# Patient Record
Sex: MALE | Race: WHITE | NOT HISPANIC OR LATINO | Employment: FULL TIME | URBAN - METROPOLITAN AREA
[De-identification: names, ages, dates, MRNs, and addresses within clinical notes are randomized per-mention and may not be internally consistent; named-entity substitution may affect disease eponyms.]

---

## 2017-09-01 ENCOUNTER — TELEPHONE (OUTPATIENT)
Dept: ONCOLOGY | Facility: CLINIC | Age: 37
End: 2017-09-01

## 2017-09-01 NOTE — TELEPHONE ENCOUNTER
Spoke with David Pollack, patients father.  Per Dr. Henderson, patient needs to be seen by Dr. Henderson at least once per year in order for Testosterone to be refilled.  Dr. Henderson authed 1 month refill and requested patient to make appt.  David Donovanann marie stated he understood and would relay message to his son.    [facility]  [address] [place] [id number]  Phone# [phone]  Fax# [phone]    1 month supply:  CMPD Testosterone 10% 100mg/MI  Apply 1 milliliter (4 clicks) as directed each day.    This is a Schedule CIII-V Drug.  Must be verbally called or signed order faxed.

## 2017-10-04 ENCOUNTER — TELEPHONE (OUTPATIENT)
Dept: ONCOLOGY | Facility: CLINIC | Age: 37
End: 2017-10-04

## 2017-10-04 NOTE — TELEPHONE ENCOUNTER
----- Message from Natalia Watson sent at 10/4/2017  9:21 AM EDT -----  Regarding: [facility] - RX REFILL   Contact: [phone]  PATIENT NEEDS REFILL ON TESTOSTERONE.      PHARMACY: [facility] IN [place]

## 2017-10-04 NOTE — TELEPHONE ENCOUNTER
Called, no answer- left VM. Patient was supposed to have called and set up an apt to see Dr. Henderson. No one has been in touch since 9-1-17. I asked that they call and schedule an apt. Until then no additional refills will be authorized.

## 2017-10-05 ENCOUNTER — OFFICE VISIT (OUTPATIENT)
Dept: ONCOLOGY | Facility: CLINIC | Age: 37
End: 2017-10-05

## 2017-10-05 VITALS
SYSTOLIC BLOOD PRESSURE: 127 MMHG | WEIGHT: 202 LBS | HEART RATE: 90 BPM | TEMPERATURE: 97.2 F | BODY MASS INDEX: 28.28 KG/M2 | RESPIRATION RATE: 14 BRPM | HEIGHT: 71 IN | DIASTOLIC BLOOD PRESSURE: 80 MMHG

## 2017-10-05 DIAGNOSIS — F43.10 PTSD (POST-TRAUMATIC STRESS DISORDER): ICD-10-CM

## 2017-10-05 DIAGNOSIS — E29.1 HYPOGONADISM, MALE: Primary | ICD-10-CM

## 2017-10-05 PROBLEM — C85.90 LYMPHOMA: Status: ACTIVE | Noted: 2017-10-05

## 2017-10-05 PROBLEM — H92.01 RIGHT EAR PAIN: Status: ACTIVE | Noted: 2017-10-05

## 2017-10-05 PROBLEM — Z72.0 TOBACCO USE: Status: ACTIVE | Noted: 2017-10-05

## 2017-10-05 PROBLEM — K21.9 GERD (GASTROESOPHAGEAL REFLUX DISEASE): Status: ACTIVE | Noted: 2017-10-05

## 2017-10-05 PROBLEM — T78.40XA ALLERGY: Status: ACTIVE | Noted: 2017-10-05

## 2017-10-05 PROCEDURE — 99213 OFFICE O/P EST LOW 20 MIN: CPT | Performed by: INTERNAL MEDICINE

## 2017-10-05 RX ORDER — TESTOSTERONE 10 MG/.5G
100 GEL, METERED TOPICAL DAILY
Qty: 60 G | Refills: 5 | OUTPATIENT
Start: 2017-10-05 | End: 2018-06-21 | Stop reason: SDUPTHER

## 2017-10-05 NOTE — PROGRESS NOTES
Chief Complaint   Follow-up post chemotherapy related hypogonadism.  History of Hodgkin's lymphoma diagnosed in 1999, alternately treated with high-dose therapy with auto transplant at that time.  PTSD  PROBLEM LIST   Patient Active Problem List   Diagnosis   • Hypogonadism, male   • Lymphoma   • Tobacco use   • GERD (gastroesophageal reflux disease)   • Right ear pain   • Allergy   • PTSD (post-traumatic stress disorder)       HISTORY OF PRESENT ILLNESS:   First time in that I've seen the patient in about a year and a half.  I diagnosed as hypogonadism and started him on topical testosterone at that time which has been effective.  His PTSD has been under good control.  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    Jamar is living with his girlfriend in [place].  He reports that his health has been good although, he's had some bowel issues recently which led to a colonoscopy which was apparently relatively benign last week.    Past Medical History, Past Surgical History, Social History, Family History have been reviewed and are without significant changes except as mentioned.      Medications:      Current Outpatient Prescriptions:   •  Testosterone 10 MG/ACT (2%) gel, Apply 100 mL topically Daily. Apply 1 ml 4 clicksas directed each day, Disp: 60 g, Rfl: 5    ALLERGIES:    Allergies   Allergen Reactions   • Bactrim [Sulfamethoxazole-Trimethoprim]      Skin rash   • Biaxin [Clarithromycin]        ROS:  Review of Systems   Constitutional: Negative for activity change and appetite change.        Energy level has been good   HENT: Negative for mouth sores, sinus pressure and voice change.    Eyes: Negative for visual disturbance.   Respiratory: Negative for shortness of breath.    Cardiovascular: Negative for chest pain.   Gastrointestinal: Negative for abdominal pain and vomiting.   Genitourinary: Negative for dysuria.   Musculoskeletal: Negative for  "arthralgias and myalgias.   Skin: Negative for color change.   Neurological: Negative for dizziness, syncope and headaches.   Hematological: Negative for adenopathy.   Psychiatric/Behavioral: Negative for confusion, sleep disturbance and suicidal ideas. The patient is not nervous/anxious.         Anxiety remains an issue but it's been relatively quiet for 4 months or so           Objective:    /80  Pulse 90  Temp 97.2 °F (36.2 °C)  Resp 14  Ht 71\" (180.3 cm)  Wt 202 lb (91.6 kg)  BMI 28.17 kg/m2    Physical Exam   Constitutional: He is oriented to person, place, and time. He appears well-developed and well-nourished. No distress.   Alert healthy plenty of male pattern body hair as well as a full beard   HENT:   Head: Normocephalic.   Mouth/Throat: Oropharynx is clear and moist.   Eyes: Conjunctivae and EOM are normal. No scleral icterus.   Neck: Normal range of motion. Neck supple. No thyromegaly present.   Cardiovascular: Normal rate, regular rhythm and normal heart sounds.    No murmur heard.  Pulmonary/Chest: Effort normal and breath sounds normal. He has no wheezes. He has no rales.   Abdominal: Soft. Bowel sounds are normal. He exhibits no distension and no mass. There is no tenderness.   Genitourinary:   Genitourinary Comments: Testis are a bit small-unchanged   Musculoskeletal: He exhibits no edema or tenderness.   Lymphadenopathy:     He has no cervical adenopathy.   Neurological: He is alert and oriented to person, place, and time. He displays normal reflexes. No cranial nerve deficit.   Skin: Skin is warm and dry. No rash noted.   Psychiatric: He has a normal mood and affect. Judgment normal.   Vitals reviewed.             RECENT LABS:  Hematology No results found for: WBC, HGB, HCT, MCV, RDW, MPV, PLT, AUTOIGPER, NEUTROABS, LYMPHSABS, MONOSABS, EOSABS, BASOSABS, AUTOIGNUM, NRBC    Albumin   Date Value Ref Range Status   12/30/2014 4.6 3.2 - 5.6 g/dL Final     A/G Ratio   Date Value Ref Range " Status   12/30/2014 1.7 0.7 - 2.0 Final          Perf Status: 0    Assessment/Plan    Diagnoses and all orders for this visit:    Hypogonadism, male    PTSD (post-traumatic stress disorder)      Overall, the patient is doing well.  We will renew his topical testosterone.  I asked him to make an appointment to return in about a year.      Alec Henderson MD , 10/5/2017    CC:

## 2018-03-02 ENCOUNTER — APPOINTMENT (OUTPATIENT)
Dept: CT IMAGING | Facility: HOSPITAL | Age: 38
End: 2018-03-02

## 2018-03-02 ENCOUNTER — HOSPITAL ENCOUNTER (EMERGENCY)
Facility: HOSPITAL | Age: 38
Discharge: HOME | End: 2018-03-02
Attending: EMERGENCY MEDICINE | Admitting: EMERGENCY MEDICINE

## 2018-03-02 VITALS
SYSTOLIC BLOOD PRESSURE: 128 MMHG | HEART RATE: 86 BPM | WEIGHT: 200 LBS | OXYGEN SATURATION: 96 % | TEMPERATURE: 98.6 F | HEIGHT: 71 IN | DIASTOLIC BLOOD PRESSURE: 80 MMHG | RESPIRATION RATE: 16 BRPM | BODY MASS INDEX: 28 KG/M2

## 2018-03-02 DIAGNOSIS — K57.32 DIVERTICULITIS OF LARGE INTESTINE WITHOUT PERFORATION OR ABSCESS WITHOUT BLEEDING: Primary | ICD-10-CM

## 2018-03-02 LAB
ALBUMIN SERPL-MCNC: 4.8 G/DL (ref 3.2–4.8)
ALBUMIN/GLOB SERPL: 1.8 G/DL (ref 1.5–2.5)
ALP SERPL-CCNC: 81 U/L (ref 25–100)
ALT SERPL W P-5'-P-CCNC: 59 U/L (ref 7–40)
ANION GAP SERPL CALCULATED.3IONS-SCNC: 7 MMOL/L (ref 3–11)
AST SERPL-CCNC: 41 U/L (ref 0–33)
BASOPHILS # BLD AUTO: 0.03 10*3/MM3 (ref 0–0.2)
BASOPHILS NFR BLD AUTO: 0.4 % (ref 0–1)
BILIRUB SERPL-MCNC: 0.3 MG/DL (ref 0.3–1.2)
BILIRUB UR QL STRIP: NEGATIVE
BUN BLD-MCNC: 13 MG/DL (ref 9–23)
BUN/CREAT SERPL: 11.8 (ref 7–25)
CALCIUM SPEC-SCNC: 9.3 MG/DL (ref 8.7–10.4)
CHLORIDE SERPL-SCNC: 105 MMOL/L (ref 99–109)
CLARITY UR: CLEAR
CO2 SERPL-SCNC: 26 MMOL/L (ref 20–31)
COLOR UR: YELLOW
CREAT BLD-MCNC: 1.1 MG/DL (ref 0.6–1.3)
D-LACTATE SERPL-SCNC: 0.9 MMOL/L (ref 0.5–2)
DEPRECATED RDW RBC AUTO: 48.5 FL (ref 37–54)
EOSINOPHIL # BLD AUTO: 0.07 10*3/MM3 (ref 0–0.3)
EOSINOPHIL NFR BLD AUTO: 1 % (ref 0–3)
ERYTHROCYTE [DISTWIDTH] IN BLOOD BY AUTOMATED COUNT: 14.6 % (ref 11.3–14.5)
GFR SERPL CREATININE-BSD FRML MDRD: 75 ML/MIN/1.73
GLOBULIN UR ELPH-MCNC: 2.6 GM/DL
GLUCOSE BLD-MCNC: 95 MG/DL (ref 70–100)
GLUCOSE UR STRIP-MCNC: NEGATIVE MG/DL
HCT VFR BLD AUTO: 44.9 % (ref 38.9–50.9)
HGB BLD-MCNC: 15 G/DL (ref 13.1–17.5)
HGB UR QL STRIP.AUTO: NEGATIVE
HOLD SPECIMEN: NORMAL
HOLD SPECIMEN: NORMAL
IMM GRANULOCYTES # BLD: 0.02 10*3/MM3 (ref 0–0.03)
IMM GRANULOCYTES NFR BLD: 0.3 % (ref 0–0.6)
KETONES UR QL STRIP: NEGATIVE
LEUKOCYTE ESTERASE UR QL STRIP.AUTO: NEGATIVE
LIPASE SERPL-CCNC: 48 U/L (ref 6–51)
LYMPHOCYTES # BLD AUTO: 1.85 10*3/MM3 (ref 0.6–4.8)
LYMPHOCYTES NFR BLD AUTO: 25.5 % (ref 24–44)
MCH RBC QN AUTO: 30.5 PG (ref 27–31)
MCHC RBC AUTO-ENTMCNC: 33.4 G/DL (ref 32–36)
MCV RBC AUTO: 91.4 FL (ref 80–99)
MONOCYTES # BLD AUTO: 0.62 10*3/MM3 (ref 0–1)
MONOCYTES NFR BLD AUTO: 8.5 % (ref 0–12)
NEUTROPHILS # BLD AUTO: 4.67 10*3/MM3 (ref 1.5–8.3)
NEUTROPHILS NFR BLD AUTO: 64.3 % (ref 41–71)
NITRITE UR QL STRIP: NEGATIVE
PH UR STRIP.AUTO: <=5 [PH] (ref 5–8)
PLATELET # BLD AUTO: 162 10*3/MM3 (ref 150–450)
PMV BLD AUTO: 9.5 FL (ref 6–12)
POTASSIUM BLD-SCNC: 3.4 MMOL/L (ref 3.5–5.5)
PROT SERPL-MCNC: 7.4 G/DL (ref 5.7–8.2)
PROT UR QL STRIP: NEGATIVE
RBC # BLD AUTO: 4.91 10*6/MM3 (ref 4.2–5.76)
SODIUM BLD-SCNC: 138 MMOL/L (ref 132–146)
SP GR UR STRIP: 1.01 (ref 1–1.03)
UROBILINOGEN UR QL STRIP: NORMAL
WBC NRBC COR # BLD: 7.26 10*3/MM3 (ref 3.5–10.8)
WHOLE BLOOD HOLD SPECIMEN: NORMAL
WHOLE BLOOD HOLD SPECIMEN: NORMAL

## 2018-03-02 PROCEDURE — 25010000002 ONDANSETRON PER 1 MG: Performed by: EMERGENCY MEDICINE

## 2018-03-02 PROCEDURE — 0 IOPAMIDOL 61 % SOLUTION: Performed by: EMERGENCY MEDICINE

## 2018-03-02 PROCEDURE — 83690 ASSAY OF LIPASE: CPT | Performed by: EMERGENCY MEDICINE

## 2018-03-02 PROCEDURE — 80053 COMPREHEN METABOLIC PANEL: CPT | Performed by: EMERGENCY MEDICINE

## 2018-03-02 PROCEDURE — 96375 TX/PRO/DX INJ NEW DRUG ADDON: CPT

## 2018-03-02 PROCEDURE — 96374 THER/PROPH/DIAG INJ IV PUSH: CPT

## 2018-03-02 PROCEDURE — 25010000002 HYDROMORPHONE PER 4 MG: Performed by: EMERGENCY MEDICINE

## 2018-03-02 PROCEDURE — 83605 ASSAY OF LACTIC ACID: CPT | Performed by: EMERGENCY MEDICINE

## 2018-03-02 PROCEDURE — 81003 URINALYSIS AUTO W/O SCOPE: CPT | Performed by: EMERGENCY MEDICINE

## 2018-03-02 PROCEDURE — 99283 EMERGENCY DEPT VISIT LOW MDM: CPT

## 2018-03-02 PROCEDURE — 85025 COMPLETE CBC W/AUTO DIFF WBC: CPT | Performed by: EMERGENCY MEDICINE

## 2018-03-02 PROCEDURE — 74177 CT ABD & PELVIS W/CONTRAST: CPT

## 2018-03-02 RX ORDER — METRONIDAZOLE 500 MG/1
500 TABLET ORAL 3 TIMES DAILY
Qty: 30 TABLET | Refills: 0 | Status: SHIPPED | OUTPATIENT
Start: 2018-03-02 | End: 2018-06-21

## 2018-03-02 RX ORDER — SODIUM CHLORIDE 0.9 % (FLUSH) 0.9 %
10 SYRINGE (ML) INJECTION AS NEEDED
Status: DISCONTINUED | OUTPATIENT
Start: 2018-03-02 | End: 2018-03-03 | Stop reason: HOSPADM

## 2018-03-02 RX ORDER — CIPROFLOXACIN 500 MG/1
500 TABLET, FILM COATED ORAL 2 TIMES DAILY
Qty: 20 TABLET | Refills: 0 | Status: SHIPPED | OUTPATIENT
Start: 2018-03-02 | End: 2018-06-21

## 2018-03-02 RX ORDER — HYDROCODONE BITARTRATE AND ACETAMINOPHEN 5; 325 MG/1; MG/1
1 TABLET ORAL EVERY 6 HOURS PRN
Qty: 12 TABLET | Refills: 0 | Status: SHIPPED | OUTPATIENT
Start: 2018-03-02 | End: 2018-06-21

## 2018-03-02 RX ORDER — ONDANSETRON 2 MG/ML
4 INJECTION INTRAMUSCULAR; INTRAVENOUS ONCE
Status: COMPLETED | OUTPATIENT
Start: 2018-03-02 | End: 2018-03-02

## 2018-03-02 RX ADMIN — IOPAMIDOL 95 ML: 612 INJECTION, SOLUTION INTRAVENOUS at 22:42

## 2018-03-02 RX ADMIN — SODIUM CHLORIDE 1000 ML: 9 INJECTION, SOLUTION INTRAVENOUS at 22:23

## 2018-03-02 RX ADMIN — ONDANSETRON 4 MG: 2 INJECTION INTRAMUSCULAR; INTRAVENOUS at 22:25

## 2018-03-02 RX ADMIN — HYDROMORPHONE HYDROCHLORIDE 1 MG: 10 INJECTION INTRAMUSCULAR; INTRAVENOUS; SUBCUTANEOUS at 22:26

## 2018-03-03 NOTE — DISCHARGE INSTRUCTIONS
Follow up with your primary care physician in one week.     Immediately return to the emergency department for any new or worsening symptoms.

## 2018-03-03 NOTE — ED PROVIDER NOTES
"Subjective   HPI Comments: 37-year-old male presents complaining of acute on chronic left-sided abdominal pain.  He states that he has a history of both kidney stones and diverticulitis as well as low back pain.  For the past 5 months, he has been experiencing intermittent pain in his left groin and flank.  However, since earlier today, the pain has intensified.  It is currently 9 out of 10 in severity and limited to his left lower quadrant.  No accompanying vomiting, fevers, or systemic symptoms.  He denies any injury or trauma.  No bowel or bladder incontinence or retention.  No IV drug use.  No difficulty walking.  He endorses occasional \"shooting pains in his left leg.\"  No urinary complaints.    Patient is a 37 y.o. male presenting with abdominal pain.   History provided by:  Patient  Abdominal Pain   Pain location:  LLQ and L flank  Pain quality: aching    Pain radiates to:  Does not radiate  Pain severity:  Moderate  Onset quality:  Sudden  Timing:  Constant  Progression:  Worsening  Chronicity:  Recurrent  Relieved by:  None tried  Worsened by:  Nothing  Ineffective treatments:  None tried  Risk factors comment:  Hx of kidney stones and diverticulitis.      Review of Systems   Gastrointestinal: Positive for abdominal pain.   Genitourinary: Positive for flank pain.   Musculoskeletal: Negative for back pain.   Neurological: Positive for numbness.        Tingling sensation.   All other systems reviewed and are negative.      Past Medical History:   Diagnosis Date   • Allergic rhinitis    • Chronic right ear pain    • GERD (gastroesophageal reflux disease)    • Hodgkin lymphoma    • Hypogonadism in male    • PTSD (post-traumatic stress disorder)        Allergies   Allergen Reactions   • Bactrim [Sulfamethoxazole-Trimethoprim]      Skin rash   • Biaxin [Clarithromycin]        Past Surgical History:   Procedure Laterality Date   • COLONOSCOPY         Family History   Problem Relation Age of Onset   • Lung cancer " Father    • Cancer Father      Positive for cured lung cancer- he was smoker       Social History     Social History   • Marital status: Single     Social History Main Topics   • Smoking status: Current Every Day Smoker   • Smokeless tobacco: Never Used   • Alcohol use Yes   • Drug use: No         Objective   Physical Exam   Constitutional: He is oriented to person, place, and time. He appears well-developed and well-nourished. No distress.   Well-appearing male in no acute distress   HENT:   Head: Normocephalic and atraumatic.   Mouth/Throat: Oropharynx is clear and moist.   No mucous membrane lesions   Neck: No JVD present.   Cardiovascular: Normal rate, regular rhythm and normal heart sounds.  Exam reveals no gallop and no friction rub.    No murmur heard.  Pulmonary/Chest: Effort normal and breath sounds normal. No respiratory distress. He has no wheezes. He has no rales.   Abdominal: Soft. Bowel sounds are normal. He exhibits no distension and no mass. There is tenderness. There is guarding.   Focal tenderness to palpation noted to left lower quadrant with voluntary guarding present   Genitourinary:   Genitourinary Comments: Unremarkable  exam with normal testicular lie, no CVA tenderness to palpation   Musculoskeletal: Normal range of motion.   Neurological: He is alert and oriented to person, place, and time. He displays normal reflexes.   No saddle anesthesia, normal gait, 5 out of 5 strength in all fours, neurovascularly intact distally in all fours   Skin: Skin is warm and dry. No rash noted. He is not diaphoretic. No erythema. No pallor.   Psychiatric: He has a normal mood and affect. Judgment and thought content normal.   Nursing note and vitals reviewed.      Procedures         ED Course  ED Course   Comment By Time   37-year-old male presents complaining of acute on chronic left-sided abdominal and flank pain.  He has a history of prior kidney stones, diverticulitis, and low back pain and is unsure  "as to which of these may be triggering his symptoms.  On arrival to the ED, patient nontoxic appearing.  Exam remarkable for focal left lower quadrant tenderness to palpation with voluntary guarding present.  No CVA tenderness.  Unremarkable  exam.  No focal neurological deficits noted.  Normal gait.  We will obtain labs and imaging and reassess after IV fluids and pain medications. Josesito Avina MD 03/02 2240   CT suggestive of diverticulitis.  Given the patient's labs and clinical presentation, he is appropriate for a trial of outpatient antibiotics.  We will give him prescriptions for both Cipro and Flagyl and he will follow-up with his primary care physician within one week.  Agreeable with plan and given appropriate strict return precautions. Josesito Avina MD 03/02 2312     No results found for this or any previous visit (from the past 24 hour(s)).  Note: In addition to lab results from this visit, the labs listed above may include labs taken at another facility or during a different encounter within the last 24 hours. Please correlate lab times with ED admission and discharge times for further clarification of the services performed during this visit.    No orders to display     Vitals:    03/02/18 1819 03/02/18 1834   BP: 128/80    BP Location: Left arm    Patient Position: Sitting    Pulse: 101    Resp: 16    Temp: 98.6 °F (37 °C)    TempSrc: Oral    SpO2: 98%    Weight:  90.7 kg (200 lb)   Height:  180.3 cm (71\")     Medications   sodium chloride 0.9 % flush 10 mL (not administered)     ECG/EMG Results (last 24 hours)     ** No results found for the last 24 hours. **                  Recent Results (from the past 24 hour(s))   Urinalysis With / Culture If Indicated - Urine, Clean Catch    Collection Time: 03/02/18  9:56 PM   Result Value Ref Range    Color, UA Yellow Yellow, Straw    Appearance, UA Clear Clear    pH, UA <=5.0 5.0 - 8.0    Specific Gravity, UA 1.009 1.001 - 1.030    Glucose, UA " Negative Negative    Ketones, UA Negative Negative    Bilirubin, UA Negative Negative    Blood, UA Negative Negative    Protein, UA Negative Negative    Leuk Esterase, UA Negative Negative    Nitrite, UA Negative Negative    Urobilinogen, UA 0.2 E.U./dL 0.2 - 1.0 E.U./dL   Light Blue Top    Collection Time: 03/02/18 10:11 PM   Result Value Ref Range    Extra Tube hold for add-on    Lactic Acid, Plasma    Collection Time: 03/02/18 10:11 PM   Result Value Ref Range    Lactate 0.9 0.5 - 2.0 mmol/L   Comprehensive Metabolic Panel    Collection Time: 03/02/18 10:12 PM   Result Value Ref Range    Glucose 95 70 - 100 mg/dL    BUN 13 9 - 23 mg/dL    Creatinine 1.10 0.60 - 1.30 mg/dL    Sodium 138 132 - 146 mmol/L    Potassium 3.4 (L) 3.5 - 5.5 mmol/L    Chloride 105 99 - 109 mmol/L    CO2 26.0 20.0 - 31.0 mmol/L    Calcium 9.3 8.7 - 10.4 mg/dL    Total Protein 7.4 5.7 - 8.2 g/dL    Albumin 4.80 3.20 - 4.80 g/dL    ALT (SGPT) 59 (H) 7 - 40 U/L    AST (SGOT) 41 (H) 0 - 33 U/L    Alkaline Phosphatase 81 25 - 100 U/L    Total Bilirubin 0.3 0.3 - 1.2 mg/dL    eGFR Non African Amer 75 >60 mL/min/1.73    Globulin 2.6 gm/dL    A/G Ratio 1.8 1.5 - 2.5 g/dL    BUN/Creatinine Ratio 11.8 7.0 - 25.0    Anion Gap 7.0 3.0 - 11.0 mmol/L   Lipase    Collection Time: 03/02/18 10:12 PM   Result Value Ref Range    Lipase 48 6 - 51 U/L   Green Top (Gel)    Collection Time: 03/02/18 10:12 PM   Result Value Ref Range    Extra Tube Hold for add-ons.    Lavender Top    Collection Time: 03/02/18 10:12 PM   Result Value Ref Range    Extra Tube hold for add-on    Gold Top - SST    Collection Time: 03/02/18 10:12 PM   Result Value Ref Range    Extra Tube Hold for add-ons.    CBC Auto Differential    Collection Time: 03/02/18 10:12 PM   Result Value Ref Range    WBC 7.26 3.50 - 10.80 10*3/mm3    RBC 4.91 4.20 - 5.76 10*6/mm3    Hemoglobin 15.0 13.1 - 17.5 g/dL    Hematocrit 44.9 38.9 - 50.9 %    MCV 91.4 80.0 - 99.0 fL    MCH 30.5 27.0 - 31.0 pg    MCHC  "33.4 32.0 - 36.0 g/dL    RDW 14.6 (H) 11.3 - 14.5 %    RDW-SD 48.5 37.0 - 54.0 fl    MPV 9.5 6.0 - 12.0 fL    Platelets 162 150 - 450 10*3/mm3    Neutrophil % 64.3 41.0 - 71.0 %    Lymphocyte % 25.5 24.0 - 44.0 %    Monocyte % 8.5 0.0 - 12.0 %    Eosinophil % 1.0 0.0 - 3.0 %    Basophil % 0.4 0.0 - 1.0 %    Immature Grans % 0.3 0.0 - 0.6 %    Neutrophils, Absolute 4.67 1.50 - 8.30 10*3/mm3    Lymphocytes, Absolute 1.85 0.60 - 4.80 10*3/mm3    Monocytes, Absolute 0.62 0.00 - 1.00 10*3/mm3    Eosinophils, Absolute 0.07 0.00 - 0.30 10*3/mm3    Basophils, Absolute 0.03 0.00 - 0.20 10*3/mm3    Immature Grans, Absolute 0.02 0.00 - 0.03 10*3/mm3     Note: In addition to lab results from this visit, the labs listed above may include labs taken at another facility or during a different encounter within the last 24 hours. Please correlate lab times with ED admission and discharge times for further clarification of the services performed during this visit.    CT Abdomen Pelvis With Contrast   Final Result   1.  Distal colon diverticulosis.   2.  Mild thickening and inflammatory change along the sigmoid and distal colon.    Differential diagnosis includes acute diverticulitis versus nonspecific distal    colitis.      THIS DOCUMENT HAS BEEN ELECTRONICALLY SIGNED BY LOUISA MONTEZ JR. MD        Vitals:    03/02/18 1819 03/02/18 1834 03/02/18 2256   BP: 128/80     BP Location: Left arm     Patient Position: Sitting     Pulse: 101  86   Resp: 16     Temp: 98.6 °F (37 °C)     TempSrc: Oral     SpO2: 98%  96%   Weight:  90.7 kg (200 lb)    Height:  180.3 cm (71\")      Medications   sodium chloride 0.9 % flush 10 mL (not administered)   sodium chloride 0.9 % bolus 1,000 mL (1,000 mL Intravenous New Bag 3/2/18 0300)   HYDROmorphone (DILAUDID) injection 1 mg (1 mg Intravenous Given 3/2/18 2226)   ondansetron (ZOFRAN) injection 4 mg (4 mg Intravenous Given 3/2/18 2225)   iopamidol (ISOVUE-300) 61 % injection 100 mL (95 mL Intravenous Given " 3/2/18 2242)     ECG/EMG Results (last 24 hours)     ** No results found for the last 24 hours. **              [facility]    Final diagnoses:   Diverticulitis of large intestine without perforation or abscess without bleeding       Documentation assistance provided by lori Armas.  Information recorded by the scribe was done at my direction and has been verified and validated by me.     Job Armas  03/02/18 9927       Josesito Avina MD  03/02/18 6175

## 2018-06-21 ENCOUNTER — OFFICE VISIT (OUTPATIENT)
Dept: FAMILY MEDICINE CLINIC | Facility: CLINIC | Age: 38
End: 2018-06-21

## 2018-06-21 ENCOUNTER — TELEPHONE (OUTPATIENT)
Dept: FAMILY MEDICINE CLINIC | Facility: CLINIC | Age: 38
End: 2018-06-21

## 2018-06-21 VITALS
RESPIRATION RATE: 16 BRPM | SYSTOLIC BLOOD PRESSURE: 110 MMHG | OXYGEN SATURATION: 99 % | BODY MASS INDEX: 30.1 KG/M2 | HEIGHT: 71 IN | DIASTOLIC BLOOD PRESSURE: 70 MMHG | TEMPERATURE: 98.3 F | WEIGHT: 215 LBS | HEART RATE: 93 BPM

## 2018-06-21 DIAGNOSIS — K57.30 DIVERTICULA OF COLON: Primary | ICD-10-CM

## 2018-06-21 DIAGNOSIS — E29.1 HYPOGONADISM, MALE: ICD-10-CM

## 2018-06-21 PROCEDURE — 99203 OFFICE O/P NEW LOW 30 MIN: CPT | Performed by: FAMILY MEDICINE

## 2018-06-21 RX ORDER — CIPROFLOXACIN 500 MG/1
500 TABLET, FILM COATED ORAL 2 TIMES DAILY
Qty: 20 TABLET | Refills: 0 | Status: SHIPPED | OUTPATIENT
Start: 2018-06-21 | End: 2019-03-04

## 2018-06-21 RX ORDER — METRONIDAZOLE 250 MG/1
250 TABLET ORAL 3 TIMES DAILY
Qty: 30 TABLET | Refills: 0 | Status: SHIPPED | OUTPATIENT
Start: 2018-06-21 | End: 2019-03-04

## 2018-06-21 RX ORDER — TESTOSTERONE 10 MG/.5G
100 GEL, METERED TOPICAL DAILY
Qty: 60 G | Refills: 5 | OUTPATIENT
Start: 2018-06-21 | End: 2022-05-05

## 2018-06-21 RX ORDER — DICYCLOMINE HYDROCHLORIDE 10 MG/1
10 CAPSULE ORAL 3 TIMES DAILY PRN
Qty: 30 CAPSULE | Refills: 0 | Status: SHIPPED | OUTPATIENT
Start: 2018-06-21 | End: 2019-03-04

## 2018-06-21 NOTE — PROGRESS NOTES
Subjective   Palomo Pollack is a 38 y.o. male.     History of Present Illness   August 2017 diverticulitis with three episodes.  Two days abdominal discomfort, obstipation, chills, bloated.  Last bowel movement three days. Appetite OK but afraid to eat. Past treated with antibiotics. Not evaluated by surgery.  Uses testosterone topical daily.  The following portions of the patient's history were reviewed and updated as appropriate: allergies, current medications, past family history, past medical history, past social history, past surgical history and problem list.    Review of Systems   Constitutional: Positive for chills. Negative for fever.   HENT: Negative.    Respiratory: Negative.    Cardiovascular: Negative.    Gastrointestinal: Positive for abdominal distention and abdominal pain. Negative for constipation.       Objective   Physical Exam   Constitutional: He appears well-developed.   HENT:   Mouth/Throat: Oropharynx is clear and moist.   Cardiovascular: Normal heart sounds.    Pulmonary/Chest: Breath sounds normal.   Abdominal: He exhibits distension. There is no hepatomegaly. There is tenderness in the right lower quadrant and left lower quadrant. There is no CVA tenderness.   Neurological: He is alert.   Skin: No rash noted.   Vitals reviewed.      Assessment/Plan   Palomo was seen today for diverticulitis.    Diagnoses and all orders for this visit:    Diverticula of colon  -     ciprofloxacin (CIPRO) 500 MG tablet; Take 1 tablet by mouth 2 (Two) Times a Day.  -     metroNIDAZOLE (FLAGYL) 250 MG tablet; Take 1 tablet by mouth 3 (Three) Times a Day.    Hypogonadism, male  -     Testosterone 10 MG/ACT (2%) gel; Apply 100 mL topically Daily. Apply 1 ml 4 clicks as directed each day

## 2018-06-21 NOTE — TELEPHONE ENCOUNTER
----- Message from Liam Willett sent at 6/21/2018 11:24 AM EDT -----  Contact: PT'S MOM ([phone] ABHISHEK)  PT'S MOM CALLED AND WANTS TO KNOW IF YOU WILL CALL IN PT'S TESTOSTERONE MEDS IN TO [facility] IN [place] PHONE [phone]    PT'S MOM WANTS A CALL BACK FROM SHARMIN     Pt wanted to know if rx for testosterone could be called in, advised that because of the class of controlled med that it was, I could not call in, has to be hand delivered.  BBsukumar, CMA

## 2019-03-04 ENCOUNTER — OFFICE VISIT (OUTPATIENT)
Dept: FAMILY MEDICINE CLINIC | Facility: CLINIC | Age: 39
End: 2019-03-04

## 2019-03-04 VITALS
HEIGHT: 71 IN | TEMPERATURE: 97.2 F | BODY MASS INDEX: 29.57 KG/M2 | OXYGEN SATURATION: 99 % | DIASTOLIC BLOOD PRESSURE: 90 MMHG | HEART RATE: 115 BPM | RESPIRATION RATE: 18 BRPM | WEIGHT: 211.25 LBS | SYSTOLIC BLOOD PRESSURE: 142 MMHG

## 2019-03-04 DIAGNOSIS — E29.1 HYPOGONADISM, MALE: ICD-10-CM

## 2019-03-04 DIAGNOSIS — J01.40 ACUTE NON-RECURRENT PANSINUSITIS: Primary | ICD-10-CM

## 2019-03-04 PROBLEM — H92.01 RIGHT EAR PAIN: Status: RESOLVED | Noted: 2017-10-05 | Resolved: 2019-03-04

## 2019-03-04 PROCEDURE — 99214 OFFICE O/P EST MOD 30 MIN: CPT | Performed by: NURSE PRACTITIONER

## 2019-03-04 RX ORDER — CEFDINIR 300 MG/1
300 CAPSULE ORAL 2 TIMES DAILY
Qty: 20 CAPSULE | Refills: 0 | Status: SHIPPED | OUTPATIENT
Start: 2019-03-04 | End: 2019-03-14

## 2019-03-04 NOTE — PATIENT INSTRUCTIONS
Sinusitis, Adult  Sinusitis is soreness and inflammation of your sinuses. Sinuses are hollow spaces in the bones around your face. Your sinuses are located:  · Around your eyes.  · In the middle of your forehead.  · Behind your nose.  · In your cheekbones.    Your sinuses and nasal passages are lined with a stringy fluid (mucus). Mucus normally drains out of your sinuses. When your nasal tissues become inflamed or swollen, the mucus can become trapped or blocked so air cannot flow through your sinuses. This allows bacteria, viruses, and funguses to grow, which leads to infection.  Sinusitis can develop quickly and last for 7?10 days (acute) or for more than 12 weeks (chronic). Sinusitis often develops after a cold.  What are the causes?  This condition is caused by anything that creates swelling in the sinuses or stops mucus from draining, including:  · Allergies.  · Asthma.  · Bacterial or viral infection.  · Abnormally shaped bones between the nasal passages.  · Nasal growths that contain mucus (nasal polyps).  · Narrow sinus openings.  · Pollutants, such as chemicals or irritants in the air.  · A foreign object stuck in the nose.  · A fungal infection. This is rare.    What increases the risk?  The following factors may make you more likely to develop this condition:  · Having allergies or asthma.  · Having had a recent cold or respiratory tract infection.  · Having structural deformities or blockages in your nose or sinuses.  · Having a weak immune system.  · Doing a lot of swimming or diving.  · Overusing nasal sprays.  · Smoking.    What are the signs or symptoms?  The main symptoms of this condition are pain and a feeling of pressure around the affected sinuses. Other symptoms include:  · Upper toothache.  · Earache.  · Headache.  · Bad breath.  · Decreased sense of smell and taste.  · A cough that may get worse at night.  · Fatigue.  · Fever.  · Thick drainage from your nose. The drainage is often green and  it may contain pus (purulent).  · Stuffy nose or congestion.  · Postnasal drip. This is when extra mucus collects in the throat or back of the nose.  · Swelling and warmth over the affected sinuses.  · Sore throat.  · Sensitivity to light.    How is this diagnosed?  This condition is diagnosed based on symptoms, a medical history, and a physical exam. To find out if your condition is acute or chronic, your health care provider may:  · Look in your nose for signs of nasal polyps.  · Tap over the affected sinus to check for signs of infection.  · View the inside of your sinuses using an imaging device that has a light attached (endoscope).    If your health care provider suspects that you have chronic sinusitis, you may also:  · Be tested for allergies.  · Have a sample of mucus taken from your nose (nasal culture) and checked for bacteria.  · Have a mucus sample examined to see if your sinusitis is related to an allergy.    If your sinusitis does not respond to treatment and it lasts longer than 8 weeks, you may have an MRI or CT scan to check your sinuses. These scans also help to determine how severe your infection is.  In rare cases, a bone biopsy may be done to rule out more serious types of fungal sinus disease.  How is this treated?  Treatment for sinusitis depends on the cause and whether your condition is chronic or acute. If a virus is causing your sinusitis, your symptoms will go away on their own within 10 days. You may be given medicines to relieve your symptoms, including:  · Topical nasal decongestants. They shrink swollen nasal passages and let mucus drain from your sinuses.  · Antihistamines. These drugs block inflammation that is triggered by allergies. This can help to ease swelling in your nose and sinuses.  · Topical nasal corticosteroids. These are nasal sprays that ease inflammation and swelling in your nose and sinuses.  · Nasal saline washes. These rinses can help to get rid of thick mucus in  your nose.    If your condition is caused by bacteria, you will be given an antibiotic medicine. If your condition is caused by a fungus, you will be given an antifungal medicine.  Surgery may be needed to correct underlying conditions, such as narrow nasal passages. Surgery may also be needed to remove polyps.  Follow these instructions at home:  Medicines  · Take, use, or apply over-the-counter and prescription medicines only as told by your health care provider. These may include nasal sprays.  · If you were prescribed an antibiotic medicine, take it as told by your health care provider. Do not stop taking the antibiotic even if you start to feel better.  Hydrate and Humidify  · Drink enough water to keep your urine clear or pale yellow. Staying hydrated will help to thin your mucus.  · Use a cool mist humidifier to keep the humidity level in your home above 50%.  · Inhale steam for 10-15 minutes, 3-4 times a day or as told by your health care provider. You can do this in the bathroom while a hot shower is running.  · Limit your exposure to cool or dry air.  Rest  · Rest as much as possible.  · Sleep with your head raised (elevated).  · Make sure to get enough sleep each night.  General instructions  · Apply a warm, moist washcloth to your face 3-4 times a day or as told by your health care provider. This will help with discomfort.  · Wash your hands often with soap and water to reduce your exposure to viruses and other germs. If soap and water are not available, use hand .  · Do not smoke. Avoid being around people who are smoking (secondhand smoke).  · Keep all follow-up visits as told by your health care provider. This is important.  Contact a health care provider if:  · You have a fever.  · Your symptoms get worse.  · Your symptoms do not improve within 10 days.  Get help right away if:  · You have a severe headache.  · You have persistent vomiting.  · You have pain or swelling around your face or  eyes.  · You have vision problems.  · You develop confusion.  · Your neck is stiff.  · You have trouble breathing.  This information is not intended to replace advice given to you by your health care provider. Make sure you discuss any questions you have with your health care provider.  Document Released: 12/18/2006 Document Revised: 08/13/2017 Document Reviewed: 10/12/2016  [employer] Interactive Patient Education © 2018 [employer]  Cefdinir capsules  What is this medicine?  CEFDINIR (SEF di ner) is a cephalosporin antibiotic. It is used to treat certain kinds of bacterial infections. It will not work for colds, flu, or other viral infections.  This medicine may be used for other purposes; ask your health care provider or pharmacist if you have questions.  COMMON BRAND NAME(S): Talha  What should I tell my health care provider before I take this medicine?  They need to know if you have any of these conditions:  -bleeding problems  -kidney disease  -stomach or intestine problems (especially colitis)  -an unusual or allergic reaction to cefdinir, other cephalosporin antibiotics, penicillin, penicillamine, other foods, dyes or preservatives  -pregnant or trying to get pregnant  -breast-feeding  How should I use this medicine?  Take this medicine by mouth. Swallow it with a drink of water. Follow the directions on the prescription label. You can take it with or without food. If it upsets your stomach it may help to take it with food. Take your doses at regular intervals. Do not take it more often than directed. Finish all the medicine you are prescribed even if you think your infection is better.  Talk to your pediatrician regarding the use of this medicine in children. Special care may be needed.  Overdosage: If you think you have taken too much of this medicine contact a poison control center or emergency room at once.  NOTE: This medicine is only for you. Do not share this medicine with others.  What if I miss a  dose?  If you miss a dose, take it as soon as you can. If it is almost time for your next dose, take only that dose. Do not take double or extra doses.  What may interact with this medicine?  -antacids that contain aluminum or magnesium  -iron supplements  -other antibiotics  -probenecid  This list may not describe all possible interactions. Give your health care provider a list of all the medicines, herbs, non-prescription drugs, or dietary supplements you use. Also tell them if you smoke, drink alcohol, or use illegal drugs. Some items may interact with your medicine.  What should I watch for while using this medicine?  Tell your doctor or health care professional if your symptoms do not get better in a few days.  If you are diabetic you may get a false-positive result for sugar in your urine. Check with your doctor or health care professional before you change your diet or the dose of your diabetes medicine.  What side effects may I notice from receiving this medicine?  Side effects that you should report to your doctor or health care professional as soon as possible:  -allergic reactions like skin rash, itching or hives, swelling of the face, lips, or tongue  -bloody or watery diarrhea  -breathing problems  -fever  -redness, blistering, peeling or loosening of the skin, including inside the mouth  -seizures  -trouble passing urine or change in the amount of urine  -unusual bleeding or bruising  -unusually weak or tired  Side effects that usually do not require medical attention (report to your doctor or health care professional if they continue or are bothersome):  -constipation  -diarrhea  -dizziness  -dry mouth  -headache  -loss of appetite  -nausea, vomiting  -stomach pain  -stool discoloration  -tiredness  -vaginal discharge, itching, or odor in women  This list may not describe all possible side effects. Call your doctor for medical advice about side effects. You may report side effects to FDA at  1-800-FDA-1088.  Where should I keep my medicine?  Keep out of the reach of children.  Store at room temperature between 15 and 30 degrees C (59 and 86 degrees F). Throw the medicine away after the expiration date.  NOTE: This sheet is a summary. It may not cover all possible information. If you have questions about this medicine, talk to your doctor, pharmacist, or health care provider.  © 2018 [employer] (2017-04-24 15:52:44)    Testosterone Replacement Therapy  Testosterone replacement therapy (TRT) is used to treat men who have a low testosterone level (hypogonadism). Testosterone is a male hormone that is produced in the testicles. It is responsible for typically male characteristics and for maintaining a man's sex drive and the ability to get an erection. Testosterone also supports bone and muscle health. TRT can be a gel, liquid, or patch that you put on your skin. It can also be in the form of a tablet or an injection. In some cases, your health care provider may insert long-acting pellets under your skin.  In most men, the level of testosterone starts to decline gradually after age 45. Low testosterone can also be caused by certain medical conditions, medicines, and obesity. Your health care provider can diagnose hypogonadism with at least two blood tests that are done early in the morning.  Low testosterone may not need to be treated. TRT is usually a choice that you make with your health care provider. Your health care provider may recommend TRT if you have low testosterone that is causing symptoms, such as:  · Low sex drive.  · Erection problems.  · Breast enlargement.  · Loss of body hair.  · Weak muscles or bones.  · Shrinking testicles.  · Increased body fat.  · Low energy.  · Hot flashes.  · Depression.  · Decreased work performance.    TRT is a lifetime treatment. If you stop treatment, your testosterone will drop, and your symptoms may return.  What are the risks?  Testosterone  replacement therapy may have side effects, including:  · Lower sperm count.  · Skin irritation at the application or injection site.  · Mouth irritation if you take an oral tablet.  · Acne.  · Swelling of your legs or feet.  · Tender breasts.  · Dizziness.  · Sleep disturbance.  · Mood swings.  · Possible increased risk of stroke or heart attack.    Testosterone replacement therapy may also increase your risk for prostate cancer or male breast cancer. You should not use TRT if you have either of those conditions. Your health care provider also may not recommend TRT if:  · You are suspected of having prostate cancer.  · You want to father a child.  · You have a high number of red blood cells.  · You have untreated sleep apnea.  · You have a very large prostate.    Supplies needed:  · Your health care provider will prescribe the testosterone gel, solution, or medicine that you need. If your health care provider teaches you to do self-injections at home, you will also need:  ? Your medicine vial.  ? Disposable needles and syringes.  ? Alcohol swabs.  ? A needle disposal container.  ? Adhesive bandages.  How to use testosterone replacement therapy  Your health care provider will help you find the TRT option that will work best for you based on your preference, the side effects, and the cost. You may:  · Rub testosterone gel on your upper arm or shoulder every day after a shower. This is the most common type of TRT. Do not let women or children come in contact with the gel.  · Apply a testosterone solution under your arms once each day.  · Place a testosterone patch on your skin once each day.  · Dissolve a testosterone tablet in your mouth twice each day.  · Have a testosterone pellet inserted under your skin by your health care provider. This will be replaced every 3-6 months.  · Use testosterone nasal spray three times each day.  · Get testosterone injections. For some types of testosterone, your health care provider  will give you this injection. With other types of testosterone, you may be taught to give injections to yourself. The frequency of injections may vary based on the type of testosterone that you receive.    Follow these instructions at home:  · Take over-the-counter and prescription medicines only as told by your health care provider.  · Lose weight if you are overweight. Ask your health care provider to help you start a healthy diet and exercise program to reach and maintain a healthy weight.  · Work with your health care provider to treat other medical conditions that may lower your testosterone. These include obesity, high blood pressure, high cholesterol, diabetes, liver disease, kidney disease, and sleep apnea.  · Keep all follow-up visits as told by your health care provider. This is important.  General recommendations  · Discuss all risks and benefits with your health care provider before starting therapy.  · Work with your health care provider to check your prostate health and do blood testing before you start therapy.  · Do not use any testosterone replacement therapies that are not prescribed by your health care provider or not approved for use in the [place]  · Do not use TRT for bodybuilding or to improve sexual performance. TRT should be used only to treat symptoms of low testosterone.  · Return for all repeat prostate checks and blood tests during therapy, as told by your health care provider.  Where to find more information:  Learn more about testosterone replacement therapy from:  · [employer]: www.urologyhealth.org/urologic-conditions/low-testosterone-(hypogonadism)  · Endocrine Society: www.hormone.org/diseases-and-conditions/mens-health/hypogonadism    Contact a health care provider if:  · You have side effects from your testosterone replacement therapy.  · You continue to have symptoms of low testosterone during treatment.  · You develop new symptoms during  treatment.  Summary  · Testosterone replacement therapy is only for men who have low testosterone as determined by blood testing and who have symptoms of low testosterone.  · Testosterone replacement therapy should be prescribed only by a health care provider and should be used under the supervision of a health care provider.  · You may not be able to take testosterone if you have certain medical conditions, including prostate cancer, male breast cancer, or heart disease.  · Testosterone replacement therapy may have side effects and may make some medical conditions worse.  · Talk with your health care provider about all the risks and benefits before you start therapy.  This information is not intended to replace advice given to you by your health care provider. Make sure you discuss any questions you have with your health care provider.  Document Released: 09/07/2017 Document Revised: 09/07/2017 Document Reviewed: 09/07/2017  [employer] Interactive Patient Education © 2018 [employer].     no

## 2019-03-04 NOTE — PROGRESS NOTES
Subjective   Palomo Pollack is a 38 y.o. male.     Mr. Pollack presents today with c/o URI/sinus symptoms for over one month. He is also requesting a refill on his testosterone gel.      URI    This is a new problem. The current episode started more than 1 month ago. The problem has been gradually worsening. There has been no fever. Associated symptoms include congestion, coughing, headaches, a plugged ear sensation, sinus pain and a sore throat. Pertinent negatives include no abdominal pain, chest pain, diarrhea, ear pain, nausea, rash, vomiting or wheezing. He has tried nothing for the symptoms.       The following portions of the patient's history were reviewed and updated as appropriate: allergies, current medications, past family history, past medical history, past social history, past surgical history and problem list.     Allergies   Allergen Reactions   • Bactrim [Sulfamethoxazole-Trimethoprim]      Skin rash   • Biaxin [Clarithromycin]      Review of Systems   Constitutional: Negative for appetite change, chills, diaphoresis, fatigue and fever.   HENT: Positive for congestion, postnasal drip, sinus pressure, sinus pain and sore throat. Negative for ear discharge, ear pain and trouble swallowing.    Respiratory: Positive for cough. Negative for chest tightness, shortness of breath, wheezing and stridor.    Cardiovascular: Negative.  Negative for chest pain.   Gastrointestinal: Negative for abdominal pain, diarrhea, nausea and vomiting.   Musculoskeletal: Negative for arthralgias and myalgias.   Skin: Negative for rash.   Neurological: Positive for headaches. Negative for dizziness.       Objective   Physical Exam   Constitutional: He is oriented to person, place, and time. He appears well-developed and well-nourished. He is cooperative. No distress.   HENT:   Head: Normocephalic and atraumatic.   Right Ear: External ear normal. Tympanic membrane is injected and bulging. A middle ear effusion is present.    Left Ear: External ear normal. Tympanic membrane is injected and bulging. A middle ear effusion is present.   Nose: Mucosal edema present. Right sinus exhibits maxillary sinus tenderness. Left sinus exhibits maxillary sinus tenderness.   Mouth/Throat: Uvula is midline and mucous membranes are normal. Posterior oropharyngeal erythema (moderate with cobblestoning) present.   Neck: Normal range of motion. Neck supple. No thyromegaly present.   Cardiovascular: Normal rate, regular rhythm and normal heart sounds.   Pulmonary/Chest: Effort normal and breath sounds normal.   Lymphadenopathy:     He has no cervical adenopathy.   Neurological: He is alert and oriented to person, place, and time.   Skin: Skin is warm and dry. No rash noted. He is not diaphoretic.   Psychiatric: He has a normal mood and affect. His behavior is normal. Judgment and thought content normal.   Vitals reviewed.    Vitals:    03/04/19 1525   BP: 142/90   Pulse: 115   Resp: 18   Temp: 97.2 °F (36.2 °C)   SpO2: 99%   Body mass index is 29.46 kg/m².     Assessment/Plan   Palomo was seen today for med refill and cough.    Diagnoses and all orders for this visit:    Acute non-recurrent pansinusitis  -     cefdinir (OMNICEF) 300 MG capsule; Take 1 capsule by mouth 2 (Two) Times a Day for 10 days.    Hypogonadism, male    Patient advised that he would need to see a urologist for refill on testosterone gel-no one in this office prescribes this medication due to it being a controlled substance. Patient verbalized understanding and agreement with plan of care. Offered urology referral for patient, states he will call Dr. Alec Nolan (urologist) in [place] to set up an appointment for himself-will call if referral required.    Discussed the nature of the medical condition(s) risks, complications, implications, management, safe and proper use of medications. Encouraged medication compliance, and keeping scheduled follow up appointments with me and any  other providers.

## 2022-05-05 ENCOUNTER — OFFICE VISIT (OUTPATIENT)
Dept: FAMILY MEDICINE CLINIC | Facility: CLINIC | Age: 42
End: 2022-05-05

## 2022-05-05 VITALS
HEIGHT: 71 IN | OXYGEN SATURATION: 98 % | TEMPERATURE: 97.7 F | WEIGHT: 232.2 LBS | SYSTOLIC BLOOD PRESSURE: 174 MMHG | DIASTOLIC BLOOD PRESSURE: 100 MMHG | HEART RATE: 113 BPM | BODY MASS INDEX: 32.51 KG/M2

## 2022-05-05 DIAGNOSIS — R74.8 ELEVATED LIVER ENZYMES: Primary | ICD-10-CM

## 2022-05-05 DIAGNOSIS — L02.91 ABSCESS: ICD-10-CM

## 2022-05-05 DIAGNOSIS — R03.0 ELEVATED BLOOD PRESSURE READING: ICD-10-CM

## 2022-05-05 DIAGNOSIS — L03.90 CELLULITIS, UNSPECIFIED CELLULITIS SITE: ICD-10-CM

## 2022-05-05 DIAGNOSIS — E66.09 CLASS 1 OBESITY DUE TO EXCESS CALORIES WITHOUT SERIOUS COMORBIDITY WITH BODY MASS INDEX (BMI) OF 32.0 TO 32.9 IN ADULT: ICD-10-CM

## 2022-05-05 PROCEDURE — 99204 OFFICE O/P NEW MOD 45 MIN: CPT | Performed by: FAMILY MEDICINE

## 2022-05-05 PROCEDURE — 10060 I&D ABSCESS SIMPLE/SINGLE: CPT | Performed by: FAMILY MEDICINE

## 2022-05-05 RX ORDER — DOXYCYCLINE 100 MG/1
100 CAPSULE ORAL 2 TIMES DAILY
Qty: 28 CAPSULE | Refills: 0 | Status: SHIPPED | OUTPATIENT
Start: 2022-05-05 | End: 2022-06-29

## 2022-05-05 NOTE — PROGRESS NOTES
New Patient Office Visit      Patient Name: Palomo Pollack  : 1980   MRN: [id number]     Chief Complaint:    Chief Complaint   Patient presents with   • ingrown hair     On back of head since September    • Establish Care       History of Present Illness: Palomo Pollack is a 42 y.o. male who is here today to establish care.  Patient is here for abscess on his left posterior scalp.    Abscess near left mastoid. 2022 had 10days of antibiotics. And then lesion popped and drained fluid. They drained it for a couple weeks but never went away.  Reports being treated by amoxicillin by urgent care.  Lesions started back in 2021.    Now he has jaw pain.  May be related to abscess.  Started recently.  Denies fevers.    Discussed hypertension/elevated blood pressure, and elevated liver enzymes.  We will recheck liver enzymes at annual visit.  Discussed elevated blood pressure in office today and checking ambulatory readings.  Patient will call back with ambulatory readings.    Discussed patient being obese.  Gave patient counting calorie worksheet today.      Discussed procedure with patient as far as draining abscess today.  Reviewed risk and benefits and signed procedure consent form.    Review of systems was negative for fever.  Positive for skin lesion    Physical exam: Near patient's left mastoid is a subcutaneous fluctuant mass.  Slight induration noted around area 2.  Area of concern is fairly large measuring approximately 4 inches x 2-1/2 inches.  No tenderness with palpation of area.  Mild tenderness with palpation of patient's left jaw at the angle of the mandible.  Lymphadenopathy noted at angle of left mandible.  No cervical lymphadenopathy.  No tenderness for palpation of the mastoid on the left.                Subjective          Past Medical History:   Past Medical History:   Diagnosis Date   • Allergic rhinitis    • Chronic right ear pain    • GERD (gastroesophageal reflux  "disease)    • Hodgkin lymphoma (HCC)    • Hypogonadism in male    • PTSD (post-traumatic stress disorder)        Past Surgical History:   Past Surgical History:   Procedure Laterality Date   • COLONOSCOPY         Family History:   Family History   Problem Relation Age of Onset   • Stroke Father    • Lung cancer Father    • Cancer Father         Positive for cured lung cancer- he was smoker       Social History:   Social History     Socioeconomic History   • Marital status: Single   Tobacco Use   • Smoking status: Current Every Day Smoker     Packs/day: 0.25     Years: 20.00     Pack years: 5.00     Types: Cigarettes   • Smokeless tobacco: Never Used   Substance and Sexual Activity   • Alcohol use: Yes     Comment: 2 drinks a night   • Drug use: Yes     Types: Marijuana     Comment: \"A COUPLE A WEEK\"       Medications:     Current Outpatient Medications:   •  doxycycline (MONODOX) 100 MG capsule, Take 1 capsule by mouth 2 (Two) Times a Day., Disp: 28 capsule, Rfl: 0    Allergies:   Allergies   Allergen Reactions   • Bactrim [Sulfamethoxazole-Trimethoprim]      Skin rash   • Biaxin [Clarithromycin]        Objective     Physical Exam: Please see above  Vital Signs:   Vitals:    05/05/22 1408   BP: 174/100   Pulse: 113   Temp: 97.7 °F (36.5 °C)   TempSrc: Temporal   SpO2: 98%   Weight: 105 kg (232 lb 3.2 oz)   Height: 180.3 cm (71\")   PainSc: 0-No pain     Body mass index is 32.39 kg/m².       Assessment / Plan      Assessment/Plan:   Diagnoses and all orders for this visit:    1. Elevated liver enzymes (Primary)    2. Elevated blood pressure reading    3. Class 1 obesity due to excess calories without serious comorbidity with body mass index (BMI) of 32.0 to 32.9 in adult    4. Abscess  -     Incision & Drainage    5. Cellulitis, unspecified cellulitis site  -     doxycycline (MONODOX) 100 MG capsule; Take 1 capsule by mouth 2 (Two) Times a Day.  Dispense: 28 capsule; Refill: 0         1. Reviewed patient's previous " labs with him including elevated liver enzymes.  Discussed his elevated blood pressure reading today.  Patient will perform ambulatory readings at home.  He will call in 1 week with blood pressure readings.  If elevated we will start medication.  We will likely start combination lisinopril/hydrochlorothiazide.  We will recheck patient's blood work after starting medication.  2. We will recheck patient's liver enzymes at annual visit.  3. Discussed obesity and risk of with him.  And given conchae worksheet.    Will treat patient's cellulitis with doxycycline.  Likely staph infection with low concern for strep.  We will treat for 10 days to 14 days.  We will see patient back in 10 days at nurse visit to discuss continuation of 4 days of antibiotics.    Incision & Drainage    Date/Time: 5/5/2022 3:20 PM  Performed by: Heladio Valentine DO  Authorized by: Heladio Valentine DO   Consent: Verbal consent obtained. Written consent obtained.  Risks and benefits: risks, benefits and alternatives were discussed  Consent given by: patient  Patient identity confirmed: verbally with patient  Type: abscess  Body area: head/neck  Location details: scalp  Anesthesia: local infiltration    Anesthesia:  Local Anesthetic: lidocaine 1% with epinephrine  Anesthetic total: 7 mL    Sedation:  Patient sedated: no    Risk factor: underlying major nerve  Scalpel size: 10  Incision type: single straight  Complexity: simple  Drainage characteristics: none.  Drainage amount: none.  Wound treatment: left wound partially open to drain.  Packing material: 1/2 in gauze  Patient tolerance: patient tolerated the procedure well with no immediate complications  Comments: Placed 2 sutures to partially close drainage site.  Abscess could not be drained      Could not adequately drain abscess due to induration and likely a loculated cluster of abscess.  We will place patient on antibiotic as above.  Likely lesion will start draining on its own.  We will see him  back in 10 weeks to see if we need to reopen to drain lesion more.  Considering CT head neck and ENT referral in future if symptoms progress or worsen.      Discuss starting hypertension meds with patient today.  Prescribe doxycycline.  We will see patient back for further evaluation of liver enzymes and hypertension.  We will also do annual exam in the near future.      Follow Up:   Return in about 3 months (around 8/5/2022).    Heladio Valentine,   [facility] [place]

## 2022-05-06 ENCOUNTER — TELEPHONE (OUTPATIENT)
Dept: FAMILY MEDICINE CLINIC | Facility: CLINIC | Age: 42
End: 2022-05-06

## 2022-05-06 NOTE — TELEPHONE ENCOUNTER
Caller: Carolyn Pollack    Relationship: Mother    Best call back number: [phone]    What medications are you currently taking:   Current Outpatient Medications on File Prior to Visit   Medication Sig Dispense Refill   • doxycycline (MONODOX) 100 MG capsule Take 1 capsule by mouth 2 (Two) Times a Day. 28 capsule 0     No current facility-administered medications on file prior to visit.          Which medication are you concerned about: DOXYCYCLINE    Who prescribed you this medication: DR CALLAHAN    What are your concerns: THIS MEDICATION INTERACTS WITH THE NEXIUM PATIENT TAKES AND HE HAS TO TAKE THAT. IS THERE SOMETHING ELSE THAT CAN BE PRESCRIBED? PLEASE ADVISE

## 2022-05-06 NOTE — TELEPHONE ENCOUNTER
HUB CAN READ  Per Provider  Most antibiotics will interfere with antiacid medications like Nexium.  I recommend taking the antibiotic at least 1 hour after or before Nexium.

## 2022-05-10 ENCOUNTER — TELEPHONE (OUTPATIENT)
Dept: FAMILY MEDICINE CLINIC | Facility: CLINIC | Age: 42
End: 2022-05-10

## 2022-05-10 DIAGNOSIS — L02.91 ABSCESS: Primary | ICD-10-CM

## 2022-05-10 NOTE — TELEPHONE ENCOUNTER
PATIENT FEELING VERY SICK, NECK NOT DRAINING.  HAVING ALL THE SIDE EFFECTS FROM THE ANTIBIOTIC.      WAS SUPPOSE TO REPORT HIS BLOOD PRESSURE BUT UNABLE TO BUY BLOOD PRESSURE CUFF.      MADE APPOINTMENT FOR TOMORROW.

## 2022-05-11 ENCOUNTER — OFFICE VISIT (OUTPATIENT)
Dept: FAMILY MEDICINE CLINIC | Facility: CLINIC | Age: 42
End: 2022-05-11

## 2022-05-11 VITALS
TEMPERATURE: 98.6 F | BODY MASS INDEX: 32.2 KG/M2 | RESPIRATION RATE: 12 BRPM | DIASTOLIC BLOOD PRESSURE: 100 MMHG | WEIGHT: 230 LBS | SYSTOLIC BLOOD PRESSURE: 160 MMHG | HEART RATE: 101 BPM | HEIGHT: 71 IN | OXYGEN SATURATION: 99 %

## 2022-05-11 DIAGNOSIS — L02.91 ABSCESS: Primary | ICD-10-CM

## 2022-05-11 PROCEDURE — 99024 POSTOP FOLLOW-UP VISIT: CPT | Performed by: FAMILY MEDICINE

## 2022-05-11 NOTE — PROGRESS NOTES
"     Follow Up Office Visit      Patient Name: Palomo Pollack  : 1980   MRN: [id number]     Chief Complaint:    Chief Complaint   Patient presents with   • Abscess       History of Present Illness: Palomo Pollack is a 42 y.o. male who is here today to follow up with abscess.  Abscess has improved.  He thought initially it had been worsening because it has not been draining.  However lesion does look improved.  Has 2 stitches in place.  Patient has had an upset stomach from doxycycline.  Takes Nexium away from doxycycline to help with absorption.      Review of systems positive for skin infection      Physical exam: Left mastoid with slight edema/swelling.  No tenderness.  No drainage.  No erythema.      Subjective        I have reviewed and the following portions of the patient's history were updated as appropriate: past family history, past medical history, past social history, past surgical history and problem list.    Medications:     Current Outpatient Medications:   •  doxycycline (MONODOX) 100 MG capsule, Take 1 capsule by mouth 2 (Two) Times a Day., Disp: 28 capsule, Rfl: 0    Allergies:   Allergies   Allergen Reactions   • Bactrim [Sulfamethoxazole-Trimethoprim]      Skin rash   • Biaxin [Clarithromycin]        Objective     Physical Exam: Please see above  Vital Signs:   Vitals:    22 0954   BP: 160/100   Pulse: 101   Resp: 12   Temp: 98.6 °F (37 °C)   SpO2: 99%   Weight: 104 kg (230 lb)   Height: 180.3 cm (71\")     Body mass index is 32.08 kg/m².          Assessment / Plan      Assessment/Plan:   Diagnoses and all orders for this visit:    1. Abscess (Primary)    Improving with antibiotic usage.  Recommend finishing 2 weeks worth of antibiotics.  Patient can call back for repeat antibiotic treatment but if he did call back with recurrent signs and symptoms of abscess/infection then I would refer to ENT for definitive treatment for I&D.    Follow Up:   Return if symptoms worsen or fail " to improve.    Heladio Valentine DO  [facility] [place]

## 2022-05-20 ENCOUNTER — CLINICAL SUPPORT (OUTPATIENT)
Dept: FAMILY MEDICINE CLINIC | Facility: CLINIC | Age: 42
End: 2022-05-20

## 2022-05-20 DIAGNOSIS — L02.91 ABSCESS: Primary | ICD-10-CM

## 2022-06-03 ENCOUNTER — TELEPHONE (OUTPATIENT)
Dept: FAMILY MEDICINE CLINIC | Facility: CLINIC | Age: 42
End: 2022-06-03

## 2022-06-03 NOTE — TELEPHONE ENCOUNTER
Caller: Carolyn Pollack    Relationship to patient: Mother    Best call back number: [phone]    Patient is needing: CAROLYN STATED THAT SHE WAS CALLING BACK FOR NURSE TO SPEAK WITH HER ABOUT THE PATIENT'S HEALTH AND FIND OUT WHAT TO DO FOR HIM     PLEASE ADVISE

## 2022-06-29 ENCOUNTER — OFFICE VISIT (OUTPATIENT)
Dept: FAMILY MEDICINE CLINIC | Facility: CLINIC | Age: 42
End: 2022-06-29

## 2022-06-29 ENCOUNTER — LAB (OUTPATIENT)
Dept: LAB | Facility: HOSPITAL | Age: 42
End: 2022-06-29

## 2022-06-29 VITALS
RESPIRATION RATE: 20 BRPM | DIASTOLIC BLOOD PRESSURE: 116 MMHG | HEART RATE: 104 BPM | TEMPERATURE: 98.7 F | SYSTOLIC BLOOD PRESSURE: 172 MMHG | OXYGEN SATURATION: 99 %

## 2022-06-29 DIAGNOSIS — I10 PRIMARY HYPERTENSION: ICD-10-CM

## 2022-06-29 DIAGNOSIS — I10 PRIMARY HYPERTENSION: Primary | ICD-10-CM

## 2022-06-29 LAB
ALBUMIN SERPL-MCNC: 4.7 G/DL (ref 3.5–5.2)
ALBUMIN/GLOB SERPL: 1.8 G/DL
ALP SERPL-CCNC: 122 U/L (ref 39–117)
ALT SERPL W P-5'-P-CCNC: 86 U/L (ref 1–41)
ANION GAP SERPL CALCULATED.3IONS-SCNC: 15 MMOL/L (ref 5–15)
AST SERPL-CCNC: 66 U/L (ref 1–40)
BILIRUB SERPL-MCNC: 0.4 MG/DL (ref 0–1.2)
BUN SERPL-MCNC: 14 MG/DL (ref 6–20)
BUN/CREAT SERPL: 13.7 (ref 7–25)
CALCIUM SPEC-SCNC: 9.6 MG/DL (ref 8.6–10.5)
CHLORIDE SERPL-SCNC: 97 MMOL/L (ref 98–107)
CO2 SERPL-SCNC: 24 MMOL/L (ref 22–29)
CREAT SERPL-MCNC: 1.02 MG/DL (ref 0.76–1.27)
EGFRCR SERPLBLD CKD-EPI 2021: 92.9 ML/MIN/1.73
GLOBULIN UR ELPH-MCNC: 2.6 GM/DL
GLUCOSE SERPL-MCNC: 211 MG/DL (ref 65–99)
POTASSIUM SERPL-SCNC: 4.5 MMOL/L (ref 3.5–5.2)
PROT SERPL-MCNC: 7.3 G/DL (ref 6–8.5)
SODIUM SERPL-SCNC: 136 MMOL/L (ref 136–145)

## 2022-06-29 PROCEDURE — 80053 COMPREHEN METABOLIC PANEL: CPT | Performed by: FAMILY MEDICINE

## 2022-06-29 PROCEDURE — 99213 OFFICE O/P EST LOW 20 MIN: CPT | Performed by: FAMILY MEDICINE

## 2022-06-29 RX ORDER — TESTOSTERONE UNDECANOATE 112.5 MG/1
CAPSULE, LIQUID FILLED ORAL
COMMUNITY
Start: 2022-06-20

## 2022-06-29 RX ORDER — LISINOPRIL AND HYDROCHLOROTHIAZIDE 20; 12.5 MG/1; MG/1
1 TABLET ORAL DAILY
Qty: 30 TABLET | Refills: 2 | Status: SHIPPED | OUTPATIENT
Start: 2022-06-29 | End: 2022-08-09

## 2022-06-29 RX ORDER — FEXOFENADINE HCL 180 MG/1
180 TABLET ORAL DAILY
COMMUNITY
End: 2023-03-31 | Stop reason: SDUPTHER

## 2022-06-29 NOTE — PROGRESS NOTES
Follow Up Office Visit      Patient Name: Palomo Pollack  : 1980   MRN: [id number]     Chief Complaint:    Chief Complaint   Patient presents with   • Hypertension     Following up on hypertension       History of Present Illness: Palomo Pollack is a 42 y.o. male who is here today to follow up with low testosterone and hypertension.  Patient's low testosterone is being managed by urology.  Patient's hypertension is uncontrolled at home and here.  His systolic ranges above 150.  Patient is willing to start medication today so reviewed side effects.    Also discussed annual exam and performing lipid panel in the future.      Review of systems was negative for chest pain      Physical exam: Patient's mood and affect is appropriate.      Subjective        I have reviewed and the following portions of the patient's history were updated as appropriate: past family history, past medical history, past social history, past surgical history and problem list.    Medications:     Current Outpatient Medications:   •  fexofenadine (ALLEGRA) 180 MG tablet, Take 180 mg by mouth Daily., Disp: , Rfl:   •  Tlando 112.5 MG capsule, Patient has not started yet because it causes his blood pressure to go up, Disp: , Rfl:   •  lisinopril-hydrochlorothiazide (PRINZIDE,ZESTORETIC) 20-12.5 MG per tablet, Take 1 tablet by mouth Daily., Disp: 30 tablet, Rfl: 2    Allergies:   Allergies   Allergen Reactions   • Bactrim [Sulfamethoxazole-Trimethoprim] Rash and Other (See Comments)     Gave increased heartrate-biaxin         Objective     Physical Exam: Please see above  Vital Signs:   Vitals:    22 0918   BP: (!) 172/116   BP Location: Left arm   Patient Position: Sitting   Cuff Size: Adult   Pulse: 104   Resp: 20   Temp: 98.7 °F (37.1 °C)   TempSrc: Temporal   SpO2: 99%     There is no height or weight on file to calculate BMI.          Assessment / Plan      Assessment/Plan:   Diagnoses and all orders for this  visit:    1. Primary hypertension (Primary)  -     Comprehensive Metabolic Panel; Future  -     lisinopril-hydrochlorothiazide (PRINZIDE,ZESTORETIC) 20-12.5 MG per tablet; Take 1 tablet by mouth Daily.  Dispense: 30 tablet; Refill: 2    Start lisinopril hydrochlorothiazide.  Will check CMP today and after starting therapy in 1 month.    Follow Up:   Return in about 4 weeks (around 7/27/2022) for Recheck.    Heladio Valentine DO  [facility] [place]

## 2022-08-09 ENCOUNTER — OFFICE VISIT (OUTPATIENT)
Dept: FAMILY MEDICINE CLINIC | Facility: CLINIC | Age: 42
End: 2022-08-09

## 2022-08-09 VITALS
SYSTOLIC BLOOD PRESSURE: 148 MMHG | BODY MASS INDEX: 31.56 KG/M2 | HEART RATE: 113 BPM | OXYGEN SATURATION: 98 % | HEIGHT: 71 IN | TEMPERATURE: 97.7 F | DIASTOLIC BLOOD PRESSURE: 92 MMHG | WEIGHT: 225.4 LBS

## 2022-08-09 DIAGNOSIS — T88.7XXA MEDICATION SIDE EFFECT: ICD-10-CM

## 2022-08-09 DIAGNOSIS — R73.9 HYPERGLYCEMIA: ICD-10-CM

## 2022-08-09 DIAGNOSIS — E11.65 TYPE 2 DIABETES MELLITUS WITH HYPERGLYCEMIA, WITHOUT LONG-TERM CURRENT USE OF INSULIN: ICD-10-CM

## 2022-08-09 DIAGNOSIS — I10 PRIMARY HYPERTENSION: Primary | ICD-10-CM

## 2022-08-09 LAB
EXPIRATION DATE: NORMAL
HBA1C MFR BLD: 9.2 %
Lab: NORMAL

## 2022-08-09 PROCEDURE — 83036 HEMOGLOBIN GLYCOSYLATED A1C: CPT | Performed by: FAMILY MEDICINE

## 2022-08-09 PROCEDURE — 99214 OFFICE O/P EST MOD 30 MIN: CPT | Performed by: FAMILY MEDICINE

## 2022-08-09 PROCEDURE — 3046F HEMOGLOBIN A1C LEVEL >9.0%: CPT | Performed by: FAMILY MEDICINE

## 2022-08-09 RX ORDER — METFORMIN HYDROCHLORIDE 500 MG/1
500 TABLET, EXTENDED RELEASE ORAL 2 TIMES DAILY
Qty: 60 TABLET | Refills: 2 | Status: SHIPPED | OUTPATIENT
Start: 2022-08-09 | End: 2022-12-08

## 2022-08-09 RX ORDER — LISINOPRIL 20 MG/1
20 TABLET ORAL DAILY
Qty: 30 TABLET | Refills: 2 | Status: SHIPPED | OUTPATIENT
Start: 2022-08-09 | End: 2022-12-08 | Stop reason: ALTCHOICE

## 2022-08-09 NOTE — PROGRESS NOTES
Follow Up Office Visit      Patient Name: Palomo Pollack  : 1980   MRN: [id number]     Chief Complaint:    Chief Complaint   Patient presents with   • Hypertension     Follow up on B/P meds. Not taking because flaring up diverticulitis        History of Present Illness: Palomo Pollack is a 42 y.o. male who is here today to follow up with htn. Started lisinopril/hctz and did well for a bit. Had very well controlled bp at home. Reporting 120/70s. He did do some research and saw some.     Diabetes -new diagnosis.  Patient reporting prediabetes previously.  Has lost 7 pounds.  Discussed new diagnosis and etiology.  Discussed treatment of diabetes.  Discussed side effects to drugs.  We discussed nutritional and diabetic education consults.    htn -improved on medication but had diarrhea for side effect.  Patient stopped medication and symptoms improved.  Uncontrolled currently.      Review of systems was positive for weight loss      Physical exam: Patient's mood and affect is appropriate.        Subjective        I have reviewed and the following portions of the patient's history were updated as appropriate: past family history, past medical history, past social history, past surgical history and problem list.    Medications:     Current Outpatient Medications:   •  fexofenadine (ALLEGRA) 180 MG tablet, Take 180 mg by mouth Daily., Disp: , Rfl:   •  lisinopril (PRINIVIL,ZESTRIL) 20 MG tablet, Take 1 tablet by mouth Daily., Disp: 30 tablet, Rfl: 2  •  metFORMIN ER (GLUCOPHAGE-XR) 500 MG 24 hr tablet, Take 1 tablet by mouth 2 (Two) Times a Day., Disp: 60 tablet, Rfl: 2  •  Tlando 112.5 MG capsule, Patient has not started yet because it causes his blood pressure to go up, Disp: , Rfl:     Allergies:   Allergies   Allergen Reactions   • Bactrim [Sulfamethoxazole-Trimethoprim] Rash and Other (See Comments)     Gave increased heartrate-biaxin         Objective     Physical Exam: Please see above  Vital  "Signs:   Vitals:    08/09/22 1414   BP: 148/92   Pulse: 113   Temp: 97.7 °F (36.5 °C)   SpO2: 98%   Weight: 102 kg (225 lb 6.4 oz)   Height: 180.3 cm (71\")     Body mass index is 31.44 kg/m².          Assessment / Plan      Assessment/Plan:   Diagnoses and all orders for this visit:    1. Primary hypertension (Primary)  -     lisinopril (PRINIVIL,ZESTRIL) 20 MG tablet; Take 1 tablet by mouth Daily.  Dispense: 30 tablet; Refill: 2    2. Hyperglycemia  -     POC Glycosylated Hemoglobin (Hb A1C)    3. Type 2 diabetes mellitus with hyperglycemia, without long-term current use of insulin (HCC)  -     Ambulatory Referral to Diabetic Education  -     metFORMIN ER (GLUCOPHAGE-XR) 500 MG 24 hr tablet; Take 1 tablet by mouth 2 (Two) Times a Day.  Dispense: 60 tablet; Refill: 2    4. Medication side effect    Start metformin for diabetes.  Refer patient to diabetic educator.  Recommend 90 g protein diet.  Also would like him to keep calories around 1500 contreras and 30 g of fiber needed.  Recommend 1 pound per week weight loss.  Follow-up in 3 months for repeat A1c.  Call with concerns with metformin side effects, would switch to Jardiance if needed    Hypertension uncontrolled.  Restart lisinopril 20 mg.  Stop hydrochlorothiazide.  Likely had side effect from hydrochlorothiazide.        Follow Up:   Return in about 3 months (around 11/9/2022) for Recheck, Annual.    Heladio Valentine DO  [facility] [place]   "

## 2022-09-28 DIAGNOSIS — I10 PRIMARY HYPERTENSION: ICD-10-CM

## 2022-09-29 DIAGNOSIS — I10 PRIMARY HYPERTENSION: Primary | ICD-10-CM

## 2022-09-29 RX ORDER — LISINOPRIL 20 MG/1
20 TABLET ORAL DAILY
Qty: 90 TABLET | Refills: 2 | OUTPATIENT
Start: 2022-09-29

## 2022-09-29 RX ORDER — LISINOPRIL 40 MG/1
40 TABLET ORAL DAILY
Qty: 30 TABLET | Refills: 1 | Status: SHIPPED | OUTPATIENT
Start: 2022-09-29 | End: 2022-11-28 | Stop reason: SDUPTHER

## 2022-11-09 ENCOUNTER — TELEPHONE (OUTPATIENT)
Dept: FAMILY MEDICINE CLINIC | Facility: CLINIC | Age: 42
End: 2022-11-09

## 2022-11-28 DIAGNOSIS — I10 PRIMARY HYPERTENSION: ICD-10-CM

## 2022-11-28 RX ORDER — LISINOPRIL 40 MG/1
40 TABLET ORAL DAILY
Qty: 30 TABLET | Refills: 1 | Status: SHIPPED | OUTPATIENT
Start: 2022-11-28 | End: 2023-01-30 | Stop reason: SDUPTHER

## 2022-11-28 NOTE — TELEPHONE ENCOUNTER
Caller: Palomo Pollack    Relationship: Self    Best call back number: [phone]    Requested Prescriptions:   Requested Prescriptions     Pending Prescriptions Disp Refills   • lisinopril (PRINIVIL,ZESTRIL) 40 MG tablet 30 tablet 1     Sig: Take 1 tablet by mouth Daily.        Pharmacy where request should be sent: [facility] [address] [place] [address] - [phone] [facility] [phone]     Additional details provided by patient: PATIENT HAS ONE DAY LEFT      Does the patient have less than a 3 day supply:  [x] Yes  [] No    Cadance Maricel Guerrero Rep   11/28/22 13:44 EST

## 2022-11-28 NOTE — TELEPHONE ENCOUNTER
Rx Refill Note  Requested Prescriptions     Pending Prescriptions Disp Refills   • lisinopril (PRINIVIL,ZESTRIL) 40 MG tablet 30 tablet 1     Sig: Take 1 tablet by mouth Daily.      Last office visit with prescribing clinician: 8/9/2022      Next office visit with prescribing clinician: 12/8/2022            Gin Guevara MA  11/28/22, 13:56 EST

## 2022-12-08 ENCOUNTER — LAB (OUTPATIENT)
Dept: LAB | Facility: HOSPITAL | Age: 42
End: 2022-12-08

## 2022-12-08 ENCOUNTER — OFFICE VISIT (OUTPATIENT)
Dept: FAMILY MEDICINE CLINIC | Facility: CLINIC | Age: 42
End: 2022-12-08

## 2022-12-08 VITALS
OXYGEN SATURATION: 100 % | TEMPERATURE: 97.8 F | BODY MASS INDEX: 30.35 KG/M2 | DIASTOLIC BLOOD PRESSURE: 98 MMHG | SYSTOLIC BLOOD PRESSURE: 144 MMHG | HEART RATE: 103 BPM | HEIGHT: 71 IN | WEIGHT: 216.8 LBS

## 2022-12-08 DIAGNOSIS — I10 PRIMARY HYPERTENSION: ICD-10-CM

## 2022-12-08 DIAGNOSIS — R10.84 GENERALIZED ABDOMINAL PAIN: ICD-10-CM

## 2022-12-08 DIAGNOSIS — E66.09 CLASS 1 OBESITY DUE TO EXCESS CALORIES WITH SERIOUS COMORBIDITY AND BODY MASS INDEX (BMI) OF 30.0 TO 30.9 IN ADULT: ICD-10-CM

## 2022-12-08 DIAGNOSIS — E11.65 TYPE 2 DIABETES MELLITUS WITH HYPERGLYCEMIA, WITHOUT LONG-TERM CURRENT USE OF INSULIN: ICD-10-CM

## 2022-12-08 DIAGNOSIS — R74.8 ELEVATED LIVER ENZYMES: ICD-10-CM

## 2022-12-08 DIAGNOSIS — E11.65 TYPE 2 DIABETES MELLITUS WITH HYPERGLYCEMIA, WITHOUT LONG-TERM CURRENT USE OF INSULIN: Primary | ICD-10-CM

## 2022-12-08 LAB
ALBUMIN SERPL-MCNC: 4.8 G/DL (ref 3.5–5.2)
ALBUMIN/GLOB SERPL: 1.5 G/DL
ALP SERPL-CCNC: 82 U/L (ref 39–117)
ALT SERPL W P-5'-P-CCNC: 70 U/L (ref 1–41)
ANION GAP SERPL CALCULATED.3IONS-SCNC: 9 MMOL/L (ref 5–15)
AST SERPL-CCNC: 57 U/L (ref 1–40)
BILIRUB SERPL-MCNC: 0.4 MG/DL (ref 0–1.2)
BUN SERPL-MCNC: 9 MG/DL (ref 6–20)
BUN/CREAT SERPL: 8.8 (ref 7–25)
CALCIUM SPEC-SCNC: 9.6 MG/DL (ref 8.6–10.5)
CHLORIDE SERPL-SCNC: 104 MMOL/L (ref 98–107)
CO2 SERPL-SCNC: 30 MMOL/L (ref 22–29)
CREAT SERPL-MCNC: 1.02 MG/DL (ref 0.76–1.27)
EGFRCR SERPLBLD CKD-EPI 2021: 92.9 ML/MIN/1.73
EXPIRATION DATE: NORMAL
GLOBULIN UR ELPH-MCNC: 3.2 GM/DL
GLUCOSE SERPL-MCNC: 120 MG/DL (ref 65–99)
HBA1C MFR BLD: 6.9 %
Lab: NORMAL
POTASSIUM SERPL-SCNC: 4.4 MMOL/L (ref 3.5–5.2)
PROT SERPL-MCNC: 8 G/DL (ref 6–8.5)
SODIUM SERPL-SCNC: 143 MMOL/L (ref 136–145)

## 2022-12-08 PROCEDURE — 83036 HEMOGLOBIN GLYCOSYLATED A1C: CPT | Performed by: FAMILY MEDICINE

## 2022-12-08 PROCEDURE — 80053 COMPREHEN METABOLIC PANEL: CPT

## 2022-12-08 PROCEDURE — 86364 TISS TRNSGLTMNASE EA IG CLAS: CPT

## 2022-12-08 PROCEDURE — 36415 COLL VENOUS BLD VENIPUNCTURE: CPT

## 2022-12-08 PROCEDURE — 3044F HG A1C LEVEL LT 7.0%: CPT | Performed by: FAMILY MEDICINE

## 2022-12-08 PROCEDURE — 99213 OFFICE O/P EST LOW 20 MIN: CPT | Performed by: FAMILY MEDICINE

## 2022-12-08 PROCEDURE — 86231 EMA EACH IG CLASS: CPT

## 2022-12-08 PROCEDURE — 82784 ASSAY IGA/IGD/IGG/IGM EACH: CPT

## 2022-12-08 NOTE — PROGRESS NOTES
"     Follow Up Office Visit      Patient Name: Palomo Pollack  : 1980   MRN: [id number]     Chief Complaint:    Chief Complaint   Patient presents with   • Diverticulitis       History of Present Illness: Palomo Pollack is a 42 y.o. male who is here today to follow up with diabetes. Needs a1c today.  Hypertension controlled today.  Uncontrolled at home as well.  Patient working on diet and exercise.    2018 ct abdomen with colitis vs diverticulitis.     Having a cough. He was exposed to bleach and he gets a lot coughing. He noticed that with using bleach he has this issue. Things have improved.     Was having severe pain in abdomen. He cut out gluten two weeks ago and he feels much better.     htn - at home bp is in 140s/90s on average.     Review of systems was positive for abdominal pain, cough      Physical exam: Patient's mood and affect is appropriate    Subjective        I have reviewed and the following portions of the patient's history were updated as appropriate: past family history, past medical history, past social history, past surgical history and problem list.    Medications:     Current Outpatient Medications:   •  fexofenadine (ALLEGRA) 180 MG tablet, Take 180 mg by mouth Daily., Disp: , Rfl:   •  lisinopril (PRINIVIL,ZESTRIL) 40 MG tablet, Take 1 tablet by mouth Daily., Disp: 30 tablet, Rfl: 1  •  Tlando 112.5 MG capsule, Patient has not started yet because it causes his blood pressure to go up, Disp: , Rfl:     Allergies:   Allergies   Allergen Reactions   • Bactrim [Sulfamethoxazole-Trimethoprim] Rash and Other (See Comments)     Gave increased heartrate-biaxin         Objective     Physical Exam: Please see above  Vital Signs:   Vitals:    22 0959   BP: 144/98   Pulse: 103   Temp: 97.8 °F (36.6 °C)   SpO2: 100%   Weight: 98.3 kg (216 lb 12.8 oz)   Height: 180.3 cm (71\")     Body mass index is 30.24 kg/m².          Assessment / Plan      Assessment/Plan:   Diagnoses and all " orders for this visit:    1. Type 2 diabetes mellitus with hyperglycemia, without long-term current use of insulin (HCC) (Primary)  -     POC Glycosylated Hemoglobin (Hb A1C)  -     Comprehensive Metabolic Panel; Future    2. Generalized abdominal pain  -     Celiac Disease Panel; Future    3. Elevated liver enzymes    4. Class 1 obesity due to excess calories with serious comorbidity and body mass index (BMI) of 30.0 to 30.9 in adult    5. Primary hypertension    Rechecking liver enzymes.  They were elevated but patient is lost weight.  Due to patient's abdominal pain and improvement with  Restriction of gluten we will check for celiac disease.    A1c improving.  Diabetes improving substantially.  Stop metformin.  Reviewed risk and benefits of taking metformin.  Patient okay with stopping for now.  Recheck A1c at annual exam.  Congratulated patient on weight loss.    Will continue to monitor uncontrolled hypertension at home and in the office.  Patient improving dietary and lifestyle so hopefully his blood pressure will improve.  If it does not we will start hydrochlorothiazide on top of lisinopril to reach goal of 130/80 at home      Follow Up:   Return for Next scheduled follow up.    Heladio Valentine DO  [facility] [place]

## 2022-12-09 LAB
ENDOMYSIUM IGA SER QL: NEGATIVE
IGA SERPL-MCNC: 225 MG/DL (ref 90–386)
TTG IGA SER-ACNC: <2 U/ML (ref 0–3)

## 2023-01-30 DIAGNOSIS — I10 PRIMARY HYPERTENSION: ICD-10-CM

## 2023-01-30 RX ORDER — LISINOPRIL 40 MG/1
40 TABLET ORAL DAILY
Qty: 90 TABLET | Refills: 1 | Status: SHIPPED | OUTPATIENT
Start: 2023-01-30 | End: 2023-03-31

## 2023-03-31 ENCOUNTER — OFFICE VISIT (OUTPATIENT)
Dept: FAMILY MEDICINE CLINIC | Facility: CLINIC | Age: 43
End: 2023-03-31
Payer: MEDICAID

## 2023-03-31 VITALS
TEMPERATURE: 97.8 F | BODY MASS INDEX: 28.03 KG/M2 | SYSTOLIC BLOOD PRESSURE: 156 MMHG | OXYGEN SATURATION: 99 % | HEIGHT: 71 IN | DIASTOLIC BLOOD PRESSURE: 86 MMHG | WEIGHT: 200.2 LBS | HEART RATE: 112 BPM

## 2023-03-31 DIAGNOSIS — E11.65 TYPE 2 DIABETES MELLITUS WITH HYPERGLYCEMIA, WITHOUT LONG-TERM CURRENT USE OF INSULIN: ICD-10-CM

## 2023-03-31 DIAGNOSIS — Z00.00 ANNUAL PHYSICAL EXAM: Primary | ICD-10-CM

## 2023-03-31 DIAGNOSIS — E11.9 ENCOUNTER FOR DIABETIC FOOT EXAM: ICD-10-CM

## 2023-03-31 DIAGNOSIS — I44.7 LEFT BUNDLE BRANCH BLOCK: ICD-10-CM

## 2023-03-31 DIAGNOSIS — M79.672 LEFT FOOT PAIN: ICD-10-CM

## 2023-03-31 DIAGNOSIS — J30.2 SEASONAL ALLERGIES: ICD-10-CM

## 2023-03-31 DIAGNOSIS — R04.2 HEMOPTYSIS: ICD-10-CM

## 2023-03-31 DIAGNOSIS — I10 PRIMARY HYPERTENSION: ICD-10-CM

## 2023-03-31 LAB
ALBUMIN/CREATININE RATIO, URINE: 30
EXPIRATION DATE: NORMAL
EXPIRATION DATE: NORMAL
HBA1C MFR BLD: 5.5 %
Lab: NORMAL
Lab: NORMAL
POC CREATININE URINE: 300
POC MICROALBUMIN URINE: 80

## 2023-03-31 RX ORDER — DOXYCYCLINE 100 MG/1
100 CAPSULE ORAL 2 TIMES DAILY
Qty: 28 CAPSULE | Refills: 0 | Status: SHIPPED | OUTPATIENT
Start: 2023-03-31

## 2023-03-31 RX ORDER — FEXOFENADINE HCL 180 MG/1
180 TABLET ORAL DAILY
Qty: 90 TABLET | Refills: 3 | Status: SHIPPED | OUTPATIENT
Start: 2023-03-31

## 2023-03-31 RX ORDER — LISINOPRIL 40 MG/1
40 TABLET ORAL DAILY
Qty: 90 TABLET | Refills: 3 | Status: SHIPPED | OUTPATIENT
Start: 2023-03-31

## 2023-03-31 NOTE — PROGRESS NOTES
Follow Up Office Visit      Patient Name: Palomo Pollack  : 1980   MRN: [id number]     Chief Complaint:    Chief Complaint   Patient presents with   • Annual Exam       History of Present Illness: Palomo Pollack is a 43 y.o. male who is here today to follow up with diabetes.  Patient presents today with his wife for annual exam.  He has diabetes, low testosterone/hypogonadism, hypertension, foot pain for years, and a new left bundle branch block.  For patient's annual exam we discussed diet and exercise.  Reviewed vaccination status colorectal cancer screening.  We discussed dental and vision care as well.    For patient's diabetes-is very well controlled and improved substantially.  His A1c is nondiabetic now at 5.5.  He will continue diet control    His weight has improved substantially.    Hypertension is still uncontrolled.  Today we discussed the risk of having uncontrolled hypertension in regards to morbidity mortality.  Patient is wanting to work on his exercise and lifestyle to improve medication.  He did have leg cramps from hydrochlorothiazide.  Patient is currently on lisinopril doing well.  He has a left bundle branch block is noted on today's EKG and he will be referred to cardiology for further management.  Patient is reporting intermittent chest pain that goes down his left arm.  Patient says that this is occasional and does not occur with activity.    Patient has foot pain for the last 3 years.  It only hurts when wearing shoes.  Feels better when wearing sandals or flat-footed.  Patient says its right before the his fourth digit and radiates into his toe.  Patient denies any recent injury    He is on testosterone replacement and this is likely causing elevated blood pressure.  He is following up with urology soon.    We discussed HIV and hepatitis C screening for annual exam.    Patient's allergies are controlled on Allegra, refill sent today.    He has a chronic cough since  having COVID back in December.  Productive cough and he thinks it looks like blood.  Patient also has shortness of breath and had a lot of congestion with COVID.  Things are improving but he still left with a productive cough.  Overall he does not feel sick.  Has not been treated for productive cough and no antibiotics recently.    Review of systems was positive for weight loss, foot pain              Subjective        I have reviewed and the following portions of the patient's history were updated as appropriate: past family history, past medical history, past social history, past surgical history and problem list.    Medications:     Current Outpatient Medications:   •  fexofenadine (ALLEGRA) 180 MG tablet, Take 1 tablet by mouth Daily., Disp: 90 tablet, Rfl: 3  •  doxycycline (MONODOX) 100 MG capsule, Take 1 capsule by mouth 2 (Two) Times a Day., Disp: 28 capsule, Rfl: 0  •  lisinopril (PRINIVIL,ZESTRIL) 40 MG tablet, Take 1 tablet by mouth Daily., Disp: 90 tablet, Rfl: 3  •  Tlando 112.5 MG capsule, Patient has not started yet because it causes his blood pressure to go up (Patient not taking: Reported on 3/31/2023), Disp: , Rfl:     Allergies:   Allergies   Allergen Reactions   • Bactrim [Sulfamethoxazole-Trimethoprim] Rash and Other (See Comments)     Gave increased heartrate-biaxin         Objective     Physical Exam:   Physical Exam  Vitals reviewed.   HENT:      Head: Normocephalic.      Nose: Nose normal.   Eyes:      Pupils: Pupils are equal, round, and reactive to light.   Cardiovascular:      Rate and Rhythm: Normal rate and regular rhythm.      Pulses:           Dorsalis pedis pulses are 2+ on the right side and 2+ on the left side.   Pulmonary:      Effort: Pulmonary effort is normal.   Abdominal:      General: Abdomen is flat.   Musculoskeletal:         General: No swelling.      Cervical back: Neck supple.      Comments: Tenderness at the head of the fourth metatarsal.  No edema noted.   Feet:       "Right foot:      Protective Sensation: 6 sites tested. 6 sites sensed.      Skin integrity: No ulcer.      Toenail Condition: Right toenails are normal.      Left foot:      Protective Sensation: 6 sites tested. 6 sites sensed.      Skin integrity: No ulcer.      Toenail Condition: Left toenails are normal.   Skin:     General: Skin is warm.   Neurological:      General: No focal deficit present.      Mental Status: He is alert. Mental status is at baseline.   Psychiatric:         Mood and Affect: Mood normal.         Vital Signs:   Vitals:    03/31/23 1550   BP: 156/86   Pulse: 112   Temp: 97.8 °F (36.6 °C)   TempSrc: Infrared   SpO2: 99%   Weight: 90.8 kg (200 lb 3.2 oz)   Height: 180.3 cm (71\")  Comment: PT reported   PainSc: 0-No pain     Body mass index is 27.92 kg/m².          Assessment / Plan      Assessment/Plan:   Diagnoses and all orders for this visit:    1. Annual physical exam (Primary)  -     Lipid Panel; Future  -     Hepatitis C antibody; Future  -     HIV-1 / O / 2 Ag / Antibody 4th Generation; Future    2. Type 2 diabetes mellitus with hyperglycemia, without long-term current use of insulin (HCC)  -     POC Glycosylated Hemoglobin (Hb A1C)  -     POCT microalbumin    3. Hemoptysis  -     doxycycline (MONODOX) 100 MG capsule; Take 1 capsule by mouth 2 (Two) Times a Day.  Dispense: 28 capsule; Refill: 0    4. Primary hypertension  -     lisinopril (PRINIVIL,ZESTRIL) 40 MG tablet; Take 1 tablet by mouth Daily.  Dispense: 90 tablet; Refill: 3  -     ECG 12 Lead    5. Left foot pain  -     XR Foot 3+ View Left; Future  -     Ambulatory Referral to Podiatry    6. Seasonal allergies  -     fexofenadine (ALLEGRA) 180 MG tablet; Take 1 tablet by mouth Daily.  Dispense: 90 tablet; Refill: 3    7. Encounter for diabetic foot exam ([facility])    8. Left bundle branch block  -     Ambulatory Referral to Cardiology    Annual exam counseling: Counseled patient regards to diet and exercise.  Recommended 150 minutes " moderate intense exercise weekly.  Recommended diet with control portion sizes and restricted calories.  Patient doing very well with diet.  Recommend yearly dental and vision checks for this patient.  Also recommend colorectal cancer screening, will get colonoscopy records to review.  I recommend hepatitis B, Prevnar, and Tdap vaccines today.  Patient deferred for now.  Discussed HIV and hepatitis C screening with him.  Discussed diabetic eye exam and diabetic foot exam.  Diabetic foot exam performed today.     diabetes- very well controlled, no medication currently.  Continue monitor every 6 months going forward.    Left bundle branch block-new on EKG today.  Patient reports intermittent chest pain.  Will refer to cardiology for further evaluation and management    Foot pain-unsure of etiology.  Could be bone spur, recommend x-ray and podiatry follow-up.    Hypertension-uncontrolled.  Discussed mortality benefit and morbidity benefit with adding on additional blood pressure medication.  Patient prefers to follow-up with urology regarding T. Chu which is likely causing elevation in blood pressure.  If no improvement by next visit, will add on chlorthalidone or amlodipine.  Patient had side effects from hydrochlorothiazide previously.    Hemoptysis-pictures of the patient showed me today actually looks like mucus.  We will put the patient on doxycycline for couple weeks, if no improvement we will get x-rays of his chest.  Consider pulmonary function test for shortness of breath.    Additional chronic diseases and acute issues discussed today in addition to annual exam.  This was outside the scope of annual exam, level 4 visit assessed.        ECG 12 Lead    Date/Time: 3/31/2023 5:46 PM  Performed by: Heladio Valentine DO  Authorized by: Heladio Valentine DO   Comparison: not compared with previous ECG   Previous ECG: no previous ECG available  Rhythm: sinus rhythm  Ectopy: infrequent PVCs  Rate: normal  Conduction: left  bundle branch block  ST Segments: ST segments normal  T Waves: T waves normal  QRS axis: normal  Other: no other findings    Clinical impression: abnormal EKG              Follow Up:   Return in about 3 months (around 6/30/2023) for Labs today, Recheck, xray.    Heladio Valentine DO  [facility] [place] [facility] [place]

## 2023-04-03 ENCOUNTER — LAB (OUTPATIENT)
Dept: LAB | Facility: HOSPITAL | Age: 43
End: 2023-04-03
Payer: MEDICAID

## 2023-04-03 DIAGNOSIS — Z00.00 ANNUAL PHYSICAL EXAM: ICD-10-CM

## 2023-04-03 LAB
CHOLEST SERPL-MCNC: 143 MG/DL (ref 0–200)
HCV AB SER DONR QL: NORMAL
HDLC SERPL-MCNC: 48 MG/DL (ref 40–60)
HIV1+2 AB SER QL: NORMAL
LDLC SERPL CALC-MCNC: 79 MG/DL (ref 0–100)
LDLC/HDLC SERPL: 1.65 {RATIO}
TRIGL SERPL-MCNC: 80 MG/DL (ref 0–150)
VLDLC SERPL-MCNC: 16 MG/DL (ref 5–40)

## 2023-04-03 PROCEDURE — 86803 HEPATITIS C AB TEST: CPT

## 2023-04-03 PROCEDURE — 36415 COLL VENOUS BLD VENIPUNCTURE: CPT

## 2023-04-03 PROCEDURE — 80061 LIPID PANEL: CPT

## 2023-04-03 PROCEDURE — G0432 EIA HIV-1/HIV-2 SCREEN: HCPCS

## 2023-04-07 DIAGNOSIS — R04.2 HEMOPTYSIS: Primary | ICD-10-CM

## 2023-04-07 RX ORDER — AMOXICILLIN AND CLAVULANATE POTASSIUM 875; 125 MG/1; MG/1
1 TABLET, FILM COATED ORAL 2 TIMES DAILY
Qty: 14 TABLET | Refills: 0 | Status: SHIPPED | OUTPATIENT
Start: 2023-04-07

## 2023-04-25 ENCOUNTER — OFFICE VISIT (OUTPATIENT)
Dept: CARDIOLOGY | Facility: CLINIC | Age: 43
End: 2023-04-25
Payer: MEDICAID

## 2023-04-25 VITALS
WEIGHT: 204 LBS | BODY MASS INDEX: 28.56 KG/M2 | OXYGEN SATURATION: 99 % | SYSTOLIC BLOOD PRESSURE: 152 MMHG | HEIGHT: 71 IN | DIASTOLIC BLOOD PRESSURE: 92 MMHG | HEART RATE: 102 BPM

## 2023-04-25 DIAGNOSIS — I44.7 LBBB (LEFT BUNDLE BRANCH BLOCK): ICD-10-CM

## 2023-04-25 DIAGNOSIS — R06.09 DYSPNEA ON EXERTION: ICD-10-CM

## 2023-04-25 DIAGNOSIS — R00.2 PALPITATIONS: Primary | ICD-10-CM

## 2023-04-25 PROCEDURE — 99204 OFFICE O/P NEW MOD 45 MIN: CPT | Performed by: INTERNAL MEDICINE

## 2023-04-25 NOTE — PROGRESS NOTES
[facility] Cardiology  Consultation H&P  Palomo Pollack  1980    There is no work phone number on file..    VISIT DATE:  04/25/2023    PCP: Heladio Valentine DO  [address] [address]    CC:  Chief Complaint   Patient presents with   • Left Bundle Branch Block         ASSESSMENT:   Diagnosis Plan   1. Palpitations  Holter Monitor - 72 Hour Up To 15 Days      2. Dyspnea on exertion  Adult Transthoracic Echo Complete W/ Cont if Necessary Per Protocol    Stress Test With Myocardial Perfusion (1 Day)      3. LBBB (left bundle branch block)              PLAN:  Transthoracic echo to evaluate underlying myocardial structure and function  2-week amatory ECG monitor for symptom rhythm correlation  Nelsy scan myocardial perfusion imaging for ischemia evaluation.  Left bundle branch block precludes exercise treadmill testing and baseline heart rate will not allow for CT coronary angiography.    History of Present Illness   43-year-old gentleman with persistent limiting dyspnea on exertion over the previous 4 months following COVID-19 infection in December 2022.  Also with intermittent palpitations.  Describes intermittent isolated flip-flop sensations, also with intermittent sustained palpitations in which she feels his heart is beating hard and fast.  Has intermittent left upper chest discomfort rating down his left arm, no obvious triggers.  No alleviating or exacerbating features.  Does have a history of Hodgkin's lymphoma with chemotherapy and chest radiation and stem cell transplant.  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  Diagnosed with diabetes last year, was able to normalize his hemoglobin A1c with dietary changes and approximately 50 pound weight loss.    PHYSICAL EXAMINATION:  Vitals:    04/25/23 0857   BP: 152/92   BP Location: Right arm   Patient  "Position: Sitting   Cuff Size: Adult   Pulse: 102   SpO2: 99%   Weight: 92.5 kg (204 lb)   Height: 180.3 cm (71\")     General Appearance:    Alert, cooperative, no distress, appears stated age   Head:    Normocephalic, without obvious abnormality, atraumatic   Eyes:    conjunctiva/corneas clear, EOM's intact, fundi     benign, both eyes   Ears:    Normal TM's and external ear canals, both ears   Nose:   Nares normal, septum midline, mucosa normal, no drainage    or sinus tenderness   Throat:   Lips, mucosa, and tongue normal; teeth and gums normal   Neck:   Supple, symmetrical, trachea midline, no adenopathy;     thyroid:  no enlargement/tenderness/nodules; no carotid    bruit or JVD   Back:     Symmetric, no curvature, ROM normal, no CVA tenderness   Lungs:     Clear to auscultation bilaterally, respirations unlabored   Chest Wall:    No tenderness or deformity    Heart:    Regular rate and rhythm, S1 and S2 normal, no murmur, rub   or gallop, normal carotid impulse bilaterally without bruit.   Abdomen:     Soft, non-tender, bowel sounds active all four quadrants,     no masses, no organomegaly   Extremities:   Extremities normal, atraumatic, no cyanosis or edema   Pulses:   2+ and symmetric all extremities   Skin:   Skin color, texture, turgor normal, no rashes or lesions   Lymph nodes:   Cervical, supraclavicular, and axillary nodes normal   Neurologic:   normal strength, sensation intact     throughout       Diagnostic Data:  Procedures  Lab Results   Component Value Date    TRIG 80 04/03/2023    HDL 48 04/03/2023     Lab Results   Component Value Date    GLUCOSE 120 (H) 12/08/2022    BUN 9 12/08/2022    CREATININE 1.02 12/08/2022     12/08/2022    K 4.4 12/08/2022     12/08/2022    CO2 30.0 (H) 12/08/2022     Lab Results   Component Value Date    HGBA1C 5.5 03/31/2023     Lab Results   Component Value Date    WBC 7.26 03/02/2018    HGB 15.0 03/02/2018    HCT 44.9 03/02/2018     03/02/2018 " "      PROBLEM LIST:  Patient Active Problem List   Diagnosis   • Hypogonadism, male   • Lymphoma   • Tobacco use   • GERD (gastroesophageal reflux disease)   • Allergy   • PTSD (post-traumatic stress disorder)   • LBBB (left bundle branch block)   • Palpitations   • Dyspnea on exertion       PAST MEDICAL HX  Past Medical History:   Diagnosis Date   • Allergic rhinitis    • Chronic right ear pain    • GERD (gastroesophageal reflux disease)    • Hodgkin lymphoma    • Hypogonadism in male    • PTSD (post-traumatic stress disorder)        Allergies  Allergies   Allergen Reactions   • Bactrim [Sulfamethoxazole-Trimethoprim] Rash and Other (See Comments)     Gave increased heartrate-biaxin         Current Medications    Current Outpatient Medications:   •  doxycycline (MONODOX) 100 MG capsule, Take 1 capsule by mouth 2 (Two) Times a Day., Disp: 28 capsule, Rfl: 0  •  esomeprazole (nexIUM) 20 MG capsule, Take 1 capsule by mouth Every Morning Before Breakfast., Disp: , Rfl:   •  fexofenadine (ALLEGRA) 180 MG tablet, Take 1 tablet by mouth Daily., Disp: 90 tablet, Rfl: 3  •  lisinopril (PRINIVIL,ZESTRIL) 40 MG tablet, Take 1 tablet by mouth Daily., Disp: 90 tablet, Rfl: 3  •  Tlando 112.5 MG capsule, Patient has not started yet because it causes his blood pressure to go up, Disp: , Rfl:          ROS  ROS      SOCIAL HX  Social History     Socioeconomic History   • Marital status: Single   Tobacco Use   • Smoking status: Former     Packs/day: 0.25     Years: 20.00     Pack years: 5.00     Types: Cigarettes     Quit date: 2022     Years since quittin.4   • Smokeless tobacco: Never   • Tobacco comments:     smokes less than .25 ppd   Vaping Use   • Vaping Use: Never used   Substance and Sexual Activity   • Alcohol use: Yes     Comment: 2 drinks a night   • Drug use: Yes     Types: Marijuana     Comment: \"A COUPLE A WEEK\"   • Sexual activity: Yes       FAMILY HX  Family History   Problem Relation Age of Onset   • Stroke " Father    • Lung cancer Father    • Cancer Father         Positive for cured lung cancer- he was smoker             Alec Mason III, MD, FACC

## 2023-04-28 ENCOUNTER — OFFICE VISIT (OUTPATIENT)
Dept: FAMILY MEDICINE CLINIC | Facility: CLINIC | Age: 43
End: 2023-04-28
Payer: MEDICAID

## 2023-04-28 VITALS
WEIGHT: 198.6 LBS | HEART RATE: 86 BPM | DIASTOLIC BLOOD PRESSURE: 98 MMHG | OXYGEN SATURATION: 98 % | TEMPERATURE: 98 F | BODY MASS INDEX: 27.8 KG/M2 | SYSTOLIC BLOOD PRESSURE: 161 MMHG | HEIGHT: 71 IN

## 2023-04-28 DIAGNOSIS — J40 BRONCHITIS: ICD-10-CM

## 2023-04-28 DIAGNOSIS — R05.3 CHRONIC COUGH: Primary | ICD-10-CM

## 2023-04-28 RX ORDER — PREDNISONE 10 MG/1
TABLET ORAL
Qty: 32 TABLET | Refills: 0 | Status: SHIPPED | OUTPATIENT
Start: 2023-04-28 | End: 2023-05-13

## 2023-04-28 RX ORDER — ALBUTEROL SULFATE 90 UG/1
2 AEROSOL, METERED RESPIRATORY (INHALATION) EVERY 4 HOURS PRN
Qty: 8 G | Refills: 2 | Status: SHIPPED | OUTPATIENT
Start: 2023-04-28

## 2023-04-28 NOTE — PROGRESS NOTES
"Chief Complaint  Cough and Shortness of Breath (Pt had covid at the end of the year, has had productive cough, SOA, has been on 2 rounds of abx and little improvement)    Subjective        Palomo Pollack presents to [facility]  History of Present Illness     Mr. Pollack is a pleasant 43-year-old male who presents today with complaint of cough.  He had COVID at [place] and has had cough and congestion since.  He has been treated with both Augmentin and doxycycline.  He reports significant sputum production that is clear.  Has been having shortness of breath especially with the cough.      Objective   Vital Signs:  /98   Pulse 86   Temp 98 °F (36.7 °C) (Infrared)   Ht 180.3 cm (70.98\")   Wt 90.1 kg (198 lb 9.6 oz)   SpO2 98%   BMI 27.71 kg/m²   Estimated body mass index is 27.71 kg/m² as calculated from the following:    Height as of this encounter: 180.3 cm (70.98\").    Weight as of this encounter: 90.1 kg (198 lb 9.6 oz).       BMI is >= 25 and <30. (Overweight) The following options were offered after discussion;: weight loss educational material (shared in after visit summary)      Physical Exam  Constitutional:       Appearance: He is not ill-appearing.   Eyes:      Pupils: Pupils are equal, round, and reactive to light.   Cardiovascular:      Rate and Rhythm: Normal rate.      Pulses: Normal pulses.   Pulmonary:      Effort: Pulmonary effort is normal.      Breath sounds: Normal breath sounds.   Neurological:      General: No focal deficit present.      Mental Status: He is alert. Mental status is at baseline.   Psychiatric:         Mood and Affect: Mood normal.         Thought Content: Thought content normal.        Result Review :             Assessment and Plan   Diagnoses and all orders for this visit:    1. Chronic cough (Primary)  -     XR Chest PA & Lateral (In Office)    2. Bronchitis  -     albuterol sulfate  (90 Base) MCG/ACT inhaler; " Inhale 2 puffs Every 4 (Four) Hours As Needed for Wheezing.  Dispense: 8 g; Refill: 2  -     predniSONE (DELTASONE) 10 MG tablet; Take 4 tablets by mouth Daily for 3 days, THEN 3 tablets Daily for 3 days, THEN 2 tablets Daily for 3 days, THEN 1 tablet Daily for 3 days, THEN 0.5 tablets Daily for 3 days.  Dispense: 32 tablet; Refill: 0      Due to chronic cough for the last 3 months I am ordering a chest x-ray.  I do not think he needs further antibiotic therapy as he has had both Augmentin and doxycycline.  Prescribed prednisone taper and albuterol.  We will follow-up on x-ray.      He does have an upcoming appointment with cardiology and for an echo, I have encouraged him to ensure to follow up on this workup.          Follow Up   Return for Next scheduled follow up.  Patient was given instructions and counseling regarding his condition or for health maintenance advice. Please see specific information pulled into the AVS if appropriate.       This document has been electronically signed by Gabriela Ye DO   April 29, 2023 11:17 EDT    Dictated Utilizing Dragon Dictation: Part of this note may be an electronic transcription/translation of spoken language to printed text using the Dragon Dictation System.    Gabriela Ye D.O.  [facility] [place] [place] [facility] [place]

## 2023-05-24 ENCOUNTER — HOSPITAL ENCOUNTER (OUTPATIENT)
Dept: CARDIOLOGY | Facility: HOSPITAL | Age: 43
Discharge: HOME | End: 2023-05-24
Payer: MEDICAID

## 2023-05-24 DIAGNOSIS — R06.09 DYSPNEA ON EXERTION: ICD-10-CM

## 2023-05-24 LAB
BH CV ECHO MEAS - AO MAX PG: 4.3 MMHG
BH CV ECHO MEAS - AO MEAN PG: 2 MMHG
BH CV ECHO MEAS - AO ROOT DIAM: 2.9 CM
BH CV ECHO MEAS - AO V2 MAX: 104 CM/SEC
BH CV ECHO MEAS - AO V2 VTI: 15.7 CM
BH CV ECHO MEAS - AVA(I,D): 2.6 CM2
BH CV ECHO MEAS - EDV(CUBED): 166.4 ML
BH CV ECHO MEAS - EDV(MOD-SP2): 132 ML
BH CV ECHO MEAS - EDV(MOD-SP4): 102 ML
BH CV ECHO MEAS - EF(MOD-BP): 28 %
BH CV ECHO MEAS - EF(MOD-SP2): 35.8 %
BH CV ECHO MEAS - EF(MOD-SP4): 25.1 %
BH CV ECHO MEAS - ESV(CUBED): 110.6 ML
BH CV ECHO MEAS - ESV(MOD-SP2): 84.8 ML
BH CV ECHO MEAS - ESV(MOD-SP4): 76.4 ML
BH CV ECHO MEAS - FS: 12.7 %
BH CV ECHO MEAS - IVS/LVPW: 0.9 CM
BH CV ECHO MEAS - IVSD: 0.9 CM
BH CV ECHO MEAS - LA DIMENSION: 4.1 CM
BH CV ECHO MEAS - LAT PEAK E' VEL: 11.6 CM/SEC
BH CV ECHO MEAS - LV DIASTOLIC VOL/BSA (35-75): 49.1 CM2
BH CV ECHO MEAS - LV MASS(C)D: 199.3 GRAMS
BH CV ECHO MEAS - LV MAX PG: 1.89 MMHG
BH CV ECHO MEAS - LV MEAN PG: 1 MMHG
BH CV ECHO MEAS - LV SYSTOLIC VOL/BSA (12-30): 36.8 CM2
BH CV ECHO MEAS - LV V1 MAX: 68.7 CM/SEC
BH CV ECHO MEAS - LV V1 VTI: 8.9 CM
BH CV ECHO MEAS - LVIDD: 5.5 CM
BH CV ECHO MEAS - LVIDS: 4.8 CM
BH CV ECHO MEAS - LVOT AREA: 4.5 CM2
BH CV ECHO MEAS - LVOT DIAM: 2.4 CM
BH CV ECHO MEAS - LVPWD: 1 CM
BH CV ECHO MEAS - MED PEAK E' VEL: 8.1 CM/SEC
BH CV ECHO MEAS - PA ACC TIME: 0.08 SEC
BH CV ECHO MEAS - PA PR(ACCEL): 42.6 MMHG
BH CV ECHO MEAS - PA V2 MAX: 85 CM/SEC
BH CV ECHO MEAS - RAP SYSTOLE: 15 MMHG
BH CV ECHO MEAS - RVSP: 50 MMHG
BH CV ECHO MEAS - SI(MOD-SP2): 22.7 ML/M2
BH CV ECHO MEAS - SI(MOD-SP4): 12.3 ML/M2
BH CV ECHO MEAS - SV(LVOT): 40.2 ML
BH CV ECHO MEAS - SV(MOD-SP2): 47.2 ML
BH CV ECHO MEAS - SV(MOD-SP4): 25.6 ML
BH CV ECHO MEAS - TAPSE (>1.6): 1.28 CM
BH CV ECHO MEAS - TR MAX PG: 35 MMHG
BH CV REST NUCLEAR ISOTOPE DOSE: 9.3 MCI
BH CV STRESS BP STAGE 2: NORMAL
BH CV STRESS BP STAGE 4: NORMAL
BH CV STRESS COMMENTS STAGE 1: NORMAL
BH CV STRESS DOSE REGADENOSON STAGE 1: 0.4
BH CV STRESS DURATION MIN STAGE 1: 1
BH CV STRESS DURATION MIN STAGE 2: 1
BH CV STRESS DURATION MIN STAGE 3: 1
BH CV STRESS DURATION MIN STAGE 4: 1
BH CV STRESS DURATION SEC STAGE 1: 0
BH CV STRESS DURATION SEC STAGE 2: 0
BH CV STRESS DURATION SEC STAGE 3: 0
BH CV STRESS DURATION SEC STAGE 4: 0
BH CV STRESS HR STAGE 1: 109
BH CV STRESS HR STAGE 2: 111
BH CV STRESS HR STAGE 3: 109
BH CV STRESS HR STAGE 4: 107
BH CV STRESS NUCLEAR ISOTOPE DOSE: 33 MCI
BH CV STRESS O2 STAGE 1: 98
BH CV STRESS O2 STAGE 2: 100
BH CV STRESS O2 STAGE 3: 100
BH CV STRESS O2 STAGE 4: 99
BH CV STRESS PROTOCOL 1: NORMAL
BH CV STRESS RECOVERY BP: NORMAL MMHG
BH CV STRESS RECOVERY HR: 106 BPM
BH CV STRESS RECOVERY O2: 99 %
BH CV STRESS STAGE 1: 1
BH CV STRESS STAGE 2: 2
BH CV STRESS STAGE 3: 3
BH CV STRESS STAGE 4: 4
BH CV XLRA - RV BASE: 3.3 CM
BH CV XLRA - RV LENGTH: 7.2 CM
BH CV XLRA - RV MID: 2.8 CM
BH CV XLRA - TDI S': 9.9 CM/SEC
LEFT ATRIUM VOLUME INDEX: 15.4 ML/M2
LV EF NUC BP: 37 %
MAXIMAL PREDICTED HEART RATE: 175 BPM
PERCENT MAX PREDICTED HR: 65.14 %
STRESS BASELINE BP: NORMAL MMHG
STRESS BASELINE HR: 94 BPM
STRESS O2 SAT REST: 99 %
STRESS PERCENT HR: 77 %
STRESS POST ESTIMATED WORKLOAD: 1 METS
STRESS POST EXERCISE DUR MIN: 4 MIN
STRESS POST EXERCISE DUR SEC: 0 SEC
STRESS POST O2 SAT PEAK: 99 %
STRESS POST PEAK BP: NORMAL MMHG
STRESS POST PEAK HR: 114 BPM
STRESS TARGET HR: 149 BPM

## 2023-05-24 PROCEDURE — 25010000002 REGADENOSON 0.4 MG/5ML SOLUTION: Performed by: INTERNAL MEDICINE

## 2023-05-24 PROCEDURE — 0 TECHNETIUM SESTAMIBI: Performed by: INTERNAL MEDICINE

## 2023-05-24 PROCEDURE — 93306 TTE W/DOPPLER COMPLETE: CPT

## 2023-05-24 PROCEDURE — A9500 TC99M SESTAMIBI: HCPCS | Performed by: INTERNAL MEDICINE

## 2023-05-24 PROCEDURE — 93017 CV STRESS TEST TRACING ONLY: CPT

## 2023-05-24 PROCEDURE — 78452 HT MUSCLE IMAGE SPECT MULT: CPT

## 2023-05-24 PROCEDURE — 93306 TTE W/DOPPLER COMPLETE: CPT | Performed by: INTERNAL MEDICINE

## 2023-05-24 RX ORDER — REGADENOSON 0.08 MG/ML
0.4 INJECTION, SOLUTION INTRAVENOUS ONCE
Status: COMPLETED | OUTPATIENT
Start: 2023-05-24 | End: 2023-05-24

## 2023-05-24 RX ADMIN — REGADENOSON 0.4 MG: 0.08 INJECTION, SOLUTION INTRAVENOUS at 12:40

## 2023-05-24 RX ADMIN — TECHNETIUM TC 99M SESTAMIBI 1 DOSE: 1 INJECTION INTRAVENOUS at 10:57

## 2023-05-25 ENCOUNTER — TELEPHONE (OUTPATIENT)
Dept: CARDIOLOGY | Facility: CLINIC | Age: 43
End: 2023-05-25
Payer: MEDICAID

## 2023-05-25 DIAGNOSIS — R94.39 ABNORMAL STRESS TEST: Primary | ICD-10-CM

## 2023-05-25 NOTE — TELEPHONE ENCOUNTER
----- Message from Alec Mason III, MD sent at 5/24/2023  4:40 PM EDT -----  Abnormal stress test and echo revealing decrease in his heart function.  Needs to be scheduled for left heart catheterization.

## 2023-05-29 ENCOUNTER — HOSPITAL ENCOUNTER (OUTPATIENT)
Facility: HOSPITAL | Age: 43
Setting detail: OBSERVATION
LOS: 1 days | Discharge: HOME | End: 2023-06-01
Attending: STUDENT IN AN ORGANIZED HEALTH CARE EDUCATION/TRAINING PROGRAM | Admitting: INTERNAL MEDICINE
Payer: MEDICAID

## 2023-05-29 ENCOUNTER — APPOINTMENT (OUTPATIENT)
Dept: GENERAL RADIOLOGY | Facility: HOSPITAL | Age: 43
End: 2023-05-29
Payer: MEDICAID

## 2023-05-29 ENCOUNTER — APPOINTMENT (OUTPATIENT)
Dept: CT IMAGING | Facility: HOSPITAL | Age: 43
End: 2023-05-29
Payer: MEDICAID

## 2023-05-29 DIAGNOSIS — C81.90 HODGKIN LYMPHOMA, UNSPECIFIED HODGKIN LYMPHOMA TYPE, UNSPECIFIED BODY REGION: ICD-10-CM

## 2023-05-29 DIAGNOSIS — R94.39 ABNORMAL STRESS TEST: ICD-10-CM

## 2023-05-29 DIAGNOSIS — R06.09 DYSPNEA ON EXERTION: ICD-10-CM

## 2023-05-29 DIAGNOSIS — I44.7 LBBB (LEFT BUNDLE BRANCH BLOCK): ICD-10-CM

## 2023-05-29 DIAGNOSIS — J96.01 ACUTE RESPIRATORY FAILURE WITH HYPOXIA: Primary | ICD-10-CM

## 2023-05-29 DIAGNOSIS — E29.1 HYPOGONADISM, MALE: ICD-10-CM

## 2023-05-29 DIAGNOSIS — I50.9 ACUTE ON CHRONIC CONGESTIVE HEART FAILURE, UNSPECIFIED HEART FAILURE TYPE: ICD-10-CM

## 2023-05-29 DIAGNOSIS — R00.2 PALPITATIONS: ICD-10-CM

## 2023-05-29 LAB
ALBUMIN SERPL-MCNC: 4.3 G/DL (ref 3.5–5.2)
ALBUMIN/GLOB SERPL: 1.6 G/DL
ALP SERPL-CCNC: 86 U/L (ref 39–117)
ALT SERPL W P-5'-P-CCNC: 28 U/L (ref 1–41)
ANION GAP SERPL CALCULATED.3IONS-SCNC: 15 MMOL/L (ref 5–15)
AST SERPL-CCNC: 29 U/L (ref 1–40)
ATMOSPHERIC PRESS: ABNORMAL MM[HG]
B PARAPERT DNA SPEC QL NAA+PROBE: NOT DETECTED
B PERT DNA SPEC QL NAA+PROBE: NOT DETECTED
BACTERIA UR QL AUTO: NORMAL /HPF
BASE EXCESS BLDV CALC-SCNC: 3.7 MMOL/L (ref -2–2)
BASOPHILS # BLD AUTO: 0.05 10*3/MM3 (ref 0–0.2)
BASOPHILS NFR BLD AUTO: 0.5 % (ref 0–1.5)
BDY SITE: ABNORMAL
BILIRUB SERPL-MCNC: 0.7 MG/DL (ref 0–1.2)
BILIRUB UR QL STRIP: NEGATIVE
BODY TEMPERATURE: 37 C
BUN SERPL-MCNC: 11 MG/DL (ref 6–20)
BUN/CREAT SERPL: 9.6 (ref 7–25)
C PNEUM DNA NPH QL NAA+NON-PROBE: NOT DETECTED
CALCIUM SPEC-SCNC: 8.8 MG/DL (ref 8.6–10.5)
CHLORIDE SERPL-SCNC: 102 MMOL/L (ref 98–107)
CLARITY UR: ABNORMAL
CO2 BLDA-SCNC: 29.1 MMOL/L (ref 22–33)
CO2 SERPL-SCNC: 23 MMOL/L (ref 22–29)
COHGB MFR BLD: 1.2 %
COLOR UR: YELLOW
CREAT SERPL-MCNC: 1.15 MG/DL (ref 0.76–1.27)
CRP SERPL-MCNC: 2.11 MG/DL (ref 0–0.5)
D DIMER PPP FEU-MCNC: 3.9 MCGFEU/ML (ref 0–0.5)
D-LACTATE SERPL-SCNC: 2.6 MMOL/L (ref 0.5–2)
DEPRECATED RDW RBC AUTO: 47.5 FL (ref 37–54)
EGFRCR SERPLBLD CKD-EPI 2021: 80 ML/MIN/1.73
EOSINOPHIL # BLD AUTO: 0.13 10*3/MM3 (ref 0–0.4)
EOSINOPHIL NFR BLD AUTO: 1.4 % (ref 0.3–6.2)
EPAP: 0
ERYTHROCYTE [DISTWIDTH] IN BLOOD BY AUTOMATED COUNT: 13.9 % (ref 12.3–15.4)
FLUAV SUBTYP SPEC NAA+PROBE: NOT DETECTED
FLUBV RNA ISLT QL NAA+PROBE: NOT DETECTED
GLOBULIN UR ELPH-MCNC: 2.7 GM/DL
GLUCOSE SERPL-MCNC: 99 MG/DL (ref 65–99)
GLUCOSE UR STRIP-MCNC: NEGATIVE MG/DL
HADV DNA SPEC NAA+PROBE: NOT DETECTED
HCO3 BLDV-SCNC: 27.9 MMOL/L (ref 22–28)
HCOV 229E RNA SPEC QL NAA+PROBE: NOT DETECTED
HCOV HKU1 RNA SPEC QL NAA+PROBE: NOT DETECTED
HCOV NL63 RNA SPEC QL NAA+PROBE: NOT DETECTED
HCOV OC43 RNA SPEC QL NAA+PROBE: NOT DETECTED
HCT VFR BLD AUTO: 44.2 % (ref 37.5–51)
HGB BLD-MCNC: 14.7 G/DL (ref 13–17.7)
HGB BLDA-MCNC: 14.4 G/DL (ref 13.5–17.5)
HGB UR QL STRIP.AUTO: NEGATIVE
HMPV RNA NPH QL NAA+NON-PROBE: NOT DETECTED
HOLD SPECIMEN: NORMAL
HOLD SPECIMEN: NORMAL
HPIV1 RNA ISLT QL NAA+PROBE: NOT DETECTED
HPIV2 RNA SPEC QL NAA+PROBE: NOT DETECTED
HPIV3 RNA NPH QL NAA+PROBE: NOT DETECTED
HPIV4 P GENE NPH QL NAA+PROBE: NOT DETECTED
HYALINE CASTS UR QL AUTO: NORMAL /LPF
IMM GRANULOCYTES # BLD AUTO: 0.06 10*3/MM3 (ref 0–0.05)
IMM GRANULOCYTES NFR BLD AUTO: 0.6 % (ref 0–0.5)
INHALED O2 CONCENTRATION: 21 %
IPAP: 0
KETONES UR QL STRIP: ABNORMAL
LEUKOCYTE ESTERASE UR QL STRIP.AUTO: NEGATIVE
LIPASE SERPL-CCNC: 28 U/L (ref 13–60)
LYMPHOCYTES # BLD AUTO: 0.97 10*3/MM3 (ref 0.7–3.1)
LYMPHOCYTES NFR BLD AUTO: 10.4 % (ref 19.6–45.3)
M PNEUMO IGG SER IA-ACNC: NOT DETECTED
MAGNESIUM SERPL-MCNC: 1.4 MG/DL (ref 1.6–2.6)
MCH RBC QN AUTO: 31 PG (ref 26.6–33)
MCHC RBC AUTO-ENTMCNC: 33.3 G/DL (ref 31.5–35.7)
MCV RBC AUTO: 93.2 FL (ref 79–97)
METHGB BLD QL: 0.5 %
MODALITY: ABNORMAL
MONOCYTES # BLD AUTO: 0.68 10*3/MM3 (ref 0.1–0.9)
MONOCYTES NFR BLD AUTO: 7.3 % (ref 5–12)
NEUTROPHILS NFR BLD AUTO: 7.45 10*3/MM3 (ref 1.7–7)
NEUTROPHILS NFR BLD AUTO: 79.8 % (ref 42.7–76)
NITRITE UR QL STRIP: NEGATIVE
NOTE: ABNORMAL
NRBC BLD AUTO-RTO: 0 /100 WBC (ref 0–0.2)
NT-PROBNP SERPL-MCNC: 3449 PG/ML (ref 0–450)
OXYHGB MFR BLDV: 51.2 %
PAW @ PEAK INSP FLOW SETTING VENT: 0 CMH2O
PCO2 BLDV: 39.6 MM HG (ref 41–51)
PH BLDV: 7.46 PH UNITS (ref 7.31–7.41)
PH UR STRIP.AUTO: 6.5 [PH] (ref 5–8)
PHOSPHATE SERPL-MCNC: 2.5 MG/DL (ref 2.5–4.5)
PLATELET # BLD AUTO: 143 10*3/MM3 (ref 140–450)
PMV BLD AUTO: 9.8 FL (ref 6–12)
PO2 BLDV: 27.4 MM HG (ref 27–53)
POTASSIUM SERPL-SCNC: 3.6 MMOL/L (ref 3.5–5.2)
PROCALCITONIN SERPL-MCNC: 0.05 NG/ML (ref 0–0.25)
PROT SERPL-MCNC: 7 G/DL (ref 6–8.5)
PROT UR QL STRIP: ABNORMAL
RBC # BLD AUTO: 4.74 10*6/MM3 (ref 4.14–5.8)
RBC # UR STRIP: NORMAL /HPF
REF LAB TEST METHOD: NORMAL
RHINOVIRUS RNA SPEC NAA+PROBE: NOT DETECTED
RSV RNA NPH QL NAA+NON-PROBE: NOT DETECTED
SARS-COV-2 RNA NPH QL NAA+NON-PROBE: NOT DETECTED
SODIUM SERPL-SCNC: 140 MMOL/L (ref 136–145)
SP GR UR STRIP: 1.02 (ref 1–1.03)
SQUAMOUS #/AREA URNS HPF: NORMAL /HPF
T4 FREE SERPL-MCNC: 1.11 NG/DL (ref 0.93–1.7)
TOTAL RATE: 0 BREATHS/MINUTE
TROPONIN T SERPL HS-MCNC: 21 NG/L
TSH SERPL DL<=0.05 MIU/L-ACNC: 12.11 UIU/ML (ref 0.27–4.2)
UROBILINOGEN UR QL STRIP: ABNORMAL
WBC # UR STRIP: NORMAL /HPF
WBC NRBC COR # BLD: 9.34 10*3/MM3 (ref 3.4–10.8)
WHOLE BLOOD HOLD COAG: NORMAL
WHOLE BLOOD HOLD SPECIMEN: NORMAL

## 2023-05-29 PROCEDURE — 81001 URINALYSIS AUTO W/SCOPE: CPT | Performed by: STUDENT IN AN ORGANIZED HEALTH CARE EDUCATION/TRAINING PROGRAM

## 2023-05-29 PROCEDURE — 96375 TX/PRO/DX INJ NEW DRUG ADDON: CPT

## 2023-05-29 PROCEDURE — 25510000001 IOPAMIDOL PER 1 ML: Performed by: STUDENT IN AN ORGANIZED HEALTH CARE EDUCATION/TRAINING PROGRAM

## 2023-05-29 PROCEDURE — 80306 DRUG TEST PRSMV INSTRMNT: CPT | Performed by: PHYSICIAN ASSISTANT

## 2023-05-29 PROCEDURE — 85379 FIBRIN DEGRADATION QUANT: CPT | Performed by: STUDENT IN AN ORGANIZED HEALTH CARE EDUCATION/TRAINING PROGRAM

## 2023-05-29 PROCEDURE — 86140 C-REACTIVE PROTEIN: CPT | Performed by: STUDENT IN AN ORGANIZED HEALTH CARE EDUCATION/TRAINING PROGRAM

## 2023-05-29 PROCEDURE — 83690 ASSAY OF LIPASE: CPT | Performed by: STUDENT IN AN ORGANIZED HEALTH CARE EDUCATION/TRAINING PROGRAM

## 2023-05-29 PROCEDURE — 84484 ASSAY OF TROPONIN QUANT: CPT | Performed by: STUDENT IN AN ORGANIZED HEALTH CARE EDUCATION/TRAINING PROGRAM

## 2023-05-29 PROCEDURE — 87040 BLOOD CULTURE FOR BACTERIA: CPT | Performed by: STUDENT IN AN ORGANIZED HEALTH CARE EDUCATION/TRAINING PROGRAM

## 2023-05-29 PROCEDURE — 25010000002 MAGNESIUM SULFATE IN D5W 1G/100ML (PREMIX) 1-5 GM/100ML-% SOLUTION: Performed by: STUDENT IN AN ORGANIZED HEALTH CARE EDUCATION/TRAINING PROGRAM

## 2023-05-29 PROCEDURE — 96365 THER/PROPH/DIAG IV INF INIT: CPT

## 2023-05-29 PROCEDURE — 93005 ELECTROCARDIOGRAM TRACING: CPT | Performed by: STUDENT IN AN ORGANIZED HEALTH CARE EDUCATION/TRAINING PROGRAM

## 2023-05-29 PROCEDURE — 80053 COMPREHEN METABOLIC PANEL: CPT | Performed by: STUDENT IN AN ORGANIZED HEALTH CARE EDUCATION/TRAINING PROGRAM

## 2023-05-29 PROCEDURE — 85025 COMPLETE CBC W/AUTO DIFF WBC: CPT | Performed by: STUDENT IN AN ORGANIZED HEALTH CARE EDUCATION/TRAINING PROGRAM

## 2023-05-29 PROCEDURE — 94640 AIRWAY INHALATION TREATMENT: CPT

## 2023-05-29 PROCEDURE — 84443 ASSAY THYROID STIM HORMONE: CPT | Performed by: STUDENT IN AN ORGANIZED HEALTH CARE EDUCATION/TRAINING PROGRAM

## 2023-05-29 PROCEDURE — 99285 EMERGENCY DEPT VISIT HI MDM: CPT

## 2023-05-29 PROCEDURE — 84145 PROCALCITONIN (PCT): CPT | Performed by: STUDENT IN AN ORGANIZED HEALTH CARE EDUCATION/TRAINING PROGRAM

## 2023-05-29 PROCEDURE — 84439 ASSAY OF FREE THYROXINE: CPT | Performed by: STUDENT IN AN ORGANIZED HEALTH CARE EDUCATION/TRAINING PROGRAM

## 2023-05-29 PROCEDURE — 36415 COLL VENOUS BLD VENIPUNCTURE: CPT

## 2023-05-29 PROCEDURE — 83880 ASSAY OF NATRIURETIC PEPTIDE: CPT | Performed by: STUDENT IN AN ORGANIZED HEALTH CARE EDUCATION/TRAINING PROGRAM

## 2023-05-29 PROCEDURE — 71045 X-RAY EXAM CHEST 1 VIEW: CPT

## 2023-05-29 PROCEDURE — 83605 ASSAY OF LACTIC ACID: CPT | Performed by: STUDENT IN AN ORGANIZED HEALTH CARE EDUCATION/TRAINING PROGRAM

## 2023-05-29 PROCEDURE — 83735 ASSAY OF MAGNESIUM: CPT | Performed by: STUDENT IN AN ORGANIZED HEALTH CARE EDUCATION/TRAINING PROGRAM

## 2023-05-29 PROCEDURE — 25010000002 METHYLPREDNISOLONE PER 125 MG: Performed by: STUDENT IN AN ORGANIZED HEALTH CARE EDUCATION/TRAINING PROGRAM

## 2023-05-29 PROCEDURE — 84100 ASSAY OF PHOSPHORUS: CPT | Performed by: STUDENT IN AN ORGANIZED HEALTH CARE EDUCATION/TRAINING PROGRAM

## 2023-05-29 PROCEDURE — 0202U NFCT DS 22 TRGT SARS-COV-2: CPT | Performed by: STUDENT IN AN ORGANIZED HEALTH CARE EDUCATION/TRAINING PROGRAM

## 2023-05-29 PROCEDURE — 71275 CT ANGIOGRAPHY CHEST: CPT

## 2023-05-29 PROCEDURE — 82805 BLOOD GASES W/O2 SATURATION: CPT

## 2023-05-29 RX ORDER — MAGNESIUM SULFATE 1 G/100ML
1 INJECTION INTRAVENOUS ONCE
Status: COMPLETED | OUTPATIENT
Start: 2023-05-29 | End: 2023-05-30

## 2023-05-29 RX ORDER — SODIUM CHLORIDE 0.9 % (FLUSH) 0.9 %
10 SYRINGE (ML) INJECTION AS NEEDED
Status: DISCONTINUED | OUTPATIENT
Start: 2023-05-29 | End: 2023-06-01 | Stop reason: HOSPADM

## 2023-05-29 RX ORDER — METHYLPREDNISOLONE SODIUM SUCCINATE 125 MG/2ML
125 INJECTION, POWDER, LYOPHILIZED, FOR SOLUTION INTRAMUSCULAR; INTRAVENOUS ONCE
Status: COMPLETED | OUTPATIENT
Start: 2023-05-29 | End: 2023-05-29

## 2023-05-29 RX ORDER — TESTOSTERONE 20.25 MG/1.25G
GEL TOPICAL
COMMUNITY
Start: 2023-05-01

## 2023-05-29 RX ORDER — ASPIRIN 81 MG/1
324 TABLET, CHEWABLE ORAL ONCE
Status: COMPLETED | OUTPATIENT
Start: 2023-05-29 | End: 2023-05-29

## 2023-05-29 RX ORDER — IPRATROPIUM BROMIDE AND ALBUTEROL SULFATE 2.5; .5 MG/3ML; MG/3ML
3 SOLUTION RESPIRATORY (INHALATION)
Status: DISCONTINUED | OUTPATIENT
Start: 2023-05-29 | End: 2023-05-30

## 2023-05-29 RX ADMIN — METHYLPREDNISOLONE SODIUM SUCCINATE 125 MG: 125 INJECTION, POWDER, FOR SOLUTION INTRAMUSCULAR; INTRAVENOUS at 22:06

## 2023-05-29 RX ADMIN — MAGNESIUM SULFATE HEPTAHYDRATE 1 G: 1 INJECTION, SOLUTION INTRAVENOUS at 22:07

## 2023-05-29 RX ADMIN — IOPAMIDOL 80 ML: 755 INJECTION, SOLUTION INTRAVENOUS at 23:05

## 2023-05-29 RX ADMIN — ASPIRIN 81 MG 324 MG: 81 TABLET ORAL at 22:06

## 2023-05-29 RX ADMIN — IPRATROPIUM BROMIDE AND ALBUTEROL SULFATE 3 ML: 2.5; .5 SOLUTION RESPIRATORY (INHALATION) at 22:28

## 2023-05-29 NOTE — Clinical Note
Level of Care: Telemetry [5]   Diagnosis: Hypoxia [511939]   Admitting Physician: RENUKA ESPINAL [[id number]]   Attending Physician: RENUKA ESPINAL [[id number]]   Isolate for COVID?: No [0]   Bed Request Comments: tele   Certification: I Certify That Inpatient Hospital Services Are Medically Necessary For Greater Than 2 Midnights

## 2023-05-30 ENCOUNTER — PREP FOR SURGERY (OUTPATIENT)
Dept: OTHER | Facility: HOSPITAL | Age: 43
End: 2023-05-30

## 2023-05-30 PROBLEM — I50.21 ACUTE HFREF (HEART FAILURE WITH REDUCED EJECTION FRACTION): Status: ACTIVE | Noted: 2023-05-30

## 2023-05-30 PROBLEM — J96.01 ACUTE RESPIRATORY FAILURE WITH HYPOXIA: Status: ACTIVE | Noted: 2023-05-30

## 2023-05-30 PROBLEM — E83.42 HYPOMAGNESEMIA: Status: ACTIVE | Noted: 2023-05-30

## 2023-05-30 PROBLEM — R09.02 HYPOXIA: Status: ACTIVE | Noted: 2023-05-30

## 2023-05-30 LAB
AMPHET+METHAMPHET UR QL: NEGATIVE
AMPHETAMINES UR QL: NEGATIVE
BARBITURATES UR QL SCN: NEGATIVE
BENZODIAZ UR QL SCN: NEGATIVE
BUPRENORPHINE SERPL-MCNC: NEGATIVE NG/ML
CANNABINOIDS SERPL QL: POSITIVE
COCAINE UR QL: NEGATIVE
D-LACTATE SERPL-SCNC: 1.4 MMOL/L (ref 0.5–2)
GEN 5 2HR TROPONIN T REFLEX: 17 NG/L
HOLD SPECIMEN: NORMAL
MAGNESIUM SERPL-MCNC: 2.8 MG/DL (ref 1.6–2.6)
MAGNESIUM SERPL-MCNC: 3.2 MG/DL (ref 1.6–2.6)
METHADONE UR QL SCN: NEGATIVE
OPIATES UR QL: NEGATIVE
OXYCODONE UR QL SCN: NEGATIVE
PCP UR QL SCN: NEGATIVE
PROPOXYPH UR QL: NEGATIVE
TRICYCLICS UR QL SCN: NEGATIVE
TROPONIN T DELTA: -4 NG/L
TROPONIN T SERPL HS-MCNC: 13 NG/L

## 2023-05-30 PROCEDURE — 25010000002 HEPARIN (PORCINE) PER 1000 UNITS: Performed by: INTERNAL MEDICINE

## 2023-05-30 PROCEDURE — C1894 INTRO/SHEATH, NON-LASER: HCPCS | Performed by: INTERNAL MEDICINE

## 2023-05-30 PROCEDURE — 99223 1ST HOSP IP/OBS HIGH 75: CPT | Performed by: INTERNAL MEDICINE

## 2023-05-30 PROCEDURE — G0378 HOSPITAL OBSERVATION PER HR: HCPCS

## 2023-05-30 PROCEDURE — 0 MAGNESIUM SULFATE 4 GM/100ML SOLUTION: Performed by: INTERNAL MEDICINE

## 2023-05-30 PROCEDURE — 83735 ASSAY OF MAGNESIUM: CPT | Performed by: INTERNAL MEDICINE

## 2023-05-30 PROCEDURE — 93458 L HRT ARTERY/VENTRICLE ANGIO: CPT | Performed by: INTERNAL MEDICINE

## 2023-05-30 PROCEDURE — 25510000001 IOPAMIDOL PER 1 ML: Performed by: INTERNAL MEDICINE

## 2023-05-30 PROCEDURE — C1769 GUIDE WIRE: HCPCS | Performed by: INTERNAL MEDICINE

## 2023-05-30 PROCEDURE — 84484 ASSAY OF TROPONIN QUANT: CPT | Performed by: STUDENT IN AN ORGANIZED HEALTH CARE EDUCATION/TRAINING PROGRAM

## 2023-05-30 PROCEDURE — 25010000002 FENTANYL CITRATE (PF) 50 MCG/ML SOLUTION: Performed by: INTERNAL MEDICINE

## 2023-05-30 PROCEDURE — 25010000002 MIDAZOLAM PER 1 MG: Performed by: INTERNAL MEDICINE

## 2023-05-30 PROCEDURE — 96375 TX/PRO/DX INJ NEW DRUG ADDON: CPT

## 2023-05-30 PROCEDURE — 83605 ASSAY OF LACTIC ACID: CPT | Performed by: STUDENT IN AN ORGANIZED HEALTH CARE EDUCATION/TRAINING PROGRAM

## 2023-05-30 PROCEDURE — 96376 TX/PRO/DX INJ SAME DRUG ADON: CPT

## 2023-05-30 PROCEDURE — 84484 ASSAY OF TROPONIN QUANT: CPT | Performed by: PHYSICIAN ASSISTANT

## 2023-05-30 RX ORDER — HEPARIN SODIUM 1000 [USP'U]/ML
INJECTION, SOLUTION INTRAVENOUS; SUBCUTANEOUS
Status: DISCONTINUED | OUTPATIENT
Start: 2023-05-30 | End: 2023-05-30 | Stop reason: HOSPADM

## 2023-05-30 RX ORDER — ALBUTEROL SULFATE 2.5 MG/3ML
2.5 SOLUTION RESPIRATORY (INHALATION) EVERY 4 HOURS PRN
Status: DISCONTINUED | OUTPATIENT
Start: 2023-05-30 | End: 2023-06-01 | Stop reason: HOSPADM

## 2023-05-30 RX ORDER — SODIUM CHLORIDE 9 MG/ML
40 INJECTION, SOLUTION INTRAVENOUS AS NEEDED
Status: DISCONTINUED | OUTPATIENT
Start: 2023-05-30 | End: 2023-06-01 | Stop reason: HOSPADM

## 2023-05-30 RX ORDER — AMOXICILLIN 250 MG
2 CAPSULE ORAL 2 TIMES DAILY
Status: DISCONTINUED | OUTPATIENT
Start: 2023-05-30 | End: 2023-06-01 | Stop reason: HOSPADM

## 2023-05-30 RX ORDER — ACETAMINOPHEN 325 MG/1
325 TABLET ORAL EVERY 4 HOURS PRN
Status: CANCELLED | OUTPATIENT
Start: 2023-05-30

## 2023-05-30 RX ORDER — ACETAMINOPHEN 325 MG/1
650 TABLET ORAL EVERY 4 HOURS PRN
Status: DISCONTINUED | OUTPATIENT
Start: 2023-05-30 | End: 2023-06-01 | Stop reason: HOSPADM

## 2023-05-30 RX ORDER — ASPIRIN 81 MG/1
324 TABLET, CHEWABLE ORAL ONCE
Status: CANCELLED | OUTPATIENT
Start: 2023-05-30 | End: 2023-05-30

## 2023-05-30 RX ORDER — SODIUM CHLORIDE 0.9 % (FLUSH) 0.9 %
10 SYRINGE (ML) INJECTION EVERY 12 HOURS SCHEDULED
Status: DISCONTINUED | OUTPATIENT
Start: 2023-05-30 | End: 2023-06-01 | Stop reason: HOSPADM

## 2023-05-30 RX ORDER — LISINOPRIL 40 MG/1
40 TABLET ORAL DAILY
Status: DISCONTINUED | OUTPATIENT
Start: 2023-05-30 | End: 2023-06-01 | Stop reason: HOSPADM

## 2023-05-30 RX ORDER — ACETAMINOPHEN 160 MG/5ML
650 SOLUTION ORAL EVERY 4 HOURS PRN
Status: DISCONTINUED | OUTPATIENT
Start: 2023-05-30 | End: 2023-06-01 | Stop reason: HOSPADM

## 2023-05-30 RX ORDER — BISACODYL 10 MG
10 SUPPOSITORY, RECTAL RECTAL DAILY PRN
Status: DISCONTINUED | OUTPATIENT
Start: 2023-05-30 | End: 2023-06-01 | Stop reason: HOSPADM

## 2023-05-30 RX ORDER — SODIUM CHLORIDE 0.9 % (FLUSH) 0.9 %
10 SYRINGE (ML) INJECTION EVERY 12 HOURS SCHEDULED
Status: CANCELLED | OUTPATIENT
Start: 2023-05-30

## 2023-05-30 RX ORDER — SODIUM CHLORIDE 0.9 % (FLUSH) 0.9 %
10 SYRINGE (ML) INJECTION AS NEEDED
Status: DISCONTINUED | OUTPATIENT
Start: 2023-05-30 | End: 2023-06-01 | Stop reason: HOSPADM

## 2023-05-30 RX ORDER — NITROGLYCERIN 20 MG/100ML
5-200 INJECTION INTRAVENOUS
Status: DISCONTINUED | OUTPATIENT
Start: 2023-05-30 | End: 2023-05-30

## 2023-05-30 RX ORDER — BUMETANIDE 0.25 MG/ML
2 INJECTION INTRAMUSCULAR; INTRAVENOUS
Status: COMPLETED | OUTPATIENT
Start: 2023-05-30 | End: 2023-05-30

## 2023-05-30 RX ORDER — NITROGLYCERIN 0.4 MG/1
0.4 TABLET SUBLINGUAL
Status: CANCELLED | OUTPATIENT
Start: 2023-05-30

## 2023-05-30 RX ORDER — SODIUM CHLORIDE 9 MG/ML
40 INJECTION, SOLUTION INTRAVENOUS AS NEEDED
Status: CANCELLED | OUTPATIENT
Start: 2023-05-30

## 2023-05-30 RX ORDER — CARVEDILOL 6.25 MG/1
6.25 TABLET ORAL 2 TIMES DAILY
Status: DISCONTINUED | OUTPATIENT
Start: 2023-05-30 | End: 2023-06-01 | Stop reason: HOSPADM

## 2023-05-30 RX ORDER — NICARDIPINE HCL-0.9% SOD CHLOR 1 MG/10 ML
SYRINGE (ML) INTRAVENOUS
Status: DISCONTINUED | OUTPATIENT
Start: 2023-05-30 | End: 2023-05-30 | Stop reason: HOSPADM

## 2023-05-30 RX ORDER — POLYETHYLENE GLYCOL 3350 17 G/17G
17 POWDER, FOR SOLUTION ORAL DAILY PRN
Status: DISCONTINUED | OUTPATIENT
Start: 2023-05-30 | End: 2023-06-01 | Stop reason: HOSPADM

## 2023-05-30 RX ORDER — CHOLECALCIFEROL (VITAMIN D3) 125 MCG
5 CAPSULE ORAL NIGHTLY PRN
Status: DISCONTINUED | OUTPATIENT
Start: 2023-05-30 | End: 2023-06-01 | Stop reason: HOSPADM

## 2023-05-30 RX ORDER — NITROGLYCERIN 0.4 MG/1
0.4 TABLET SUBLINGUAL
Status: DISCONTINUED | OUTPATIENT
Start: 2023-05-30 | End: 2023-05-30

## 2023-05-30 RX ORDER — ACETAMINOPHEN 325 MG/1
650 TABLET ORAL EVERY 4 HOURS PRN
Status: DISCONTINUED | OUTPATIENT
Start: 2023-05-30 | End: 2023-05-30

## 2023-05-30 RX ORDER — SODIUM CHLORIDE 0.9 % (FLUSH) 0.9 %
10 SYRINGE (ML) INJECTION AS NEEDED
Status: CANCELLED | OUTPATIENT
Start: 2023-05-30

## 2023-05-30 RX ORDER — FENTANYL CITRATE 50 UG/ML
INJECTION, SOLUTION INTRAMUSCULAR; INTRAVENOUS
Status: DISCONTINUED | OUTPATIENT
Start: 2023-05-30 | End: 2023-05-30 | Stop reason: HOSPADM

## 2023-05-30 RX ORDER — ASPIRIN 81 MG/1
81 TABLET ORAL DAILY
Status: CANCELLED | OUTPATIENT
Start: 2023-05-31

## 2023-05-30 RX ORDER — MIDAZOLAM HYDROCHLORIDE 1 MG/ML
INJECTION INTRAMUSCULAR; INTRAVENOUS
Status: DISCONTINUED | OUTPATIENT
Start: 2023-05-30 | End: 2023-05-30 | Stop reason: HOSPADM

## 2023-05-30 RX ORDER — BUMETANIDE 0.25 MG/ML
1 INJECTION INTRAMUSCULAR; INTRAVENOUS ONCE
Status: COMPLETED | OUTPATIENT
Start: 2023-05-30 | End: 2023-05-30

## 2023-05-30 RX ORDER — SPIRONOLACTONE 25 MG/1
25 TABLET ORAL DAILY
Status: DISCONTINUED | OUTPATIENT
Start: 2023-05-31 | End: 2023-06-01 | Stop reason: HOSPADM

## 2023-05-30 RX ORDER — HEPARIN SODIUM 5000 [USP'U]/ML
5000 INJECTION, SOLUTION INTRAVENOUS; SUBCUTANEOUS EVERY 12 HOURS SCHEDULED
Status: DISCONTINUED | OUTPATIENT
Start: 2023-05-30 | End: 2023-06-01 | Stop reason: HOSPADM

## 2023-05-30 RX ORDER — ACETAMINOPHEN 650 MG/1
650 SUPPOSITORY RECTAL EVERY 4 HOURS PRN
Status: DISCONTINUED | OUTPATIENT
Start: 2023-05-30 | End: 2023-06-01 | Stop reason: HOSPADM

## 2023-05-30 RX ORDER — LIDOCAINE HYDROCHLORIDE 10 MG/ML
INJECTION, SOLUTION EPIDURAL; INFILTRATION; INTRACAUDAL; PERINEURAL
Status: DISCONTINUED | OUTPATIENT
Start: 2023-05-30 | End: 2023-05-30 | Stop reason: HOSPADM

## 2023-05-30 RX ORDER — BUMETANIDE 0.25 MG/ML
2 INJECTION INTRAMUSCULAR; INTRAVENOUS EVERY 12 HOURS
Status: DISCONTINUED | OUTPATIENT
Start: 2023-05-30 | End: 2023-05-30

## 2023-05-30 RX ORDER — BISACODYL 5 MG/1
5 TABLET, DELAYED RELEASE ORAL DAILY PRN
Status: DISCONTINUED | OUTPATIENT
Start: 2023-05-30 | End: 2023-06-01 | Stop reason: HOSPADM

## 2023-05-30 RX ORDER — MAGNESIUM SULFATE HEPTAHYDRATE 40 MG/ML
4 INJECTION, SOLUTION INTRAVENOUS ONCE
Status: COMPLETED | OUTPATIENT
Start: 2023-05-30 | End: 2023-05-30

## 2023-05-30 RX ADMIN — BUMETANIDE 1 MG: 0.25 INJECTION, SOLUTION INTRAMUSCULAR; INTRAVENOUS at 02:19

## 2023-05-30 RX ADMIN — BUMETANIDE 2 MG: 0.25 INJECTION, SOLUTION INTRAMUSCULAR; INTRAVENOUS at 18:19

## 2023-05-30 RX ADMIN — CARVEDILOL 6.25 MG: 6.25 TABLET, FILM COATED ORAL at 20:35

## 2023-05-30 RX ADMIN — Medication 10 ML: at 20:36

## 2023-05-30 RX ADMIN — Medication 10 ML: at 09:06

## 2023-05-30 RX ADMIN — NITROGLYCERIN 5 MCG/MIN: 20 INJECTION INTRAVENOUS at 04:46

## 2023-05-30 RX ADMIN — BUMETANIDE 1 MG: 0.25 INJECTION, SOLUTION INTRAMUSCULAR; INTRAVENOUS at 06:04

## 2023-05-30 RX ADMIN — MAGNESIUM SULFATE HEPTAHYDRATE 4 G: 40 INJECTION, SOLUTION INTRAVENOUS at 05:13

## 2023-05-30 RX ADMIN — LISINOPRIL 40 MG: 40 TABLET ORAL at 09:05

## 2023-05-30 NOTE — H&P
"    [facility] Medicine Services  HISTORY AND PHYSICAL    Patient Name: Palomo Pollack  : 1980  MRN: [id number]  Primary Care Physician: Heladio Valentine DO  Date of admission: 2023    Subjective   Subjective     Chief Complaint:  SOB    HPI:  Palomo Pollack is a 43 y.o. male with a past medical history significant for Hodgkins lymphoma s/p chemotherapy and radiation to chest, PTDS, GERD, and occasional alcohol and marijuana use. Presents today with SOB. Patient volunteers that he was diagnosed with COVID-19 pneumonia in late 2022 and has \"never fully recovered\". States he has had persistent dyspnea which is worse with exertion, plus orthopnea, chronic cough productive of blood tinged sputum, and generalized weakness. States he was been on Augmentin and Doxycycline plus multiple rounds of steroids without improvement. Symptoms wax and wane. Patient recently established care with Dr. Mason per cardiology for evaluation of persistent dyspnea and palpitations. Underwent stress test in 2023 which was concerning for high risk study and echocardiogram at that time demonstrated EF of 25%. He is scheduled for cardiac catheterization today, 23.  Currently there are no complaints of fever, chest pain, or syncope. No lower extremity pain or swelling. No abdominal pain or N/v/D. No headache or focal weakness/parathesias. Will admit to inpatient for further evaluation and treatment.      Review of Systems   Constitutional: Negative for chills, fatigue and fever.   HENT: Negative for congestion and trouble swallowing.    Eyes: Negative for photophobia and visual disturbance.   Respiratory: Positive for cough and shortness of breath.    Cardiovascular: Negative for chest pain and leg swelling.   Gastrointestinal: Negative for abdominal pain, diarrhea, nausea and vomiting.   Endocrine: Negative for cold intolerance and heat intolerance.   Genitourinary: " Negative for dysuria and flank pain.   Musculoskeletal: Negative for back pain and gait problem.   Skin: Negative for pallor and rash.   Allergic/Immunologic: Negative for immunocompromised state.   Neurological: Positive for weakness. Negative for dizziness and headaches.   Hematological: Negative for adenopathy.   Psychiatric/Behavioral: Negative for agitation and confusion.            Personal History     Past Medical History:   Diagnosis Date   • Allergic rhinitis    • Chronic right ear pain    • GERD (gastroesophageal reflux disease)    • Hodgkin lymphoma    • Hypogonadism in male    • PTSD (post-traumatic stress disorder)          Oncology Problem List:  History of Lymphoma (10/05/2017; Status: Active)       Past Surgical History:   Procedure Laterality Date   • CENTRAL VENOUS LINE INSERTION  1999   • COLONOSCOPY     • PORTOCAVAL SHUNT PLACEMENT  1998       Family History:  family history includes Cancer in his father; Lung cancer in his father; Stroke in his father.     Social History:  reports that he quit smoking about 6 months ago. His smoking use included cigarettes. He has a 5.00 pack-year smoking history. He has been exposed to tobacco smoke. He has never used smokeless tobacco. He reports current alcohol use. He reports current drug use. Drug: Marijuana.  Social History     Social History Narrative   • Not on file       Medications:  Testosterone, albuterol sulfate HFA, esomeprazole, fexofenadine, and lisinopril    Allergies   Allergen Reactions   • Bactrim [Sulfamethoxazole-Trimethoprim] Rash and Other (See Comments)     Gave increased heartrate-biaxin         Objective   Objective     Vital Signs:   Temp:  [97.8 °F (36.6 °C)] 97.8 °F (36.6 °C)  Heart Rate:  [101-130] 103  Resp:  [24-36] 24  BP: (151-162)/() 153/96    Physical Exam   Constitutional: Awake, alert  Eyes: PERRLA, sclerae anicteric, no conjunctival injection  HENT: NCAT, mucous membranes moist  Neck: Supple, no thyromegaly, no  lymphadenopathy, trachea midline  Respiratory: Clear to auscultation bilaterally, nonlabored respirations   Cardiovascular: RRR, no murmurs, rubs, or gallops, palpable pedal pulses bilaterally  Gastrointestinal: Positive bowel sounds, soft, nontender, nondistended  Musculoskeletal: No bilateral ankle edema, no clubbing or cyanosis to extremities  Psychiatric: Appropriate affect, cooperative  Neurologic: Oriented x 3, strength symmetric in all extremities, Cranial Nerves grossly intact to confrontation, speech clear  Skin: No rashes      Result Review:  I have personally reviewed the results from the time of this admission to 5/30/2023 03:01 EDT and agree with these findings:  [x]  Laboratory list / accordion  []  Microbiology  []  Radiology  []  EKG/Telemetry   []  Cardiology/Vascular   []  Pathology  [x]  Old records  []  Other:  Most notable findings include: vitals currently stable. TSH 12.1. lactic acid 2.6. D-dimer 3.9. labs otherwise favorable. CTA shows heart failure with pulmonary edema, also concerns for superimposed pneumonia.    LAB RESULTS:      Lab 05/30/23  0156 05/29/23 2156   WBC  --  9.34   HEMOGLOBIN  --  14.7   HEMATOCRIT  --  44.2   PLATELETS  --  143   NEUTROS ABS  --  7.45*   IMMATURE GRANS (ABS)  --  0.06*   LYMPHS ABS  --  0.97   MONOS ABS  --  0.68   EOS ABS  --  0.13   MCV  --  93.2   CRP  --  2.11*   PROCALCITONIN  --  0.05   LACTATE 1.4 2.6*   D DIMER QUANT  --  3.90*         Lab 05/29/23 2156   SODIUM 140   POTASSIUM 3.6   CHLORIDE 102   CO2 23.0   ANION GAP 15.0   BUN 11   CREATININE 1.15   EGFR 80.0   GLUCOSE 99   CALCIUM 8.8   MAGNESIUM 1.4*   PHOSPHORUS 2.5   TSH 12.110*         Lab 05/29/23 2156   TOTAL PROTEIN 7.0   ALBUMIN 4.3   GLOBULIN 2.7   ALT (SGPT) 28   AST (SGOT) 29   BILIRUBIN 0.7   ALK PHOS 86   LIPASE 28         Lab 05/30/23  0017 05/29/23 2156   PROBNP  --  3,449.0*   HSTROP T 17* 21*                 Lab 05/29/23  2235   FIO2 21   CARBOXYHEMOGLOBIN (VENOUS) 1.2      Brief Urine Lab Results  (Last result in the past 365 days)      Color   Clarity   Blood   Leuk Est   Nitrite   Protein   CREAT   Urine HCG        05/29/23 2218 Yellow   Cloudy   Negative   Negative   Negative   100 mg/dL (2+)               Microbiology Results (last 10 days)     Procedure Component Value - Date/Time    COVID PRE-OP / PRE-PROCEDURE SCREENING ORDER (NO ISOLATION) - Swab, Nasopharynx [[id number]]  (Normal) Collected: 05/29/23 2156    Lab Status: Final result Specimen: Swab from Nasopharynx Updated: 05/29/23 2256    Narrative:      The following orders were created for panel order COVID PRE-OP / PRE-PROCEDURE SCREENING ORDER (NO ISOLATION) - Swab, Nasopharynx.  Procedure                               Abnormality         Status                     ---------                               -----------         ------                     Respiratory Panel PCR w/...[[id number]]  Normal              Final result                 Please view results for these tests on the individual orders.    Respiratory Panel PCR w/COVID-19(SARS-CoV-2) FRANCIA/SAMIA/ANA/PAD/COR/MAD/BERNARD In-House, NP Swab in UTM/VTM, 3-4 HR TAT - Swab, Nasopharynx [[id number]]  (Normal) Collected: 05/29/23 2156    Lab Status: Final result Specimen: Swab from Nasopharynx Updated: 05/29/23 2256     ADENOVIRUS, PCR Not Detected     Coronavirus 229E Not Detected     Coronavirus HKU1 Not Detected     Coronavirus NL63 Not Detected     Coronavirus OC43 Not Detected     COVID19 Not Detected     Human Metapneumovirus Not Detected     Human Rhinovirus/Enterovirus Not Detected     Influenza A PCR Not Detected     Influenza B PCR Not Detected     Parainfluenza Virus 1 Not Detected     Parainfluenza Virus 2 Not Detected     Parainfluenza Virus 3 Not Detected     Parainfluenza Virus 4 Not Detected     RSV, PCR Not Detected     Bordetella pertussis pcr Not Detected     Bordetella parapertussis PCR Not Detected     Chlamydophila pneumoniae PCR Not Detected      Mycoplasma pneumo by PCR Not Detected    Narrative:      In the setting of a positive respiratory panel with a viral infection PLUS a negative procalcitonin without other underlying concern for bacterial infection, consider observing off antibiotics or discontinuation of antibiotics and continue supportive care. If the respiratory panel is positive for atypical bacterial infection (Bordetella pertussis, Chlamydophila pneumoniae, or Mycoplasma pneumoniae), consider antibiotic de-escalation to target atypical bacterial infection.          XR Chest 1 View    Result Date: 5/29/2023  XR CHEST 1 VW Date of Exam: 5/29/2023 9:55 PM EDT Indication: Chest Pain Triage Protocol Comparison: CT chest with contrast 12/30/2014, 2 view chest x-ray 4/28/2023. Findings: 4.5 cm rim calcification over the right heart border is stable, likely a calcified pericardial cyst when correlated with previous CT. Cardiomediastinal contours appear unchanged. There are suspected right basilar airspace opacities. Left lung appears clear. No pneumothorax or large pleural effusion is seen.     Impression: Impression: Suspected right basilar airspace opacities, which may be due to atelectasis and/or pneumonia. Electronically Signed: Khadijah Aguilar  5/29/2023 10:50 PM EDT  Workstation ID: [id number]    CT Angiogram Chest    Result Date: 5/29/2023  Exam: CTA thorax, PE protocol Date: May 29, 2023 History: Chest pain, shortness of breath, hemoptysis Comparison: None available Technique: Contiguous axial CT images obtained of the thorax following the uneventful intravenous administration of 80 mL Isovue-370 contrast. Additionally, sagittal, coronal and 3-D reformatted images were obtained. CT dose lowering techniques were used, to include: automated exposure control, adjustment for patient size, and or use of iterative reconstruction. Findings: Thoracic aorta: There are mild atherosclerotic changes. There is no aneurysm or dissection. HEART: The heart is  enlarged. There are coronary artery calcifications. There is a calcified right-sided pericardial cyst measuring up to 3.7 cm on series 4 image 52. This demonstrates a chronic appearance. Pulmonary arteries: The pulmonary arteries are well visualized. There is no pulmonary embolus. Mediastinum: There are calcified mediastinal and right hilar lymph nodes. Lung parenchyma: There is interlobular septal thickening in both lungs with a small to moderately sized right-sided pleural effusion and a smaller left-sided pleural effusion. There are nodular and hazy groundglass opacities within the right upper lobe, right middle lobe, lower lobes and left upper lobe. There is no pneumothorax. Upper abdomen: The spleen is borderline in size. The liver is hypodense. The liver demonstrates a subtle micronodular contour. There is an enlarged lymph node in the region of the gastroesophageal junction measuring 1.4 cm in short axis. This lymph node is nonspecific. Osseous structures: The osseous structures are intact. No acute fractures are identified.     Impression: 1. Findings are most consistent with congestive heart failure with associated pulmonary edema. There is also a small to moderately sized right-sided pleural effusion and a smaller left-sided pleural effusion. 2. Somewhat extensive nodular and hazy groundglass airspace disease in both lungs as described. This could be related to a manifestation of pulmonary edema, however, superimposed bronchopneumonia is of significant concern, including viral or COVID pneumonia. Close clinical follow-up is recommended. 3. Hepatic steatosis. The liver demonstrates a subtle micronodular contour likely representing liver disease or hepatic fibrosis. Correlation with hepatic function is recommended. 4. Coronary artery calcifications. 5. No pulmonary embolus. Electronically signed by:  Lisandro Long M.D.  5/29/2023 9:34 PM Mountain Time      Results for orders placed during the hospital  encounter of 05/24/23    Adult Transthoracic Echo Complete W/ Cont if Necessary Per Protocol    Interpretation Summary  •  Left ventricular systolic function is moderately decreased. Calculated left ventricular EF = 28% Left ventricular ejection fraction appears to be 26 - 30%.  •  The left ventricular cavity is mildly dilated.  •  The left atrial cavity is mildly dilated.  •  Moderate tricuspid valve regurgitation is present.  •  Estimated right ventricular systolic pressure from tricuspid regurgitation is moderately elevated (45-55 mmHg). Calculated right ventricular systolic pressure from tricuspid regurgitation is 50 mmHg.      Assessment & Plan   Assessment & Plan       Acute respiratory failure with hypoxia    Hypomagnesemia    History of Lymphoma    GERD (gastroesophageal reflux disease)    PTSD (post-traumatic stress disorder)    LBBB (left bundle branch block)    44 yo male with persistent dyspnea after COVID in December 2022. Positive stress test in April 2023, scheduled for heart cath today with Marlon. Admitting for diuresis and cards eval.    Acute Respiratory Failure with Hypoxia  Acute systolic heart failure with reduced ef of 25%  - suspect heart failure. Image reads as pulmonary edema, but notes possible superimposed pneumonia related to viral illness.  - COVID-PCR negative  - echocardiogram 4/2023 shows EF 28% with moderate tricuspid regurgitation  - stress test 4/2023 demonstrated; Large region of moderately decreased perfusion which is predominantly fixed involving the septal wall, apex, and mid portions of the anterior and inferior walls. Consistent with high risk study  - troponin flat X2. Continue to trend  - UDS  - EKG with known LBBB, no acute changes  - administered 1 mg Bumex. Give additional 1mg. Scheduled 2mg IV bumex bid  -nitroglycerin drip  - daily weights  - strict I&O  - NPO until cardiology eval. Was already shceduled for   - consult to cardiology  - close monitor on telemetry  -  maintain oxygen saturation > 92%    Hypomagnesemia   - AT prolonged  - replace per protocol    History of Hodgkin's Lymphoma  - s/p chemotherapy and radiation to chest     ? Hypogonadism/hypotestosteronism  -currently on supplemental testosterone which is affecting his blood pressure  -reports prescribed by his urologist after testing  - my concern is that it is rare that labs perform appropriate testing to determine if patient is truly testosterone deficient and that patient may have only been started on supplementation based on an inaccurate total testosterone. Recommend referral outpatient to endocrinology for appropriate workup and diagnosis as this significantly increases his risk of heart attack and stroke.      DVT prophylaxis:  DANICA    CODE STATUS:  Full code  Level Of Support Discussed With: Patient  Code Status (Patient has no pulse and is not breathing): CPR (Attempt to Resuscitate)  Medical Interventions (Patient has pulse or is breathing): Full Support      Attending   Admission Attestation       I have performed an independent face-to-face diagnostic evaluation including performing an independent physical examination as documented here.  The documented plan of care above was reviewed and developed with the advanced practice clinician (APC).      Brief Summary Statement:   Palomo Pollack is a 43 y.o. male with past medical history of lymphoma status postchemotherapy and radiation of the chest,, PTSD, GERD, occasional marijuana and alcohol use who had COVID-19 in December 2022 and has had issues with his respiratory status since that time.    Patient reports worsening shortness of air with exertion, orthopnea, chronic cough, and occasional blood-tinged sputum since kimo COVID in December.  He has had multiple rounds of antibiotics and steroids with no improvement in his symptoms.  Patient recently underwent outpatient stress test and echocardiogram which revealed an EF of 25% and was  concerning for high risk ischemia.  Patient is scheduled for cardiac catheterization today but came into the ER for worsening symptoms of shortness of air.  Work-up consistent with diffuse pulmonary edema and bilateral pleural effusions.  Patient given diuretics and nitroglycerin.  Cardiology has been consulted for this a.m.    Highly recommend referral outpatient to endocrinology to determine if patient truly requires supplemental testosterone.   Remainder of detailed HPI is as noted by APC and has been reviewed and/or edited by me for completeness.    Attending Physical Exam:  Temp:  [97.8 °F (36.6 °C)] 97.8 °F (36.6 °C)  Heart Rate:  [100-130] 100  Resp:  [18-36] 18  BP: (151-162)/() 151/96  Flow (L/min):  [3] 3    Constitutional: Awake, alert, nontoxic  Eyes: PERRLA, sclerae anicteric, no conjunctival injection  HENT: NCAT, mucous membranes moist  Neck: Supple, no thyromegaly, no lymphadenopathy, trachea midline  Respiratory: rales bilaterally, mildly labored respirations   Cardiovascular: RRR, no murmurs, rubs, or gallops, palpable pedal pulses bilaterally  Gastrointestinal: Positive bowel sounds, soft, nontender, nondistended  Musculoskeletal: No bilateral ankle edema, no clubbing or cyanosis to extremities  Psychiatric: Appropriate affect, cooperative  Neurologic: Oriented x 3, strength symmetric in all extremities, Cranial Nerves grossly intact to confrontation, speech clear  Skin: No rashes      Brief Assessment/Plan :  See detailed assessment and plan developed with APC which I have reviewed and/or edited for completeness.            Jorge Caro DO  05/30/23       Total time spent: 35  Time spent includes time reviewing chart, face-to-face time, counseling patient/family/caregiver, ordering medications/tests/procedures, communicating with other health care professionals, documenting clinical information in the electronic health record, and coordination of care.           Expected  Discharge  TBD    This note has been completed as part of a split-shared workflow.     Electronically signed by Kristopher Balderrama PA-C, 05/30/23, 3:12 AM EDT.    Total time spent: 90  Time spent includes time reviewing chart, face-to-face time, counseling patient/family/caregiver, ordering medications/tests/procedures, communicating with other health care professionals, documenting clinical information in the electronic health record, and coordination of care.

## 2023-05-30 NOTE — CASE MANAGEMENT/SOCIAL WORK
Discharge Planning Assessment  [facility]     Patient Name: Palomo Pollack  MRN: [id number]  Today's Date: 5/30/2023    Admit Date: 5/29/2023    Plan: Home   Discharge Needs Assessment     Row Name 05/30/23 1419       Living Environment    People in Home significant other    Current Living Arrangements home    Potentially Unsafe Housing Conditions none    Primary Care Provided by self    Provides Primary Care For no one, unable/limited ability to care for self    Family Caregiver if Needed significant other    Quality of Family Relationships helpful;involved;supportive    Able to Return to Prior Arrangements yes       Resource/Environmental Concerns    Resource/Environmental Concerns none       Transportation Needs    In the past 12 months, has lack of transportation kept you from medical appointments or from getting medications? no    In the past 12 months, has lack of transportation kept you from meetings, work, or from getting things needed for daily living? No       Food Insecurity    Within the past 12 months, you worried that your food would run out before you got the money to buy more. Never true    Within the past 12 months, the food you bought just didn't last and you didn't have money to get more. Never true       Transition Planning    Patient/Family Anticipates Transition to home    Patient/Family Anticipated Services at Transition none    Transportation Anticipated family or friend will provide       Discharge Needs Assessment    Readmission Within the Last 30 Days no previous admission in last 30 days    Equipment Currently Used at Home none    Concerns to be Addressed adjustment to diagnosis/illness;basic needs;discharge planning    Equipment Needed After Discharge --  to be determined    Provided Post Acute Provider List? N/A    Current Discharge Risk chronically ill               Discharge Plan     Row Name 05/30/23 1421       Plan    Plan Home    Plan Comments Chart reviewed. I met with  Mr Jaki and girlfriend at bedside to initiate d/c planning.The live in [place]. His plan is to return home. Prior to admit, he was independent with all ADLs, driving,etc. He is not currently working. he uses no assistive equipment. He has a PCP and insurance coverage for RX meds. Case mgt will follow progress and assist with any d/c needs    Final Discharge Disposition Code 01 - home or self-care              Continued Care and Services - Admitted Since 5/29/2023    Coordination has not been started for this encounter.          Demographic Summary     Row Name 05/30/23 1417       General Information    Admission Type inpatient    Arrived From home    Referral Source admission list    Reason for Consult discharge planning    Preferred Language English    General Information Comments PCP- Heladio Valentine       Contact Information    Permission Granted to Share Info With ;family/designee    Contact Information Comments Mable Quentin (significant other) [phone]               Functional Status     Row Name 05/30/23 1418       Functional Status    Usual Activity Tolerance good    Current Activity Tolerance good       Physical Activity    On average, how many days per week do you engage in moderate to strenuous exercise (like a brisk walk)? 7 days    On average, how many minutes do you engage in exercise at this level? 30 min    Number of minutes of exercise per week 210       Assessment of Health Literacy    How often do you have someone help you read hospital materials? Never    How often do you have problems learning about your medical condition because of difficulty understanding written information? Never    How often do you have a problem understanding what is told to you about your medical condition? Never    How confident are you filling out medical forms by yourself? Extremely    Health Literacy Good       Functional Status, IADL    Medications independent    Meal Preparation independent     Housekeeping independent    Laundry independent    Shopping independent       Mental Status    General Appearance WDL WDL       Mental Status Summary    Recent Changes in Mental Status/Cognitive Functioning no changes       Employment/    Employment Status unemployed    Employment/ Comments insurance- [employer]               Psychosocial    No documentation.                Abuse/Neglect    No documentation.                Legal    No documentation.                Substance Abuse    No documentation.                Patient Forms    No documentation.                   Sonja C Kellerman, RN

## 2023-05-30 NOTE — CONSULTS
[facility] Cardiology  Consultation H&P    Patient: Palomo Pollack  1980  [phone]  There is no work phone number on file.      PCP:  Heladio Valentine DO   Treatment Team:   Attending Provider: Carolynn Multani DO  Consulting Physician: Alec Mason III, MD  Admitting Provider: Carolynn Multani DO   5/29/2023      DATE OF CONSULTATION: 5/30/2023 12:52 EDT     REASON FOR CONSULTATION: CHF      ASSESSMENT/PLAN:  Heart failure with reduced ejection fraction, acute on chronic exacerbation: Responding well to diuresis.  Cardiac catheterization planned for definitive assessment of coronary anatomy and LV filling pressures.  We will titrate guideline directed medical therapy as blood pressure allows.  Will likely require LifeVest placement at discharge.    History of Present Illness   43-year-old gentleman with persistent limiting dyspnea on exertion over the previous 5 months following COVID-19 infection in December 2022.  Also with intermittent palpitations.  Describes intermittent isolated flip-flop sensations, also with intermittent sustained palpitations in which she feels his heart is beating hard and fast.  Has intermittent left upper chest discomfort rating down his left arm, no obvious triggers.  No alleviating or exacerbating features.  Does have a history of Hodgkin's lymphoma with chemotherapy and chest radiation and stem cell transplant.  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  Diagnosed with diabetes last year, was able to normalize his hemoglobin A1c with dietary changes and approximately 50 pound weight loss.    Cardiac testing last week revealed:  Holter monitor:  Frequent PVCs, 6%, which are occasionally symptomatic.  Myocardial perfusion imaging:  •  Calcifications visualized in the proximal and mid RCA.  •  Left ventricular ejection fraction is moderately  reduced (Calculated EF = 37%).  •  Abnormal LV wall motion consistent with moderate hypokinesis of the septal wall.  •  Large region of moderately decreased perfusion which is predominantly fixed involving the septal wall, apex, and mid portions of the anterior and inferior walls.  •  Impressions are consistent with a high risk study.  TTE:  •  Left ventricular systolic function is moderately decreased. Calculated left ventricular EF = 28% Left ventricular ejection fraction appears to be 26 - 30%.  •  The left ventricular cavity is mildly dilated.  •  The left atrial cavity is mildly dilated.  •  Moderate tricuspid valve regurgitation is present.  •  Estimated right ventricular systolic pressure from tricuspid regurgitation is moderately elevated (45-55 mmHg). Calculated right ventricular systolic pressure from tricuspid regurgitation is 50 mmHg.    Over the previous week leading up to the his admission he reports progressive shortness of breath with associated cough, 8 to 10 pound weight gain and chest discomfort.  Overnight following diuresis he feels much better.  Currently resting supine in bed pain-free and satting over 95% on 2 L nasal cannula oxygen.    OBJECTIVE:  Vitals:    05/30/23 0536 05/30/23 0730 05/30/23 0905 05/30/23 1100   BP:  140/93 149/91 153/94   BP Location:  Left arm  Left arm   Patient Position:  Lying  Lying   Pulse:  94 97 103   Resp:  18  18   Temp:  98.1 °F (36.7 °C)  98.5 °F (36.9 °C)   TempSrc:  Oral  Oral   SpO2:  91%  94%   Weight: 94.8 kg (208 lb 15.9 oz)      Height:         I/O last 3 completed shifts:  In: 100 [I.V.:100]  Out: -   I/O this shift:  In: -   Out: 1300 [Urine:1300]  Intake & Output (last 3 days)       05/27 0701 05/28 0700 05/28 0701 05/29 0700 05/29 0701 05/30 0700 05/30 0701 05/31 0700    I.V. (mL/kg)   100 (1.1)     Total Intake(mL/kg)   100 (1.1)     Urine (mL/kg/hr)    1300 (2.3)    Total Output    1300    Net   +100 -1300            Urine Unmeasured  Occurrence   1 x            PHYSICAL EXAMINATION:    General Appearance:    Alert, cooperative, no distress, appears stated age   Head:    Normocephalic, without obvious abnormality, atraumatic   Eyes:    PERRL, conjunctivae/corneas clear, EOMs intact, fundi     benign, both eyes   Ears:    Normal TMs and external ear canals, both ears   Nose:   Nares normal, septum midline, mucosa normal, no drainage    or sinus tenderness   Throat:   Lips, mucosa, and tongue normal; teeth and gums normal   Neck:   Supple, symmetrical, trachea midline, no adenopathy;     thyroid:  no enlargement/tenderness/nodules; no carotid    bruit or JVD   Back:     Symmetric, no curvature, ROM normal, no CVA tenderness   Lungs:    Minimal bibasilar external rales, respirations unlabored   Chest Wall:    No tenderness or deformity    Heart:    Regular rate and rhythm, S1 and S2 normal, no murmur, rub   or gallop, normal carotid impulse bilaterally without bruit.   Abdomen:     Soft, nontender, bowel sounds active all four quadrants,     no masses, no organomegaly   Extremities:   Extremities normal, atraumatic, no cyanosis or edema   Pulses:   2+ and symmetric all extremities   Skin:   Skin color, texture, turgor normal, no rashes or lesions   Lymph nodes:   Cervical, supraclavicular, and axillary nodes normal   Neurologic:   CNII-XII intact, normal strength, sensation and reflexes     throughout     PROBLEM LIST:    Acute respiratory failure with hypoxia    History of Lymphoma    GERD (gastroesophageal reflux disease)    PTSD (post-traumatic stress disorder)    LBBB (left bundle branch block)    Abnormal stress test    Hypomagnesemia    Acute HFrEF (heart failure with reduced ejection fraction)      Past Medical History:   Diagnosis Date   • Allergic rhinitis    • Chronic right ear pain    • GERD (gastroesophageal reflux disease)    • Hodgkin lymphoma    • Hypogonadism in male    • PTSD (post-traumatic stress disorder)      Past Surgical  History:   Procedure Laterality Date   • CENTRAL VENOUS LINE INSERTION  1999   • COLONOSCOPY     • PORTOCAVAL SHUNT PLACEMENT  1998       Allergies  Allergies   Allergen Reactions   • Bactrim [Sulfamethoxazole-Trimethoprim] Rash and Other (See Comments)     Gave increased heartrate-biaxin         Current Medications    Current Facility-Administered Medications:   •  acetaminophen (TYLENOL) tablet 650 mg, 650 mg, Oral, Q4H PRN **OR** acetaminophen (TYLENOL) 160 MG/5ML solution 650 mg, 650 mg, Oral, Q4H PRN **OR** acetaminophen (TYLENOL) suppository 650 mg, 650 mg, Rectal, Q4H PRN, Kristopher Balderrama PA-C  •  albuterol (PROVENTIL) nebulizer solution 0.083% 2.5 mg/3mL, 2.5 mg, Nebulization, Q4H PRN, Kristopher Balderrama PA-C  •  sennosides-docusate (PERICOLACE) 8.6-50 MG per tablet 2 tablet, 2 tablet, Oral, BID **AND** polyethylene glycol (MIRALAX) packet 17 g, 17 g, Oral, Daily PRN **AND** bisacodyl (DULCOLAX) EC tablet 5 mg, 5 mg, Oral, Daily PRN **AND** bisacodyl (DULCOLAX) suppository 10 mg, 10 mg, Rectal, Daily PRN, Kristopher Balderrama PA-C  •  bumetanide (BUMEX) injection 2 mg, 2 mg, Intravenous, Q12H, Jorge Caro, DO  •  heparin (porcine) 5000 UNIT/ML injection 5,000 Units, 5,000 Units, Subcutaneous, Q12H, Kristopher Balderrama PA-C  •  lisinopril (PRINIVIL,ZESTRIL) tablet 40 mg, 40 mg, Oral, Daily, Kristopher Balderrama PA-C, 40 mg at 05/30/23 0905  •  Magnesium Standard Dose Replacement - Follow Nurse / BPA Driven Protocol, , Does not apply, PRN, Kristopher Balderrama PA-C  •  melatonin tablet 5 mg, 5 mg, Oral, Nightly PRN, Kristopher Balderrama PA-C  •  nitroglycerin (TRIDIL) 200 mcg/ml infusion, 5-200 mcg/min, Intravenous, Titrated, Jorge Caro, DO, Last Rate: 1.5 mL/hr at 05/30/23 0446, 5 mcg/min at 05/30/23 0446  •  sodium chloride 0.9 % flush 10 mL, 10 mL, Intravenous, PRN, Yakelin, Clyde AVENDANO MD  •  sodium chloride 0.9 % flush 10 mL, 10 mL, Intravenous, Q12H, Kristopher Balderrama PA-C, 10 mL at 05/30/23 0906  •   "sodium chloride 0.9 % flush 10 mL, 10 mL, Intravenous, PRN, Kristopher Balderrama PA-C  •  sodium chloride 0.9 % infusion 40 mL, 40 mL, Intravenous, PRN, Kristopher Balderrama PA-C  nitroglycerin, 5-200 mcg/min, Last Rate: 5 mcg/min (23 0446)            ROS  All systems were reviewed and negative except for:  Respiratory: positive for  cough, dry and shortness of air  Cardiovascular: positive for  chest pressure / pain, at rest, lower extremity edema and orthopnea      SOCIAL HX  Social History     Socioeconomic History   • Marital status: Single   Tobacco Use   • Smoking status: Former     Packs/day: 0.25     Years: 20.00     Pack years: 5.00     Types: Cigarettes     Quit date: 2022     Years since quittin.5     Passive exposure: Past   • Smokeless tobacco: Never   • Tobacco comments:     smokes less than .25 ppd   Vaping Use   • Vaping Use: Never used   Substance and Sexual Activity   • Alcohol use: Yes     Comment: 2 drinks a night   • Drug use: Yes     Types: Marijuana     Comment: \"A COUPLE A WEEK\"   • Sexual activity: Yes       FAMILY HX  Family History   Problem Relation Age of Onset   • Stroke Father    • Lung cancer Father    • Cancer Father         Positive for cured lung cancer- he was smoker         Diagnostic Data:  Lab Results (last 24 hours)     Procedure Component Value Units Date/Time    Magnesium [[id number]]  (Abnormal) Collected: 23 0856    Specimen: Blood Updated: 23 0934     Magnesium 3.2 mg/dL     Single High Sensitivity Troponin T [[id number]]  (Normal) Collected: 23 0300    Specimen: Blood Updated: 23 0347     HS Troponin T 13 ng/L     Narrative:      High Sensitive Troponin T Reference Range:  <10.0 ng/L- Negative Female for AMI  <15.0 ng/L- Negative Male for AMI  >=10 - Abnormal Female indicating possible myocardial injury.  >=15 - Abnormal Male indicating possible myocardial injury.   Clinicians would have to utilize clinical acumen, EKG, Troponin, and serial " changes to determine if it is an Acute Myocardial Infarction or myocardial injury due to an underlying chronic condition.         Urine Drug Screen - Urine, Clean Catch [[id number]]  (Abnormal) Collected: 05/29/23 2218    Specimen: Urine, Clean Catch Updated: 05/30/23 0230     THC, Screen, Urine Positive     Phencyclidine (PCP), Urine Negative     Cocaine Screen, Urine Negative     Methamphetamine, Ur Negative     Opiate Screen Negative     Amphetamine Screen, Urine Negative     Benzodiazepine Screen, Urine Negative     Tricyclic Antidepressants Screen Negative     Methadone Screen, Urine Negative     Barbiturates Screen, Urine Negative     Oxycodone Screen, Urine Negative     Propoxyphene Screen Negative     Buprenorphine, Screen, Urine Negative    Narrative:      Cutoff For Drugs Screened:    Amphetamines               500 ng/ml  Barbiturates               200 ng/ml  Benzodiazepines            150 ng/ml  Cocaine                    150 ng/ml  Methadone                  200 ng/ml  Opiates                    100 ng/ml  Phencyclidine               25 ng/ml  THC                            50 ng/ml  Methamphetamine            500 ng/ml  Tricyclic Antidepressants  300 ng/ml  Oxycodone                  100 ng/ml  Propoxyphene               300 ng/ml  Buprenorphine               10 ng/ml    The normal value for all drugs tested is negative. This report includes unconfirmed screening results, with the cutoff values listed, to be used for medical treatment purposes only.  Unconfirmed results must not be used for non-medical purposes such as employment or legal testing.  Clinical consideration should be applied to any drug of abuse test, particularly when unconfirmed results are used.      STAT Lactic Acid, Reflex [[id number]]  (Normal) Collected: 05/30/23 0156    Specimen: Blood Updated: 05/30/23 0228     Lactate 1.4 mmol/L      Comment: Falsely depressed results may occur on samples drawn from patients receiving  N-Acetylcysteine (NAC) or Metamizole.       [place] Draw [[id number]] Collected: 05/29/23 2156    Specimen: Blood Updated: 05/30/23 0201    Narrative:      The following orders were created for panel order [place] Draw.  Procedure                               Abnormality         Status                     ---------                               -----------         ------                     Green Top (Gel)[[id number]]                                  Final result               Lavender Top[[id number]]                                     Final result               Gold Top - SST[[id number]]                                   Final result               Gray Top[[id number]]                                         Final result               Light Blue Top[[id number]]                                   Final result                 Please view results for these tests on the individual orders.    Gray Top [[id number]] Collected: 05/29/23 2156    Specimen: Blood Updated: 05/30/23 0201     Extra Tube Hold for add-ons.     Comment: Auto resulted.       High Sensitivity Troponin T 2Hr [[id number]]  (Abnormal) Collected: 05/30/23 0017    Specimen: Blood Updated: 05/30/23 0051     HS Troponin T 17 ng/L      Troponin T Delta -4 ng/L     Narrative:      High Sensitive Troponin T Reference Range:  <10.0 ng/L- Negative Female for AMI  <15.0 ng/L- Negative Male for AMI  >=10 - Abnormal Female indicating possible myocardial injury.  >=15 - Abnormal Male indicating possible myocardial injury.   Clinicians would have to utilize clinical acumen, EKG, Troponin, and serial changes to determine if it is an Acute Myocardial Infarction or myocardial injury due to an underlying chronic condition.         Green Top (Gel) [[id number]] Collected: 05/29/23 2156    Specimen: Blood Updated: 05/29/23 2301     Extra Tube Hold for add-ons.     Comment: Auto resulted.       Gold Top - SST [[id number]] Collected: 05/29/23 2156    Specimen: Blood Updated: 05/29/23  2301     Extra Tube Hold for add-ons.     Comment: Auto resulted.       Lavender Top [[id number]] Collected: 05/29/23 2156    Specimen: Blood Updated: 05/29/23 2301     Extra Tube hold for add-on     Comment: Auto resulted       Light Blue Top [[id number]] Collected: 05/29/23 2156    Specimen: Blood Updated: 05/29/23 2301     Extra Tube Hold for add-ons.     Comment: Auto resulted       COVID PRE-OP / PRE-PROCEDURE SCREENING ORDER (NO ISOLATION) - Swab, Nasopharynx [[id number]]  (Normal) Collected: 05/29/23 2156    Specimen: Swab from Nasopharynx Updated: 05/29/23 2256    Narrative:      The following orders were created for panel order COVID PRE-OP / PRE-PROCEDURE SCREENING ORDER (NO ISOLATION) - Swab, Nasopharynx.  Procedure                               Abnormality         Status                     ---------                               -----------         ------                     Respiratory Panel PCR w/...[[id number]]  Normal              Final result                 Please view results for these tests on the individual orders.    Respiratory Panel PCR w/COVID-19(SARS-CoV-2) FRANCIA/SAMIA/ANA/PAD/COR/MAD/BERNARD In-House, NP Swab in UTM/VTM, 3-4 HR TAT - Swab, Nasopharynx [[id number]]  (Normal) Collected: 05/29/23 2156    Specimen: Swab from Nasopharynx Updated: 05/29/23 2256     ADENOVIRUS, PCR Not Detected     Coronavirus 229E Not Detected     Coronavirus HKU1 Not Detected     Coronavirus NL63 Not Detected     Coronavirus OC43 Not Detected     COVID19 Not Detected     Human Metapneumovirus Not Detected     Human Rhinovirus/Enterovirus Not Detected     Influenza A PCR Not Detected     Influenza B PCR Not Detected     Parainfluenza Virus 1 Not Detected     Parainfluenza Virus 2 Not Detected     Parainfluenza Virus 3 Not Detected     Parainfluenza Virus 4 Not Detected     RSV, PCR Not Detected     Bordetella pertussis pcr Not Detected     Bordetella parapertussis PCR Not Detected     Chlamydophila pneumoniae PCR Not  "Detected     Mycoplasma pneumo by PCR Not Detected    Narrative:      In the setting of a positive respiratory panel with a viral infection PLUS a negative procalcitonin without other underlying concern for bacterial infection, consider observing off antibiotics or discontinuation of antibiotics and continue supportive care. If the respiratory panel is positive for atypical bacterial infection (Bordetella pertussis, Chlamydophila pneumoniae, or Mycoplasma pneumoniae), consider antibiotic de-escalation to target atypical bacterial infection.    Lactic Acid, Plasma [[id number]]  (Abnormal) Collected: 05/29/23 2156    Specimen: Blood Updated: 05/29/23 2251     Lactate 2.6 mmol/L      Comment: Falsely depressed results may occur on samples drawn from patients receiving N-Acetylcysteine (NAC) or Metamizole.       D-dimer, Quantitative [[id number]]  (Abnormal) Collected: 05/29/23 2156    Specimen: Blood Updated: 05/29/23 2250     D-Dimer, Quantitative 3.90 MCGFEU/mL     Narrative:      According to the assay 's published package insert, a normal (<0.50 MCGFEU/mL) D-dimer result in conjunction with a non-high clinical probability assessment, excludes deep vein thrombosis (DVT) and pulmonary embolism (PE) with high sensitivity.    D-dimer values increase with age and this can make VTE exclusion of an older population difficult. To address this, the [employer], based on best available evidence and recent guidelines, recommends that clinicians use age-adjusted D-dimer thresholds in patients greater than 50 years of age with: a) a low probability of PE who do not meet all Pulmonary Embolism Rule Out Criteria, or b) in those with intermediate probability of PE.   The formula for an age-adjusted D-dimer cut-off is \"age/100\".  For example, a 60 year old patient would have an age-adjusted cut-off of 0.60 MCGFEU/mL and an 80 year old 0.80 MCGFEU/mL.    BNP [[id number]]  (Abnormal) Collected: 05/29/23 " "2156    Specimen: Blood Updated: 05/29/23 2237     proBNP 3,449.0 pg/mL     Narrative:      Among patients with dyspnea, NT-proBNP is highly sensitive for the detection of acute congestive heart failure. In addition NT-proBNP of <300 pg/ml effectively rules out acute congestive heart failure with 99% negative predictive value.    Results may be falsely decreased if patient taking Biotin.      Procalcitonin [[id number]]  (Normal) Collected: 05/29/23 2156    Specimen: Blood Updated: 05/29/23 2237     Procalcitonin 0.05 ng/mL     Narrative:      As a Marker for Sepsis (Non-Neonates):    1. <0.5 ng/mL represents a low risk of severe sepsis and/or septic shock.  2. >2 ng/mL represents a high risk of severe sepsis and/or septic shock.    As a Marker for Lower Respiratory Tract Infections that require antibiotic therapy:    PCT on Admission    Antibiotic Therapy       6-12 Hrs later    >0.5                Strongly Recommended  >0.25 - <0.5        Recommended   0.1 - 0.25          Discouraged              Remeasure/reassess PCT  <0.1                Strongly Discouraged     Remeasure/reassess PCT    As 28 day mortality risk marker: \"Change in Procalcitonin Result\" (>80% or <=80%) if Day 0 (or Day 1) and Day 4 values are available. Refer to http://www.Twon[employer]-pct-calculator.com    Change in PCT <=80%  A decrease of PCT levels below or equal to 80% defines a positive change in PCT test result representing a higher risk for 28-day all-cause mortality of patients diagnosed with severe sepsis for septic shock.    Change in PCT >80%  A decrease of PCT levels of more than 80% defines a negative change in PCT result representing a lower risk for 28-day all-cause mortality of patients diagnosed with severe sepsis or septic shock.       TSH [[id number]]  (Abnormal) Collected: 05/29/23 2156    Specimen: Blood Updated: 05/29/23 2237     TSH 12.110 uIU/mL     Narrative:      Due to abnormal TSH results, suggest ordering Free T4.    High " Sensitivity Troponin T [[id number]]  (Abnormal) Collected: 05/29/23 2156    Specimen: Blood Updated: 05/29/23 2237     HS Troponin T 21 ng/L     Narrative:      High Sensitive Troponin T Reference Range:  <10.0 ng/L- Negative Female for AMI  <15.0 ng/L- Negative Male for AMI  >=10 - Abnormal Female indicating possible myocardial injury.  >=15 - Abnormal Male indicating possible myocardial injury.   Clinicians would have to utilize clinical acumen, EKG, Troponin, and serial changes to determine if it is an Acute Myocardial Infarction or myocardial injury due to an underlying chronic condition.         T4, Free [[id number]]  (Normal) Collected: 05/29/23 2156    Specimen: Blood Updated: 05/29/23 2237     Free T4 1.11 ng/dL     Narrative:      Results may be falsely increased if patient taking Biotin.      Urinalysis With Microscopic If Indicated (No Culture) - Urine, Clean Catch [[id number]]  (Abnormal) Collected: 05/29/23 2218    Specimen: Urine, Clean Catch Updated: 05/29/23 2237     Color, UA Yellow     Appearance, UA Cloudy     pH, UA 6.5     Specific Gravity, UA 1.018     Glucose, UA Negative     Ketones, UA Trace     Bilirubin, UA Negative     Blood, UA Negative     Protein,  mg/dL (2+)     Leuk Esterase, UA Negative     Nitrite, UA Negative     Urobilinogen, UA 1.0 E.U./dL    Urinalysis, Microscopic Only - Urine, Clean Catch [[id number]] Collected: 05/29/23 2218    Specimen: Urine, Clean Catch Updated: 05/29/23 2237     RBC, UA 0-2 /HPF      WBC, UA 0-2 /HPF      Bacteria, UA None Seen /HPF      Squamous Epithelial Cells, UA 0-2 /HPF      Hyaline Casts, UA 0-6 /LPF      Methodology Automated Microscopy    Blood Gas, Venous With Co-Ox [[id number]]  (Abnormal) Collected: 05/29/23 2235    Specimen: Venous Blood Updated: 05/29/23 2235     Site Right Radial     pH, Venous 7.456 pH Units      Comment: 83 Value above reference range        pCO2, Venous 39.6 mm Hg      Comment: 84 Value below reference range         pO2, Venous 27.4 mm Hg      HCO3, Venous 27.9 mmol/L      Base Excess, Venous 3.7 mmol/L      Hemoglobin, Blood Gas 14.4 g/dL      Oxyhemoglobin Venous 51.2 %      Methemoglobin Venous 0.5 %      Carboxyhemoglobin Venous 1.2 %      CO2 Content 29.1 mmol/L      Temperature 37.0 C      Barometric Pressure for Blood Gas --     Comment: N/A        Modality Room Air     FIO2 21 %      Rate 0 Breaths/minute      PIP 0 cmH2O      Comment: Meter: [id number]     :  [id number]        IPAP 0     EPAP 0     Note --    Magnesium [[id number]]  (Abnormal) Collected: 05/29/23 2156    Specimen: Blood Updated: 05/29/23 2231     Magnesium 1.4 mg/dL     Comprehensive Metabolic Panel [[id number]] Collected: 05/29/23 2156    Specimen: Blood Updated: 05/29/23 2231     Glucose 99 mg/dL      BUN 11 mg/dL      Creatinine 1.15 mg/dL      Sodium 140 mmol/L      Potassium 3.6 mmol/L      Chloride 102 mmol/L      CO2 23.0 mmol/L      Calcium 8.8 mg/dL      Total Protein 7.0 g/dL      Albumin 4.3 g/dL      ALT (SGPT) 28 U/L      AST (SGOT) 29 U/L      Alkaline Phosphatase 86 U/L      Total Bilirubin 0.7 mg/dL      Globulin 2.7 gm/dL      Comment: Calculated Result        A/G Ratio 1.6 g/dL      BUN/Creatinine Ratio 9.6     Anion Gap 15.0 mmol/L      eGFR 80.0 mL/min/1.73     Narrative:      GFR Normal >60  Chronic Kidney Disease <60  Kidney Failure <15      Lipase [[id number]]  (Normal) Collected: 05/29/23 2156    Specimen: Blood Updated: 05/29/23 2231     Lipase 28 U/L     Phosphorus [[id number]]  (Normal) Collected: 05/29/23 2156    Specimen: Blood Updated: 05/29/23 2231     Phosphorus 2.5 mg/dL     C-reactive Protein [[id number]]  (Abnormal) Collected: 05/29/23 2156    Specimen: Blood Updated: 05/29/23 2230     C-Reactive Protein 2.11 mg/dL     CBC & Differential [[id number]]  (Abnormal) Collected: 05/29/23 2156    Specimen: Blood Updated: 05/29/23 2211    Narrative:      The following orders were created for panel order CBC &  Differential.  Procedure                               Abnormality         Status                     ---------                               -----------         ------                     CBC Auto Differential[[id number]]        Abnormal            Final result                 Please view results for these tests on the individual orders.    CBC Auto Differential [[id number]]  (Abnormal) Collected: 05/29/23 2156    Specimen: Blood Updated: 05/29/23 2211     WBC 9.34 10*3/mm3      RBC 4.74 10*6/mm3      Hemoglobin 14.7 g/dL      Hematocrit 44.2 %      MCV 93.2 fL      MCH 31.0 pg      MCHC 33.3 g/dL      RDW 13.9 %      RDW-SD 47.5 fl      MPV 9.8 fL      Platelets 143 10*3/mm3      Neutrophil % 79.8 %      Lymphocyte % 10.4 %      Monocyte % 7.3 %      Eosinophil % 1.4 %      Basophil % 0.5 %      Immature Grans % 0.6 %      Neutrophils, Absolute 7.45 10*3/mm3      Lymphocytes, Absolute 0.97 10*3/mm3      Monocytes, Absolute 0.68 10*3/mm3      Eosinophils, Absolute 0.13 10*3/mm3      Basophils, Absolute 0.05 10*3/mm3      Immature Grans, Absolute 0.06 10*3/mm3      nRBC 0.0 /100 WBC     Blood Culture - Blood, Arm, Left [[id number]] Collected: 05/29/23 2150    Specimen: Blood from Arm, Left Updated: 05/29/23 2206    Blood Culture - Blood, Arm, Right [[id number]] Collected: 05/29/23 2155    Specimen: Blood from Arm, Right Updated: 05/29/23 2206        ECG/EMG Results (last 24 hours)     Procedure Component Value Units Date/Time    SCANNED - TELEMETRY   [[id number]] Resulted: 05/29/23     Updated: 05/30/23 0643    ECG 12 Lead ED Triage Standing Order; Chest Pain [[id number]] Collected: 05/29/23 2152     Updated: 05/30/23 0659     QT Interval 394 ms      QTC Interval 527 ms     Narrative:      Test Reason : ED Triage Standing Order~  Blood Pressure :   */*   mmHG  Vent. Rate : 108 BPM     Atrial Rate : 108 BPM     P-R Int : 146 ms          QRS Dur : 146 ms      QT Int : 394 ms       P-R-T Axes :  71  25  92 degrees     QTc  Int : 527 ms    Sinus tachycardia with premature atrial complexes with aberrant conduction  Possible Left atrial enlargement  Left bundle branch block  Abnormal ECG  No previous ECGs available    Referred By: EDMD           Confirmed By:     ECG 12 Lead ED Triage Standing Order; Chest Pain [[id number]] Collected: 05/30/23 0017     Updated: 05/30/23 0712     QT Interval 408 ms      QTC Interval 531 ms     Narrative:      Test Reason : ED Triage Standing Order~  Blood Pressure :   */*   mmHG  Vent. Rate : 102 BPM     Atrial Rate : 102 BPM     P-R Int : 156 ms          QRS Dur : 150 ms      QT Int : 408 ms       P-R-T Axes :  67  27  80 degrees     QTc Int : 531 ms    Sinus tachycardia with premature atrial complexes with aberrant conduction  Possible Left atrial enlargement  Left bundle branch block  Abnormal ECG  When compared with ECG of 29-MAY-2023 21:52, (Unconfirmed)  No significant change was found    Referred By: EDMD           Confirmed By:                Acute respiratory failure with hypoxia    History of Lymphoma    GERD (gastroesophageal reflux disease)    PTSD (post-traumatic stress disorder)    LBBB (left bundle branch block)    Abnormal stress test    Hypomagnesemia    Acute HFrEF (heart failure with reduced ejection fraction)          Alec Mason III, MD   12:52 EDT 5/30/2023

## 2023-05-30 NOTE — PROGRESS NOTES
[facility] Medicine Services  ADMISSION FOLLOW-UP NOTE          Patient admitted after midnight, H&P by my partner performed earlier on today's date reviewed.  Interim findings, labs, and charting also reviewed.        The [facility] Hospital Problem List has been managed and updated to include any new diagnoses:  Active Hospital Problems    Diagnosis  POA   • **Acute respiratory failure with hypoxia [J96.01]  Yes   • Hypomagnesemia [E83.42]  Yes   • Acute HFrEF (heart failure with reduced ejection fraction) [I50.21]  Unknown   • Abnormal stress test [R94.39]  Unknown   • LBBB (left bundle branch block) [I44.7]  Yes   • GERD (gastroesophageal reflux disease) [K21.9]  Yes   • PTSD (post-traumatic stress disorder) [F43.10]  Yes   • History of Lymphoma [C85.90]  Yes      Resolved Hospital Problems   No resolved problems to display.         ADDITIONAL PLAN:  - detailed assessment and plan from admission reviewed  42 yo male with persistent dyspnea after COVID in December 2022. Positive stress test in April 2023, scheduled for heart cath today with [facility]lon. Admitting for diuresis and cards eval.    HFrEF with Exacerbation and Hypoxia  - suspect heart failure. Image reads as pulmonary edema, but notes possible superimposed pneumonia related to viral illness.  - COVID-PCR negative  - echocardiogram 4/2023 shows EF 28% with moderate tricuspid regurgitation  - stress test 4/2023 demonstrated; Large region of moderately decreased perfusion which is predominantly fixed involving the septal wall, apex, and mid portions of the anterior and inferior walls. Consistent with high risk study  - EKG with known LBBB, no acute changes  - administered 1 mg Bumex. Give additional 1mg. Scheduled 2mg IV bumex bid  -nitroglycerin drip  - daily weights, strict I&O  - NPO until cardiology eval. Was already scheduled for [facility] today     Hypomagnesemia   - replace per protocol     History of Hodgkin's Lymphoma  - s/p  chemotherapy and radiation to chest     Hypogonadism/hypotestosteronism  -currently on supplemental testosterone which is affecting his blood pressure  -reports prescribed by his urologist after testing  - my concern is that it is rare that labs perform appropriate testing to determine if patient is truly testosterone deficient and that patient may have only been started on supplementation based on an inaccurate total testosterone. Recommend referral outpatient to endocrinology for appropriate workup and diagnosis as this significantly increases his risk of heart attack and stroke.         Expected Discharge   Expected discharge date/ time has not been documented.     Carolynn Multani DO  05/30/23

## 2023-05-31 PROBLEM — J96.01 ACUTE RESPIRATORY FAILURE WITH HYPOXIA: Status: RESOLVED | Noted: 2023-05-30 | Resolved: 2023-05-31

## 2023-05-31 LAB
CHOLEST SERPL-MCNC: 177 MG/DL (ref 0–200)
HBA1C MFR BLD: 7 % (ref 4.8–5.6)
HDLC SERPL-MCNC: 59 MG/DL (ref 40–60)
LDLC SERPL CALC-MCNC: 101 MG/DL (ref 0–100)
LDLC/HDLC SERPL: 1.68 {RATIO}
TRIGL SERPL-MCNC: 93 MG/DL (ref 0–150)
VLDLC SERPL-MCNC: 17 MG/DL (ref 5–40)

## 2023-05-31 PROCEDURE — 80061 LIPID PANEL: CPT | Performed by: INTERNAL MEDICINE

## 2023-05-31 PROCEDURE — 25010000002 HEPARIN (PORCINE) PER 1000 UNITS: Performed by: INTERNAL MEDICINE

## 2023-05-31 PROCEDURE — G0378 HOSPITAL OBSERVATION PER HR: HCPCS

## 2023-05-31 PROCEDURE — 83036 HEMOGLOBIN GLYCOSYLATED A1C: CPT | Performed by: INTERNAL MEDICINE

## 2023-05-31 PROCEDURE — 99214 OFFICE O/P EST MOD 30 MIN: CPT | Performed by: INTERNAL MEDICINE

## 2023-05-31 RX ORDER — LORAZEPAM 0.5 MG/1
0.5 TABLET ORAL ONCE AS NEEDED
Status: COMPLETED | OUTPATIENT
Start: 2023-05-31 | End: 2023-05-31

## 2023-05-31 RX ORDER — CARVEDILOL 6.25 MG/1
6.25 TABLET ORAL 2 TIMES DAILY
Qty: 60 TABLET | Refills: 3 | Status: SHIPPED | OUTPATIENT
Start: 2023-05-31

## 2023-05-31 RX ORDER — SPIRONOLACTONE 25 MG/1
25 TABLET ORAL DAILY
Qty: 30 TABLET | Refills: 3 | Status: SHIPPED | OUTPATIENT
Start: 2023-06-01

## 2023-05-31 RX ADMIN — HEPARIN SODIUM 5000 UNITS: 5000 INJECTION, SOLUTION INTRAVENOUS; SUBCUTANEOUS at 20:21

## 2023-05-31 RX ADMIN — SPIRONOLACTONE 25 MG: 25 TABLET ORAL at 08:53

## 2023-05-31 RX ADMIN — CARVEDILOL 6.25 MG: 6.25 TABLET, FILM COATED ORAL at 20:21

## 2023-05-31 RX ADMIN — DOCUSATE SODIUM 50 MG AND SENNOSIDES 8.6 MG 2 TABLET: 8.6; 5 TABLET, FILM COATED ORAL at 20:21

## 2023-05-31 RX ADMIN — Medication 10 ML: at 20:21

## 2023-05-31 RX ADMIN — Medication 10 ML: at 08:54

## 2023-05-31 RX ADMIN — LORAZEPAM 0.5 MG: 0.5 TABLET ORAL at 22:33

## 2023-05-31 RX ADMIN — CARVEDILOL 6.25 MG: 6.25 TABLET, FILM COATED ORAL at 08:52

## 2023-05-31 RX ADMIN — EMPAGLIFLOZIN 10 MG: 10 TABLET, FILM COATED ORAL at 08:58

## 2023-05-31 RX ADMIN — HEPARIN SODIUM 5000 UNITS: 5000 INJECTION, SOLUTION INTRAVENOUS; SUBCUTANEOUS at 08:52

## 2023-05-31 RX ADMIN — LISINOPRIL 40 MG: 40 TABLET ORAL at 08:52

## 2023-05-31 NOTE — PROGRESS NOTES
"[facility] Progress Note     LOS: 1 day   Patient Care Team:  Heladio Valentine DO as PCP - General (Family Medicine)  PCP:  Heladio Valentine DO    Chief Complaint: CHF    SUBJECTIVE: Sitting up in bed.  Off oxygen.  Denies chest pain, palpitations or dyspnea.      Review of Systems:   All systems have been reviewed and are negative with the exception of those mentioned above.      OBJECTIVE:    Vital Sign Min/Max for last 24 hours  Temp  Min: 97.9 °F (36.6 °C)  Max: 98.5 °F (36.9 °C)   BP  Min: 120/64  Max: 171/106   Pulse  Min: 82  Max: 109   Resp  Min: 18  Max: 20   SpO2  Min: 88 %  Max: 99 %   No data recorded   Weight  Min: 90.3 kg (199 lb)  Max: 90.4 kg (199 lb 3.2 oz)     Flowsheet Rows    Flowsheet Row First Filed Value   Admission Height 180.3 cm (71\") Documented at 05/29/2023 2138   Admission Weight 95.3 kg (210 lb) Documented at 05/29/2023 2138          Telemetry: Sinus rhythm      Intake/Output Summary (Last 24 hours) at 5/31/2023 0848  Last data filed at 5/30/2023 1100  Gross per 24 hour   Intake --   Output 300 ml   Net -300 ml     Intake & Output (last 3 days)       05/28 0701  05/29 0700 05/29 0701  05/30 0700 05/30 0701  05/31 0700 05/31 0701  06/01 0700    I.V. (mL/kg)  100 (1.1)      Total Intake(mL/kg)  100 (1.1)      Urine (mL/kg/hr)   1300 (0.6)     Total Output   1300     Net  +100 -1300             Urine Unmeasured Occurrence  1 x 1 x            Physical Exam:    General Appearance:    Alert, cooperative, no distress, appears stated age   Neck:   Supple, symmetrical, trachea midline.   Lungs:     Clear to auscultation bilaterally, respirations unlabored   Chest Wall:    No tenderness or deformity    Heart:    Regular rate and rhythm, S1 and S2 normal, no murmur, rub   or gallop, normal carotid impulse bilaterally without bruit.   Extremities:   Extremities normal, atraumatic, no cyanosis or edema   Pulses:   2+ and symmetric all extremities "   Skin:   Skin color, texture, turgor normal, no rashes or lesions      LABS/DIAGNOSTIC DATA:  Results from last 7 days   Lab Units 05/29/23 2156   WBC 10*3/mm3 9.34   HEMOGLOBIN g/dL 14.7   HEMATOCRIT % 44.2   PLATELETS 10*3/mm3 143     Lab Results   Lab Value Date/Time    TROPONINT 13 05/30/2023 0300    TROPONINT 17 (H) 05/30/2023 0017    TROPONINT 21 (H) 05/29/2023 2156         Results from last 7 days   Lab Units 05/29/23  2156   SODIUM mmol/L 140   POTASSIUM mmol/L 3.6   CHLORIDE mmol/L 102   CO2 mmol/L 23.0   BUN mg/dL 11   CREATININE mg/dL 1.15   CALCIUM mg/dL 8.8   BILIRUBIN mg/dL 0.7   ALK PHOS U/L 86   ALT (SGPT) U/L 28   AST (SGOT) U/L 29   GLUCOSE mg/dL 99     Results from last 7 days   Lab Units 05/31/23  0427   HEMOGLOBIN A1C % 7.00*     Results from last 7 days   Lab Units 05/31/23  0427   CHOLESTEROL mg/dL 177   TRIGLYCERIDES mg/dL 93   HDL CHOL mg/dL 59   LDL CHOL mg/dL 101*     Results from last 7 days   Lab Units 05/29/23 2156   TSH uIU/mL 12.110*   FREE T4 ng/dL 1.11           Medication Review:   carvedilol, 6.25 mg, Oral, BID  empagliflozin, 10 mg, Oral, Daily  heparin (porcine), 5,000 Units, Subcutaneous, Q12H  lisinopril, 40 mg, Oral, Daily  senna-docusate sodium, 2 tablet, Oral, BID  sodium chloride, 10 mL, Intravenous, Q12H  spironolactone, 25 mg, Oral, Daily             ASSESSMENT/PLAN:    Acute respiratory failure with hypoxia    History of Lymphoma    GERD (gastroesophageal reflux disease)    PTSD (post-traumatic stress disorder)    LBBB (left bundle branch block)    Abnormal stress test    Hypomagnesemia    Acute HFrEF (heart failure with reduced ejection fraction)        Heart failure with reduced ejection fraction, acute on chronic, nonischemic: Currently compensated and euvolemic.  Stable for discharge on current cardiac medical therapy.  Will need follow-up in heart valve clinic in 1 to 2 weeks for repeat laboratory evaluation and further titration of guideline directed medical  therapy.  LifeVest at discharge.  Most recent echocardiographic assessment of the EF measuring 28% is more reliable than associated EF based on myocardial perfusion imaging.  Repeat echo in 90 days to reassess EF.          Alec Mason III, MD   05/31/23  08:48 EDT

## 2023-05-31 NOTE — PLAN OF CARE
Goal Outcome Evaluation:      VSS. left radial site C/D/I/Soft. SR/ST. RA. No complaints of pain. No further complaints at this time.

## 2023-05-31 NOTE — DISCHARGE INSTRUCTIONS
-Take all medications as directed  -Follow a low salt diet  -Weigh yourself every morning before breakfast, write it down and compare to yesterday’s weight. If you have weight gain greater than 3lbs in 1 day or 5 lbs or more in 1 week , call your PCP or Heart Failure Clinic.  -If you experience any of the following symptoms please contact your PCP or Heart Failure Clinic:    Increased shortness of breath   Increased swelling of feet or stomach   Dry hacking cough   Shortness of breath when laying down flat    - [facility]:  [phone]

## 2023-05-31 NOTE — DISCHARGE SUMMARY
[facility]  DISCHARGE SUMMARY    Patient Name: Paloom Pollack  : 1980  MRN: [id number]    Date of Admission: 2023  9:49 PM  Date of Discharge:  2023  Primary Care Physician: Heladio Valentine DO    Consults     Date and Time Order Name Status Description    2023  4:00 AM Inpatient Cardiology Consult Completed           Hospital Course     Presenting Problem: Dyspnea    Active Hospital Problems    Diagnosis  POA   • Hypomagnesemia [E83.42]  Yes   • Acute HFrEF (heart failure with reduced ejection fraction) [I50.21]  Yes   • Abnormal stress test [R94.39]  Yes   • LBBB (left bundle branch block) [I44.7]  Yes   • GERD (gastroesophageal reflux disease) [K21.9]  Yes   • PTSD (post-traumatic stress disorder) [F43.10]  Yes   • History of Lymphoma [C85.90]  Yes      Resolved Hospital Problems    Diagnosis Date Resolved POA   • **Acute respiratory failure with hypoxia [J96.01] 2023 Yes          Hospital Course:  Palomo Pollack is a 43 y.o. male with history of lymphoma s/p chemo, chest radiation and stem cell transplant who presented to the ED due to worsening dyspnea and abdominal distention, recent abnormal stress test with plan for LHC as an outpatient.  He was found to have acute systolic CHF, LVEF 28% and improved with diuresis.  He was started on carvedilol, spironolactone, jardiance and continued on lisinopril.  LHC showed nonobstructive CAD.  Life Vest was ordered by cardiology and he is to follow up with repeat echo in 90 days.  In the interim, will follow closely in CHF clinic for medication titration.    Addendum: discharge delayed due to late delivery of life vest.     Discharge Follow Up Recommendations for outpatient labs/diagnostics:  F/U CHF clinic 1 week  F/U Dr. Mason 3 months with repeat echocardiogram    Day of Discharge     HPI:   He is feeling better but somewhat anxious and frustrated about being in the hospital.   Not sleeping well.    Review of Systems  Gen- No fevers  CV- No chest pain  Resp- No dyspnea      Vital Signs:   Temp:  [97.9 °F (36.6 °C)-98 °F (36.7 °C)] 98 °F (36.7 °C)  Heart Rate:  [] 80  Resp:  [18-20] 18  BP: (120-171)/() 150/99  Flow (L/min):  [2] 2      Physical Exam:  Constitutional: No acute distress, awake, alert, sitting up in bed  HENT: NCAT, mucous membranes moist  Respiratory: Respiratory effort normal on room air  Cardiovascular: RRR, occasional PVC  Musculoskeletal: No bilateral ankle edema  Psychiatric: Appropriate affect, cooperative  Neurologic: Speech clear  Skin: No rashes    Pertinent  and/or Most Recent Results     LAB RESULTS:      Lab 05/30/23  0156 05/29/23 2156   WBC  --  9.34   HEMOGLOBIN  --  14.7   HEMATOCRIT  --  44.2   PLATELETS  --  143   NEUTROS ABS  --  7.45*   IMMATURE GRANS (ABS)  --  0.06*   LYMPHS ABS  --  0.97   MONOS ABS  --  0.68   EOS ABS  --  0.13   MCV  --  93.2   CRP  --  2.11*   PROCALCITONIN  --  0.05   LACTATE 1.4 2.6*   D DIMER QUANT  --  3.90*         Lab 05/31/23  0427 05/30/23 2115 05/30/23  0856 05/29/23 2156   SODIUM  --   --   --  140   POTASSIUM  --   --   --  3.6   CHLORIDE  --   --   --  102   CO2  --   --   --  23.0   ANION GAP  --   --   --  15.0   BUN  --   --   --  11   CREATININE  --   --   --  1.15   EGFR  --   --   --  80.0   GLUCOSE  --   --   --  99   CALCIUM  --   --   --  8.8   MAGNESIUM  --  2.8* 3.2* 1.4*   PHOSPHORUS  --   --   --  2.5   HEMOGLOBIN A1C 7.00*  --   --   --    TSH  --   --   --  12.110*         Lab 05/29/23 2156   TOTAL PROTEIN 7.0   ALBUMIN 4.3   GLOBULIN 2.7   ALT (SGPT) 28   AST (SGOT) 29   BILIRUBIN 0.7   ALK PHOS 86   LIPASE 28         Lab 05/30/23  0300 05/30/23  0017 05/29/23  2156   PROBNP  --   --  3,449.0*   HSTROP T 13 17* 21*         Lab 05/31/23  0427   CHOLESTEROL 177   LDL CHOL 101*   HDL CHOL 59   TRIGLYCERIDES 93             Lab 05/29/23  2235   FIO2 21   CARBOXYHEMOGLOBIN (VENOUS) 1.2     Brief  Urine Lab Results  (Last result in the past 365 days)      Color   Clarity   Blood   Leuk Est   Nitrite   Protein   CREAT   Urine HCG        05/29/23 2218 Yellow   Cloudy   Negative   Negative   Negative   100 mg/dL (2+)               Microbiology Results (last 10 days)     Procedure Component Value - Date/Time    COVID PRE-OP / PRE-PROCEDURE SCREENING ORDER (NO ISOLATION) - Swab, Nasopharynx [[id number]]  (Normal) Collected: 05/29/23 2156    Lab Status: Final result Specimen: Swab from Nasopharynx Updated: 05/29/23 2256    Narrative:      The following orders were created for panel order COVID PRE-OP / PRE-PROCEDURE SCREENING ORDER (NO ISOLATION) - Swab, Nasopharynx.  Procedure                               Abnormality         Status                     ---------                               -----------         ------                     Respiratory Panel PCR w/...[[id number]]  Normal              Final result                 Please view results for these tests on the individual orders.    Respiratory Panel PCR w/COVID-19(SARS-CoV-2) [facility] In-House, NP Swab in UTM/VTM, 3-4 HR TAT - Swab, Nasopharynx [[id number]]  (Normal) Collected: 05/29/23 2156    Lab Status: Final result Specimen: Swab from Nasopharynx Updated: 05/29/23 2256     ADENOVIRUS, PCR Not Detected     Coronavirus 229E Not Detected     Coronavirus HKU1 Not Detected     Coronavirus NL63 Not Detected     Coronavirus OC43 Not Detected     COVID19 Not Detected     Human Metapneumovirus Not Detected     Human Rhinovirus/Enterovirus Not Detected     Influenza A PCR Not Detected     Influenza B PCR Not Detected     Parainfluenza Virus 1 Not Detected     Parainfluenza Virus 2 Not Detected     Parainfluenza Virus 3 Not Detected     Parainfluenza Virus 4 Not Detected     RSV, PCR Not Detected     Bordetella pertussis pcr Not Detected     Bordetella parapertussis PCR Not Detected     Chlamydophila pneumoniae PCR Not Detected     Mycoplasma  pneumo by PCR Not Detected    Narrative:      In the setting of a positive respiratory panel with a viral infection PLUS a negative procalcitonin without other underlying concern for bacterial infection, consider observing off antibiotics or discontinuation of antibiotics and continue supportive care. If the respiratory panel is positive for atypical bacterial infection (Bordetella pertussis, Chlamydophila pneumoniae, or Mycoplasma pneumoniae), consider antibiotic de-escalation to target atypical bacterial infection.    Blood Culture - Blood, Arm, Right [[id number]]  (Normal) Collected: 05/29/23 2155    Lab Status: Preliminary result Specimen: Blood from Arm, Right Updated: 05/30/23 2215     Blood Culture No growth at 24 hours    Blood Culture - Blood, Arm, Left [[id number]]  (Normal) Collected: 05/29/23 2150    Lab Status: Preliminary result Specimen: Blood from Arm, Left Updated: 05/30/23 2215     Blood Culture No growth at 24 hours          Adult Transthoracic Echo Complete W/ Cont if Necessary Per Protocol    Addendum Date: 5/24/2023    •  Left ventricular systolic function is moderately decreased. Calculated left ventricular EF = 28% Left ventricular ejection fraction appears to be 26 - 30%. •  The left ventricular cavity is mildly dilated. •  The left atrial cavity is mildly dilated. •  Moderate tricuspid valve regurgitation is present. •  Estimated right ventricular systolic pressure from tricuspid regurgitation is moderately elevated (45-55 mmHg). Calculated right ventricular systolic pressure from tricuspid regurgitation is 50 mmHg.     Result Date: 5/24/2023  •  The left ventricular cavity is mildly dilated.     Cardiac Catheterization/Vascular Study    Luminal irregularities noted in the proximal mid major epicardial vessels. LVEDP 21 mmHg No aortic stenosis RECOMMENDATIONS: Medical therapy. ---------------------------------------------------------------------------  ------------------------------------------- Access: 6 [place] left radial artery Procedure narrative: Patient arrived in the catheterization laboratory in a fasting, nonsedated state.  Risks, benefits, alternatives to cardiac catheterization had previously been discussed in detail.  The patient verbalized understanding of these concepts and agreed to proceed with procedure.  Left wrist was prepped and draped in the usual sterile fashion.  Local anesthesia was achieved using 1% lidocaine subcutaneously.  6 [place] sheath was placed in the left radial artery using a modified Seldinger technique using an Angiocath over a short Glidewire.  Sheath was aspirated and flushed per protocol.  5 [place] JR4 catheter was advanced in the central aortic circulation over J-tip wire.  Catheter was aspirated and flushed per protocol.  Continue BX right coronary, this catheter prolapsed across the aortic valve and was used to record left ventricular hemodynamics and perform an LV to AO pullback gradient.  Selective angiography of the  right coronary artery was performed.  5 [place] JR4 catheter was removed over guidewire and a 5 [place] JL 3.5 was used to achieve selective angiography left coronary system.  5 [place] JL 3.5 catheter was removed over a J-tip wire.  5 [place] pigtail catheter was then removed over J-tip wire.  Radial sheath was removed and hemostasis was obtained via compression band.  Patient was discharged from the catheterization laboratory in stable condition.  No complications encountered.  Angiographic Findings: Coronary circulation is right dominant Left main: Normal caliber vessel, trifurcates.  Mild calcifications visualized in the mid to distal LAD.  There are luminal irregularities visualized in the mid to distal LAD. LAD: Normal caliber vessel which gives rise to 3 diagonal branches and approaches the apex.  Luminal irregularities visualized in the proximal mid LAD. LCX: Moderate caliber vessel which gives rise to  3 obtuse marginal branches.  Angiographically free of disease. Ramus intermedius: Moderate caliber vessel, angiographically disease. RCA: Normal caliber vessel that gives rise to right posterior descending artery and right posterolateral system.  Calcifications visualized in the proximal to mid RCA.  Diffuse mild disease noted throughout the proximal mid and distal RCA. Hemodynamic Findings: LVEDP: 21 mmHg No aortic valve gradient to suggest aortic stenosis. Complications: None apparent EBL <10cc     XR Chest 1 View    Result Date: 5/29/2023  XR CHEST 1 VW Date of Exam: 5/29/2023 9:55 PM EDT Indication: Chest Pain Triage Protocol Comparison: CT chest with contrast 12/30/2014, 2 view chest x-ray 4/28/2023. Findings: 4.5 cm rim calcification over the right heart border is stable, likely a calcified pericardial cyst when correlated with previous CT. Cardiomediastinal contours appear unchanged. There are suspected right basilar airspace opacities. Left lung appears clear. No pneumothorax or large pleural effusion is seen.     Impression: Suspected right basilar airspace opacities, which may be due to atelectasis and/or pneumonia. Electronically Signed: Khadijah Aguilar  5/29/2023 10:50 PM EDT  Workstation ID: [id number]    Holter Monitor - 48 Hour    Result Date: 5/24/2023  •  An abnormal monitor study. •  Frequent PVCs, 6%, which are occasionally symptomatic.     Stress Test With Myocardial Perfusion (1 Day)    Result Date: 5/24/2023  •  Calcifications visualized in the proximal and mid RCA. •  Left ventricular ejection fraction is moderately reduced (Calculated EF = 37%). •  Abnormal LV wall motion consistent with moderate hypokinesis of the septal wall. •  Large region of moderately decreased perfusion which is predominantly fixed involving the septal wall, apex, and mid portions of the anterior and inferior walls. •  Impressions are consistent with a high risk study.     CT Angiogram Chest    Result Date:  5/29/2023  Exam: CTA thorax, PE protocol Date: May 29, 2023 History: Chest pain, shortness of breath, hemoptysis Comparison: None available Technique: Contiguous axial CT images obtained of the thorax following the uneventful intravenous administration of 80 mL Isovue-370 contrast. Additionally, sagittal, coronal and 3-D reformatted images were obtained. CT dose lowering techniques were used, to include: automated exposure control, adjustment for patient size, and or use of iterative reconstruction. Findings: Thoracic aorta: There are mild atherosclerotic changes. There is no aneurysm or dissection. HEART: The heart is enlarged. There are coronary artery calcifications. There is a calcified right-sided pericardial cyst measuring up to 3.7 cm on series 4 image 52. This demonstrates a chronic appearance. Pulmonary arteries: The pulmonary arteries are well visualized. There is no pulmonary embolus. Mediastinum: There are calcified mediastinal and right hilar lymph nodes. Lung parenchyma: There is interlobular septal thickening in both lungs with a small to moderately sized right-sided pleural effusion and a smaller left-sided pleural effusion. There are nodular and hazy groundglass opacities within the right upper lobe, right middle lobe, lower lobes and left upper lobe. There is no pneumothorax. Upper abdomen: The spleen is borderline in size. The liver is hypodense. The liver demonstrates a subtle micronodular contour. There is an enlarged lymph node in the region of the gastroesophageal junction measuring 1.4 cm in short axis. This lymph node is nonspecific. Osseous structures: The osseous structures are intact. No acute fractures are identified.     1. Findings are most consistent with congestive heart failure with associated pulmonary edema. There is also a small to moderately sized right-sided pleural effusion and a smaller left-sided pleural effusion. 2. Somewhat extensive nodular and hazy groundglass airspace  disease in both lungs as described. This could be related to a manifestation of pulmonary edema, however, superimposed bronchopneumonia is of significant concern, including viral or COVID pneumonia. Close clinical follow-up is recommended. 3. Hepatic steatosis. The liver demonstrates a subtle micronodular contour likely representing liver disease or hepatic fibrosis. Correlation with hepatic function is recommended. 4. Coronary artery calcifications. 5. No pulmonary embolus. Electronically signed by:  Lisandro Long M.D.  5/29/2023 9:34 PM Mountain Time              Results for orders placed during the hospital encounter of 05/24/23    Adult Transthoracic Echo Complete W/ Cont if Necessary Per Protocol    Interpretation Summary  •  Left ventricular systolic function is moderately decreased. Calculated left ventricular EF = 28% Left ventricular ejection fraction appears to be 26 - 30%.  •  The left ventricular cavity is mildly dilated.  •  The left atrial cavity is mildly dilated.  •  Moderate tricuspid valve regurgitation is present.  •  Estimated right ventricular systolic pressure from tricuspid regurgitation is moderately elevated (45-55 mmHg). Calculated right ventricular systolic pressure from tricuspid regurgitation is 50 mmHg.      Plan for Follow-up of Pending Labs/Results: Will notify patient if further results warrant a change in management  Pending Labs     Order Current Status    Blood Culture - Blood, Arm, Left Preliminary result    Blood Culture - Blood, Arm, Right Preliminary result        Discharge Details        Discharge Medications      New Medications      Instructions Start Date   carvedilol 6.25 MG tablet  Commonly known as: COREG   6.25 mg, Oral, 2 Times Daily      empagliflozin 10 MG tablet tablet  Commonly known as: JARDIANCE   10 mg, Oral, Daily   Start Date: June 1, 2023     spironolactone 25 MG tablet  Commonly known as: ALDACTONE   25 mg, Oral, Daily   Start Date: June 1, 2023         Continue These Medications      Instructions Start Date   albuterol sulfate  (90 Base) MCG/ACT inhaler  Commonly known as: PROVENTIL HFA;VENTOLIN HFA;PROAIR HFA   2 puffs, Inhalation, Every 4 Hours PRN      esomeprazole 20 MG capsule  Commonly known as: nexIUM   20 mg, Oral, Every Morning Before Breakfast      fexofenadine 180 MG tablet  Commonly known as: ALLEGRA   180 mg, Oral, Daily      lisinopril 40 MG tablet  Commonly known as: PRINIVIL,ZESTRIL   40 mg, Oral, Daily      Testosterone 1.62 % gel   APPLY 2 PUMPS TOPICALLY TO THE AFFECTED AREA EVERY DAY             Allergies   Allergen Reactions   • Bactrim [Sulfamethoxazole-Trimethoprim] Rash and Other (See Comments)     Gave increased heartrate-biaxin           Discharge Disposition:  Home or Self Care    Diet:  Hospital:  Diet Order   Procedures   • Diet: Cardiac Diets; Healthy Heart (2-3 Na+); Texture: Regular Texture (IDDSI 7); Fluid Consistency: Thin (IDDSI 0)          CODE STATUS:    Code Status and Medical Interventions:   Ordered at: 05/30/23 0218     Level Of Support Discussed With:    Patient     Code Status (Patient has no pulse and is not breathing):    CPR (Attempt to Resuscitate)     Medical Interventions (Patient has pulse or is breathing):    Full Support       Future Appointments   Date Time Provider Department Center   7/7/2023  3:45 PM Heladio Valenitne DO [facility]   11/28/2023  9:15 AM Alec Mason III, MD [facility]       Additional Instructions for the Follow-ups that You Need to Schedule     Discharge Follow-up with PCP   As directed       Currently Documented PCP:    Heladio Valentine DO    PCP Phone Number:    [phone]     Follow Up Details: 1 week         Discharge Follow-up with Specialty: CHF clinic; 1 Week   As directed      Specialty: CHF clinic    Follow Up: 1 Week         Discharge Follow-up with Specified Provider: Dr. Mason; 3 Months   As directed      To: Dr. Mason    Follow Up: 3 Months                      Mabel Noguera MD  05/31/23      Time Spent on Discharge:  I spent  32 minutes on this discharge activity which included: face-to-face encounter with the patient, reviewing the data in the system, coordination of the care with the nursing staff as well as consultants, documentation, and entering orders.

## 2023-05-31 NOTE — ED PROVIDER NOTES
" EMERGENCY DEPARTMENT ENCOUNTER    Pt Name: Palomo Pollack  MRN: [id number]  Pt :   1980  Room Number:  2515/1  Date of encounter:  2023  PCP: Heladio Valentine DO  ED Provider: Clyde Hamlin MD    Historian: Patient, spouse      HPI:  Chief Complaint: Dyspnea, chest pain        Context: Palomo Pollack is a 43-year-old man who presents the emergency department for worsening dyspnea and chest pain.  He is scheduled for cardiac catheterization tomorrow and has history of recent positive stress test.  He has been having worsening dyspnea ever since COVID in December.  He has been having productive cough and tonight got so bad he presents here for evaluation.       PAST MEDICAL HISTORY  Past Medical History:   Diagnosis Date   • Allergic rhinitis    • Chronic right ear pain    • GERD (gastroesophageal reflux disease)    • Hodgkin lymphoma    • Hypogonadism in male    • PTSD (post-traumatic stress disorder)          PAST SURGICAL HISTORY  Past Surgical History:   Procedure Laterality Date   • CENTRAL VENOUS LINE INSERTION     • COLONOSCOPY     • PORTOCAVAL SHUNT PLACEMENT           FAMILY HISTORY  Family History   Problem Relation Age of Onset   • Stroke Father    • Lung cancer Father    • Cancer Father         Positive for cured lung cancer- he was smoker         SOCIAL HISTORY  Social History     Socioeconomic History   • Marital status: Single   Tobacco Use   • Smoking status: Former     Packs/day: 0.25     Years: 20.00     Pack years: 5.00     Types: Cigarettes     Quit date: 2022     Years since quittin.5     Passive exposure: Past   • Smokeless tobacco: Never   • Tobacco comments:     smokes less than .25 ppd   Vaping Use   • Vaping Use: Never used   Substance and Sexual Activity   • Alcohol use: Yes     Comment: 2 drinks a night   • Drug use: Yes     Types: Marijuana     Comment: \"A COUPLE A WEEK\"   • Sexual activity: Yes         ALLERGIES  Bactrim " [sulfamethoxazole-trimethoprim]        REVIEW OF SYSTEMS  Review of Systems       All systems reviewed and negative except for those discussed in HPI.       PHYSICAL EXAM    I have reviewed the triage vital signs and nursing notes.    ED Triage Vitals   Temp Heart Rate Resp BP SpO2   05/29/23 2138 05/29/23 2138 05/29/23 2138 05/29/23 2138 05/29/23 2138   97.8 °F (36.6 °C) (!) 130 (!) 36 (!) 152/118 94 %      Temp src Heart Rate Source Patient Position BP Location FiO2 (%)   05/29/23 2138 05/29/23 2138 05/30/23 0359 05/30/23 0359 --   Oral Monitor Lying Right arm        Physical Exam  GENERAL:   Appears ill, dyspneic, tachypneic  HENT: Nares patent.  EYES: No scleral icterus.  CV: Regular tachycardia without appreciated murmurs rubs or gallops  RESPIRATORY: Wet rhonchi in all lung fields with trace and expiratory wheezes  ABDOMEN: Soft, nontender  MUSCULOSKELETAL: No deformities.   NEURO: Alert, moves all extremities, follows commands.  SKIN: Warm, dry, no rash visualized.      LAB RESULTS  Recent Results (from the past 24 hour(s))   ECG 12 Lead ED Triage Standing Order; Chest Pain    Collection Time: 05/29/23  9:52 PM   Result Value Ref Range    QT Interval 394 ms    QTC Interval 527 ms   High Sensitivity Troponin T    Collection Time: 05/29/23  9:56 PM    Specimen: Blood   Result Value Ref Range    HS Troponin T 21 (H) <15 ng/L   Comprehensive Metabolic Panel    Collection Time: 05/29/23  9:56 PM    Specimen: Blood   Result Value Ref Range    Glucose 99 65 - 99 mg/dL    BUN 11 6 - 20 mg/dL    Creatinine 1.15 0.76 - 1.27 mg/dL    Sodium 140 136 - 145 mmol/L    Potassium 3.6 3.5 - 5.2 mmol/L    Chloride 102 98 - 107 mmol/L    CO2 23.0 22.0 - 29.0 mmol/L    Calcium 8.8 8.6 - 10.5 mg/dL    Total Protein 7.0 6.0 - 8.5 g/dL    Albumin 4.3 3.5 - 5.2 g/dL    ALT (SGPT) 28 1 - 41 U/L    AST (SGOT) 29 1 - 40 U/L    Alkaline Phosphatase 86 39 - 117 U/L    Total Bilirubin 0.7 0.0 - 1.2 mg/dL    Globulin 2.7 gm/dL    A/G Ratio  1.6 g/dL    BUN/Creatinine Ratio 9.6 7.0 - 25.0    Anion Gap 15.0 5.0 - 15.0 mmol/L    eGFR 80.0 >60.0 mL/min/1.73   Lipase    Collection Time: 05/29/23  9:56 PM    Specimen: Blood   Result Value Ref Range    Lipase 28 13 - 60 U/L   BNP    Collection Time: 05/29/23  9:56 PM    Specimen: Blood   Result Value Ref Range    proBNP 3,449.0 (H) 0.0 - 450.0 pg/mL   Green Top (Gel)    Collection Time: 05/29/23  9:56 PM   Result Value Ref Range    Extra Tube Hold for add-ons.    Lavender Top    Collection Time: 05/29/23  9:56 PM   Result Value Ref Range    Extra Tube hold for add-on    Gold Top - SST    Collection Time: 05/29/23  9:56 PM   Result Value Ref Range    Extra Tube Hold for add-ons.    Gray Top    Collection Time: 05/29/23  9:56 PM   Result Value Ref Range    Extra Tube Hold for add-ons.    Light Blue Top    Collection Time: 05/29/23  9:56 PM   Result Value Ref Range    Extra Tube Hold for add-ons.    CBC Auto Differential    Collection Time: 05/29/23  9:56 PM    Specimen: Blood   Result Value Ref Range    WBC 9.34 3.40 - 10.80 10*3/mm3    RBC 4.74 4.14 - 5.80 10*6/mm3    Hemoglobin 14.7 13.0 - 17.7 g/dL    Hematocrit 44.2 37.5 - 51.0 %    MCV 93.2 79.0 - 97.0 fL    MCH 31.0 26.6 - 33.0 pg    MCHC 33.3 31.5 - 35.7 g/dL    RDW 13.9 12.3 - 15.4 %    RDW-SD 47.5 37.0 - 54.0 fl    MPV 9.8 6.0 - 12.0 fL    Platelets 143 140 - 450 10*3/mm3    Neutrophil % 79.8 (H) 42.7 - 76.0 %    Lymphocyte % 10.4 (L) 19.6 - 45.3 %    Monocyte % 7.3 5.0 - 12.0 %    Eosinophil % 1.4 0.3 - 6.2 %    Basophil % 0.5 0.0 - 1.5 %    Immature Grans % 0.6 (H) 0.0 - 0.5 %    Neutrophils, Absolute 7.45 (H) 1.70 - 7.00 10*3/mm3    Lymphocytes, Absolute 0.97 0.70 - 3.10 10*3/mm3    Monocytes, Absolute 0.68 0.10 - 0.90 10*3/mm3    Eosinophils, Absolute 0.13 0.00 - 0.40 10*3/mm3    Basophils, Absolute 0.05 0.00 - 0.20 10*3/mm3    Immature Grans, Absolute 0.06 (H) 0.00 - 0.05 10*3/mm3    nRBC 0.0 0.0 - 0.2 /100 WBC   D-dimer, Quantitative     Collection Time: 05/29/23  9:56 PM    Specimen: Blood   Result Value Ref Range    D-Dimer, Quantitative 3.90 (H) 0.00 - 0.50 MCGFEU/mL   Lactic Acid, Plasma    Collection Time: 05/29/23  9:56 PM    Specimen: Blood   Result Value Ref Range    Lactate 2.6 (C) 0.5 - 2.0 mmol/L   Procalcitonin    Collection Time: 05/29/23  9:56 PM    Specimen: Blood   Result Value Ref Range    Procalcitonin 0.05 0.00 - 0.25 ng/mL   C-reactive Protein    Collection Time: 05/29/23  9:56 PM    Specimen: Blood   Result Value Ref Range    C-Reactive Protein 2.11 (H) 0.00 - 0.50 mg/dL   Magnesium    Collection Time: 05/29/23  9:56 PM    Specimen: Blood   Result Value Ref Range    Magnesium 1.4 (L) 1.6 - 2.6 mg/dL   Phosphorus    Collection Time: 05/29/23  9:56 PM    Specimen: Blood   Result Value Ref Range    Phosphorus 2.5 2.5 - 4.5 mg/dL   T4, Free    Collection Time: 05/29/23  9:56 PM    Specimen: Blood   Result Value Ref Range    Free T4 1.11 0.93 - 1.70 ng/dL   TSH    Collection Time: 05/29/23  9:56 PM    Specimen: Blood   Result Value Ref Range    TSH 12.110 (H) 0.270 - 4.200 uIU/mL   Respiratory Panel PCR w/COVID-19(SARS-CoV-2) FRANCIA/SAMIA/ANA/PAD/COR/MAD/BERNARD In-House, NP Swab in [facility], 3-4 HR TAT - Swab, Nasopharynx    Collection Time: 05/29/23  9:56 PM    Specimen: Nasopharynx; Swab   Result Value Ref Range    ADENOVIRUS, PCR Not Detected Not Detected    Coronavirus 229E Not Detected Not Detected    Coronavirus HKU1 Not Detected Not Detected    Coronavirus NL63 Not Detected Not Detected    Coronavirus OC43 Not Detected Not Detected    COVID19 Not Detected Not Detected - Ref. Range    Human Metapneumovirus Not Detected Not Detected    Human Rhinovirus/Enterovirus Not Detected Not Detected    Influenza A PCR Not Detected Not Detected    Influenza B PCR Not Detected Not Detected    Parainfluenza Virus 1 Not Detected Not Detected    Parainfluenza Virus 2 Not Detected Not Detected    Parainfluenza Virus 3 Not Detected Not Detected     Parainfluenza Virus 4 Not Detected Not Detected    RSV, PCR Not Detected Not Detected    Bordetella pertussis pcr Not Detected Not Detected    Bordetella parapertussis PCR Not Detected Not Detected    Chlamydophila pneumoniae PCR Not Detected Not Detected    Mycoplasma pneumo by PCR Not Detected Not Detected   Urinalysis With Microscopic If Indicated (No Culture) - Urine, Clean Catch    Collection Time: 05/29/23 10:18 PM    Specimen: Urine, Clean Catch   Result Value Ref Range    Color, UA Yellow Yellow, Straw    Appearance, UA Cloudy (A) Clear    pH, UA 6.5 5.0 - 8.0    Specific Gravity, UA 1.018 1.001 - 1.030    Glucose, UA Negative Negative    Ketones, UA Trace (A) Negative    Bilirubin, UA Negative Negative    Blood, UA Negative Negative    Protein,  mg/dL (2+) (A) Negative    Leuk Esterase, UA Negative Negative    Nitrite, UA Negative Negative    Urobilinogen, UA 1.0 E.U./dL 0.2 - 1.0 E.U./dL   Urinalysis, Microscopic Only - Urine, Clean Catch    Collection Time: 05/29/23 10:18 PM    Specimen: Urine, Clean Catch   Result Value Ref Range    RBC, UA 0-2 None Seen, 0-2 /HPF    WBC, UA 0-2 None Seen, 0-2 /HPF    Bacteria, UA None Seen None Seen, Trace /HPF    Squamous Epithelial Cells, UA 0-2 None Seen, 0-2 /HPF    Hyaline Casts, UA 0-6 0 - 6 /LPF    Methodology Automated Microscopy    Urine Drug Screen - Urine, Clean Catch    Collection Time: 05/29/23 10:18 PM    Specimen: Urine, Clean Catch   Result Value Ref Range    THC, Screen, Urine Positive (A) Negative    Phencyclidine (PCP), Urine Negative Negative    Cocaine Screen, Urine Negative Negative    Methamphetamine, Ur Negative Negative    Opiate Screen Negative Negative    Amphetamine Screen, Urine Negative Negative    Benzodiazepine Screen, Urine Negative Negative    Tricyclic Antidepressants Screen Negative Negative    Methadone Screen, Urine Negative Negative    Barbiturates Screen, Urine Negative Negative    Oxycodone Screen, Urine Negative Negative     Propoxyphene Screen Negative Negative    Buprenorphine, Screen, Urine Negative Negative   Blood Gas, Venous With Co-Ox    Collection Time: 05/29/23 10:35 PM    Specimen: Venous Blood   Result Value Ref Range    Site Right Radial     pH, Venous 7.456 (H) 7.310 - 7.410 pH Units    pCO2, Venous 39.6 (L) 41.0 - 51.0 mm Hg    pO2, Venous 27.4 27.0 - 53.0 mm Hg    HCO3, Venous 27.9 22.0 - 28.0 mmol/L    Base Excess, Venous 3.7 (H) -2.0 - 2.0 mmol/L    Hemoglobin, Blood Gas 14.4 13.5 - 17.5 g/dL    Oxyhemoglobin Venous 51.2 %    Methemoglobin Venous 0.5 %    Carboxyhemoglobin Venous 1.2 %    CO2 Content 29.1 22 - 33 mmol/L    Temperature 37.0 C    Barometric Pressure for Blood Gas      Modality Room Air     FIO2 21 %    Rate 0 Breaths/minute    PIP 0 cmH2O    IPAP 0     EPAP 0     Note     High Sensitivity Troponin T 2Hr    Collection Time: 05/30/23 12:17 AM    Specimen: Blood   Result Value Ref Range    HS Troponin T 17 (H) <15 ng/L    Troponin T Delta -4 (L) >=-4 - <+4 ng/L   ECG 12 Lead ED Triage Standing Order; Chest Pain    Collection Time: 05/30/23 12:17 AM   Result Value Ref Range    QT Interval 408 ms    QTC Interval 531 ms   STAT Lactic Acid, Reflex    Collection Time: 05/30/23  1:56 AM    Specimen: Blood   Result Value Ref Range    Lactate 1.4 0.5 - 2.0 mmol/L   Single High Sensitivity Troponin T    Collection Time: 05/30/23  3:00 AM    Specimen: Blood   Result Value Ref Range    HS Troponin T 13 <15 ng/L   Magnesium    Collection Time: 05/30/23  8:56 AM    Specimen: Blood   Result Value Ref Range    Magnesium 3.2 (H) 1.6 - 2.6 mg/dL       If labs were ordered, I independently reviewed the results and considered them in treating the patient.        RADIOLOGY  Cardiac Catheterization/Vascular Study    Result Date: 5/30/2023  Luminal irregularities noted in the proximal mid major epicardial vessels. LVEDP 21 mmHg No aortic stenosis RECOMMENDATIONS: Medical therapy.  --------------------------------------------------------------------------- ------------------------------------------- Access: 6 Canadian left radial artery Procedure narrative: Patient arrived in the catheterization laboratory in a fasting, nonsedated state.  Risks, benefits, alternatives to cardiac catheterization had previously been discussed in detail.  The patient verbalized understanding of these concepts and agreed to proceed with procedure.  Left wrist was prepped and draped in the usual sterile fashion.  Local anesthesia was achieved using 1% lidocaine subcutaneously.  6 Canadian sheath was placed in the left radial artery using a modified Seldinger technique using an Angiocath over a short Glidewire.  Sheath was aspirated and flushed per protocol.  5 Canadian JR4 catheter was advanced in the central aortic circulation over J-tip wire.  Catheter was aspirated and flushed per protocol.  Continue BX right coronary, this catheter prolapsed across the aortic valve and was used to record left ventricular hemodynamics and perform an LV to AO pullback gradient.  Selective angiography of the  right coronary artery was performed.  5 Canadian JR4 catheter was removed over guidewire and a 5 Canadian JL 3.5 was used to achieve selective angiography left coronary system.  5 Canadian JL 3.5 catheter was removed over a J-tip wire.  5 Canadian pigtail catheter was then removed over J-tip wire.  Radial sheath was removed and hemostasis was obtained via compression band.  Patient was discharged from the catheterization laboratory in stable condition.  No complications encountered.  Angiographic Findings: Coronary circulation is right dominant Left main: Normal caliber vessel, trifurcates.  Mild calcifications visualized in the mid to distal LAD.  There are luminal irregularities visualized in the mid to distal LAD. LAD: Normal caliber vessel which gives rise to 3 diagonal branches and approaches the apex.  Luminal irregularities  visualized in the proximal mid LAD. LCX: Moderate caliber vessel which gives rise to 3 obtuse marginal branches.  Angiographically free of disease. Ramus intermedius: Moderate caliber vessel, angiographically disease. RCA: Normal caliber vessel that gives rise to right posterior descending artery and right posterolateral system.  Calcifications visualized in the proximal to mid RCA.  Diffuse mild disease noted throughout the proximal mid and distal RCA. Hemodynamic Findings: LVEDP: 21 mmHg No aortic valve gradient to suggest aortic stenosis. Complications: None apparent EBL <10cc     XR Chest 1 View    Result Date: 5/29/2023  XR CHEST 1 VW Date of Exam: 5/29/2023 9:55 PM EDT Indication: Chest Pain Triage Protocol Comparison: CT chest with contrast 12/30/2014, 2 view chest x-ray 4/28/2023. Findings: 4.5 cm rim calcification over the right heart border is stable, likely a calcified pericardial cyst when correlated with previous CT. Cardiomediastinal contours appear unchanged. There are suspected right basilar airspace opacities. Left lung appears clear. No pneumothorax or large pleural effusion is seen.     Impression: Suspected right basilar airspace opacities, which may be due to atelectasis and/or pneumonia. Electronically Signed: Khadijah Aguilar  5/29/2023 10:50 PM EDT  Workstation ID: [id number]    CT Angiogram Chest    Result Date: 5/29/2023  Exam: CTA thorax, PE protocol Date: May 29, 2023 History: Chest pain, shortness of breath, hemoptysis Comparison: None available Technique: Contiguous axial CT images obtained of the thorax following the uneventful intravenous administration of 80 mL Isovue-370 contrast. Additionally, sagittal, coronal and 3-D reformatted images were obtained. CT dose lowering techniques were used, to include: automated exposure control, adjustment for patient size, and or use of iterative reconstruction. Findings: Thoracic aorta: There are mild atherosclerotic changes. There is no aneurysm or  dissection. HEART: The heart is enlarged. There are coronary artery calcifications. There is a calcified right-sided pericardial cyst measuring up to 3.7 cm on series 4 image 52. This demonstrates a chronic appearance. Pulmonary arteries: The pulmonary arteries are well visualized. There is no pulmonary embolus. Mediastinum: There are calcified mediastinal and right hilar lymph nodes. Lung parenchyma: There is interlobular septal thickening in both lungs with a small to moderately sized right-sided pleural effusion and a smaller left-sided pleural effusion. There are nodular and hazy groundglass opacities within the right upper lobe, right middle lobe, lower lobes and left upper lobe. There is no pneumothorax. Upper abdomen: The spleen is borderline in size. The liver is hypodense. The liver demonstrates a subtle micronodular contour. There is an enlarged lymph node in the region of the gastroesophageal junction measuring 1.4 cm in short axis. This lymph node is nonspecific. Osseous structures: The osseous structures are intact. No acute fractures are identified.     1. Findings are most consistent with congestive heart failure with associated pulmonary edema. There is also a small to moderately sized right-sided pleural effusion and a smaller left-sided pleural effusion. 2. Somewhat extensive nodular and hazy groundglass airspace disease in both lungs as described. This could be related to a manifestation of pulmonary edema, however, superimposed bronchopneumonia is of significant concern, including viral or COVID pneumonia. Close clinical follow-up is recommended. 3. Hepatic steatosis. The liver demonstrates a subtle micronodular contour likely representing liver disease or hepatic fibrosis. Correlation with hepatic function is recommended. 4. Coronary artery calcifications. 5. No pulmonary embolus. Electronically signed by:  Lisandro Long M.D.  5/29/2023 9:34 PM Mountain Time      I ordered and independently  reviewed the above noted radiographic studies.      I viewed images of chest x-ray which shows bilateral opacities consistent with edema or pneumonia.  CTA of the chest which does not show pulmonary embolism or aortic injury but does show findings consistent with congestive heart failure and pulmonary edema.    See radiologist's dictation for official interpretation.        PROCEDURES    Procedures    ECG 12 Lead ED Triage Standing Order; Chest Pain   Preliminary Result   Test Reason : ED Triage Standing Order~   Blood Pressure :   */*   mmHG   Vent. Rate : 102 BPM     Atrial Rate : 102 BPM      P-R Int : 156 ms          QRS Dur : 150 ms       QT Int : 408 ms       P-R-T Axes :  67  27  80 degrees      QTc Int : 531 ms      Sinus tachycardia with premature atrial complexes with aberrant conduction   Possible Left atrial enlargement   Left bundle branch block   Abnormal ECG   When compared with ECG of 29-MAY-2023 21:52, (Unconfirmed)   No significant change was found      Referred By: EDMD           Confirmed By:       ECG 12 Lead ED Triage Standing Order; Chest Pain   Preliminary Result   Test Reason : ED Triage Standing Order~   Blood Pressure :   */*   mmHG   Vent. Rate : 108 BPM     Atrial Rate : 108 BPM      P-R Int : 146 ms          QRS Dur : 146 ms       QT Int : 394 ms       P-R-T Axes :  71  25  92 degrees      QTc Int : 527 ms      Sinus tachycardia with premature atrial complexes with aberrant conduction   Possible Left atrial enlargement   Left bundle branch block   Abnormal ECG   No previous ECGs available      Referred By: EDMD           Confirmed By:       SCANNED - TELEMETRY     Final Result      ECG 12 Lead Chest Pain    (Results Pending)       MEDICATIONS GIVEN IN ER    Medications   sodium chloride 0.9 % flush 10 mL ( Intravenous MAR Unhold 5/30/23 6217)   albuterol (PROVENTIL) nebulizer solution 0.083% 2.5 mg/3mL (has no administration in time range)   lisinopril (PRINIVIL,ZESTRIL) tablet 40 mg  (40 mg Oral Given 5/30/23 0905)   sodium chloride 0.9 % flush 10 mL (10 mL Intravenous Given 5/30/23 0906)   sodium chloride 0.9 % flush 10 mL (has no administration in time range)   sodium chloride 0.9 % infusion 40 mL (has no administration in time range)   sennosides-docusate (PERICOLACE) 8.6-50 MG per tablet 2 tablet (2 tablets Oral Not Given 5/30/23 0837)     And   polyethylene glycol (MIRALAX) packet 17 g (has no administration in time range)     And   bisacodyl (DULCOLAX) EC tablet 5 mg (has no administration in time range)     And   bisacodyl (DULCOLAX) suppository 10 mg (has no administration in time range)   heparin (porcine) 5000 UNIT/ML injection 5,000 Units (5,000 Units Subcutaneous Not Given 5/30/23 1159)   acetaminophen (TYLENOL) tablet 650 mg (has no administration in time range)     Or   acetaminophen (TYLENOL) 160 MG/5ML solution 650 mg (has no administration in time range)     Or   acetaminophen (TYLENOL) suppository 650 mg (has no administration in time range)   melatonin tablet 5 mg (has no administration in time range)   Magnesium Standard Dose Replacement - Follow Nurse / BPA Driven Protocol ( Does not apply MAR Unhold 5/30/23 1552)   carvedilol (COREG) tablet 6.25 mg (has no administration in time range)   spironolactone (ALDACTONE) tablet 25 mg (has no administration in time range)   aspirin chewable tablet 324 mg (324 mg Oral Given 5/29/23 2206)   methylPREDNISolone sodium succinate (SOLU-Medrol) injection 125 mg (125 mg Intravenous Given 5/29/23 2206)   magnesium sulfate in D5W 1g/100mL (PREMIX) (0 g Intravenous Stopped 5/30/23 0010)   iopamidol (ISOVUE-370) 76 % injection 100 mL (80 mL Intravenous Given 5/29/23 2305)   bumetanide (BUMEX) injection 1 mg (1 mg Intravenous Given 5/30/23 0219)   magnesium sulfate 4g/100mL (PREMIX) infusion (4 g Intravenous New Bag 5/30/23 4488)   bumetanide (BUMEX) injection 1 mg (1 mg Intravenous Given 5/30/23 0604)   bumetanide (BUMEX) injection 2 mg (2 mg  Intravenous Given 5/30/23 1819)         MEDICAL DECISION MAKING, PROGRESS, and CONSULTS    All labs have been independently reviewed by me.  All radiology studies have been reviewed by me and the radiologist dictating the report.  All EKG's have been independently viewed and interpreted by me.      Discussion below represents my analysis of pertinent findings related to patient's condition, differential diagnosis, treatment plan and final disposition.      Differential diagnosis:    CHF, COPD, pneumonia, sepsis, acute respiratory failure, respiratory acidosis, anemia, electrolyte abnormality, pulmonary embolism, aortic dissection      Additional sources:    - Discussed/ obtained information from independent historians: Spouse    - External (non-ED) record review: Multiple PCP notes for cough and shortness of breath, has failed 2 rounds of antibiotics this year but improved with inhaler and prednisone    - Chronic or social conditions impacting care:      - Shared decision making: Agreeable to hospital admission      Orders placed during this visit:  Orders Placed This Encounter   Procedures   • COVID PRE-OP / PRE-PROCEDURE SCREENING ORDER (NO ISOLATION) - Swab, Nasopharynx   • Blood Culture - Blood,   • Blood Culture - Blood,   • Respiratory Panel PCR w/COVID-19(SARS-CoV-2) FRANCIA/SAMIA/ANA/PAD/COR/MAD/BERNARD In-House, NP Swab in UTM/VTM, 3-4 HR TAT - Swab, Nasopharynx   • XR Chest 1 View   • CT Angiogram Chest   • [place] Draw   • High Sensitivity Troponin T   • Comprehensive Metabolic Panel   • Lipase   • BNP   • CBC Auto Differential   • Urinalysis With Microscopic If Indicated (No Culture) - Urine, Clean Catch   • D-dimer, Quantitative   • Blood Gas, Arterial -With Co-Ox Panel: Yes   • Lactic Acid, Plasma   • Procalcitonin   • C-reactive Protein   • Magnesium   • Phosphorus   • T4, Free   • TSH   • Urinalysis, Microscopic Only - Urine, Clean Catch   • Blood Gas, Venous With Co-Ox   • High Sensitivity Troponin T 2Hr   •  STAT Lactic Acid, Reflex   • Urine Drug Screen - Urine, Clean Catch   • Single High Sensitivity Troponin T   • Magnesium   • Magnesium   • Hemoglobin A1c   • Lipid Panel   • Diet: Cardiac Diets; Healthy Heart (2-3 Na+); Texture: Regular Texture (IDDSI 7); Fluid Consistency: Thin (IDDSI 0)   • Undress & Gown   • Continuous Pulse Oximetry   • Vital Signs   • Intake & Output   • Weigh Patient   • Oral Care   • Strict Intake & Output   • Daily Weights   • Vital Signs   • Telemetry - Maintain IV Access   • Telemetry - Place Orders & Notify Provider of Results When Patient Experiences Acute Chest Pain, Dysrhythmia or Respiratory Distress   • May Be Off Telemetry for Tests   • Change site dressing   • Encourage fluids   • Activity - Ad Fany   • Advance Diet As Tolerated -   • Strict intake and output   • Vital Signs & Reverse Akil's Test (While Radial Compression Device in Place)   • Keep Affected Arm Straight & Elevated   • Activity As Tolerated   • Verify Discontinuation of enoxaparin (LOVENOX) and / or heparin   • Code Status and Medical Interventions:   • Inpatient Cardiology Consult   • Inpatient Heart Failure Navigator Consult   • Oxygen Therapy- Nasal Cannula; Titrate 1-6 LPM Per SpO2; 90 - 95%   • POC Creatinine   • ECG 12 Lead ED Triage Standing Order; Chest Pain   • ECG 12 Lead ED Triage Standing Order; Chest Pain   • ECG 12 Lead Chest Pain   • SCANNED - TELEMETRY     • Insert Peripheral IV   • Insert Peripheral IV   • Inpatient Admission   • Initiate Observation Status   • ED Bed Request   • CBC & Differential   • Green Top (Gel)   • Lavender Top   • Gold Top - SST   • Gray Top   • Light Blue Top         Additional orders considered but not ordered:      ED Course:    Consultants:      ED Course as of 05/30/23 2013   Tue May 30, 2023   2009 Is a 43-year-old man who presents the emergency department for worsening dyspnea and chest pain.  He is scheduled for cardiac catheterization tomorrow and has history of  recent positive stress test.  He has been having worsening dyspnea ever since COVID in December.  He has been having productive cough and tonight got so bad he presents here for evaluation. [CC]   2009 He arrived awake and alert but obviously dyspneic, tachypneic, in distress ultimately had to be started on oxygen by nasal cannula.  Symptoms have improved with inhalers and corticosteroids in the past I do hear some wheezing on exam so treating with Solu-Medrol and nebulizer treatment and magnesium.  Treated with aspirin. [CC]   2010 ECG shows Sgarbossa negative left bundle branch block. [CC]   2010 Both chest x-ray and CTA show findings consistent with pulmonary edema and congestive heart failure.  BNP is 3500.  Starting on IV Bumex.  Initial high-sensitivity troponin is mildly elevated at 21.  Full viral panel including COVID and flu is negative.  D-dimer was significantly elevated but CTA of the chest does not show pulmonary embolism or aortic injury.  Initial lactate is elevated at 2.6.  Upon reevaluation he continues to remain dyspneic but is much improved from his initial presentation.  Repeat troponin is downtrending at 17.  Blood gas shows mild alkalosis.  Repeat lactate after initial treatment has normalized.  This point I think he needs further diuresis and cardiology evaluation was already scheduled for catheterization today anyway.  He is agreeable to hospital admission.  Medicine team consulted for admission [CC]      ED Course User Index  [CC] Clyde Hamlin MD                  AS OF 20:13 EDT VITALS:    BP - 144/82  HR - 101  TEMP - 98.5 °F (36.9 °C) (Oral)  O2 SATS - 92%                  DIAGNOSIS  Final diagnoses:   Abnormal stress test   Acute respiratory failure with hypoxia   Acute on chronic congestive heart failure, unspecified heart failure type   LBBB (left bundle branch block)   Hodgkin lymphoma, unspecified Hodgkin lymphoma type, unspecified body region   Dyspnea on exertion    Palpitations         DISPOSITION  Admit      Please note that portions of this document were completed with voice recognition software.        Clyde Hamlin MD  05/30/23 2015

## 2023-05-31 NOTE — CASE MANAGEMENT/SOCIAL WORK
Case Management Discharge Note      Final Note: Patient's plan is still to return hoem at discharge.  Jardiance PA approved through CoverMyMeds ([id number]).  No other needs noted.    Provided Post Acute Provider List?: N/A    Selected Continued Care - Admitted Since 5/29/2023     Destination    No services have been selected for the patient.              Durable Medical Equipment    No services have been selected for the patient.              Dialysis/Infusion    No services have been selected for the patient.              Home Medical Care    No services have been selected for the patient.              Therapy    No services have been selected for the patient.              Community Resources    No services have been selected for the patient.              Community & DME    No services have been selected for the patient.                       Final Discharge Disposition Code: 01 - home or self-care

## 2023-06-01 ENCOUNTER — READMISSION MANAGEMENT (OUTPATIENT)
Dept: CALL CENTER | Facility: HOSPITAL | Age: 43
End: 2023-06-01

## 2023-06-01 VITALS
RESPIRATION RATE: 18 BRPM | TEMPERATURE: 98.4 F | DIASTOLIC BLOOD PRESSURE: 94 MMHG | HEART RATE: 87 BPM | OXYGEN SATURATION: 94 % | SYSTOLIC BLOOD PRESSURE: 143 MMHG | WEIGHT: 198.6 LBS | BODY MASS INDEX: 27.8 KG/M2 | HEIGHT: 71 IN

## 2023-06-01 LAB
QT INTERVAL: 394 MS
QT INTERVAL: 408 MS
QTC INTERVAL: 527 MS
QTC INTERVAL: 531 MS

## 2023-06-01 PROCEDURE — 25010000002 HEPARIN (PORCINE) PER 1000 UNITS: Performed by: INTERNAL MEDICINE

## 2023-06-01 PROCEDURE — G0378 HOSPITAL OBSERVATION PER HR: HCPCS

## 2023-06-01 RX ADMIN — LISINOPRIL 40 MG: 40 TABLET ORAL at 09:39

## 2023-06-01 RX ADMIN — HEPARIN SODIUM 5000 UNITS: 5000 INJECTION, SOLUTION INTRAVENOUS; SUBCUTANEOUS at 09:39

## 2023-06-01 RX ADMIN — CARVEDILOL 6.25 MG: 6.25 TABLET, FILM COATED ORAL at 09:38

## 2023-06-01 RX ADMIN — SPIRONOLACTONE 25 MG: 25 TABLET ORAL at 09:38

## 2023-06-01 RX ADMIN — EMPAGLIFLOZIN 10 MG: 10 TABLET, FILM COATED ORAL at 09:39

## 2023-06-01 NOTE — NURSING NOTE
Pt just completed getting fitted for his life vest. Pt is feeling very anxious and decided he wanted to stay. Dr briones was notified

## 2023-06-01 NOTE — PROGRESS NOTES
[facility] Medicine Services  PROGRESS NOTE    Patient Name: Palomo Pollack  : 1980  MRN: [id number]    Date of Admission: 2023  Primary Care Physician: Heladio Valentine DO    Subjective   Subjective     CC:  F/U CHF    HPI:  Got life vest late last night.  Ready to go home today.    ROS:  Gen- No fevers  CV- No chest pain       Objective   Objective     Vital Signs:   Temp:  [98 °F (36.7 °C)-98.4 °F (36.9 °C)] 98.4 °F (36.9 °C)  Heart Rate:  [] 87  Resp:  [18] 18  BP: (127-154)/() 143/94     Physical Exam:  Constitutional: No acute distress, awake, alert, walking around room  HENT: NCAT, mucous membranes moist  Respiratory: Respiratory effort normal   Cardiovascular: RRR  Musculoskeletal: No bilateral ankle edema  Psychiatric: Appropriate affect, cooperative  Neurologic: Speech clear  Skin: No rashes on exposed surfaces    Results Reviewed:  LAB RESULTS:      Lab 236 23   WBC  --  9.34   HEMOGLOBIN  --  14.7   HEMATOCRIT  --  44.2   PLATELETS  --  143   NEUTROS ABS  --  7.45*   IMMATURE GRANS (ABS)  --  0.06*   LYMPHS ABS  --  0.97   MONOS ABS  --  0.68   EOS ABS  --  0.13   MCV  --  93.2   CRP  --  2.11*   PROCALCITONIN  --  0.05   LACTATE 1.4 2.6*   D DIMER QUANT  --  3.90*         Lab 23  0427 235 23  0856 23   SODIUM  --   --   --  140   POTASSIUM  --   --   --  3.6   CHLORIDE  --   --   --  102   CO2  --   --   --  23.0   ANION GAP  --   --   --  15.0   BUN  --   --   --  11   CREATININE  --   --   --  1.15   EGFR  --   --   --  80.0   GLUCOSE  --   --   --  99   CALCIUM  --   --   --  8.8   MAGNESIUM  --  2.8* 3.2* 1.4*   PHOSPHORUS  --   --   --  2.5   HEMOGLOBIN A1C 7.00*  --   --   --    TSH  --   --   --  12.110*         Lab 23  2156   TOTAL PROTEIN 7.0   ALBUMIN 4.3   GLOBULIN 2.7   ALT (SGPT) 28   AST (SGOT) 29   BILIRUBIN 0.7   ALK PHOS 86   LIPASE 28         Lab 23  0300  05/30/23  0017 05/29/23 2156   PROBNP  --   --  3,449.0*   HSTROP T 13 17* 21*         Lab 05/31/23  0427   CHOLESTEROL 177   LDL CHOL 101*   HDL CHOL 59   TRIGLYCERIDES 93             Lab 05/29/23 2235   FIO2 21   CARBOXYHEMOGLOBIN (VENOUS) 1.2     Brief Urine Lab Results  (Last result in the past 365 days)      Color   Clarity   Blood   Leuk Est   Nitrite   Protein   CREAT   Urine HCG        05/29/23 2218 Yellow   Cloudy   Negative   Negative   Negative   100 mg/dL (2+)                 Microbiology Results Abnormal     Procedure Component Value - Date/Time    Blood Culture - Blood, Arm, Right [[id number]]  (Normal) Collected: 05/29/23 2155    Lab Status: Preliminary result Specimen: Blood from Arm, Right Updated: 05/31/23 2215     Blood Culture No growth at 2 days    Blood Culture - Blood, Arm, Left [[id number]]  (Normal) Collected: 05/29/23 2150    Lab Status: Preliminary result Specimen: Blood from Arm, Left Updated: 05/31/23 2215     Blood Culture No growth at 2 days    COVID PRE-OP / PRE-PROCEDURE SCREENING ORDER (NO ISOLATION) - Swab, Nasopharynx [[id number]]  (Normal) Collected: 05/29/23 2156    Lab Status: Final result Specimen: Swab from Nasopharynx Updated: 05/29/23 2256    Narrative:      The following orders were created for panel order COVID PRE-OP / PRE-PROCEDURE SCREENING ORDER (NO ISOLATION) - Swab, Nasopharynx.  Procedure                               Abnormality         Status                     ---------                               -----------         ------                     Respiratory Panel PCR w/...[[id number]]  Normal              Final result                 Please view results for these tests on the individual orders.    Respiratory Panel PCR w/COVID-19(SARS-CoV-2) FRANCIA/SAMIA/ANA/PAD/COR/MAD/BERNARD In-House, NP Swab in UTM/VTM, 3-4 HR TAT - Swab, Nasopharynx [[id number]]  (Normal) Collected: 05/29/23 2156    Lab Status: Final result Specimen: Swab from Nasopharynx Updated: 05/29/23 2256      ADENOVIRUS, PCR Not Detected     Coronavirus 229E Not Detected     Coronavirus HKU1 Not Detected     Coronavirus NL63 Not Detected     Coronavirus OC43 Not Detected     COVID19 Not Detected     Human Metapneumovirus Not Detected     Human Rhinovirus/Enterovirus Not Detected     Influenza A PCR Not Detected     Influenza B PCR Not Detected     Parainfluenza Virus 1 Not Detected     Parainfluenza Virus 2 Not Detected     Parainfluenza Virus 3 Not Detected     Parainfluenza Virus 4 Not Detected     RSV, PCR Not Detected     Bordetella pertussis pcr Not Detected     Bordetella parapertussis PCR Not Detected     Chlamydophila pneumoniae PCR Not Detected     Mycoplasma pneumo by PCR Not Detected    Narrative:      In the setting of a positive respiratory panel with a viral infection PLUS a negative procalcitonin without other underlying concern for bacterial infection, consider observing off antibiotics or discontinuation of antibiotics and continue supportive care. If the respiratory panel is positive for atypical bacterial infection (Bordetella pertussis, Chlamydophila pneumoniae, or Mycoplasma pneumoniae), consider antibiotic de-escalation to target atypical bacterial infection.          Cardiac Catheterization/Vascular Study    Result Date: 5/30/2023  Impression: Luminal irregularities noted in the proximal mid major epicardial vessels. LVEDP 21 mmHg No aortic stenosis RECOMMENDATIONS: Medical therapy. --------------------------------------------------------------------------- ------------------------------------------- Access: 6 Citizen of the [place] left radial artery Procedure narrative: Patient arrived in the catheterization laboratory in a fasting, nonsedated state.  Risks, benefits, alternatives to cardiac catheterization had previously been discussed in detail.  The patient verbalized understanding of these concepts and agreed to proceed with procedure.  Left wrist was prepped and draped in the usual sterile fashion.  Local  anesthesia was achieved using 1% lidocaine subcutaneously.  6 [place] sheath was placed in the left radial artery using a modified Seldinger technique using an Angiocath over a short Glidewire.  Sheath was aspirated and flushed per protocol.  5 [place] JR4 catheter was advanced in the central aortic circulation over J-tip wire.  Catheter was aspirated and flushed per protocol.  Continue BX right coronary, this catheter prolapsed across the aortic valve and was used to record left ventricular hemodynamics and perform an LV to AO pullback gradient.  Selective angiography of the  right coronary artery was performed.  5 [place] JR4 catheter was removed over guidewire and a 5 [place] JL 3.5 was used to achieve selective angiography left coronary system.  5 [place] JL 3.5 catheter was removed over a J-tip wire.  5 [place] pigtail catheter was then removed over J-tip wire.  Radial sheath was removed and hemostasis was obtained via compression band.  Patient was discharged from the catheterization laboratory in stable condition.  No complications encountered.  Angiographic Findings: Coronary circulation is right dominant Left main: Normal caliber vessel, trifurcates.  Mild calcifications visualized in the mid to distal LAD.  There are luminal irregularities visualized in the mid to distal LAD. LAD: Normal caliber vessel which gives rise to 3 diagonal branches and approaches the apex.  Luminal irregularities visualized in the proximal mid LAD. LCX: Moderate caliber vessel which gives rise to 3 obtuse marginal branches.  Angiographically free of disease. Ramus intermedius: Moderate caliber vessel, angiographically disease. RCA: Normal caliber vessel that gives rise to right posterior descending artery and right posterolateral system.  Calcifications visualized in the proximal to mid RCA.  Diffuse mild disease noted throughout the proximal mid and distal RCA. Hemodynamic Findings: LVEDP: 21 mmHg No aortic valve gradient to suggest  aortic stenosis. Complications: None apparent EBL <10cc       Results for orders placed during the hospital encounter of 05/24/23    Adult Transthoracic Echo Complete W/ Cont if Necessary Per Protocol    Interpretation Summary  •  Left ventricular systolic function is moderately decreased. Calculated left ventricular EF = 28% Left ventricular ejection fraction appears to be 26 - 30%.  •  The left ventricular cavity is mildly dilated.  •  The left atrial cavity is mildly dilated.  •  Moderate tricuspid valve regurgitation is present.  •  Estimated right ventricular systolic pressure from tricuspid regurgitation is moderately elevated (45-55 mmHg). Calculated right ventricular systolic pressure from tricuspid regurgitation is 50 mmHg.      Current medications:  Scheduled Meds:carvedilol, 6.25 mg, Oral, BID  empagliflozin, 10 mg, Oral, Daily  heparin (porcine), 5,000 Units, Subcutaneous, Q12H  lisinopril, 40 mg, Oral, Daily  senna-docusate sodium, 2 tablet, Oral, BID  sodium chloride, 10 mL, Intravenous, Q12H  spironolactone, 25 mg, Oral, Daily      Continuous Infusions:   PRN Meds:.•  acetaminophen **OR** acetaminophen **OR** acetaminophen  •  albuterol  •  senna-docusate sodium **AND** polyethylene glycol **AND** bisacodyl **AND** bisacodyl  •  Magnesium Standard Dose Replacement - Follow Nurse / BPA Driven Protocol  •  melatonin  •  sodium chloride  •  sodium chloride  •  sodium chloride    Assessment & Plan   Assessment & Plan     Active Hospital Problems    Diagnosis  POA   • Hypomagnesemia [E83.42]  Yes   • Acute HFrEF (heart failure with reduced ejection fraction) [I50.21]  Yes   • Abnormal stress test [R94.39]  Yes   • LBBB (left bundle branch block) [I44.7]  Yes   • GERD (gastroesophageal reflux disease) [K21.9]  Yes   • PTSD (post-traumatic stress disorder) [F43.10]  Yes   • History of Lymphoma [C85.90]  Yes      Resolved Hospital Problems    Diagnosis Date Resolved POA   • **Acute respiratory failure with  hypoxia [J96.01] 05/31/2023 Yes        Brief Hospital Course to date:  Palomo Pollack is a 43 y.o. male with history of lymphoma s/p chemo, chest radiation and stem cell transplant who presented to the ED due to worsening dyspnea and abdominal distention, recent abnormal stress test with plan for LHC as an outpatient.  He was found to have acute systolic CHF, LVEF 28% and improved with diuresis.  He was started on carvedilol, spironolactone, jardiance and continued on lisinopril.  LHC showed nonobstructive CAD.  Life Vest was ordered by cardiology and he is to follow up with repeat echo in 90 days.  In the interim, will follow closely in CHF clinic for medication titration.    A/P unchanged from 5/31    Expected Discharge Location and Transportation: Home  Expected Discharge   Expected Discharge Date: 6/1/2023; Expected Discharge Time:      DVT prophylaxis:  Medical DVT prophylaxis orders are present.          CODE STATUS:   Code Status and Medical Interventions:   Ordered at: 05/30/23 0218     Level Of Support Discussed With:    Patient     Code Status (Patient has no pulse and is not breathing):    CPR (Attempt to Resuscitate)     Medical Interventions (Patient has pulse or is breathing):    Full Support       Mabel Noguera MD  06/01/23

## 2023-06-01 NOTE — OUTREACH NOTE
Prep Survey    Flowsheet Row Responses   [facility] patient discharged from? [place]   Is LACE score < 7 ? No   Eligibility [facility]   Date of Admission 05/29/23   Date of Discharge 06/01/23   Discharge Disposition Home or Self Care   Discharge diagnosis Acute respiratory failure with hypoxiaheart failure   Does the patient have one of the following disease processes/diagnoses(primary or secondary)? CHF   Does the patient have Home health ordered? No   Is there a DME ordered? No   Prep survey completed? Yes          RYLAN DAWKINS - Registered Nurse

## 2023-06-02 ENCOUNTER — TRANSITIONAL CARE MANAGEMENT TELEPHONE ENCOUNTER (OUTPATIENT)
Dept: CALL CENTER | Facility: HOSPITAL | Age: 43
End: 2023-06-02

## 2023-06-02 ENCOUNTER — OFFICE VISIT (OUTPATIENT)
Dept: FAMILY MEDICINE CLINIC | Facility: CLINIC | Age: 43
End: 2023-06-02

## 2023-06-02 VITALS
SYSTOLIC BLOOD PRESSURE: 132 MMHG | BODY MASS INDEX: 27.69 KG/M2 | DIASTOLIC BLOOD PRESSURE: 84 MMHG | HEART RATE: 74 BPM | TEMPERATURE: 97.8 F | HEIGHT: 71 IN | WEIGHT: 197.8 LBS

## 2023-06-02 DIAGNOSIS — Z09 HOSPITAL DISCHARGE FOLLOW-UP: Primary | ICD-10-CM

## 2023-06-02 DIAGNOSIS — E11.65 TYPE 2 DIABETES MELLITUS WITH HYPERGLYCEMIA, WITHOUT LONG-TERM CURRENT USE OF INSULIN: ICD-10-CM

## 2023-06-02 DIAGNOSIS — J01.01 ACUTE RECURRENT MAXILLARY SINUSITIS: ICD-10-CM

## 2023-06-02 DIAGNOSIS — E29.1 HYPOGONADISM IN MALE: Primary | ICD-10-CM

## 2023-06-02 DIAGNOSIS — R79.89 ELEVATED TSH: ICD-10-CM

## 2023-06-02 DIAGNOSIS — I50.21 ACUTE HFREF (HEART FAILURE WITH REDUCED EJECTION FRACTION): ICD-10-CM

## 2023-06-02 DIAGNOSIS — K21.9 GASTROESOPHAGEAL REFLUX DISEASE WITHOUT ESOPHAGITIS: ICD-10-CM

## 2023-06-02 RX ORDER — ESOMEPRAZOLE MAGNESIUM 40 MG/1
40 CAPSULE, DELAYED RELEASE ORAL
Qty: 90 CAPSULE | Refills: 3 | Status: SHIPPED | OUTPATIENT
Start: 2023-06-02

## 2023-06-02 RX ORDER — DOXYCYCLINE 100 MG/1
100 CAPSULE ORAL 2 TIMES DAILY
Qty: 14 CAPSULE | Refills: 0 | Status: SHIPPED | OUTPATIENT
Start: 2023-06-02

## 2023-06-02 NOTE — OUTREACH NOTE
Call Center TCM Note    Flowsheet Row Responses   [facility] patient discharged from? [place]   Does the patient have one of the following disease processes/diagnoses(primary or secondary)? CHF   TCM attempt successful? No  [mother]   Unsuccessful attempts Attempt 1   Comments HOSP DC FU appt 6/2/23 2 pm.   Does the patient have an appointment with their PCP within 7 days of discharge? Yes           Kimberlee Thomas RN    6/2/2023, 09:09 EDT

## 2023-06-02 NOTE — PROGRESS NOTES
Transitional Care Follow Up Visit     Patient Name: Palomo Pollack  : 1980   MRN: [id number]     Chief Complaint:    Chief Complaint   Patient presents with   • Transitional Care Management     Had heart catheretization.        History of Present Illness: Palomo Pollack is a 43 y.o. male who presents today for transitional care management visit.  The patient was contacted by our office within 48 hours after the discharge of the patient via telephone to coordinate their follow up care and needs.  Patient presents for hospitalization follow-up.  Has some sinusitis symptoms which have been going on for over a week.  Things were not getting better he has discolored mucus coming out of his nose.  Does not feel too sick this is a congestion and pain has not improved.    He was hospitalized for heart failure.  He has new onset congestive heart failure with low ejection fraction.  He has been treated in the hospital and symptoms have improved.  Compliant with all of his medications and has a LifeVest on today.  He is wondering about physical activity and mowing his yard.  Patient was advised not to use LifeVest while mowing the yard or pressure washing.      Patient reports that his symptoms of shortness of breath and cough has improved substantially with medication.  He has a planned follow-up with cardiology.    He has diabetes which has been worsening.  He is recently on steroids.    Subjective      Review of Systems:   Review of Systems   HENT: Positive for congestion.    Respiratory: Positive for cough and shortness of breath.        I reviewed and discussed the details of that call along with the discharge summary, hospital problems, inpatient lab results, inpatient diagnostic studies, and consultation reports with Palomo Pollack.     Current outpatient and discharge medications have been reconciled for the patient.      2023     7:29 PM   Date of TCM Phone Call   [facility]  [place]   Date of Admission 5/29/2023   Date of Discharge 6/1/2023   Discharge Disposition Home or Self Care       Medications:     Current Outpatient Medications:   •  albuterol sulfate  (90 Base) MCG/ACT inhaler, Inhale 2 puffs Every 4 (Four) Hours As Needed for Wheezing., Disp: 8 g, Rfl: 2  •  carvedilol (COREG) 6.25 MG tablet, Take 1 tablet by mouth 2 (Two) Times a Day., Disp: 60 tablet, Rfl: 3  •  empagliflozin (JARDIANCE) 10 MG tablet tablet, Take 1 tablet by mouth Daily., Disp: 30 tablet, Rfl: 3  •  fexofenadine (ALLEGRA) 180 MG tablet, Take 1 tablet by mouth Daily., Disp: 90 tablet, Rfl: 3  •  lisinopril (PRINIVIL,ZESTRIL) 40 MG tablet, Take 1 tablet by mouth Daily., Disp: 90 tablet, Rfl: 3  •  spironolactone (ALDACTONE) 25 MG tablet, Take 1 tablet by mouth Daily., Disp: 30 tablet, Rfl: 3  •  Testosterone 1.62 % gel, APPLY 2 PUMPS TOPICALLY TO THE AFFECTED AREA EVERY DAY, Disp: , Rfl:   •  doxycycline (MONODOX) 100 MG capsule, Take 1 capsule by mouth 2 (Two) Times a Day., Disp: 14 capsule, Rfl: 0  •  esomeprazole (nexIUM) 40 MG capsule, Take 1 capsule by mouth Every Morning Before Breakfast., Disp: 90 capsule, Rfl: 3    Allergies:   Allergies   Allergen Reactions   • Bactrim [Sulfamethoxazole-Trimethoprim] Rash and Other (See Comments)     Gave increased heartrate-biaxin         Hospital Course Information:  Risk for Readmission (LACE) Score: 7 (6/1/2023  6:01 AM)       Course During Hospital Stay: Patient admitted for congestive heart failure symptoms such as dyspnea and, orthopnea, and PND.  Patient evaluated in the hospital with echocardiogram, lab work.  Patient noted to have elevated TSH, labs consistent with heart failure.  Echo showed reduced ejection fraction.  Patient treated in hospital with cardiology consult.  Heart cath performed and was essentially nonactionable.  Patient placed on medications for CAD along with aspirin.  Patient scheduled follow-up with cardiology.    PMH, PSH, Care  "Team, Medications including OTC/CAM and Allergies reviewed and updated as appropriate.     Objective     Physical Exam:  Vital Signs:   Vitals:    06/02/23 1358   BP: 132/84   Pulse: 74   Temp: 97.8 °F (36.6 °C)   Weight: 89.7 kg (197 lb 12.8 oz)   Height: 180.3 cm (71\")     Body mass index is 27.59 kg/m².     Physical Exam  Vitals reviewed.   HENT:      Head: Normocephalic.   Cardiovascular:      Rate and Rhythm: Normal rate.   Pulmonary:      Effort: Pulmonary effort is normal.   Abdominal:      General: Abdomen is flat.   Skin:     General: Skin is warm.   Neurological:      Mental Status: He is alert.   Psychiatric:         Mood and Affect: Mood normal.         Assessment / Plan      Assessment/Plan:   Diagnoses and all orders for this visit:    1. Hospital discharge follow-up (Primary)    2. Elevated TSH    3. Gastroesophageal reflux disease without esophagitis  -     esomeprazole (nexIUM) 40 MG capsule; Take 1 capsule by mouth Every Morning Before Breakfast.  Dispense: 90 capsule; Refill: 3    4. Type 2 diabetes mellitus with hyperglycemia, without long-term current use of insulin    5. Acute HFrEF (heart failure with reduced ejection fraction)    6. Acute recurrent maxillary sinusitis  -     doxycycline (MONODOX) 100 MG capsule; Take 1 capsule by mouth 2 (Two) Times a Day.  Dispense: 14 capsule; Refill: 0         1. Patient doing well after discharge and compliant on all medications.  Advised patient not to wear LifeVest during mowing the grass and pressure washing due to vibrations.  2. Sinusitis-we will treat with doxycycline given prolonged symptoms and current facial pain.  3. Elevated TSH-unsure if this is acute phase reactant or related to hypothyroidism.  T4 was normal, so recheck TSH at next visit.  4. Diabetes worsening-continue diet control recheck A1c in 3 months.  Have started Jardiance which will improve diabetes as well.  5. Continue Nexium for GERD, increase dose to 40 mg.      Transitional " Care Management Certification  I certify that the following are true:  1. Communication was not medically needed prior to appointment because face to face visit occurred within two business days of discharge.  2. Complexity of Medical Decision Making is moderate.  3. Face to face visit occurred within 3 days.    Follow Up:   No follow-ups on file.    Heladio Valentine DO  [facility] [place]

## 2023-06-02 NOTE — OUTREACH NOTE
Call Center TCM Note    Flowsheet Row Responses   [facility] patient discharged from? [place]   Does the patient have one of the following disease processes/diagnoses(primary or secondary)? CHF   TCM attempt successful? No   Unsuccessful attempts Attempt 2           Kimberlee Thomas RN    6/2/2023, 11:17 EDT

## 2023-06-03 ENCOUNTER — TRANSITIONAL CARE MANAGEMENT TELEPHONE ENCOUNTER (OUTPATIENT)
Dept: CALL CENTER | Facility: HOSPITAL | Age: 43
End: 2023-06-03

## 2023-06-03 LAB
BACTERIA SPEC AEROBE CULT: NORMAL
BACTERIA SPEC AEROBE CULT: NORMAL

## 2023-06-03 NOTE — OUTREACH NOTE
Call Center TCM Note      Flowsheet Row Responses   [facility] patient discharged from? [place]   Does the patient have one of the following disease processes/diagnoses(primary or secondary)? CHF   TCM attempt successful? No   Unsuccessful attempts Attempt 3             Xin Christian RN    6/3/2023, 10:01 EDT

## 2023-07-24 ENCOUNTER — TRANSCRIBE ORDERS (OUTPATIENT)
Dept: CARDIAC REHAB | Facility: HOSPITAL | Age: 43
End: 2023-07-24
Payer: MEDICAID

## 2023-07-24 ENCOUNTER — DOCUMENTATION (OUTPATIENT)
Dept: CARDIAC REHAB | Facility: HOSPITAL | Age: 43
End: 2023-07-24
Payer: MEDICAID

## 2023-07-24 DIAGNOSIS — I50.22 CHRONIC LEFT SYSTOLIC HEART FAILURE: Primary | ICD-10-CM

## 2023-07-24 NOTE — PROGRESS NOTES
Pt. Referred for Phase II Cardiac Rehab. Staff discussed benefits of exercise, program protocol, and educational material provided. Teach back verified.  Patient scheduled for orientation at [facility] on Tuesday, August 8th at 1300.

## 2023-08-02 NOTE — PROGRESS NOTES
"[facility]    Chief Complaint  Congestive Heart Failure and Follow-up    Subjective    History of Present Illness {CC  Problem List  Visit  Diagnosis   Encounters  Notes  Medications  Labs  Result Review Imaging  Media :23}     Palomo Pollack is a 43 y.o. male with history of lymphoma status post chemo, chest radiation and stem cell transplant, LBBB, diabetes, GERD, PTSD, HFrEF (LVEF 28% 5/2023) who presents today for follow up on HFrEF.    He reports he is doing well. Dizziness has improved. Denies any shortness of breath, edema or palpitations. Home SBPs are low 100s.           Objective     Vital Signs:   Vitals:    08/07/23 1021   BP: 120/82   BP Location: Left arm   Patient Position: Sitting   Cuff Size: Adult   Pulse: 88   Resp: 20   Temp: 97.1 øF (36.2 øC)   TempSrc: Temporal   SpO2: 98%   Weight: 92.3 kg (203 lb 8 oz)   Height: 180.3 cm (71\")     Body mass index is 28.38 kg/mý.  Physical Exam  Vitals and nursing note reviewed.   Constitutional:       Appearance: Normal appearance.   HENT:      Head: Normocephalic.   Eyes:      Pupils: Pupils are equal, round, and reactive to light.   Cardiovascular:      Rate and Rhythm: Normal rate and regular rhythm. Occasional Extrasystoles are present.     Pulses: Normal pulses.      Heart sounds: Normal heart sounds. No murmur heard.  Pulmonary:      Effort: Pulmonary effort is normal.      Breath sounds: Normal breath sounds.   Abdominal:      General: Bowel sounds are normal.      Palpations: Abdomen is soft.   Musculoskeletal:         General: Normal range of motion.      Cervical back: Normal range of motion.      Right lower leg: No edema.      Left lower leg: No edema.   Skin:     General: Skin is warm and dry.      Capillary Refill: Capillary refill takes less than 2 seconds.   Neurological:      Mental Status: He is alert and oriented to person, place, and time.   Psychiatric:         Mood and Affect: " Mood normal.         Thought Content: Thought content normal.            Result Review  Data Reviewed:{ Labs  Result Review  Imaging  Med Tab  Media :23}   ECG 12 Lead ED Triage Standing Order; Chest Pain (05/30/2023 00:17)  ECG 12 Lead ED Triage Standing Order; Chest Pain (05/29/2023 21:52)  Cardiac Catheterization/Vascular Study (05/30/2023 15:05)  Stress Test With Myocardial Perfusion (1 Day) (05/24/2023 12:50)  Adult Transthoracic Echo Complete W/ Cont if Necessary Per Protocol (05/24/2023 15:52)  CBC & Differential (07/10/2023 11:37)  Comprehensive Metabolic Panel (07/10/2023 11:37)  proBNP (07/10/2023 11:37)           Assessment and Plan {CC Problem List  Visit Diagnosis  ROS  Review (Popup)  Health Maintenance  Quality  BestPractice  Medications  SmartSets  SnapShot Encounters  Media :23}   1. Chronic systolic heart failure  - LVEF 28%, NYHA II  - Appears euvolemic  - Continue carvedilol, jardiance, spironolactone and entresto.  Will not uptitrate at this time due to symptoms of orthostatic lightheadedness and borderline BP readings  - Will need repeat labs in a month or two to make sure creatinine stable  - Continue LifeVest  - Has appt with cardiac rehab    2. Primary hypertension  -He brought in his home blood pressure cuff today which was very close to our readings.  Continue home monitoring          Follow Up {Instructions Charge Capture  Follow-up Communications :23}   Return if symptoms worsen or fail to improve.    Patient was given instructions and counseling regarding his condition or for health maintenance advice. Please see specific information pulled into the AVS if appropriate.  Advised to call the [facility] with any questions, concerns, or worsening symptoms.

## 2023-08-07 ENCOUNTER — OFFICE VISIT (OUTPATIENT)
Dept: CARDIOLOGY | Facility: HOSPITAL | Age: 43
End: 2023-08-07
Payer: MEDICAID

## 2023-08-07 VITALS
SYSTOLIC BLOOD PRESSURE: 120 MMHG | WEIGHT: 203.5 LBS | DIASTOLIC BLOOD PRESSURE: 82 MMHG | HEART RATE: 88 BPM | BODY MASS INDEX: 28.49 KG/M2 | HEIGHT: 71 IN | RESPIRATION RATE: 20 BRPM | OXYGEN SATURATION: 98 % | TEMPERATURE: 97.1 F

## 2023-08-07 DIAGNOSIS — I50.22 CHRONIC HFREF (HEART FAILURE WITH REDUCED EJECTION FRACTION): Primary | ICD-10-CM

## 2023-08-07 DIAGNOSIS — I10 PRIMARY HYPERTENSION: ICD-10-CM

## 2023-08-07 PROCEDURE — 1159F MED LIST DOCD IN RCRD: CPT | Performed by: NURSE PRACTITIONER

## 2023-08-07 PROCEDURE — 99214 OFFICE O/P EST MOD 30 MIN: CPT | Performed by: NURSE PRACTITIONER

## 2023-08-07 PROCEDURE — 1160F RVW MEDS BY RX/DR IN RCRD: CPT | Performed by: NURSE PRACTITIONER

## 2023-08-08 ENCOUNTER — TREATMENT (OUTPATIENT)
Dept: CARDIAC REHAB | Facility: HOSPITAL | Age: 43
End: 2023-08-08
Payer: MEDICAID

## 2023-08-08 DIAGNOSIS — I50.22 CHRONIC LEFT SYSTOLIC HEART FAILURE: ICD-10-CM

## 2023-08-08 PROCEDURE — 93798 PHYS/QHP OP CAR RHAB W/ECG: CPT

## 2023-08-08 NOTE — PROGRESS NOTES
Attended Phase II Cardiac Rehab orientation. Medication and health history changes reported. See [facility] for details.

## 2023-08-09 NOTE — PROGRESS NOTES
"  NUTRITION ASSESSMENT & EDUCATION  CARDIAC/PULMONARY REHAB      Appointment Date and Time: 23, 16:46 EDT  Patient Name: Palomo Pollack   Age: 43 y.o.     Sex:  male      Employment/Activity Level:   Palomo's education level: college  Occupation:  self employed: [employer]          Job Activity Level: {Desc; mild/moderate/stron}  Routine Exercise: {Desc; mild/moderate/stron}                                    Weight Assessment:   Height:   Ht Readings from Last 1 Encounters:   23 180.3 cm (71\")     Weight:   Wt Readings from Last 1 Encounters:   23 92.3 kg (203 lb 8 oz)         BMI:    BMI Readings from Last 1 Encounters:   23 28.38 kg/mý       -------------------------------------------------------------------------------------------------  IBW: Ideal body weight: 75.3 kg (166 lb 0.1 oz)  Adjusted ideal body weight: 82.1 kg (181 lb 0.1 oz)  -------------------------------------------------------------------------------------------------  Weight Assessment: Overweight  Weight Change last six months: *** lb       Usual Weight: *** lb       Desired Weight: *** lb  Cardiac Risk Factors:    {Causes; risk factors atherosclerosis:28093{Causes; risk factors atherosclerosis:64955::\"     CABG\"}}    Pertinent Lab Values:     Total Cholesterol   Date Value Ref Range Status   2023 177 0 - 200 mg/dL Final     HDL Cholesterol   Date Value Ref Range Status   2023 59 40 - 60 mg/dL Final     LDL Cholesterol    Date Value Ref Range Status   2023 101 (H) 0 - 100 mg/dL Final     LDL/HDL Ratio   Date Value Ref Range Status   2023 1.68  Final     Triglycerides   Date Value Ref Range Status   2023 93 0 - 150 mg/dL Final     Hemoglobin A1C   Date Value Ref Range Status   2023 7.00 (H) 4.80 - 5.60 % Final     TSH   Date Value Ref Range Status   2023 12.110 (H) 0.270 - 4.200 uIU/mL Final     Glucose   Date Value Ref Range Status   07/10/2023 " "115 (H) 65 - 99 mg/dL Final     Pertinent Medications:   Nutritional Supplements: reviewed  Pertinent Nutrition-Related Medications: Reviewed - no changes recommended at this time.  Self Identified Problems or Concerns:   ***    Nutrition Influences:   Appetite: {good/fair/etc:859054647}           Taste/smell changes:  {Yes/No/WC:579769}  Factors limiting PO intake: none  Food records reviewed?: {kjr_food_review:56813}    Palomo lives with: {family members:20158}  Palomo receives lifestyle support from others at home?: {Yes/No/WC:989473}  Spouse/significant other present for diet instruction today?: {Yes/No/WC:574284}  Diet Assessment:   Diet Survey Score: 57 of possible 72 = 79 % compliant with AHA guidelines    Meal intake pattern:  ***  Dining out:  ***             Fast food:  ***    24 hour recall:   JESE WHEATLEY    Who does the cooking at home:  ***                 Who does the grocery shopping:  ***  Home diet review:  {KJR DIET QUALITY:18230}    Motivation level toward diet compliance:  {motivation level:148256510}  Assessment / Recommendations:   Prior diet education before to coming to Cardiac Rehab?: {EXAM; YES/NO:50011::\"No\"}    Instructed provided on:  American Heart Association 2021 Nutrition Guidelines, Saturated Fat, Picking Healthy Proteins, Get the Scoop on Sodium/Salt <2300 mg/d, Added Sugars Guidance, Go Nuts for Heart Health, [place] 3 fats in Fish & Seafood, Dining Out Doesn't Mean Ditch Your Diet, and Foods to Avoid on the Cardiac Diet    Written materials provided to patient: Yes    Goals/Plan:   Patient defined goals:   1)  2)    Plan:  Encouraged to complete goals and continue setting new nutrition/exercise goals             Encouraged to follow up with dietitian during cardiac rehab sessions; may request additional RD counseling.    Tiffany López RD, 16:46 EDT - 8/9/2023      "

## 2023-08-10 ENCOUNTER — TREATMENT (OUTPATIENT)
Dept: CARDIAC REHAB | Facility: HOSPITAL | Age: 43
End: 2023-08-10
Payer: MEDICAID

## 2023-08-10 DIAGNOSIS — I50.22 CHRONIC LEFT SYSTOLIC HEART FAILURE: Primary | ICD-10-CM

## 2023-08-10 PROCEDURE — 93798 PHYS/QHP OP CAR RHAB W/ECG: CPT

## 2023-08-10 PROCEDURE — 93797 PHYS/QHP OP CAR RHAB WO ECG: CPT

## 2023-08-10 NOTE — PROGRESS NOTES
Attended Phase II Cardiac Rehab and intro to exercise class. No medication or health history changes reported. See [facility] for details.

## 2023-08-14 ENCOUNTER — TREATMENT (OUTPATIENT)
Dept: CARDIAC REHAB | Facility: HOSPITAL | Age: 43
End: 2023-08-14
Payer: MEDICAID

## 2023-08-14 DIAGNOSIS — I50.22 CHRONIC LEFT SYSTOLIC HEART FAILURE: Primary | ICD-10-CM

## 2023-08-14 PROCEDURE — 93798 PHYS/QHP OP CAR RHAB W/ECG: CPT

## 2023-08-16 ENCOUNTER — APPOINTMENT (OUTPATIENT)
Dept: CARDIAC REHAB | Facility: HOSPITAL | Age: 43
End: 2023-08-16
Payer: MEDICAID

## 2023-08-16 ENCOUNTER — TREATMENT (OUTPATIENT)
Dept: CARDIAC REHAB | Facility: HOSPITAL | Age: 43
End: 2023-08-16
Payer: MEDICAID

## 2023-08-16 DIAGNOSIS — I50.22 CHRONIC LEFT SYSTOLIC HEART FAILURE: Primary | ICD-10-CM

## 2023-08-16 PROCEDURE — 93798 PHYS/QHP OP CAR RHAB W/ECG: CPT

## 2023-08-18 ENCOUNTER — TREATMENT (OUTPATIENT)
Dept: CARDIAC REHAB | Facility: HOSPITAL | Age: 43
End: 2023-08-18
Payer: MEDICAID

## 2023-08-18 DIAGNOSIS — I50.22 CHRONIC LEFT SYSTOLIC HEART FAILURE: Primary | ICD-10-CM

## 2023-08-18 PROCEDURE — 93798 PHYS/QHP OP CAR RHAB W/ECG: CPT

## 2023-08-21 ENCOUNTER — TREATMENT (OUTPATIENT)
Dept: CARDIAC REHAB | Facility: HOSPITAL | Age: 43
End: 2023-08-21
Payer: MEDICAID

## 2023-08-21 DIAGNOSIS — I50.22 CHRONIC LEFT SYSTOLIC HEART FAILURE: Primary | ICD-10-CM

## 2023-08-21 PROCEDURE — 93798 PHYS/QHP OP CAR RHAB W/ECG: CPT

## 2023-08-23 ENCOUNTER — TREATMENT (OUTPATIENT)
Dept: CARDIAC REHAB | Facility: HOSPITAL | Age: 43
End: 2023-08-23
Payer: MEDICAID

## 2023-08-23 DIAGNOSIS — I50.22 CHRONIC LEFT SYSTOLIC HEART FAILURE: Primary | ICD-10-CM

## 2023-08-23 PROCEDURE — 93798 PHYS/QHP OP CAR RHAB W/ECG: CPT

## 2023-08-23 PROCEDURE — 93797 PHYS/QHP OP CAR RHAB WO ECG: CPT

## 2023-08-23 NOTE — PROGRESS NOTES
"  NUTRITION ASSESSMENT & EDUCATION  CARDIAC/PULMONARY REHAB      Appointment Date and Time: 08/23/23, 08:38 EDT  Patient Name: Paloom Pollack   Age: 43 y.o.     Sex:  male      Employment/Activity Level:   Palomo's education level: college  Occupation:  self employed/pressure washing        Job Activity Level: moderate  Routine Exercise: mild                                    Weight Assessment:   Height:   Ht Readings from Last 1 Encounters:   08/07/23 180.3 cm (71\")     Weight:   Wt Readings from Last 1 Encounters:   08/07/23 92.3 kg (203 lb 8 oz)         BMI:    BMI Readings from Last 1 Encounters:   08/07/23 28.38 kg/mý       -------------------------------------------------------------------------------------------------  IBW: Ideal body weight: 75.3 kg (166 lb 0.1 oz)  Adjusted ideal body weight: 82.1 kg (181 lb 0.1 oz)  -------------------------------------------------------------------------------------------------  Weight Assessment: Overweight  Weight Change last six months: top wt 250 lb, has lost ~15-25 lb post cardiac, desired wt 185-195 lb       Usual Weight: 205 lb         Cardiac Risk Factors:         Atherosclerotic heart disease       Heart failure       Hx Hodgkin lymphoma, hypomagnesemia, dyspnea on exertion    Pertinent Lab Values:     Total Cholesterol   Date Value Ref Range Status   05/31/2023 177 0 - 200 mg/dL Final     HDL Cholesterol   Date Value Ref Range Status   05/31/2023 59 40 - 60 mg/dL Final     LDL Cholesterol    Date Value Ref Range Status   05/31/2023 101 (H) 0 - 100 mg/dL Final     LDL/HDL Ratio   Date Value Ref Range Status   05/31/2023 1.68  Final     Triglycerides   Date Value Ref Range Status   05/31/2023 93 0 - 150 mg/dL Final     Hemoglobin A1C   Date Value Ref Range Status   05/31/2023 7.00 (H) 4.80 - 5.60 % Final     TSH   Date Value Ref Range Status   05/29/2023 12.110 (H) 0.270 - 4.200 uIU/mL Final     Glucose   Date Value Ref Range Status   07/10/2023 115 (H) " 65 - 99 mg/dL Final     Labs reviewed w/Pt. Notable for elevated TSH, T4 wnl. Pt reports former A1c of 10% - decreased w/wt loss    Pertinent Medications:   Nutritional Supplements: reviewed  Pertinent Nutrition-Related Medications: Reviewed - no changes recommended at this time.  Self Identified Problems or Concerns:   Multiple medical issues past few years including covid, neck surgery, diverticulitis, heart failure, heart BBB.  Currently trying to maintain healthy lifestyle to avoid any additional health related concerns.   Following intermittent fasting - 4/16, limiting carbs.    Nutrition Influences:   Appetite: improving           Taste/smell changes:  Yes  Factors limiting PO intake: none  Food records reviewed?: Yes, completed review of 'Rate Your Plate' score and tallies.  Extensive review and discussion of home diet today.    Palomo lives with: significant other  Palomo receives lifestyle support from others at home?: Yes  Spouse/significant other present for diet instruction today?: No  Diet Assessment:   Diet Survey Score: 57 of possible 72 = 79 % compliant with AHA guidelines    Meal intake pattern:  two meals per day within a 4 hour window  Dining out:  occasional             Fast food:  rare    24 hour recall:   B --   L Chicken sandwich   D 4oz chicken, 4oz steak, 2-3 cups of salad, diet coke, water    Who does the cooking at home:  both        Who does the grocery shopping:  Catrina  Home diet review:  Current diet has a moderate intake of Saturated Fats, still with room for improvement, Moderate-acceptable Sodium intake, aware of sodium guidelines and strives to reduce intake, Deficient in one or more food categories, and Expressed motivation to make heart healthy changes in diet today    Motivation level toward diet compliance:  strong  Assessment / Recommendations:   Prior diet education before to coming to Cardiac Rehab?: No  Instructed provided on:  American Heart Association 2021 Nutrition  "Guidelines, Saturated Fat, Picking Healthy Proteins, Get the Scoop on Sodium/Salt <2300 mg/d, Added Sugars Guidance, Go Nuts for Heart Health, [place] 3 fats in Fish & Seafood, Dining Out Doesn't Mean Ditch Your Diet, and Foods to Avoid on the Cardiac Diet. CHF guidelines to limit sodium intake. Discussed & provided with \"up to date\" guidelines on diverticulitis    Written materials provided to patient: Yes    Goals/Plan:   Patient defined goals:   1) Strong motivation to maintain weight between 185-195 lb  2) Choose healthy lifestyle to avoid additional medical health issues  3) Explore options for digestive health, avoid constipation    Plan:  Encouraged to complete goals and continue setting new nutrition/exercise goals             Encouraged to follow up with dietitian during cardiac rehab sessions; may request additional RD counseling.    Tiffany López RD, 08:38 EDT - 8/23/2023      "

## 2023-08-25 ENCOUNTER — TREATMENT (OUTPATIENT)
Dept: CARDIAC REHAB | Facility: HOSPITAL | Age: 43
End: 2023-08-25
Payer: MEDICAID

## 2023-08-25 DIAGNOSIS — I50.22 CHRONIC LEFT SYSTOLIC HEART FAILURE: Primary | ICD-10-CM

## 2023-08-25 PROCEDURE — 93798 PHYS/QHP OP CAR RHAB W/ECG: CPT

## 2023-08-28 ENCOUNTER — TREATMENT (OUTPATIENT)
Dept: CARDIAC REHAB | Facility: HOSPITAL | Age: 43
End: 2023-08-28
Payer: MEDICAID

## 2023-08-28 DIAGNOSIS — I50.22 CHRONIC LEFT SYSTOLIC HEART FAILURE: Primary | ICD-10-CM

## 2023-08-28 PROCEDURE — 93798 PHYS/QHP OP CAR RHAB W/ECG: CPT

## 2023-08-30 ENCOUNTER — OFFICE VISIT (OUTPATIENT)
Dept: CARDIOLOGY | Facility: CLINIC | Age: 43
End: 2023-08-30
Payer: MEDICAID

## 2023-08-30 ENCOUNTER — TREATMENT (OUTPATIENT)
Dept: CARDIAC REHAB | Facility: HOSPITAL | Age: 43
End: 2023-08-30
Payer: MEDICAID

## 2023-08-30 VITALS
HEIGHT: 71 IN | BODY MASS INDEX: 29.12 KG/M2 | OXYGEN SATURATION: 98 % | DIASTOLIC BLOOD PRESSURE: 74 MMHG | WEIGHT: 208 LBS | HEART RATE: 94 BPM | SYSTOLIC BLOOD PRESSURE: 110 MMHG

## 2023-08-30 DIAGNOSIS — I50.22 CHRONIC LEFT SYSTOLIC HEART FAILURE: Primary | ICD-10-CM

## 2023-08-30 DIAGNOSIS — I44.7 LBBB (LEFT BUNDLE BRANCH BLOCK): ICD-10-CM

## 2023-08-30 DIAGNOSIS — R00.2 PALPITATIONS: ICD-10-CM

## 2023-08-30 DIAGNOSIS — I50.21 ACUTE HFREF (HEART FAILURE WITH REDUCED EJECTION FRACTION): Primary | ICD-10-CM

## 2023-08-30 PROCEDURE — 99213 OFFICE O/P EST LOW 20 MIN: CPT | Performed by: INTERNAL MEDICINE

## 2023-08-30 PROCEDURE — 93798 PHYS/QHP OP CAR RHAB W/ECG: CPT

## 2023-08-30 NOTE — PROGRESS NOTES
[facility]  Office visit  Palomo Pollack  1980  [phone]  There is no work phone number on file.    VISIT DATE:  8/30/2023    PCP: Heladio Valentine DO  [address] [address]    CC:  Chief Complaint   Patient presents with    Palpitations       Previous cardiac studies and procedures:  May 2023  Myocardial perfusion imaging    Calcifications visualized in the proximal and mid RCA.    Left ventricular ejection fraction is moderately reduced (Calculated EF = 37%).    Abnormal LV wall motion consistent with moderate hypokinesis of the septal wall.    Large region of moderately decreased perfusion which is predominantly fixed involving the septal wall, apex, and mid portions of the anterior and inferior walls.    Impressions are consistent with a high risk study.  TTE    Left ventricular systolic function is moderately decreased. Calculated left ventricular EF = 28% Left ventricular ejection fraction appears to be 26 - 30%.    The left ventricular cavity is mildly dilated.    The left atrial cavity is mildly dilated.    Moderate tricuspid valve regurgitation is present.    Estimated right ventricular systolic pressure from tricuspid regurgitation is moderately elevated (45-55 mmHg). Calculated right ventricular systolic pressure from tricuspid regurgitation is 50 mmHg.    Cardiac catheterization  Luminal irregularities noted in the proximal mid major epicardial vessels.  LVEDP 21 mmHg  No aortic stenosis    ASSESSMENT:   Diagnosis Plan   1. Acute HFrEF (heart failure with reduced ejection fraction)        2. LBBB (left bundle branch block)        3. Palpitations            PLAN:  Heart failure with reduced ejection fraction, chronic: Suspect secondary to late onset chemotherapeutic effects.  Currently euvolemic and compensated.  Continue current medical therapy.  Repeat transthoracic echo pending to assess underlying LV systolic function and  "pulmonary pressures.  Given blood pressure trend he is unlikely to tolerate further titration of guideline directed medical therapy at this time.    Subjective  Interval assessment: Blood pressures running less than 120/80 mmHg, intermittently with systolic blood pressures in the 90s which are asymptomatic.  Compliant with medical therapy.  Denies dyspnea, palpitations, chest discomfort.  No presyncope or syncope.  Denies PND orthopnea.  No lower extremity edema.  Compliant with LifeVest.    Initial evaluation: 43-year-old gentleman with persistent limiting dyspnea on exertion over the previous 4 months following COVID-19 infection in December 2022.  Also with intermittent palpitations.  Describes intermittent isolated flip-flop sensations, also with intermittent sustained palpitations in which she feels his heart is beating hard and fast.  Has intermittent left upper chest discomfort rating down his left arm, no obvious triggers.  No alleviating or exacerbating features.  Does have a history of Hodgkin's lymphoma with chemotherapy and chest radiation and stem cell transplant.  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  Diagnosed with diabetes last year, was able to normalize his hemoglobin A1c with dietary changes and approximately 50 pound weight loss.    PHYSICAL EXAMINATION:  Vitals:    08/30/23 1106   BP: 110/74   BP Location: Right arm   Patient Position: Sitting   Pulse: 94   SpO2: 98%   Weight: 94.3 kg (208 lb)   Height: 180.3 cm (71\")     General Appearance:    Alert, cooperative, no distress, appears stated age   Head:    Normocephalic, without obvious abnormality, atraumatic   Eyes:    conjunctiva/corneas clear   Nose:   Nares normal, septum midline, mucosa normal, no drainage   Throat:   Lips, teeth and gums normal   Neck:   Supple, symmetrical, trachea midline, no carotid    bruit or JVD   Lungs:     Clear to " auscultation bilaterally, respirations unlabored   Chest Wall:    No tenderness or deformity    Heart:    Regular rate and rhythm, S1 and S2 normal, no murmur, rub   or gallop, normal carotid impulse bilaterally without bruit.   Abdomen:     Soft, non-tender   Extremities:   Extremities normal, atraumatic, no cyanosis or edema   Pulses:   2+ and symmetric all extremities   Skin:   Skin color, texture, turgor normal, no rashes or lesions       Diagnostic Data:  Procedures  Lab Results   Component Value Date    TRIG 93 05/31/2023    HDL 59 05/31/2023     Lab Results   Component Value Date    GLUCOSE 115 (H) 07/10/2023    BUN 13 07/10/2023    CREATININE 1.30 (H) 07/10/2023     07/10/2023    K 4.7 07/10/2023     07/10/2023    CO2 26.3 07/10/2023     Lab Results   Component Value Date    HGBA1C 7.00 (H) 05/31/2023     Lab Results   Component Value Date    WBC 6.87 07/10/2023    HGB 16.9 07/10/2023    HCT 48.2 07/10/2023     07/10/2023       Allergies  Allergies   Allergen Reactions    Bactrim [Sulfamethoxazole-Trimethoprim] Rash and Other (See Comments)     Gave increased heartrate-biaxin         Current Medications    Current Outpatient Medications:     albuterol sulfate  (90 Base) MCG/ACT inhaler, Inhale 2 puffs Every 4 (Four) Hours As Needed for Wheezing., Disp: 8 g, Rfl: 2    carvedilol (COREG) 6.25 MG tablet, Take 1 tablet by mouth 2 (Two) Times a Day., Disp: 60 tablet, Rfl: 3    empagliflozin (JARDIANCE) 10 MG tablet tablet, Take 1 tablet by mouth Daily., Disp: 30 tablet, Rfl: 3    esomeprazole (nexIUM) 40 MG capsule, Take 1 capsule by mouth Every Morning Before Breakfast., Disp: 90 capsule, Rfl: 3    fexofenadine (ALLEGRA) 180 MG tablet, Take 1 tablet by mouth Daily., Disp: 90 tablet, Rfl: 3    sacubitril-valsartan (Entresto) 24-26 MG tablet, Take 1 tablet by mouth 2 (Two) Times a Day., Disp: 60 tablet, Rfl: 3    spironolactone (ALDACTONE) 25 MG tablet, Take 1 tablet by mouth Daily.,  "Disp: 30 tablet, Rfl: 3    Testosterone 1.62 % gel, APPLY 2 PUMPS TOPICALLY TO THE AFFECTED AREA EVERY DAY, Disp: , Rfl:           ROS  ROS      SOCIAL HX  Social History     Socioeconomic History    Marital status: Single   Tobacco Use    Smoking status: Former     Packs/day: 0.25     Years: 20.00     Pack years: 5.00     Types: Cigarettes     Quit date: 2022     Years since quittin.8     Passive exposure: Past    Smokeless tobacco: Never    Tobacco comments:     smokes less than .25 ppd   Vaping Use    Vaping Use: Never used   Substance and Sexual Activity    Alcohol use: Not Currently     Comment: 2 drinks a night    Drug use: Not Currently     Types: Marijuana     Comment: \"A COUPLE A WEEK\"    Sexual activity: Yes       FAMILY HX  Family History   Problem Relation Age of Onset    Stroke Father     Lung cancer Father     Cancer Father         Positive for cured lung cancer- he was smoker             Alec Mason III, MD, FACC      "

## 2023-09-01 ENCOUNTER — TREATMENT (OUTPATIENT)
Dept: CARDIAC REHAB | Facility: HOSPITAL | Age: 43
End: 2023-09-01
Payer: MEDICAID

## 2023-09-01 DIAGNOSIS — I50.22 CHRONIC LEFT SYSTOLIC HEART FAILURE: Primary | ICD-10-CM

## 2023-09-01 PROCEDURE — 93798 PHYS/QHP OP CAR RHAB W/ECG: CPT

## 2023-09-06 ENCOUNTER — TREATMENT (OUTPATIENT)
Dept: CARDIAC REHAB | Facility: HOSPITAL | Age: 43
End: 2023-09-06
Payer: MEDICAID

## 2023-09-06 DIAGNOSIS — I50.22 CHRONIC LEFT SYSTOLIC HEART FAILURE: Primary | ICD-10-CM

## 2023-09-06 PROCEDURE — 93798 PHYS/QHP OP CAR RHAB W/ECG: CPT

## 2023-09-07 ENCOUNTER — HOSPITAL ENCOUNTER (OUTPATIENT)
Dept: CARDIOLOGY | Facility: HOSPITAL | Age: 43
Discharge: HOME | End: 2023-09-07
Payer: MEDICAID

## 2023-09-07 VITALS
BODY MASS INDEX: 29.12 KG/M2 | WEIGHT: 208 LBS | DIASTOLIC BLOOD PRESSURE: 83 MMHG | SYSTOLIC BLOOD PRESSURE: 108 MMHG | HEIGHT: 71 IN

## 2023-09-07 DIAGNOSIS — I50.21 ACUTE HFREF (HEART FAILURE WITH REDUCED EJECTION FRACTION): ICD-10-CM

## 2023-09-07 PROCEDURE — 93306 TTE W/DOPPLER COMPLETE: CPT

## 2023-09-08 ENCOUNTER — TREATMENT (OUTPATIENT)
Dept: CARDIAC REHAB | Facility: HOSPITAL | Age: 43
End: 2023-09-08
Payer: MEDICAID

## 2023-09-08 DIAGNOSIS — I50.22 CHRONIC LEFT SYSTOLIC HEART FAILURE: Primary | ICD-10-CM

## 2023-09-08 LAB
ASCENDING AORTA: 3.1 CM
BH CV ECHO MEAS - AO MAX PG: 8 MMHG
BH CV ECHO MEAS - AO MEAN PG: 4.8 MMHG
BH CV ECHO MEAS - AO ROOT AREA (BSA CORRECTED): 1.4 CM2
BH CV ECHO MEAS - AO ROOT DIAM: 3 CM
BH CV ECHO MEAS - AO V2 MAX: 141.6 CM/SEC
BH CV ECHO MEAS - AO V2 VTI: 24.6 CM
BH CV ECHO MEAS - AVA(I,D): 2.05 CM2
BH CV ECHO MEAS - EDV(CUBED): 101.7 ML
BH CV ECHO MEAS - EDV(MOD-SP2): 172.7 ML
BH CV ECHO MEAS - EDV(MOD-SP4): 146.8 ML
BH CV ECHO MEAS - EF(MOD-SP2): 38.9 %
BH CV ECHO MEAS - EF(MOD-SP4): 35.3 %
BH CV ECHO MEAS - ESV(CUBED): 48 ML
BH CV ECHO MEAS - ESV(MOD-SP2): 105.5 ML
BH CV ECHO MEAS - ESV(MOD-SP4): 95.1 ML
BH CV ECHO MEAS - FS: 22.1 %
BH CV ECHO MEAS - IVS/LVPW: 0.97 CM
BH CV ECHO MEAS - IVSD: 1.08 CM
BH CV ECHO MEAS - LA DIMENSION: 3.2 CM
BH CV ECHO MEAS - LAT PEAK E' VEL: 4.4 CM/SEC
BH CV ECHO MEAS - LV MASS(C)D: 185.2 GRAMS
BH CV ECHO MEAS - LV MAX PG: 2.46 MMHG
BH CV ECHO MEAS - LV MEAN PG: 1.69 MMHG
BH CV ECHO MEAS - LV V1 MAX: 78.3 CM/SEC
BH CV ECHO MEAS - LV V1 VTI: 14.4 CM
BH CV ECHO MEAS - LVIDD: 4.7 CM
BH CV ECHO MEAS - LVIDS: 3.6 CM
BH CV ECHO MEAS - LVOT AREA: 3.5 CM2
BH CV ECHO MEAS - LVOT DIAM: 2.11 CM
BH CV ECHO MEAS - LVPWD: 1.11 CM
BH CV ECHO MEAS - MED PEAK E' VEL: 5 CM/SEC
BH CV ECHO MEAS - MV A MAX VEL: 31.2 CM/SEC
BH CV ECHO MEAS - MV DEC SLOPE: 1142 CM/SEC2
BH CV ECHO MEAS - MV DEC TIME: 0.11 MSEC
BH CV ECHO MEAS - MV E MAX VEL: 124 CM/SEC
BH CV ECHO MEAS - MV E/A: 4
BH CV ECHO MEAS - MV MAX PG: 8.7 MMHG
BH CV ECHO MEAS - MV MEAN PG: 2.23 MMHG
BH CV ECHO MEAS - MV V2 VTI: 34.5 CM
BH CV ECHO MEAS - MVA(VTI): 1.46 CM2
BH CV ECHO MEAS - PA ACC TIME: 0.11 SEC
BH CV ECHO MEAS - SV(LVOT): 50.3 ML
BH CV ECHO MEAS - SV(MOD-SP2): 67.2 ML
BH CV ECHO MEAS - SV(MOD-SP4): 51.8 ML
BH CV ECHO MEAS - TAPSE (>1.6): 1.72 CM
BH CV ECHO MEASUREMENTS AVERAGE E/E' RATIO: 26.38
BH CV XLRA - RV BASE: 3 CM
BH CV XLRA - RV LENGTH: 7.4 CM
BH CV XLRA - RV MID: 2.6 CM
BH CV XLRA - TDI S': 12.4 CM/SEC

## 2023-09-08 PROCEDURE — 93798 PHYS/QHP OP CAR RHAB W/ECG: CPT

## 2023-09-11 ENCOUNTER — TREATMENT (OUTPATIENT)
Dept: CARDIAC REHAB | Facility: HOSPITAL | Age: 43
End: 2023-09-11
Payer: MEDICAID

## 2023-09-11 DIAGNOSIS — I50.22 CHRONIC LEFT SYSTOLIC HEART FAILURE: Primary | ICD-10-CM

## 2023-09-11 PROCEDURE — 93798 PHYS/QHP OP CAR RHAB W/ECG: CPT

## 2023-09-13 ENCOUNTER — TREATMENT (OUTPATIENT)
Dept: CARDIAC REHAB | Facility: HOSPITAL | Age: 43
End: 2023-09-13
Payer: MEDICAID

## 2023-09-13 DIAGNOSIS — I50.22 CHRONIC LEFT SYSTOLIC HEART FAILURE: Primary | ICD-10-CM

## 2023-09-13 PROCEDURE — 93798 PHYS/QHP OP CAR RHAB W/ECG: CPT

## 2023-09-14 ENCOUNTER — TELEPHONE (OUTPATIENT)
Dept: CARDIOLOGY | Facility: CLINIC | Age: 43
End: 2023-09-14
Payer: MEDICAID

## 2023-09-14 DIAGNOSIS — I50.21 ACUTE HFREF (HEART FAILURE WITH REDUCED EJECTION FRACTION): Primary | ICD-10-CM

## 2023-09-14 LAB
ASCENDING AORTA: 3.1 CM
BH CV ECHO MEAS - AO MAX PG: 8 MMHG
BH CV ECHO MEAS - AO MEAN PG: 4.8 MMHG
BH CV ECHO MEAS - AO ROOT AREA (BSA CORRECTED): 1.4 CM2
BH CV ECHO MEAS - AO ROOT DIAM: 3 CM
BH CV ECHO MEAS - AO V2 MAX: 141.6 CM/SEC
BH CV ECHO MEAS - AO V2 VTI: 24.6 CM
BH CV ECHO MEAS - AVA(I,D): 2.05 CM2
BH CV ECHO MEAS - EDV(CUBED): 123.8 ML
BH CV ECHO MEAS - EDV(MOD-SP2): 122 ML
BH CV ECHO MEAS - EDV(MOD-SP4): 122 ML
BH CV ECHO MEAS - EF(MOD-BP): 30 %
BH CV ECHO MEAS - EF(MOD-SP2): 39.6 %
BH CV ECHO MEAS - EF(MOD-SP4): 30.4 %
BH CV ECHO MEAS - ESV(CUBED): 67.3 ML
BH CV ECHO MEAS - ESV(MOD-SP2): 73.7 ML
BH CV ECHO MEAS - ESV(MOD-SP4): 84.9 ML
BH CV ECHO MEAS - FS: 18.4 %
BH CV ECHO MEAS - IVS/LVPW: 0.9 CM
BH CV ECHO MEAS - IVSD: 0.99 CM
BH CV ECHO MEAS - LA DIMENSION: 3.2 CM
BH CV ECHO MEAS - LAT PEAK E' VEL: 4.4 CM/SEC
BH CV ECHO MEAS - LV MASS(C)D: 193.2 GRAMS
BH CV ECHO MEAS - LV MAX PG: 2.46 MMHG
BH CV ECHO MEAS - LV MEAN PG: 1.69 MMHG
BH CV ECHO MEAS - LV V1 MAX: 78.3 CM/SEC
BH CV ECHO MEAS - LV V1 VTI: 14.4 CM
BH CV ECHO MEAS - LVIDD: 5 CM
BH CV ECHO MEAS - LVIDS: 4.1 CM
BH CV ECHO MEAS - LVOT AREA: 3.5 CM2
BH CV ECHO MEAS - LVOT DIAM: 2.11 CM
BH CV ECHO MEAS - LVPWD: 1.11 CM
BH CV ECHO MEAS - MED PEAK E' VEL: 5 CM/SEC
BH CV ECHO MEAS - MV A MAX VEL: 31.2 CM/SEC
BH CV ECHO MEAS - MV DEC SLOPE: 1142 CM/SEC2
BH CV ECHO MEAS - MV DEC TIME: 0.11 MSEC
BH CV ECHO MEAS - MV E MAX VEL: 124 CM/SEC
BH CV ECHO MEAS - MV E/A: 4
BH CV ECHO MEAS - MV MAX PG: 8.7 MMHG
BH CV ECHO MEAS - MV MEAN PG: 2.23 MMHG
BH CV ECHO MEAS - MV V2 VTI: 34.5 CM
BH CV ECHO MEAS - MVA(VTI): 1.46 CM2
BH CV ECHO MEAS - PA ACC TIME: 0.11 SEC
BH CV ECHO MEAS - SV(LVOT): 50.3 ML
BH CV ECHO MEAS - SV(MOD-SP2): 48.3 ML
BH CV ECHO MEAS - SV(MOD-SP4): 37.1 ML
BH CV ECHO MEAS - TAPSE (>1.6): 1.72 CM
BH CV ECHO MEASUREMENTS AVERAGE E/E' RATIO: 26.38
BH CV XLRA - RV BASE: 3 CM
BH CV XLRA - RV LENGTH: 7.4 CM
BH CV XLRA - RV MID: 2.6 CM
BH CV XLRA - TDI S': 12.4 CM/SEC

## 2023-09-14 NOTE — TELEPHONE ENCOUNTER
----- Message from Alec Mason III, MD sent at 9/14/2023 11:49 AM EDT -----  Strength of heart muscle remains depressed, recommending evaluation by EP for defibrillator.  Continue LifeVest.

## 2023-09-15 ENCOUNTER — TREATMENT (OUTPATIENT)
Dept: CARDIAC REHAB | Facility: HOSPITAL | Age: 43
End: 2023-09-15
Payer: MEDICAID

## 2023-09-15 DIAGNOSIS — I50.22 CHRONIC LEFT SYSTOLIC HEART FAILURE: Primary | ICD-10-CM

## 2023-09-15 PROCEDURE — 93798 PHYS/QHP OP CAR RHAB W/ECG: CPT

## 2023-09-18 ENCOUNTER — TREATMENT (OUTPATIENT)
Dept: CARDIAC REHAB | Facility: HOSPITAL | Age: 43
End: 2023-09-18
Payer: MEDICAID

## 2023-09-18 ENCOUNTER — OFFICE VISIT (OUTPATIENT)
Dept: CARDIOLOGY | Facility: CLINIC | Age: 43
End: 2023-09-18
Payer: MEDICAID

## 2023-09-18 VITALS
DIASTOLIC BLOOD PRESSURE: 94 MMHG | HEART RATE: 86 BPM | WEIGHT: 210 LBS | HEIGHT: 71 IN | SYSTOLIC BLOOD PRESSURE: 130 MMHG | OXYGEN SATURATION: 100 % | BODY MASS INDEX: 29.4 KG/M2

## 2023-09-18 DIAGNOSIS — I50.22 CHRONIC LEFT SYSTOLIC HEART FAILURE: Primary | ICD-10-CM

## 2023-09-18 DIAGNOSIS — I50.22 CHRONIC HFREF (HEART FAILURE WITH REDUCED EJECTION FRACTION): Primary | ICD-10-CM

## 2023-09-18 PROCEDURE — 93798 PHYS/QHP OP CAR RHAB W/ECG: CPT

## 2023-09-18 PROCEDURE — 99213 OFFICE O/P EST LOW 20 MIN: CPT | Performed by: INTERNAL MEDICINE

## 2023-09-18 RX ORDER — CARVEDILOL 12.5 MG/1
12.5 TABLET ORAL 2 TIMES DAILY
Qty: 180 TABLET | Refills: 3 | Status: SHIPPED | OUTPATIENT
Start: 2023-09-18

## 2023-09-18 NOTE — PROGRESS NOTES
[facility] Cardiology  Office visit  Palomo Pollack  1980  [phone]  There is no work phone number on file.    VISIT DATE:  9/18/2023    PCP: Heladio Valentine DO  [address] [address]    CC:  Chief Complaint   Patient presents with    Acute HFrEF (heart failure with reduced ejection fraction)       Previous cardiac studies and procedures:  May 2023  Myocardial perfusion imaging    Calcifications visualized in the proximal and mid RCA.    Left ventricular ejection fraction is moderately reduced (Calculated EF = 37%).    Abnormal LV wall motion consistent with moderate hypokinesis of the septal wall.    Large region of moderately decreased perfusion which is predominantly fixed involving the septal wall, apex, and mid portions of the anterior and inferior walls.    Impressions are consistent with a high risk study.  TTE    Left ventricular systolic function is moderately decreased. Calculated left ventricular EF = 28% Left ventricular ejection fraction appears to be 26 - 30%.    The left ventricular cavity is mildly dilated.    The left atrial cavity is mildly dilated.    Moderate tricuspid valve regurgitation is present.    Estimated right ventricular systolic pressure from tricuspid regurgitation is moderately elevated (45-55 mmHg). Calculated right ventricular systolic pressure from tricuspid regurgitation is 50 mmHg.    Cardiac catheterization  Luminal irregularities noted in the proximal mid major epicardial vessels.  LVEDP 21 mmHg  No aortic stenosis    September 2023 TTE    Left ventricular systolic function is moderately decreased. Calculated left ventricular EF = 30% Left ventricular ejection fraction appears to be 31 - 35%.    Left ventricular wall thickness is consistent with mild concentric hypertrophy.    Left ventricular diastolic function was indeterminate.    ASSESSMENT:   Diagnosis Plan   1. Chronic HFrEF (heart failure with  "reduced ejection fraction)            PLAN:  Heart failure with reduced ejection fraction, chronic: Suspect secondary to late onset chemotherapeutic effects.  Currently euvolemic and compensated.  Increasing carvedilol 12.5 mg p.o. twice daily, otherwise continue current medical therapy.  Repeat transthoracic echo with persistent EF less than 35% on reasonable medical therapy.  Discussed risk and benefits of ICD implantation for primary prevention of sudden cardiac death.  Agreeable to EP consultation for further discussion.  Continue LifeVest.    Subjective  Interval assessment: Here to review results of recent transthoracic echocardiogram.  Compliant with medical therapy.  Denies dyspnea, palpitations, chest discomfort.  No presyncope or syncope.  Denies PND orthopnea.  No lower extremity edema.  Compliant with LifeVest.    Initial evaluation: 43-year-old gentleman with persistent limiting dyspnea on exertion over the previous 4 months following COVID-19 infection in December 2022.  Also with intermittent palpitations.  Describes intermittent isolated flip-flop sensations, also with intermittent sustained palpitations in which she feels his heart is beating hard and fast.  Has intermittent left upper chest discomfort rating down his left arm, no obvious triggers.  No alleviating or exacerbating features.  Does have a history of Hodgkin's lymphoma with chemotherapy and chest radiation and stem cell transplant.  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  Diagnosed with diabetes last year, was able to normalize his hemoglobin A1c with dietary changes and approximately 50 pound weight loss.    PHYSICAL EXAMINATION:  Vitals:    09/18/23 0919   BP: 130/94   BP Location: Right arm   Patient Position: Sitting   Cuff Size: Adult   Pulse: 86   SpO2: 100%   Weight: 95.3 kg (210 lb)   Height: 180.3 cm (71\")     General Appearance:    " Alert, cooperative, no distress, appears stated age   Head:    Normocephalic, without obvious abnormality, atraumatic   Eyes:    conjunctiva/corneas clear   Nose:   Nares normal, septum midline, mucosa normal, no drainage   Throat:   Lips, teeth and gums normal   Neck:   Supple, symmetrical, trachea midline, no carotid    bruit or JVD   Lungs:     Clear to auscultation bilaterally, respirations unlabored   Chest Wall:    No tenderness or deformity    Heart:    Regular rate and rhythm, S1 and S2 normal, no murmur, rub   or gallop, normal carotid impulse bilaterally without bruit.   Abdomen:     Soft, non-tender   Extremities:   Extremities normal, atraumatic, no cyanosis or edema   Pulses:   2+ and symmetric all extremities   Skin:   Skin color, texture, turgor normal, no rashes or lesions       Diagnostic Data:  Procedures  Lab Results   Component Value Date    TRIG 93 05/31/2023    HDL 59 05/31/2023     Lab Results   Component Value Date    GLUCOSE 115 (H) 07/10/2023    BUN 13 07/10/2023    CREATININE 1.30 (H) 07/10/2023     07/10/2023    K 4.7 07/10/2023     07/10/2023    CO2 26.3 07/10/2023     Lab Results   Component Value Date    HGBA1C 7.00 (H) 05/31/2023     Lab Results   Component Value Date    WBC 6.87 07/10/2023    HGB 16.9 07/10/2023    HCT 48.2 07/10/2023     07/10/2023       Allergies  Allergies   Allergen Reactions    Bactrim [Sulfamethoxazole-Trimethoprim] Rash and Other (See Comments)     Gave increased heartrate-biaxin         Current Medications    Current Outpatient Medications:     empagliflozin (JARDIANCE) 10 MG tablet tablet, Take 1 tablet by mouth Daily., Disp: 30 tablet, Rfl: 3    esomeprazole (nexIUM) 40 MG capsule, Take 1 capsule by mouth Every Morning Before Breakfast., Disp: 90 capsule, Rfl: 3    fexofenadine (ALLEGRA) 180 MG tablet, Take 1 tablet by mouth Daily., Disp: 90 tablet, Rfl: 3    sacubitril-valsartan (Entresto) 24-26 MG tablet, Take 1 tablet by mouth 2 (Two)  "Times a Day., Disp: 60 tablet, Rfl: 3    spironolactone (ALDACTONE) 25 MG tablet, Take 1 tablet by mouth Daily., Disp: 30 tablet, Rfl: 3    Testosterone 1.62 % gel, APPLY 2 PUMPS TOPICALLY TO THE AFFECTED AREA EVERY DAY, Disp: , Rfl:     albuterol sulfate  (90 Base) MCG/ACT inhaler, Inhale 2 puffs Every 4 (Four) Hours As Needed for Wheezing. (Patient not taking: Reported on 2023), Disp: 8 g, Rfl: 2    carvedilol (COREG) 12.5 MG tablet, Take 1 tablet by mouth 2 (Two) Times a Day., Disp: 180 tablet, Rfl: 3          ROS  ROS      SOCIAL HX  Social History     Socioeconomic History    Marital status: Single   Tobacco Use    Smoking status: Former     Packs/day: 0.25     Years: 20.00     Pack years: 5.00     Types: Cigarettes     Quit date: 2022     Years since quittin.8     Passive exposure: Past    Smokeless tobacco: Never    Tobacco comments:     smokes less than .25 ppd   Vaping Use    Vaping Use: Never used   Substance and Sexual Activity    Alcohol use: Not Currently     Comment: 2 drinks a night    Drug use: Not Currently     Types: Marijuana     Comment: \"A COUPLE A WEEK\"    Sexual activity: Yes       FAMILY HX  Family History   Problem Relation Age of Onset    Stroke Father     Lung cancer Father     Cancer Father         Positive for cured lung cancer- he was smoker             Alec Mason III, MD, FACC      "

## 2023-09-20 ENCOUNTER — TREATMENT (OUTPATIENT)
Dept: CARDIAC REHAB | Facility: HOSPITAL | Age: 43
End: 2023-09-20
Payer: MEDICAID

## 2023-09-20 DIAGNOSIS — I50.22 CHRONIC LEFT SYSTOLIC HEART FAILURE: Primary | ICD-10-CM

## 2023-09-20 PROCEDURE — 93798 PHYS/QHP OP CAR RHAB W/ECG: CPT

## 2023-09-22 ENCOUNTER — APPOINTMENT (OUTPATIENT)
Dept: CARDIAC REHAB | Facility: HOSPITAL | Age: 43
End: 2023-09-22
Payer: MEDICAID

## 2023-09-25 ENCOUNTER — OFFICE VISIT (OUTPATIENT)
Dept: CARDIOLOGY | Facility: CLINIC | Age: 43
End: 2023-09-25

## 2023-09-25 ENCOUNTER — APPOINTMENT (OUTPATIENT)
Dept: CARDIAC REHAB | Facility: HOSPITAL | Age: 43
End: 2023-09-25
Payer: MEDICAID

## 2023-09-25 VITALS
OXYGEN SATURATION: 99 % | BODY MASS INDEX: 29.65 KG/M2 | HEIGHT: 71 IN | DIASTOLIC BLOOD PRESSURE: 78 MMHG | SYSTOLIC BLOOD PRESSURE: 124 MMHG | HEART RATE: 87 BPM | WEIGHT: 211.8 LBS

## 2023-09-25 DIAGNOSIS — I44.7 LBBB (LEFT BUNDLE BRANCH BLOCK): ICD-10-CM

## 2023-09-25 DIAGNOSIS — I44.7 LBBB (LEFT BUNDLE BRANCH BLOCK): Primary | ICD-10-CM

## 2023-09-25 DIAGNOSIS — I50.22 CHRONIC HFREF (HEART FAILURE WITH REDUCED EJECTION FRACTION): Primary | ICD-10-CM

## 2023-09-25 DIAGNOSIS — I50.22 CHRONIC HFREF (HEART FAILURE WITH REDUCED EJECTION FRACTION): ICD-10-CM

## 2023-09-25 DIAGNOSIS — Z72.0 TOBACCO USE: ICD-10-CM

## 2023-09-25 DIAGNOSIS — R00.2 PALPITATIONS: ICD-10-CM

## 2023-09-25 DIAGNOSIS — R06.09 DYSPNEA ON EXERTION: ICD-10-CM

## 2023-09-25 PROBLEM — I50.21 ACUTE HFREF (HEART FAILURE WITH REDUCED EJECTION FRACTION): Status: RESOLVED | Noted: 2023-05-30 | Resolved: 2023-09-25

## 2023-09-25 PROCEDURE — 1160F RVW MEDS BY RX/DR IN RCRD: CPT | Performed by: INTERNAL MEDICINE

## 2023-09-25 PROCEDURE — 1159F MED LIST DOCD IN RCRD: CPT | Performed by: INTERNAL MEDICINE

## 2023-09-25 PROCEDURE — 99204 OFFICE O/P NEW MOD 45 MIN: CPT | Performed by: INTERNAL MEDICINE

## 2023-09-25 NOTE — PROGRESS NOTES
Cardiac Electrophysiology Outpatient Consult Note            [facility]    Consult Note     Palomo Pollack  [id number]  @  Primary Care Physician: Heladio Valentine DO    Referred By: Alec Mason III, MD    Subjective     Chief Complaint:   Diagnoses and all orders for this visit:    1. Chronic HFrEF (heart failure with reduced ejection fraction) (Primary)    2. Dyspnea on exertion      Chief Complaint   Patient presents with    Chronic HFrEF (heart failure with reduced ejection fraction       Problem List:  Chronic Systolic CHF/HFrEF  Myocardial perfusion scan May 2023: EF 37%, abnormal LV wall motion consistent with moderate hypokinesis of the septal wall, large region of moderately decreased perfusion with a predominantly fixed involving the septal wall, apex, and mid portions of the anterior and inferior walls, impressions consistent with high risk study  Echocardiogram May 2023: EF 28%, moderate TR, RVSP 45 to 40 to 55 mmHg  Heart catheterization May 2023: Luminal irregularities noted in the proximal mid major epicardial vessels, LVEDP 21 mmHg, no aortic stenosis  Echocardiogram 9/7/2023: 31 to 35%  Suspect heart failure secondary to chemotherapeutic effects  Wearing LifeVest     Past Medical History:   Diagnosis Date    Allergic rhinitis     Chronic right ear pain     GERD (gastroesophageal reflux disease)     Heart failure     Hodgkin lymphoma-with chemotherapy, chest radiation, and stem cell transplant 1998, 1999 - remission     Hypogonadism in male     PTSD (post-traumatic stress disorder)        History of Present Illness:   Palomo Pollack is a 43 y.o. male who presents to my electrophysiology clinic for evaluation of nonischemic cardiomyopathy and chronic systolic heart failure with reduced ejection fraction.  He is referred by Dr. Alec Mason.  He was initially seen by Dr. Mason in May 2023 with new diagnosis of heart failure.  He noted at that  time he had noticed limiting persistent dyspnea on exertion over the previous 5 months following COVID-19 infection of December 2022.  Evaluation in May demonstrated EF of the 8% on echocardiogram and 37% on a stress test.  He underwent left heart catheterization which only showed mild coronary artery disease with luminal irregularities.  He was started on heart failure medications including Aldactone, Entresto, Jardiance, Coreg.  Repeat echocardiogram 9/7/2023 revealed persistently low ejection fraction of 31 to 35%.  From a CHF standpoint he continues to have PYLE with cardiac rehab or walking upstairs,taking out the trash. He states a year ago, he did not have any SOB. He has mild fatigue. He describes his SOB as moderate. He denies syncope, chest discomfort. He is wearing a LifeVest, since May 2023. Of note, he has had lymphoma in the past s/p chemotherapy, radiation, and stem cell transplant in 1998/1999. He is in remission, and last seen oncology in 2017. He states his weight fluctuates, and he thinks his weight is up, possible fluid. He is not sure if it is water weight or just weight gain from being less active.     Review of Systems:   Review of Systems - Negative except SOB, fatigue, + weight fluctuations.         Past Medical History:   Past Medical History:   Diagnosis Date    Allergic rhinitis     Chronic right ear pain     GERD (gastroesophageal reflux disease)     Heart failure     Hodgkin lymphoma     Hypogonadism in male     PTSD (post-traumatic stress disorder)        Past Surgical History:   Past Surgical History:   Procedure Laterality Date    CARDIAC CATHETERIZATION N/A 5/30/2023    Procedure: Left Heart Cath;  Surgeon: Alec Mason III, MD;  Location: [facility] CATH INVASIVE LOCATION;  Service: Cardiovascular;  Laterality: N/A;    CENTRAL VENOUS LINE INSERTION  1999    COLONOSCOPY      PORTOCAVAL SHUNT PLACEMENT  1998       Family History:   Family History   Problem Relation Age of Onset    Stroke  "Father     Lung cancer Father     Cancer Father         Positive for cured lung cancer- he was smoker       Social History:   Social History     Socioeconomic History    Marital status: Single   Tobacco Use    Smoking status: Former     Packs/day: 0.25     Years: 20.00     Pack years: 5.00     Types: Cigarettes     Quit date: 2022     Years since quittin.8     Passive exposure: Past    Smokeless tobacco: Never    Tobacco comments:     smokes less than .25 ppd   Vaping Use    Vaping Use: Never used   Substance and Sexual Activity    Alcohol use: Not Currently     Comment: 2 drinks a night    Drug use: Not Currently     Types: Marijuana     Comment: \"A COUPLE A WEEK\"    Sexual activity: Yes       Medications:     Current Outpatient Medications:     albuterol sulfate  (90 Base) MCG/ACT inhaler, Inhale 2 puffs Every 4 (Four) Hours As Needed for Wheezing., Disp: 8 g, Rfl: 2    carvedilol (COREG) 12.5 MG tablet, Take 1 tablet by mouth 2 (Two) Times a Day., Disp: 180 tablet, Rfl: 3    empagliflozin (JARDIANCE) 10 MG tablet tablet, Take 1 tablet by mouth Daily., Disp: 30 tablet, Rfl: 3    esomeprazole (nexIUM) 40 MG capsule, Take 1 capsule by mouth Every Morning Before Breakfast., Disp: 90 capsule, Rfl: 3    fexofenadine (ALLEGRA) 180 MG tablet, Take 1 tablet by mouth Daily., Disp: 90 tablet, Rfl: 3    sacubitril-valsartan (Entresto) 24-26 MG tablet, Take 1 tablet by mouth 2 (Two) Times a Day., Disp: 60 tablet, Rfl: 3    spironolactone (ALDACTONE) 25 MG tablet, Take 1 tablet by mouth Daily., Disp: 30 tablet, Rfl: 3    Testosterone 1.62 % gel, APPLY 2 PUMPS TOPICALLY TO THE AFFECTED AREA EVERY DAY, Disp: , Rfl:     Allergies:   Allergies   Allergen Reactions    Bactrim [Sulfamethoxazole-Trimethoprim] Rash and Other (See Comments)     Gave increased heartrate-biaxin         Objective   Vital Signs:   Vitals:    23 0910   BP: 124/78   BP Location: Left arm   Patient Position: Sitting   Cuff Size: Adult " "  Pulse: 87   SpO2: 99%   Weight: 96.1 kg (211 lb 12.8 oz)   Height: 180.3 cm (71\")       PHYSICAL EXAM  General appearance: Awake, alert, cooperative  Head: Normocephalic, without obvious abnormality, atraumatic  Eyes: Conjunctivae/corneas clear, EOMs intact  Neck: no adenopathy, no carotid bruit, no JVD, and thyroid: not enlarged  Lungs: clear to auscultation bilaterally and no rhonchi or crackles\", ' symmetric  Heart: regular rate and rhythm, S1, S2 normal, no murmur, click, rub or gallop  Abdomen: Soft, non-tender, bowel sounds normal,  no organomegaly  Extremities: extremities normal, atraumatic, no cyanosis or edema  Skin: Skin color, turgor normal, no rashes or lesions  Neurologic: Grossly normal     Lab Results   Component Value Date    GLUCOSE 115 (H) 07/10/2023    CALCIUM 10.0 07/10/2023     07/10/2023    K 4.7 07/10/2023    CO2 26.3 07/10/2023     07/10/2023    BUN 13 07/10/2023    CREATININE 1.30 (H) 07/10/2023    EGFRIFNONA 75 03/02/2018    BCR 10.0 07/10/2023    ANIONGAP 10.7 07/10/2023     Lab Results   Component Value Date    WBC 6.87 07/10/2023    HGB 16.9 07/10/2023    HCT 48.2 07/10/2023    MCV 87.5 07/10/2023     07/10/2023     No results found for: INR, PROTIME  Lab Results   Component Value Date    TSH 12.110 (H) 05/29/2023       Cardiac Testing:    May 2023  Myocardial perfusion imaging    Calcifications visualized in the proximal and mid RCA.    Left ventricular ejection fraction is moderately reduced (Calculated EF = 37%).    Abnormal LV wall motion consistent with moderate hypokinesis of the septal wall.    Large region of moderately decreased perfusion which is predominantly fixed involving the septal wall, apex, and mid portions of the anterior and inferior walls.    Impressions are consistent with a high risk study.  TTE    Left ventricular systolic function is moderately decreased. Calculated left ventricular EF = 28% Left ventricular ejection fraction appears to be " 26 - 30%.    The left ventricular cavity is mildly dilated.    The left atrial cavity is mildly dilated.    Moderate tricuspid valve regurgitation is present.    Estimated right ventricular systolic pressure from tricuspid regurgitation is moderately elevated (45-55 mmHg). Calculated right ventricular systolic pressure from tricuspid regurgitation is 50 mmHg.    Cardiac catheterization  Luminal irregularities noted in the proximal mid major epicardial vessels.  LVEDP 21 mmHg  No aortic stenosis     September 2023 TTE    Left ventricular systolic function is moderately decreased. Calculated left ventricular EF = 30% Left ventricular ejection fraction appears to be 31 - 35%.    Left ventricular wall thickness is consistent with mild concentric hypertrophy.    Left ventricular diastolic function was indeterminate.      I personally viewed and interpreted the patient's EKG/Telemetry/lab data    Procedures    Tobacco Cessation: N/A  Obstructive Sleep Apnea Screening: He admits to snoring but has not been tested.     Assessment & Plan    Diagnoses and all orders for this visit:    1. Chronic HFrEF (heart failure with reduced ejection fraction) (Primary)    2. Dyspnea on exertion         Diagnosis Plan   1. Chronic HFrEF (heart failure with reduced ejection fraction)  New York Heart Association functional class III.  Nonischemic cardiomyopathy.  Left bundle branch block QRS duration of 160 ms.  Here to discuss resynchronization ICD implantation.    Lengthy conversation with him about the procedure.  Conversation about the benefits    To be obtained from CRT therapy including increased cardiac performance improvement in New York Heart Association functional class and generally better symptoms of fatigue associated from heart failure.  We also discussed the survival benefit from CRT.  We also discussed that there is an incremental survival benefit likely in his case with a defibrillator in addition to CRT.  He is interested  in this.    Long conversation with him about the risk-benefit profile.  Quoted 1% chance significant procedure complication risk including but not limited to pneumothorax hemothorax infection cardiac perforation tamponade stroke myocardial infarction.  We also discussed the risk of long-term lead failure.  I discussed with him that probably is not a great idea to do invest significant time in upper strength body building activities due to an increase in lead failure in the long run.    After careful conversation he wishes to proceed with implantation of a resynchronization ICD.      Please especially note that the patient has been on maximally tolerated doses of guideline directed medical therapy, including but not limited to: HF indicated beta-blocker (carvedilol, metoprolol succinate), ACE Inhibitor or Angiotensin receptor blocker.  Please note that for some of these medications the maximally tolerated dose may be zero.  The patient has not had a myocardial infarction within 40 days and has not had coronary revascularization within 90 days.        This patient who is a candidate for an ICD has a life expectancy greater than 12 months.  The patient and I made a mutually shared decision ( shared decision making model ) that the patient would be best served by proceeding with the above invasive heart procedure.  This is a high risk invasive cardiac procedure which comes with significant risk of morbidity and mortality.  This patient has significant underlying  heart disease.  Palomo Pollack understands these risks and   has made an informed decision to proceed.    [employer]  Moderate sedation  Left-sided  CRT  No medications to hold      2. Dyspnea on exertion  Secondary to above        Body mass index is 29.54 kg/m².    I spent 50 minutes in consultation with this patient which included more than 65% of this time in direct face-to-face counseling, physical examination and discussion of my assessment  and findings and shared decision making with the patient.  The remainder of the time not spent face to face was performing one, some or all of the following actions:  preparing to see this patient ( eg. Review of tests),  ordering medications, tests or procedures ), care coordination, discussion of the plan with other healthcare providers, documenting clinical information in Epic well as independently interpreting results and communicating results to patient, family and or caregiver.  All time noted occurred on the date of service.    Follow Up:       Thank you for allowing me to participate in the care of your patient. Please to not hesitate to contact me with additional questions or concerns.      Scribed for Wilmer Rojas, DO by Maria Del Rosario North PA-C. 9/25/2023  09:42 EDT

## 2023-09-26 RX ORDER — FUROSEMIDE 20 MG/1
20 TABLET ORAL DAILY PRN
Qty: 30 TABLET | Refills: 6 | Status: SHIPPED | OUTPATIENT
Start: 2023-09-26

## 2023-09-26 RX ORDER — SPIRONOLACTONE 25 MG/1
25 TABLET ORAL DAILY
Qty: 30 TABLET | Refills: 3 | Status: SHIPPED | OUTPATIENT
Start: 2023-09-26 | End: 2023-09-26 | Stop reason: SDUPTHER

## 2023-09-26 RX ORDER — SPIRONOLACTONE 25 MG/1
25 TABLET ORAL DAILY
Qty: 90 TABLET | Refills: 3 | Status: SHIPPED | OUTPATIENT
Start: 2023-09-26

## 2023-09-27 ENCOUNTER — APPOINTMENT (OUTPATIENT)
Dept: CARDIAC REHAB | Facility: HOSPITAL | Age: 43
End: 2023-09-27
Payer: MEDICAID

## 2023-09-29 ENCOUNTER — APPOINTMENT (OUTPATIENT)
Dept: CARDIAC REHAB | Facility: HOSPITAL | Age: 43
End: 2023-09-29
Payer: MEDICAID

## 2023-10-03 ENCOUNTER — TELEPHONE (OUTPATIENT)
Dept: CARDIOLOGY | Facility: CLINIC | Age: 43
End: 2023-10-03
Payer: MEDICAID

## 2023-10-03 RX ORDER — CARVEDILOL 6.25 MG/1
6.25 TABLET ORAL 2 TIMES DAILY
Qty: 180 TABLET | Refills: 1 | Status: SHIPPED | OUTPATIENT
Start: 2023-10-03

## 2023-10-04 ENCOUNTER — PREP FOR SURGERY (OUTPATIENT)
Dept: OTHER | Facility: HOSPITAL | Age: 43
End: 2023-10-04
Payer: MEDICAID

## 2023-10-04 DIAGNOSIS — I50.22 CHRONIC HFREF (HEART FAILURE WITH REDUCED EJECTION FRACTION): ICD-10-CM

## 2023-10-04 DIAGNOSIS — I44.7 LBBB (LEFT BUNDLE BRANCH BLOCK): Primary | ICD-10-CM

## 2023-10-04 RX ORDER — SODIUM CHLORIDE 9 MG/ML
40 INJECTION, SOLUTION INTRAVENOUS AS NEEDED
OUTPATIENT
Start: 2023-10-04

## 2023-10-04 RX ORDER — SODIUM CHLORIDE 0.9 % (FLUSH) 0.9 %
3 SYRINGE (ML) INJECTION EVERY 12 HOURS SCHEDULED
OUTPATIENT
Start: 2023-10-04

## 2023-10-04 RX ORDER — ONDANSETRON 2 MG/ML
4 INJECTION INTRAMUSCULAR; INTRAVENOUS EVERY 6 HOURS PRN
OUTPATIENT
Start: 2023-10-04

## 2023-10-04 RX ORDER — CEFAZOLIN SODIUM 2 G/100ML
2000 INJECTION, SOLUTION INTRAVENOUS ONCE
OUTPATIENT
Start: 2023-10-04 | End: 2023-10-04

## 2023-10-04 RX ORDER — SODIUM CHLORIDE 0.9 % (FLUSH) 0.9 %
10 SYRINGE (ML) INJECTION AS NEEDED
OUTPATIENT
Start: 2023-10-04

## 2023-10-04 RX ORDER — ACETAMINOPHEN 325 MG/1
650 TABLET ORAL EVERY 4 HOURS PRN
OUTPATIENT
Start: 2023-10-04

## 2023-10-04 RX ORDER — NITROGLYCERIN 0.4 MG/1
0.4 TABLET SUBLINGUAL
OUTPATIENT
Start: 2023-10-04

## 2023-10-23 ENCOUNTER — PRE-ADMISSION TESTING (OUTPATIENT)
Dept: PREADMISSION TESTING | Facility: HOSPITAL | Age: 43
End: 2023-10-23
Payer: MEDICAID

## 2023-10-23 DIAGNOSIS — I50.22 CHRONIC HFREF (HEART FAILURE WITH REDUCED EJECTION FRACTION): ICD-10-CM

## 2023-10-23 DIAGNOSIS — I44.7 LBBB (LEFT BUNDLE BRANCH BLOCK): ICD-10-CM

## 2023-10-23 LAB
ANION GAP SERPL CALCULATED.3IONS-SCNC: 12 MMOL/L (ref 5–15)
BUN SERPL-MCNC: 17 MG/DL (ref 6–20)
BUN/CREAT SERPL: 15.6 (ref 7–25)
CALCIUM SPEC-SCNC: 9.2 MG/DL (ref 8.6–10.5)
CHLORIDE SERPL-SCNC: 101 MMOL/L (ref 98–107)
CO2 SERPL-SCNC: 26 MMOL/L (ref 22–29)
CREAT SERPL-MCNC: 1.09 MG/DL (ref 0.76–1.27)
DEPRECATED RDW RBC AUTO: 46.8 FL (ref 37–54)
EGFRCR SERPLBLD CKD-EPI 2021: 85.3 ML/MIN/1.73
ERYTHROCYTE [DISTWIDTH] IN BLOOD BY AUTOMATED COUNT: 13.9 % (ref 12.3–15.4)
GLUCOSE SERPL-MCNC: 119 MG/DL (ref 65–99)
HCT VFR BLD AUTO: 46.9 % (ref 37.5–51)
HGB BLD-MCNC: 15.8 G/DL (ref 13–17.7)
MAGNESIUM SERPL-MCNC: 2.2 MG/DL (ref 1.6–2.6)
MCH RBC QN AUTO: 31.2 PG (ref 26.6–33)
MCHC RBC AUTO-ENTMCNC: 33.7 G/DL (ref 31.5–35.7)
MCV RBC AUTO: 92.5 FL (ref 79–97)
PLATELET # BLD AUTO: 154 10*3/MM3 (ref 140–450)
PMV BLD AUTO: 9.6 FL (ref 6–12)
POTASSIUM SERPL-SCNC: 4.6 MMOL/L (ref 3.5–5.2)
RBC # BLD AUTO: 5.07 10*6/MM3 (ref 4.14–5.8)
SODIUM SERPL-SCNC: 139 MMOL/L (ref 136–145)
WBC NRBC COR # BLD: 6.15 10*3/MM3 (ref 3.4–10.8)

## 2023-10-23 PROCEDURE — 85027 COMPLETE CBC AUTOMATED: CPT

## 2023-10-23 PROCEDURE — 80048 BASIC METABOLIC PNL TOTAL CA: CPT

## 2023-10-23 PROCEDURE — 83735 ASSAY OF MAGNESIUM: CPT

## 2023-10-23 PROCEDURE — 36415 COLL VENOUS BLD VENIPUNCTURE: CPT

## 2023-10-23 RX ORDER — ACETAMINOPHEN 325 MG/1
650 TABLET ORAL EVERY 6 HOURS PRN
COMMUNITY

## 2023-10-26 ENCOUNTER — APPOINTMENT (OUTPATIENT)
Dept: GENERAL RADIOLOGY | Facility: HOSPITAL | Age: 43
End: 2023-10-26
Payer: MEDICAID

## 2023-10-26 ENCOUNTER — HOSPITAL ENCOUNTER (OUTPATIENT)
Facility: HOSPITAL | Age: 43
Discharge: HOME | End: 2023-10-26
Attending: INTERNAL MEDICINE | Admitting: INTERNAL MEDICINE
Payer: MEDICAID

## 2023-10-26 VITALS
RESPIRATION RATE: 15 BRPM | OXYGEN SATURATION: 97 % | HEART RATE: 91 BPM | HEIGHT: 71 IN | DIASTOLIC BLOOD PRESSURE: 82 MMHG | WEIGHT: 215 LBS | SYSTOLIC BLOOD PRESSURE: 137 MMHG | BODY MASS INDEX: 30.1 KG/M2

## 2023-10-26 DIAGNOSIS — I50.22 CHRONIC HFREF (HEART FAILURE WITH REDUCED EJECTION FRACTION): ICD-10-CM

## 2023-10-26 DIAGNOSIS — I44.7 LBBB (LEFT BUNDLE BRANCH BLOCK): ICD-10-CM

## 2023-10-26 DIAGNOSIS — R00.2 PALPITATIONS: ICD-10-CM

## 2023-10-26 PROCEDURE — 99152 MOD SED SAME PHYS/QHP 5/>YRS: CPT | Performed by: INTERNAL MEDICINE

## 2023-10-26 PROCEDURE — 25010000002 CEFAZOLIN PER 500 MG: Performed by: PHYSICIAN ASSISTANT

## 2023-10-26 PROCEDURE — C1892 INTRO/SHEATH,FIXED,PEEL-AWAY: HCPCS | Performed by: INTERNAL MEDICINE

## 2023-10-26 PROCEDURE — 93005 ELECTROCARDIOGRAM TRACING: CPT | Performed by: INTERNAL MEDICINE

## 2023-10-26 PROCEDURE — 99153 MOD SED SAME PHYS/QHP EA: CPT | Performed by: INTERNAL MEDICINE

## 2023-10-26 PROCEDURE — 33225 L VENTRIC PACING LEAD ADD-ON: CPT | Performed by: INTERNAL MEDICINE

## 2023-10-26 PROCEDURE — 25010000002 LIDOCAINE 1 % SOLUTION: Performed by: INTERNAL MEDICINE

## 2023-10-26 PROCEDURE — C1769 GUIDE WIRE: HCPCS | Performed by: INTERNAL MEDICINE

## 2023-10-26 PROCEDURE — 25010000002 MIDAZOLAM PER 1 MG: Performed by: INTERNAL MEDICINE

## 2023-10-26 PROCEDURE — 33249 INSJ/RPLCMT DEFIB W/LEAD(S): CPT | Performed by: INTERNAL MEDICINE

## 2023-10-26 PROCEDURE — C1900 LEAD, CORONARY VENOUS: HCPCS | Performed by: INTERNAL MEDICINE

## 2023-10-26 PROCEDURE — 25010000002 DIPHENHYDRAMINE PER 50 MG: Performed by: INTERNAL MEDICINE

## 2023-10-26 PROCEDURE — 25010000002 FENTANYL CITRATE (PF) 50 MCG/ML SOLUTION: Performed by: INTERNAL MEDICINE

## 2023-10-26 PROCEDURE — C1882 AICD, OTHER THAN SING/DUAL: HCPCS | Performed by: INTERNAL MEDICINE

## 2023-10-26 PROCEDURE — C1898 LEAD, PMKR, OTHER THAN TRANS: HCPCS | Performed by: INTERNAL MEDICINE

## 2023-10-26 PROCEDURE — 93641 EP EVL 1/2CHMB PAC CVDFB TST: CPT | Performed by: INTERNAL MEDICINE

## 2023-10-26 PROCEDURE — S0260 H&P FOR SURGERY: HCPCS | Performed by: INTERNAL MEDICINE

## 2023-10-26 PROCEDURE — 93461 R&L HRT ART/VENTRICLE ANGIO: CPT | Performed by: INTERNAL MEDICINE

## 2023-10-26 PROCEDURE — C1777 LEAD, AICD, ENDO SINGLE COIL: HCPCS | Performed by: INTERNAL MEDICINE

## 2023-10-26 PROCEDURE — 71046 X-RAY EXAM CHEST 2 VIEWS: CPT

## 2023-10-26 PROCEDURE — C1894 INTRO/SHEATH, NON-LASER: HCPCS | Performed by: INTERNAL MEDICINE

## 2023-10-26 PROCEDURE — 25510000001 IOPAMIDOL PER 1 ML: Performed by: INTERNAL MEDICINE

## 2023-10-26 PROCEDURE — 25010000002 BUPIVACAINE 0.5 % SOLUTION: Performed by: INTERNAL MEDICINE

## 2023-10-26 PROCEDURE — 25010000002 ONDANSETRON PER 1 MG: Performed by: INTERNAL MEDICINE

## 2023-10-26 DEVICE — INTEGRATED BIPOLAR PACE/SENSE AND DEFIBRILLATION LEAD
Type: IMPLANTABLE DEVICE | Status: FUNCTIONAL
Brand: RELIANCE 4-FRONT™

## 2023-10-26 DEVICE — CARDIAC RESYNCHRONIZATION THERAPY DEFIBRILLATOR
Type: IMPLANTABLE DEVICE | Status: FUNCTIONAL
Brand: VIGILANT™ X4 CRT-D

## 2023-10-26 DEVICE — PACE/SENSE LEAD
Type: IMPLANTABLE DEVICE | Status: FUNCTIONAL
Brand: ACUITY™ X4 SPIRAL L

## 2023-10-26 DEVICE — PACE/SENSE LEAD
Type: IMPLANTABLE DEVICE | Status: FUNCTIONAL
Brand: INGEVITY™+

## 2023-10-26 RX ORDER — LIDOCAINE HYDROCHLORIDE 10 MG/ML
INJECTION, SOLUTION INFILTRATION; PERINEURAL
Status: DISCONTINUED | OUTPATIENT
Start: 2023-10-26 | End: 2023-10-26 | Stop reason: HOSPADM

## 2023-10-26 RX ORDER — IBUPROFEN 600 MG/1
600 TABLET ORAL EVERY 6 HOURS SCHEDULED
Status: DISCONTINUED | OUTPATIENT
Start: 2023-10-26 | End: 2023-10-26 | Stop reason: HOSPADM

## 2023-10-26 RX ORDER — ACETAMINOPHEN 325 MG/1
650 TABLET ORAL EVERY 6 HOURS
Qty: 40 TABLET | Refills: 0 | Status: DISCONTINUED | OUTPATIENT
Start: 2023-10-26 | End: 2023-10-26 | Stop reason: HOSPADM

## 2023-10-26 RX ORDER — SODIUM CHLORIDE 0.9 % (FLUSH) 0.9 %
10 SYRINGE (ML) INJECTION AS NEEDED
Status: DISCONTINUED | OUTPATIENT
Start: 2023-10-26 | End: 2023-10-26 | Stop reason: HOSPADM

## 2023-10-26 RX ORDER — ONDANSETRON 2 MG/ML
4 INJECTION INTRAMUSCULAR; INTRAVENOUS EVERY 6 HOURS PRN
Status: DISCONTINUED | OUTPATIENT
Start: 2023-10-26 | End: 2023-10-26 | Stop reason: HOSPADM

## 2023-10-26 RX ORDER — BUPIVACAINE HYDROCHLORIDE 5 MG/ML
INJECTION, SOLUTION PERINEURAL
Status: DISCONTINUED | OUTPATIENT
Start: 2023-10-26 | End: 2023-10-26 | Stop reason: HOSPADM

## 2023-10-26 RX ORDER — MIDAZOLAM HYDROCHLORIDE 1 MG/ML
INJECTION INTRAMUSCULAR; INTRAVENOUS
Status: DISCONTINUED | OUTPATIENT
Start: 2023-10-26 | End: 2023-10-26 | Stop reason: HOSPADM

## 2023-10-26 RX ORDER — FENTANYL CITRATE 50 UG/ML
INJECTION, SOLUTION INTRAMUSCULAR; INTRAVENOUS
Status: DISCONTINUED | OUTPATIENT
Start: 2023-10-26 | End: 2023-10-26 | Stop reason: HOSPADM

## 2023-10-26 RX ORDER — ACETAMINOPHEN 325 MG/1
650 TABLET ORAL EVERY 4 HOURS PRN
Status: DISCONTINUED | OUTPATIENT
Start: 2023-10-26 | End: 2023-10-26 | Stop reason: HOSPADM

## 2023-10-26 RX ORDER — DIPHENHYDRAMINE HYDROCHLORIDE 50 MG/ML
INJECTION INTRAMUSCULAR; INTRAVENOUS
Status: DISCONTINUED | OUTPATIENT
Start: 2023-10-26 | End: 2023-10-26 | Stop reason: HOSPADM

## 2023-10-26 RX ORDER — NITROGLYCERIN 0.4 MG/1
0.4 TABLET SUBLINGUAL
Status: DISCONTINUED | OUTPATIENT
Start: 2023-10-26 | End: 2023-10-26 | Stop reason: HOSPADM

## 2023-10-26 RX ORDER — ACETAMINOPHEN 325 MG/1
650 TABLET ORAL EVERY 6 HOURS SCHEDULED
Qty: 40 TABLET | Refills: 0 | Status: SHIPPED | OUTPATIENT
Start: 2023-10-26 | End: 2023-10-31

## 2023-10-26 RX ORDER — IBUPROFEN 600 MG/1
600 TABLET ORAL EVERY 6 HOURS SCHEDULED
Qty: 20 TABLET | Refills: 0 | Status: SHIPPED | OUTPATIENT
Start: 2023-10-26 | End: 2023-10-31

## 2023-10-26 RX ORDER — ONDANSETRON 2 MG/ML
INJECTION INTRAMUSCULAR; INTRAVENOUS
Status: DISCONTINUED | OUTPATIENT
Start: 2023-10-26 | End: 2023-10-26 | Stop reason: HOSPADM

## 2023-10-26 RX ORDER — SODIUM CHLORIDE 9 MG/ML
40 INJECTION, SOLUTION INTRAVENOUS AS NEEDED
Status: DISCONTINUED | OUTPATIENT
Start: 2023-10-26 | End: 2023-10-26 | Stop reason: HOSPADM

## 2023-10-26 RX ORDER — SODIUM CHLORIDE 0.9 % (FLUSH) 0.9 %
3 SYRINGE (ML) INJECTION EVERY 12 HOURS SCHEDULED
Status: DISCONTINUED | OUTPATIENT
Start: 2023-10-26 | End: 2023-10-26 | Stop reason: HOSPADM

## 2023-10-26 RX ADMIN — IBUPROFEN 600 MG: 600 TABLET, FILM COATED ORAL at 18:22

## 2023-10-26 RX ADMIN — SODIUM CHLORIDE 2000 MG: 900 INJECTION INTRAVENOUS at 16:22

## 2023-10-26 NOTE — H&P
Cardiology H&P     Palomo Pollack  1980  [phone]  There is no work phone number on file.    10/26/23    DATE OF ADMISSION: 10/26/2023  [facility]   [address] [address] / [place] [id number]  Referring Provider: Wilmer Rojas DO     CC: CHF     Problem List:  Chronic Systolic CHF/HFrEF  Myocardial perfusion scan May 2023: EF 37%, abnormal LV wall motion consistent with moderate hypokinesis of the septal wall, large region of moderately decreased perfusion with a predominantly fixed involving the septal wall, apex, and mid portions of the anterior and inferior walls, impressions consistent with high risk study  Echocardiogram May 2023: EF 28%, moderate TR, RVSP 45 to 40 to 55 mmHg  Heart catheterization May 2023: Luminal irregularities noted in the proximal mid major epicardial vessels, LVEDP 21 mmHg, no aortic stenosis  Echocardiogram 9/7/2023: 31 to 35%  Suspect heart failure secondary to chemotherapeutic effects  Wearing LifeVest      History of Present Illness:   Palomo Pollack is a 43 y.o. male who presents today for CRT ICD implant. He was initially seen by Dr. Mason in May 2023 with new diagnosis of heart failure.  He noted at that time he had noticed limiting persistent dyspnea on exertion over the previous 5 months following COVID-19 infection of December 2022.  Evaluation in May demonstrated EF of the 8% on echocardiogram and 37% on a stress test.  He underwent left heart catheterization which only showed mild coronary artery disease with luminal irregularities.  He was started on heart failure medications including Aldactone, Entresto, Jardiance, Coreg.  Repeat echocardiogram 9/7/2023 revealed persistently low ejection fraction of 31 to 35%.  From a CHF standpoint he continues to have PYLE with cardiac rehab or walking upstairs,taking out the trash. He states a year ago, he did not have any SOB. He has mild fatigue. He describes his  SOB as moderate. He denies syncope, chest discomfort. He is wearing a LifeVest, since May 2023. Of note, he has had lymphoma in the past s/p chemotherapy, radiation, and stem cell transplant in 1998/1999. He is in remission, and last seen oncology in 2017. No recent illnesses or infections.       Allergies   Allergen Reactions    Bactrim [Sulfamethoxazole-Trimethoprim] Rash and Other (See Comments)     Gave increased heartrate-biaxin         Prior to Admission Medications       Prescriptions Last Dose Informant Patient Reported? Taking?    albuterol sulfate  (90 Base) MCG/ACT inhaler More than a month  No No    Inhale 2 puffs Every 4 (Four) Hours As Needed for Wheezing.    esomeprazole (nexIUM) 40 MG capsule 10/26/2023  No Yes    Take 1 capsule by mouth Every Morning Before Breakfast.    fexofenadine (ALLEGRA) 180 MG tablet 10/26/2023  No Yes    Take 1 tablet by mouth Daily.    sacubitril-valsartan (Entresto) 24-26 MG tablet 10/26/2023  No Yes    Take 1 tablet by mouth 2 (Two) Times a Day.    Testosterone 1.62 % gel 10/25/2023  Yes Yes    APPLY 2 PUMPS TOPICALLY TO THE AFFECTED AREA EVERY DAY    acetaminophen (TYLENOL) 325 MG tablet Past Week Self Yes Yes    Take 2 tablets by mouth Every 6 (Six) Hours As Needed for Mild Pain.    carvedilol (COREG) 6.25 MG tablet 10/26/2023  No Yes    Take 1 tablet by mouth 2 (Two) Times a Day.    empagliflozin (JARDIANCE) 10 MG tablet tablet 10/25/2023  No Yes    Take 1 tablet by mouth Daily.    furosemide (Lasix) 20 MG tablet 10/25/2023  No Yes    Take 1 tablet by mouth Daily As Needed (SOB, swelling, weight gain > 3 lbs).    spironolactone (ALDACTONE) 25 MG tablet 10/26/2023  No Yes    Take 1 tablet by mouth Daily.              Current Facility-Administered Medications:     acetaminophen (TYLENOL) tablet 650 mg, 650 mg, Oral, Q4H PRN, Macario Staton PA    ceFAZolin 2000 mg IVPB in 100 mL NS (MBP), 2,000 mg, Intravenous, Once, Macario Staton PA    nitroglycerin  "(NITROSTAT) SL tablet 0.4 mg, 0.4 mg, Sublingual, Q5 Min PRN, Macario Staton PA    ondansetron (ZOFRAN) injection 4 mg, 4 mg, Intravenous, Q6H PRN, Macario Staton PA    sodium chloride 0.9 % flush 10 mL, 10 mL, Intravenous, PRN, Macario Staton PA    sodium chloride 0.9 % flush 3 mL, 3 mL, Intravenous, Q12H, Macario Staton PA    sodium chloride 0.9 % infusion 40 mL, 40 mL, Intravenous, PRN, Macario Staton PA    Social History     Socioeconomic History    Marital status: Single   Tobacco Use    Smoking status: Former     Packs/day: 0.25     Years: 20.00     Additional pack years: 0.00     Total pack years: 5.00     Types: Cigarettes     Quit date: 2022     Years since quittin.9     Passive exposure: Past    Smokeless tobacco: Never    Tobacco comments:     smokes less than .25 ppd   Vaping Use    Vaping Use: Never used   Substance and Sexual Activity    Alcohol use: Not Currently     Alcohol/week: 14.0 standard drinks of alcohol     Types: 14 Shots of liquor per week     Comment: 2 drinks a night    Drug use: Not Currently     Types: Marijuana     Comment: \"A COUPLE A WEEK\"    Sexual activity: Yes       Family History   Problem Relation Age of Onset    Stroke Father     Lung cancer Father     Cancer Father         Positive for cured lung cancer- he was smoker       REVIEW OF SYSTEMS:   CONSTITUTIONAL:         No weight loss, fever, chills, weakness + fatigue.   HEENT:                            No visual loss, blurred vision, double vision, yellow sclerae.                                             No hearing loss, congestion, sore throat.   SKIN:                                No rashes, urticaria, ulcers, sores.     RESPIRATORY:               + shortness of breath, - hemoptysis, cough, sputum.   GI:                                     No anorexia, nausea, vomiting, diarrhea. No abdominal pain, melena.   :                                   No burning on urination, hematuria or " "increased frequency.  ENDOCRINE:                   No diaphoresis, cold or heat intolerance. No polyuria or polydipsia.   NEURO:                            No headache, dizziness, syncope, paralysis, ataxia, or parasthesias.                                            No change in bowel or bladder control. No history of CVA/TIA  MUSCULOSKELETAL:    No muscle, back pain, joint pain or stiffness.   HEMATOLOGY:               No anemia, bleeding, bruising. No history of DVT/PE.  PSYCH:                            No history of depression, anxiety    Vitals:    10/26/23 1204 10/26/23 1207   BP: 136/83 132/81   BP Location: Right arm Left arm   Patient Position: Lying Lying   Pulse:  89   SpO2:  98%   Weight:  97.5 kg (215 lb)   Height:  180.3 cm (71\")         Vital Sign Min/Max for last 24 hours  No data recorded   BP  Min: 132/81  Max: 136/83   Pulse  Min: 89  Max: 89   No data recorded   SpO2  Min: 98 %  Max: 98 %   No data recorded    No intake or output data in the 24 hours ending 10/26/23 1323          Physical Exam:  GEN: Well nourished, Well- developed  No acute distress  HEENT: Normocephalic, Atraumatic, PERRLA, moist mucous membranes  NECK: supple, NO JVD, no thyromegaly, no lymphadenopathy  CARDIAC: S1S2  RRR no murmur, gallop, rub  LUNGS: Clear to ausculation, normal respiratory effort  ABDOMEN: Soft, nontender, normal bowel sounds  EXTREMITIES:No gross deformities,  No clubbing, cyanosis, or edema  SKIN: Warm, dry  NEURO: No focal deficits  PSYCHIATRIC: Normal affect and mood      I personally viewed and interpreted the patient's EKG/Telemetry/lab data    Data:   Results from last 7 days   Lab Units 10/23/23  1020   WBC 10*3/mm3 6.15   HEMOGLOBIN g/dL 15.8   HEMATOCRIT % 46.9   PLATELETS 10*3/mm3 154     Results from last 7 days   Lab Units 10/23/23  1020   SODIUM mmol/L 139   POTASSIUM mmol/L 4.6   CHLORIDE mmol/L 101   CO2 mmol/L 26.0   BUN mg/dL 17   CREATININE mg/dL 1.09   GLUCOSE mg/dL 119*                "                   No intake or output data in the 24 hours ending 10/26/23 1323          Telemetry: NSR           Assessment and Plan:   Chronic HFrEF:   - New York Heart Association functional class III.  Nonischemic cardiomyopathy.  Left bundle branch block QRS duration of 160 ms   - The patient will undergo Biventricular ICD implant today. The risks, benefits, and alternatives of the procedure have been reviewed and the patient wishes to proceed.   -SDM conversation took place in office setting.   - on Entresto, Coreg, Jardiance, Aldactone.      Electronically signed by TIM Beach, 10/26/23, 1:23 PM EDT.

## 2023-10-26 NOTE — OP NOTE
Cardiac Electrophysiology Procedure Note      [facility]    PROCEDURE:  IMPLANTED ATRIOBIVENTRICULAR ICD (CRT SYSTEM)    OPERATIONS PERFORMED: Implantation of a [employer] resynchronization ICD model G247, [id number] serial number pulse generator at the left prepectoral site.  Atrial lead [employer] 7841, 52, cm, serial number [id number].  Right ventricular lead [employer] model 0673, 64 cm, serial number [id number].  Left ventricular lead model [employer] model 4677, 86 cm, serial number [id number].  Fluoroscopy for ICD system in situ.  Coronary sinus venography.  Biventricular pacing optimization intra-procedurally.  Defibrillation threshold testing.    MODERATE SEDATION FOR PROCEDURE:    Moderate sedation was given during this procedure.    I supervised and directed RN to administer this sedation.  This staff member also monitored the patient's hemodynamic and respiratory status and response to these medications.  Please see the full detailed procedure report generated by the electrophysiology laboratory staff.  The patient tolerated moderate sedation well.  There were no complications regarding sedation.  The total dose of fentanyl was 250 mcg and the total dose of midazolam was 12 mg.  The total dose of Brevital was 60 mg.  First sedation was administered at 1639 and continued through 1729.      ATTENDING SURGEON:  Wilmer Rojas DO    ESTIMATED BLOOD LOSS: 0    RADIATION EXPOSURE: 6.3 minutes and 79 milliGray.    COMPLICATIONS: None    TIME OUT: Time out was completed with verification of the correct patient identity, procedure to be performed, procedure site and implanted equipment.    INDICATION FOR PROCEDURE:   Briefly,  Palomo Pollack is a 43 y.o. year old male with a history of nonischemic cardiomyopathy with a QRS duration of 160 milliseconds, LBBB and severe systolic dysfunction with an LVEF of 31%  and NYHA functional class III heart  failure.     Problem List:  Chronic Systolic CHF/HFrEF  Myocardial perfusion scan May 2023: EF 37%, abnormal LV wall motion consistent with moderate hypokinesis of the septal wall, large region of moderately decreased perfusion with a predominantly fixed involving the septal wall, apex, and mid portions of the anterior and inferior walls, impressions consistent with high risk study  Echocardiogram May 2023: EF 28%, moderate TR, RVSP 45 to 40 to 55 mmHg  Heart catheterization May 2023: Luminal irregularities noted in the proximal mid major epicardial vessels, LVEDP 21 mmHg, no aortic stenosis  Echocardiogram 9/7/2023: 31 to 35%  Suspect heart failure secondary to chemotherapeutic effects  Wearing LifeVest    The patient was able to give written informed consent after revisiting the key portions of the risk versus benefit profile of the procedure.  This discussion was framed by our lengthy conversations  (please see our detailed notes).  Patient verbalized strong understanding of this discussion and a strong desire to proceed with the procedure.  Please note that this detailed informed consent process utilized mutual and shared decision making process between all parties involved, principally the physician and patient, but also potentially with input from the patient's selected family and friends.'    Please especially note that the patient has been on maximally tolerated doses of guideline directed medical therapy, including but not limited to: HF indicated beta-blocker (carvedilol, metoprolol succinate), ACE Inhibitor or Angiotensin receptor blocker.  Please note that for some of these medications the maximally tolerated dose may be zero.  The patient has not had a myocardial infarction within 40 days and has not had coronary revascularization within 90 days.    This patient who is a candidate for an ICD has a life expectancy greater than 12 months.    PROCEDURE AND FINDINGS:     The patient was brought to the  electrophysiology suite in a post-absorptive state.  Informed consent was obtained prior to the procedure and confirmed.  Intravenous prophylactic antibiotics were administered and confirmed to be completely infused prior to start of the procedure.  After the site of implantation was prepped and draped in the usual sterile fashion and after adequate anesthesia was given, the skin was infiltrated with 1% lidocaine and 0.5% bupivicaine 50/50 mixture.  The skin was incised with a #10 scalpel.  Blunt and electrosurgical dissection was carried out to the level of the prepectoral fascia with careful attention paid to hemostasis.  A pocket to house the pulse generator was formed between the prepectoral fascia and subcutaneous fat with blunt and electrosurgical dissection.  The pocket was copiously irrigated with antibiotic containing normal saline and subsequently observed.  Once adequate hemostasis was confirmed within the pocket, venous access was obtained.  The axillary vein was accessed via a contrast guided Seldinger technique.  J tip 0.035 inch guide wires were introduced and their course through the venous system was confirmed by their presence under fluoroscopy in the inferior vena (excluding inadvertent arterial puncture.)      RIGHT VENTRICULAR LEAD:    A peel away sheath was brought to the field and placed into the venous system via over the wire technique.  The right ventricular lead was placed via this sheath into the right ventricle.  Adequate sensing and threshold parameters were obtained.  The lead was attached via active fixation.  There was no evidence of diaphragmatic stimulation at 10 V output.  The peel away sheath was removed and the lead collar was advanced to the pectoral muscle and sutured with non absorbable suture.  Tug testing of this lead confirmed stability of the lead and the length of the lead's slack was assessed as optimal with fluoroscopy.     RIGHT ATRIAL LEAD:  A peel away sheath was  brought to the field and placed into the venous system via over the wire technique.  The right atrial lead was placed via this sheath into the right atrium.   The lead was attached via active fixation.  Adequate sensing and threshold parameters were obtained.  There was no evidence of diaphragmatic stimulation at 10 V output.  The peel away sheath was removed and the lead collar was advanced to the pectoral muscle and sutured with non absorbable suture.  Tug testing of this lead confirmed stability of the lead and the length of the lead's slack was assessed as optimal with fluoroscopy.     LV LEAD:  A David third generation variable curve CS peel away sheath was brought to the field and placed into the venous system via over the wire technique.  Next the coronary sinus was cannulated with radiopaque contrast injection technique in an over the wire fashion.  Coronary venous angiography performed in multiple projections within the great cardiac vein.  This identified the possible target branch veins.  A lateral anatomic position vein was selected.  This target vein was cannulated with the Lenzburg vein selector, the Lenzburg LVI lead delivery sheath and 0.014 inch interventional wires.  The left ventricular lead was placed to the target vein over this 0.014 inch wire in an over the wire fashion.  Adequate sensing and threshold parameters were obtained.  There was no evidence of diaphragmatic stimulation at 10 V output.  The Lenzburg system was removed from the body in toto and the lead collar was advanced to the pectoral muscle and sutured with non absorbable  suture.  Tug testing of this lead confirmed stability of the lead and the length of the lead's slack was assessed as optimal with fluoroscopy.  The lead tips for all leads were cleaned and dried thoroughly.  The leads were attached to the appropriate ports on the pulse generator.  Tug testing was performed on all connections.  The device and leads were placed within  the pocket such that the coiled redundant leads were posterior to the pulse generator.    DEFIBRILLATION THRESHOLD TESTING:    Once adequate level of anesthesia was obtained, defibrillation testing was performed using Shock on T induction of ventricular fibrillation.  The patient was successfully internal defibrillated with 21 Joules with the least sensitivity.  There were no significant dropouts.    The patient after DFT testing remained hemodynamically stable.  The defibrillation threshold was 21 Joules.     The pulse generator was then sutured to the fascia in a medial location within the pocket using non absorbable suture.  The pocket was closed with two layers of suture using 2-0 then 3-0 Vicryl, followed by a layer of surgical adhesive and finally a sterile occlusive dressing.    UNDERLYING RHYTHM: sinus                     MEASURED DEVICE DATA:    Atrial lead                   sensing:             3.2 mV                   impedance:           555 Ohms                   Threshold:           1.3 Volts at 1 ms  RV lead                   sensing:             >25 mV                   pacing impedance:    572 Ohms                   Threshold:           0.4 Volts at 0.4 ms                     RV Coil impedance:   82 Ohms                     LV lead                                     impedance:           1552 Ohms                   Threshold:           1.4 Volts at 1 ms                                                   PROGRAMMED PARAMETERS:    Mode:                          DDD  Lower Rate:                60 pulses per minute  Upper Sensor Rate:         130 pulses per minute  Upper Tracking Rate:     130 pulses per minute  Mode Switch Rate:          170 bpm                    Tachy Therapy Programming.                  VT 1 zone 30 second detection interval               Detection:  171 beats per minute                Therapy:    This is a monitor zone    VT 2 zone 9 second section interval               Detection:   200 beats per minute               Therapy:    ATP x2 followed by maximal output defibrillation    VF zone 5 seconds detection interval               Detection:  250 beats per minute               Therapy:    ATP during charge followed by maximal output defibrillation.      CONCLUSION:  Successful implantation of ICD system.   Successful DFT testing.    RECOMMENDATIONS:  Patient is to follow up with our clinic in approximately one week and then myself in 3 months.    No lovenox or heparin at any dose is to be given.    FOR THE PATIENT    Please do not lift more than 10 pounds or abduct the shoulder joint / or raise the arm above the shoulder for 6 weeks after device was implanted (this does not apply to subcutaneous ICDs).    Avoid activities that involve heavy lifting or rough contact that could result in blows to your implant site and to allow your incision time to heal.    No shower or getting device incision wet for 2 days post-operatively.    Keep wound exposed to air unless otherwise instructed, the surgical glue is the bandage.    No creams, lotions, or powders to incision.    Please avoid allowing bra strap or suspenders to lay over incision until completely healed.    Avoid hot tubs or pools until incision completely healed.    No driving for 24 hours post-operatively after device implant.    Call your doctor if you have any swelling, redness or discharge around your incision, notice anything unusual or unexpected, or you develop a fever that does not go away in two or three days.    Call your doctor if you hear any beeping sounds / vibratory alerts from your device as this indicates your device needs to be checked immediately.    Carry your Medical Device ID Card with you at all times.  Please call our office () with any questions about the device or the wounds.

## 2023-10-27 ENCOUNTER — CALL CENTER PROGRAMS (OUTPATIENT)
Dept: CALL CENTER | Facility: HOSPITAL | Age: 43
End: 2023-10-27
Payer: MEDICAID

## 2023-10-27 NOTE — OUTREACH NOTE
PCI/Device Survey      Flowsheet Row Responses   Facility patient discharged from? [facility]   Procedure date 10/26/23   Procedure (if device, specify in description) Device   Device Description GEN DEFIB VIGILANT CRTD X4 IS4 4 - [id number] - [id number]   Performing MD Dr. Wilmer Rojas   Attempt successful? No   Unsuccessful attempts Attempt 1   Is the patient taking prescribed medications: None   Medication comments Resume Home meds   Does the patient have any of the following symptoms related to the cath/surgical site? --  [Left chest site-]   Does the patient have an appointment scheduled with the cardiologist? Yes   Appointment comments Cardio appt - [facility] 1/24/24 930 am            HILL PICKARD - Registered Nurse

## 2023-10-27 NOTE — OUTREACH NOTE
PCI/Device Survey      Flowsheet Row Responses   Facility patient discharged from? [facility]   Procedure date 10/26/23   Procedure (if device, specify in description) Device   Device Description GEN DEFIB VIGILANT CRTD X4 IS4 [facility] - [id number] - [id number]   Performing MD Dr. Wilmer Rojas   Attempt successful? Yes   Call start time 1240   Call end time 1243   Has the patient had any of the following symptoms since discharge? --  [Doing well]   Nursing intervention Reminded to continue to take prescribed medications   Medication comments Resume Home meds   Does the patient have any of the following symptoms related to the cath/surgical site? --  [Left Chest site- Looks fine. no issues]   Does the patient have an appointment scheduled with the cardiologist? Yes   Appointment comments Cardio appt Isidro Mason 1/24/24 930 am. wound check next week   If the patient is a current smoker, are they able to teach back resources for cessation? [id number]   Did the patient feel prepared to go home on the same day as the procedure? Yes   Is the patient satisfied with the same day discharge process? Yes   PCI/Device call completed Yes   Wrap up additional comments Pt doing well. no needs , no questions. Pt aware of restrictions.            HILL PICKARD - Registered Nurse

## 2023-10-30 LAB
QT INTERVAL: 462 MS
QTC INTERVAL: 529 MS

## 2023-11-02 ENCOUNTER — OFFICE VISIT (OUTPATIENT)
Dept: CARDIOLOGY | Facility: CLINIC | Age: 43
End: 2023-11-02
Payer: MEDICAID

## 2023-11-02 DIAGNOSIS — I44.7 LBBB (LEFT BUNDLE BRANCH BLOCK): Primary | ICD-10-CM

## 2023-11-02 NOTE — PROGRESS NOTES
11/02/2023    Name: Palomo Pollack  YOB: 1980    WOUND CHECK    Patient has fever: [] YES   [x] NO     Temperature if indicated: 98.2    Wound Location:  Left Shoulder     Surgical Glue: intact     Wound Appearance: clear/intact     Plan: normal wound check      Did patient receive home monitoring box? [x] YES   [] NO  (If no, contact device clinic.)    Does patient have questions about remote monitoring? [] YES   [x] NO (If yes notify device clinic)      Notes:       Appointment for follow-up scheduled for 3 months post procedure [x]    Future Appointments   Date Time Provider Department Center   11/2/2023  9:30 AM WOUND CHECK [facility]   1/22/2024 10:00 AM Wilmer Rojas DO [facility]   1/24/2024  9:30 AM Alec Mason III, MD [facility]          Saul Titus MA, 11/02/23

## 2023-12-19 RX ORDER — SACUBITRIL AND VALSARTAN 24; 26 MG/1; MG/1
1 TABLET, FILM COATED ORAL 2 TIMES DAILY
Qty: 60 TABLET | Refills: 0 | Status: SHIPPED | OUTPATIENT
Start: 2023-12-19

## 2023-12-19 NOTE — TELEPHONE ENCOUNTER
Lab Results   Component Value Date    GLUCOSE 119 (H) 10/23/2023    BUN 17 10/23/2023    CREATININE 1.09 10/23/2023    EGFR 85.3 10/23/2023    BCR 15.6 10/23/2023    K 4.6 10/23/2023    CO2 26.0 10/23/2023    CALCIUM 9.2 10/23/2023    PROTENTOTREF 7.4 12/30/2014    ALBUMIN 5.1 07/10/2023    BILITOT 0.9 07/10/2023    AST 30 07/10/2023    ALT 32 07/10/2023

## 2023-12-25 ENCOUNTER — TELEMEDICINE (OUTPATIENT)
Dept: FAMILY MEDICINE CLINIC | Facility: TELEHEALTH | Age: 43
End: 2023-12-25
Payer: MEDICAID

## 2023-12-25 DIAGNOSIS — J22 LOWER RESPIRATORY INFECTION: Primary | ICD-10-CM

## 2023-12-25 RX ORDER — AMOXICILLIN 875 MG/1
875 TABLET, COATED ORAL 2 TIMES DAILY
Qty: 20 TABLET | Refills: 0 | Status: SHIPPED | OUTPATIENT
Start: 2023-12-25

## 2023-12-25 NOTE — PROGRESS NOTES
"You have chosen to receive care through a telehealth visit.  Do you consent to use a video/audio connection for your medical care today? Yes     HPI  Palomo Pollack is a 43 y.o. male  presents with complaint of cough present for 7 days, sweats and chills but has not checked her temperature. He has used Tylenol and IBU for pain and  his fever was 101 yesterday. He has body aches and his cough is becoming deep and \"wet\" with yellow phlegm. He can not take Mucinex DM and he is concerned this is going into a worsening infection. He has h/o of Covid 19 about a year ago and suffered for a year thereafter with heart and lung problems and CHF. He has tested negative for Covid 19. He is very anxious this will worsen and he will become ill again like last year.     Review of Systems   Constitutional:  Positive for activity change, fatigue and fever.   HENT:  Positive for congestion.    Respiratory:  Positive for cough. Negative for shortness of breath, wheezing and stridor.    Cardiovascular: Negative.    Gastrointestinal: Negative.    Musculoskeletal: Negative.    Skin: Negative.    Neurological: Negative.    Hematological: Negative.    Psychiatric/Behavioral: Negative.         Past Medical History:   Diagnosis Date    Allergic rhinitis     Chronic right ear pain     h/o    Diabetes mellitus     h/o-  doesnt check sugar    Diverticulitis     GERD (gastroesophageal reflux disease)     Headache     Heart failure     History of kidney stones     passed them    History of shingles     1999- after transplant of stem cells    History of transfusion     1999- 1 unit- stem cell transplant -  recieved plts - no reaction recalled    Hodgkin lymphoma     Hypogonadism in male     PTSD (post-traumatic stress disorder)     Stem cells transplant status     Vision blurred     possibly need glasses       Family History   Problem Relation Age of Onset    Stroke Father     Lung cancer Father     Cancer Father         Positive for " REASON FOR VISIT:  Sav Mendoza, a 60-year-old male well known to the Surgery Service, presents following emergency ventral hernia repair with mesh.  The patient has done very well following surgery.  He has a small area of eschar at the area of his umbilicus which has improved markedly over the past several weeks.  The patient is washing his wound with 3.3% PCMX soap once a day, packing the umbilicus with a dry gauze and using a binder.  He is avoiding heavy lifting, eating regular diet, having normal bowel movements and has no other new problems or symptoms associated with the surgical repair.      PHYSICAL EXAMINATION:  Sav Mendoza moves easily to the examination table.  His abdomen is soft and nontender.  The incision clean, dry and intact.  An area of 1 x 1 cm eschar is present at the area of the umbilicus.  The surrounding areas of skin are pink and healing well.  There is no drainage, no abnormalities.  The area of a prior hematoma in the mid portion of the lower aspect of the incision is now flattening out nicely.  The surrounding skin is turning colors to yellow and green.  There are no other areas of abnormality noted in the surgical repair.  On the remainder of his examination, patient appears completely nontoxic today and the extremities are warm and well-perfused.      IMPRESSION:  Sav Mendoza is doing very well following emergency surgery for ventral hernia repair with mesh.  The patient understands the need to continue to wash the wound with 3.3% PCMX soap to avoid removing the eschar until the eschar falls off.  The patient will call or return should he develop additional problems, infection or evidence of wound dehiscence or skin dehiscence.  The patient will be followed in the clinic sometime in the spring time, perhaps in 03/2018 for final surgical followup.  The patient understands the need to call or return should problems arise in the interim.      Mina Parsons MD    "cured lung cancer- he was smoker       Social History     Socioeconomic History    Marital status: Single   Tobacco Use    Smoking status: Former     Packs/day: 0.25     Years: 20.00     Additional pack years: 0.00     Total pack years: 5.00     Types: Cigarettes     Quit date: 2022     Years since quittin.1     Passive exposure: Past    Smokeless tobacco: Never    Tobacco comments:     smokes less than .25 ppd   Vaping Use    Vaping Use: Never used   Substance and Sexual Activity    Alcohol use: Not Currently     Alcohol/week: 14.0 standard drinks of alcohol     Types: 14 Shots of liquor per week     Comment: 2 drinks a night    Drug use: Not Currently     Types: Marijuana     Comment: \"A COUPLE A WEEK\"    Sexual activity: Yes         There were no vitals taken for this visit.    PHYSICAL EXAM  Physical Exam   Constitutional: He is oriented to person, place, and time. He appears well-developed and well-nourished. He does not have a sickly appearance. He does not appear ill. No distress.   HENT:   Head: Normocephalic and atraumatic.   Right Ear: Hearing normal.   Left Ear: Hearing normal.   Nose: Nose normal. No mucosal edema or rhinorrhea. Right sinus exhibits no maxillary sinus tenderness and no frontal sinus tenderness. Left sinus exhibits no maxillary sinus tenderness and no frontal sinus tenderness. no nasal tenderness.  Mouth/Throat: Mouth/Lips are normal.  Pulmonary/Chest: Effort normal.  No respiratory distress. He no audible wheeze...  Neurological: He is alert and oriented to person, place, and time.   Psychiatric: He mood appears abnormal (anxious).   Vitals reviewed.      Diagnoses and all orders for this visit:    1. Lower respiratory infection (Primary)  -     amoxicillin (AMOXIL) 875 MG tablet; Take 1 tablet by mouth 2 (Two) Times a Day.  Dispense: 20 tablet; Refill: 0    Recommend Robutussin DM with honey prn cough.   Increase fluids as allowed.   Follow up with PCP prn worsening s/s. "       FOLLOW-UP  As discussed during visit with [facility], if symptoms worsen or fail to improve, follow-up with PCP/Urgent Care/Emergency Department.    Patient verbalizes understanding of medications, instructions for treatment and follow-up.    Bianca Hess, APRN  12/25/2023  15:31 EST    The use of a video visit has been reviewed with the patient and verbal informed consent has been obtained. Myself and Palomo Pollack participated in this visit. The patient is located in [facility], and I am located in [place], [place]. [employer] and [employer]  were utilized.

## 2024-01-22 ENCOUNTER — OFFICE VISIT (OUTPATIENT)
Dept: CARDIOLOGY | Facility: CLINIC | Age: 44
End: 2024-01-22
Payer: MEDICAID

## 2024-01-22 VITALS
HEART RATE: 89 BPM | SYSTOLIC BLOOD PRESSURE: 136 MMHG | OXYGEN SATURATION: 98 % | BODY MASS INDEX: 29.31 KG/M2 | WEIGHT: 209.4 LBS | DIASTOLIC BLOOD PRESSURE: 84 MMHG | HEIGHT: 71 IN

## 2024-01-22 DIAGNOSIS — Z95.810 PRESENCE OF BIVENTRICULAR IMPLANTABLE CARDIOVERTER-DEFIBRILLATOR (ICD): ICD-10-CM

## 2024-01-22 DIAGNOSIS — I50.22 CHRONIC HFREF (HEART FAILURE WITH REDUCED EJECTION FRACTION): Primary | ICD-10-CM

## 2024-01-22 DIAGNOSIS — I48.0 PAROXYSMAL ATRIAL FIBRILLATION: ICD-10-CM

## 2024-01-22 DIAGNOSIS — I44.7 LBBB (LEFT BUNDLE BRANCH BLOCK): ICD-10-CM

## 2024-01-22 PROCEDURE — 99214 OFFICE O/P EST MOD 30 MIN: CPT | Performed by: INTERNAL MEDICINE

## 2024-01-22 NOTE — PROGRESS NOTES
Cardiac Electrophysiology Outpatient Follow Up Note            [facility] at [facility]    Follow Up Office Visit      Palomo Pollack  [id number]  01/22/2024  [unfilled]  [unfilled]    Primary Care Physician: Heladio Valentine DO    Referred By: No ref. provider found    Subjective     Chief Complaint:   Diagnoses and all orders for this visit:    1. Chronic HFrEF (heart failure with reduced ejection fraction) (Primary)    2. LBBB (left bundle branch block)    3. Presence of biventricular implantable cardioverter-defibrillator (ICD)    4. Paroxysmal atrial fibrillation      Chief Complaint   Patient presents with    Chronic HFrEF (heart failure with reduced ejection fraction)     3-Mo F/U       History of Present Illness:   Palomo Pollack is a 43 y.o. male who presents to my electrophysiology clinic for follow up of above complaints.  Palomo is doing very well since receiving his device in the later stages of October last year.  Significant improvement in heart failure symptoms.  He is interested in going back to the gym..      Past Medical History:   Past Medical History:   Diagnosis Date    Allergic rhinitis     Chronic right ear pain     h/o    Diabetes mellitus     h/o-  doesnt check sugar    Diverticulitis     GERD (gastroesophageal reflux disease)     Headache     Heart failure     History of kidney stones     passed them    History of shingles     1999- after transplant of stem cells    History of transfusion     1999- 1 unit- stem cell transplant -  recieved plts - no reaction recalled    Hodgkin lymphoma     Hypogonadism in male     PTSD (post-traumatic stress disorder)     Stem cells transplant status     Vision blurred     possibly need glasses       Past Surgical History:   Past Surgical History:   Procedure Laterality Date    BIVENTRICULLAR IMPLANTABLE CARDIOVERTER DEFIBRILLATOR PLACEMENT N/A 10/26/2023    Procedure: Implant ICD - bi  "ventricular; DNS meds; [place];  Surgeon: Wilmer Rojas DO;  Location:  [facility] EP INVASIVE LOCATION;  Service: Cardiovascular;  Laterality: N/A;    CARDIAC CATHETERIZATION N/A 2023    Procedure: Left Heart Cath;  Surgeon: Alec Mason III, MD;  Location:  [facility] CATH INVASIVE LOCATION;  Service: Cardiovascular;  Laterality: N/A;    CENTRAL VENOUS LINE INSERTION      COLONOSCOPY      PORTOCAVAL SHUNT PLACEMENT      WISDOM TOOTH EXTRACTION         Family History:   Family History   Problem Relation Age of Onset    Stroke Father     Lung cancer Father     Cancer Father         Positive for cured lung cancer- he was smoker       Social History:   Social History     Socioeconomic History    Marital status: Single   Tobacco Use    Smoking status: Former     Packs/day: 0.25     Years: 20.00     Additional pack years: 0.00     Total pack years: 5.00     Types: Cigarettes     Quit date: 2022     Years since quittin.2     Passive exposure: Past    Smokeless tobacco: Never    Tobacco comments:     smokes less than .25 ppd   Vaping Use    Vaping Use: Never used   Substance and Sexual Activity    Alcohol use: Not Currently     Comment: Occasional on weekends    Drug use: Yes     Types: Marijuana     Comment: \"A COUPLE A WEEK\"    Sexual activity: Yes       Medications:     Current Outpatient Medications:     acetaminophen (TYLENOL) 325 MG tablet, Take 2 tablets by mouth Every 6 (Six) Hours As Needed for Mild Pain., Disp: , Rfl:     carvedilol (COREG) 6.25 MG tablet, Take 1 tablet by mouth 2 (Two) Times a Day., Disp: 180 tablet, Rfl: 1    empagliflozin (JARDIANCE) 10 MG tablet tablet, Take 1 tablet by mouth Daily., Disp: 90 tablet, Rfl: 3    Entresto 24-26 MG tablet, Take 1 tablet by mouth twice daily, Disp: 60 tablet, Rfl: 0    esomeprazole (nexIUM) 40 MG capsule, Take 1 capsule by mouth Every Morning Before Breakfast., Disp: 90 capsule, Rfl: 3    fexofenadine (ALLEGRA) 180 MG tablet, Take 1 tablet by mouth " "Daily., Disp: 90 tablet, Rfl: 3    furosemide (Lasix) 20 MG tablet, Take 1 tablet by mouth Daily As Needed (SOB, swelling, weight gain > 3 lbs)., Disp: 30 tablet, Rfl: 6    spironolactone (ALDACTONE) 25 MG tablet, Take 1 tablet by mouth Daily., Disp: 90 tablet, Rfl: 3    Testosterone 1.62 % gel, APPLY 2 PUMPS TOPICALLY TO THE AFFECTED AREA EVERY DAY, Disp: , Rfl:     albuterol sulfate  (90 Base) MCG/ACT inhaler, Inhale 2 puffs Every 4 (Four) Hours As Needed for Wheezing. (Patient not taking: Reported on 1/22/2024), Disp: 8 g, Rfl: 2    amoxicillin (AMOXIL) 875 MG tablet, Take 1 tablet by mouth 2 (Two) Times a Day. (Patient not taking: Reported on 1/22/2024), Disp: 20 tablet, Rfl: 0    Allergies:   Allergies   Allergen Reactions    Bactrim [Sulfamethoxazole-Trimethoprim] Rash and Other (See Comments)     Gave increased heartrate-biaxin      Clarithromycin Other (See Comments) and Unknown - Low Severity       Objective   Vital Signs:   Vitals:    01/22/24 1006   BP: 136/84   BP Location: Left arm   Patient Position: Sitting   Cuff Size: Adult   Pulse: 89   SpO2: 98%   Weight: 95 kg (209 lb 6.4 oz)   Height: 180.3 cm (71\")       PHYSICAL EXAM  General appearance: Awake, alert, cooperative  Head: Normocephalic, without obvious abnormality, atraumatic  Eyes: Conjunctivae/corneas clear, EOMs intact  Neck: no adenopathy, no carotid bruit, no JVD, and thyroid: not enlarged  Lungs: clear to auscultation bilaterally and no rhonchi or crackles\", ' symmetric  Heart: regular rate and rhythm, S1, S2 normal, no murmur, click, rub or gallop  Abdomen: Soft, non-tender, bowel sounds normal,  no organomegaly  Extremities: extremities normal, atraumatic, no cyanosis or edema  Skin: Skin color, turgor normal, no rashes or lesions  Neurologic: Grossly normal     Lab Results   Component Value Date    GLUCOSE 119 (H) 10/23/2023    CALCIUM 9.2 10/23/2023     10/23/2023    K 4.6 10/23/2023    CO2 26.0 10/23/2023     " "10/23/2023    BUN 17 10/23/2023    CREATININE 1.09 10/23/2023    EGFRIFNONA 75 03/02/2018    BCR 15.6 10/23/2023    ANIONGAP 12.0 10/23/2023     Lab Results   Component Value Date    WBC 6.15 10/23/2023    HGB 15.8 10/23/2023    HCT 46.9 10/23/2023    MCV 92.5 10/23/2023     10/23/2023     No results found for: \"INR\", \"PROTIME\"  Lab Results   Component Value Date    TSH 12.110 (H) 05/29/2023       Cardiac Testing:     I personally viewed and interpreted the patient's EKG/Telemetry/lab data    Procedures    Tobacco Cessation: N/A  Obstructive Sleep Apnea Screening: Completed    Advance Care Planning   ACP discussion was declined by the patient. Patient does not have an advance directive, declines further assistance.       Assessment & Plan    Diagnoses and all orders for this visit:    1. Chronic HFrEF (heart failure with reduced ejection fraction) (Primary)    2. LBBB (left bundle branch block)    3. Presence of biventricular implantable cardioverter-defibrillator (ICD)    4. Paroxysmal atrial fibrillation         Diagnosis Plan   1. Chronic HFrEF (heart failure with reduced ejection fraction)  Stable.  New York Heart Association functional class II at this point.  Significant improvement with CRT therapy.          2. LBBB (left bundle branch block)  CRT.      3. Presence of biventricular implantable cardioverter-defibrillator (ICD)  [employer] resynchronization ICD system in situ.  Appropriate pacing sensing and impedance values.  Doing well.   4.  Paroxysmal atrial fibrillation.  New discovery.  Short episodes.  1 hour duration at most.  Anticoagulation is clearly indicated.  We will initiate anterior coagulation with apixaban 5 mg twice daily.    Will follow-up with him in 6 months time if his A-fib burden substantially increases catheter ablation will need to be considered as first-line therapy given his heart failure status.     Body mass index is 29.21 kg/m².    I spent 683 minutes in " consultation with this patient which included more than 65% of this time in direct face-to-face counseling, physical examination and discussion of my assessment and findings and this shared decision making with the patient.  The remainder of the time not spent face-to-face was performing one, some or all of the following actions: preparing to see the patient (e.g. reviewing tests, prior clinicians' notes, etc), ordering medications, tests or procedures, coordination of care, discussion of the plan with other healthcare providers, documenting clinical information in epic as well as independently interpreting results and communication of these results to the patient family and/or caregiver(s).  Please note that this explicitly excludes time spent on other separate billable services such as performing procedures or test interpretation, when applicable.      Follow Up:       Thank you for allowing me to participate in the care of your patient. Please to not hesitate to contact me with additional questions or concerns.      Wilmer Rojas DO, FACC, RS  Cardiac Electrophysiologist  [facility] / [facility]

## 2024-01-24 ENCOUNTER — OFFICE VISIT (OUTPATIENT)
Dept: CARDIOLOGY | Facility: CLINIC | Age: 44
End: 2024-01-24
Payer: MEDICAID

## 2024-01-24 VITALS
DIASTOLIC BLOOD PRESSURE: 78 MMHG | WEIGHT: 210 LBS | HEIGHT: 71 IN | SYSTOLIC BLOOD PRESSURE: 118 MMHG | OXYGEN SATURATION: 96 % | BODY MASS INDEX: 29.4 KG/M2 | HEART RATE: 98 BPM

## 2024-01-24 DIAGNOSIS — I48.0 PAROXYSMAL ATRIAL FIBRILLATION: ICD-10-CM

## 2024-01-24 DIAGNOSIS — I50.22 CHRONIC HFREF (HEART FAILURE WITH REDUCED EJECTION FRACTION): Primary | ICD-10-CM

## 2024-01-24 RX ORDER — SPIRONOLACTONE 25 MG/1
25 TABLET ORAL DAILY
Qty: 90 TABLET | Refills: 3 | Status: SHIPPED | OUTPATIENT
Start: 2024-01-24

## 2024-01-24 RX ORDER — CARVEDILOL 6.25 MG/1
6.25 TABLET ORAL 2 TIMES DAILY
Qty: 180 TABLET | Refills: 3 | Status: SHIPPED | OUTPATIENT
Start: 2024-01-24

## 2024-01-24 RX ORDER — SACUBITRIL AND VALSARTAN 24; 26 MG/1; MG/1
1 TABLET, FILM COATED ORAL 2 TIMES DAILY
Qty: 180 TABLET | Refills: 3 | Status: SHIPPED | OUTPATIENT
Start: 2024-01-24

## 2024-01-24 NOTE — PROGRESS NOTES
[facility]  Office visit  Palomo Pollack  1980  [phone]  There is no work phone number on file.    VISIT DATE:  1/24/2024    PCP: Heladio Valentine DO  [address] [id number]    CC:  No chief complaint on file.      Previous cardiac studies and procedures:  May 2023  Myocardial perfusion imaging    Calcifications visualized in the proximal and mid RCA.    Left ventricular ejection fraction is moderately reduced (Calculated EF = 37%).    Abnormal LV wall motion consistent with moderate hypokinesis of the septal wall.    Large region of moderately decreased perfusion which is predominantly fixed involving the septal wall, apex, and mid portions of the anterior and inferior walls.    Impressions are consistent with a high risk study.  TTE    Left ventricular systolic function is moderately decreased. Calculated left ventricular EF = 28% Left ventricular ejection fraction appears to be 26 - 30%.    The left ventricular cavity is mildly dilated.    The left atrial cavity is mildly dilated.    Moderate tricuspid valve regurgitation is present.    Estimated right ventricular systolic pressure from tricuspid regurgitation is moderately elevated (45-55 mmHg). Calculated right ventricular systolic pressure from tricuspid regurgitation is 50 mmHg.    Cardiac catheterization  Luminal irregularities noted in the proximal mid major epicardial vessels.  LVEDP 21 mmHg  No aortic stenosis    September 2023 TTE    Left ventricular systolic function is moderately decreased. Calculated left ventricular EF = 30% Left ventricular ejection fraction appears to be 31 - 35%.    Left ventricular wall thickness is consistent with mild concentric hypertrophy.    Left ventricular diastolic function was indeterminate.    October 2023 CRT-D [employer]    ASSESSMENT:   Diagnosis Plan   1. Chronic HFrEF (heart failure with reduced ejection fraction)        2.  Paroxysmal atrial fibrillation              PLAN:  Heart failure with reduced ejection fraction, chronic: Suspect secondary to late onset chemotherapeutic effects.  Currently euvolemic and compensated.  Clinical improvement status post CRT-D.  Continue current medical therapy.  Previous attempts at titration led to symptomatic fatigue.  Will reassess in 4 to 6 months to see if she will tolerate further titration of guideline directed medical therapy.    Paroxysmal atrial fibrillation: Following burden of arrhythmia through device interrogation.  Eliquis 5 mg p.o. twice daily for stroke prophylaxis.    Subjective  Interval assessment: Compliant with medical therapy.  Denies dyspnea, palpitations, chest discomfort.  No presyncope or syncope.  Denies PND orthopnea.  No lower extremity edema.  Does report increased energy and decreasing shortness of breath following CRT-D implantation.  Feels like he is just getting back to his baseline state of health and has begun to exercise on a regular basis.    Initial evaluation: 43-year-old gentleman with persistent limiting dyspnea on exertion over the previous 4 months following COVID-19 infection in December 2022.  Also with intermittent palpitations.  Describes intermittent isolated flip-flop sensations, also with intermittent sustained palpitations in which she feels his heart is beating hard and fast.  Has intermittent left upper chest discomfort rating down his left arm, no obvious triggers.  No alleviating or exacerbating features.  Does have a history of Hodgkin's lymphoma with chemotherapy and chest radiation and stem cell transplant.  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  Diagnosed with diabetes last year, was able to normalize his hemoglobin A1c with dietary changes and approximately 50 pound weight loss.    PHYSICAL EXAMINATION:  There were no vitals filed for this  visit.    General Appearance:    Alert, cooperative, no distress, appears stated age   Head:    Normocephalic, without obvious abnormality, atraumatic   Eyes:    conjunctiva/corneas clear   Nose:   Nares normal, septum midline, mucosa normal, no drainage   Throat:   Lips, teeth and gums normal   Neck:   Supple, symmetrical, trachea midline, no carotid    bruit or JVD   Lungs:     Clear to auscultation bilaterally, respirations unlabored   Chest Wall:    No tenderness or deformity    Heart:    Regular rate and rhythm, S1 and S2 normal, no murmur, rub   or gallop, normal carotid impulse bilaterally without bruit.   Abdomen:     Soft, non-tender   Extremities:   Extremities normal, atraumatic, no cyanosis or edema   Pulses:   2+ and symmetric all extremities   Skin:   Skin color, texture, turgor normal, no rashes or lesions       Diagnostic Data:  Procedures  Lab Results   Component Value Date    TRIG 93 05/31/2023    HDL 59 05/31/2023     Lab Results   Component Value Date    GLUCOSE 119 (H) 10/23/2023    BUN 17 10/23/2023    CREATININE 1.09 10/23/2023     10/23/2023    K 4.6 10/23/2023     10/23/2023    CO2 26.0 10/23/2023     Lab Results   Component Value Date    HGBA1C 7.00 (H) 05/31/2023     Lab Results   Component Value Date    WBC 6.15 10/23/2023    HGB 15.8 10/23/2023    HCT 46.9 10/23/2023     10/23/2023       Allergies  Allergies   Allergen Reactions    Bactrim [Sulfamethoxazole-Trimethoprim] Rash and Other (See Comments)     Gave increased heartrate-biaxin      Clarithromycin Other (See Comments) and Unknown - Low Severity       Current Medications    Current Outpatient Medications:     acetaminophen (TYLENOL) 325 MG tablet, Take 2 tablets by mouth Every 6 (Six) Hours As Needed for Mild Pain., Disp: , Rfl:     albuterol sulfate  (90 Base) MCG/ACT inhaler, Inhale 2 puffs Every 4 (Four) Hours As Needed for Wheezing. (Patient not taking: Reported on 1/22/2024), Disp: 8 g, Rfl: 2     "amoxicillin (AMOXIL) 875 MG tablet, Take 1 tablet by mouth 2 (Two) Times a Day. (Patient not taking: Reported on 2024), Disp: 20 tablet, Rfl: 0    apixaban (Eliquis) 5 MG tablet tablet, Take 1 tablet by mouth 2 (Two) Times a Day., Disp: 180 tablet, Rfl: 3    carvedilol (COREG) 6.25 MG tablet, Take 1 tablet by mouth 2 (Two) Times a Day., Disp: 180 tablet, Rfl: 1    empagliflozin (JARDIANCE) 10 MG tablet tablet, Take 1 tablet by mouth Daily., Disp: 90 tablet, Rfl: 3    Entresto 24-26 MG tablet, Take 1 tablet by mouth twice daily, Disp: 60 tablet, Rfl: 0    esomeprazole (nexIUM) 40 MG capsule, Take 1 capsule by mouth Every Morning Before Breakfast., Disp: 90 capsule, Rfl: 3    fexofenadine (ALLEGRA) 180 MG tablet, Take 1 tablet by mouth Daily., Disp: 90 tablet, Rfl: 3    furosemide (Lasix) 20 MG tablet, Take 1 tablet by mouth Daily As Needed (SOB, swelling, weight gain > 3 lbs)., Disp: 30 tablet, Rfl: 6    spironolactone (ALDACTONE) 25 MG tablet, Take 1 tablet by mouth Daily., Disp: 90 tablet, Rfl: 3    Testosterone 1.62 % gel, APPLY 2 PUMPS TOPICALLY TO THE AFFECTED AREA EVERY DAY, Disp: , Rfl:           ROS  ROS      SOCIAL HX  Social History     Socioeconomic History    Marital status: Single   Tobacco Use    Smoking status: Former     Packs/day: 0.25     Years: 20.00     Additional pack years: 0.00     Total pack years: 5.00     Types: Cigarettes     Quit date: 2022     Years since quittin.2     Passive exposure: Past    Smokeless tobacco: Never    Tobacco comments:     smokes less than .25 ppd   Vaping Use    Vaping Use: Never used   Substance and Sexual Activity    Alcohol use: Not Currently     Comment: Occasional on weekends    Drug use: Yes     Types: Marijuana     Comment: \"A COUPLE A WEEK\"    Sexual activity: Yes       FAMILY HX  Family History   Problem Relation Age of Onset    Stroke Father     Lung cancer Father     Cancer Father         Positive for cured lung cancer- he was smoker "             Alec Mason III, MD, FACC

## 2024-04-02 DIAGNOSIS — J30.2 SEASONAL ALLERGIES: ICD-10-CM

## 2024-04-02 RX ORDER — FEXOFENADINE HCL 180 MG/1
180 TABLET ORAL DAILY
Qty: 90 TABLET | Refills: 0 | Status: SHIPPED | OUTPATIENT
Start: 2024-04-02

## 2024-04-10 ENCOUNTER — TELEPHONE (OUTPATIENT)
Dept: FAMILY MEDICINE CLINIC | Facility: CLINIC | Age: 44
End: 2024-04-10
Payer: MEDICAID

## 2024-04-10 ENCOUNTER — TELEMEDICINE (OUTPATIENT)
Dept: FAMILY MEDICINE CLINIC | Facility: CLINIC | Age: 44
End: 2024-04-10
Payer: MEDICAID

## 2024-04-10 DIAGNOSIS — J06.9 UPPER RESPIRATORY TRACT INFECTION, UNSPECIFIED TYPE: Primary | ICD-10-CM

## 2024-04-10 PROCEDURE — 1159F MED LIST DOCD IN RCRD: CPT | Performed by: FAMILY MEDICINE

## 2024-04-10 PROCEDURE — 99213 OFFICE O/P EST LOW 20 MIN: CPT | Performed by: FAMILY MEDICINE

## 2024-04-10 PROCEDURE — 1160F RVW MEDS BY RX/DR IN RCRD: CPT | Performed by: FAMILY MEDICINE

## 2024-04-10 RX ORDER — DOXYCYCLINE 100 MG/1
100 CAPSULE ORAL 2 TIMES DAILY
Qty: 20 CAPSULE | Refills: 0 | Status: SHIPPED | OUTPATIENT
Start: 2024-04-10

## 2024-04-10 NOTE — TELEPHONE ENCOUNTER
HUB TO RELAY        LVM        ** needing more information from patient, such as symptoms etc.

## 2024-04-10 NOTE — PROGRESS NOTES
Follow Up Office Visit      Patient Name: Palomo Pollack  : 1980   MRN: [id number]     Chief Complaint:    Chief Complaint   Patient presents with    Sinusitis       History of Present Illness: Palomo Pollack is a 44 y.o. male who is here today to follow up with with sinus issues for about week. He has had this before. He was on amoxicillin for a week and took ibuprofen. He tried cold and sinus tylenol which doesn't work. He has been off work since last Thursday. He went back yesterday. Had body aches. No fever. Went to urgent care and was given amoxicillin. Has productive cough, sinus pressure, sneezing.       Physical exam: Cannot be performed      Patient confirmed their consent for this visit. They also confirmed their name, , and location in [place]. video and audio used for this visit.  Provider and patient located in [place].      Subjective        I have reviewed and the following portions of the patient's history were updated as appropriate: past family history, past medical history, past social history, past surgical history and problem list.    Medications:     Current Outpatient Medications:     acetaminophen (TYLENOL) 325 MG tablet, Take 2 tablets by mouth Every 6 (Six) Hours As Needed for Mild Pain., Disp: , Rfl:     albuterol sulfate  (90 Base) MCG/ACT inhaler, Inhale 2 puffs Every 4 (Four) Hours As Needed for Wheezing. (Patient not taking: Reported on 2024), Disp: 8 g, Rfl: 2    amoxicillin (AMOXIL) 875 MG tablet, Take 1 tablet by mouth 2 (Two) Times a Day. (Patient not taking: Reported on 2024), Disp: 20 tablet, Rfl: 0    apixaban (Eliquis) 5 MG tablet tablet, Take 1 tablet by mouth 2 (Two) Times a Day., Disp: 180 tablet, Rfl: 3    carvedilol (COREG) 6.25 MG tablet, Take 1 tablet by mouth 2 (Two) Times a Day., Disp: 180 tablet, Rfl: 3    doxycycline (MONODOX) 100 MG capsule, Take 1 capsule by mouth 2 (Two) Times a Day., Disp: 20 capsule, Rfl: 0     empagliflozin (JARDIANCE) 10 MG tablet tablet, Take 1 tablet by mouth Daily., Disp: 90 tablet, Rfl: 3    esomeprazole (nexIUM) 40 MG capsule, Take 1 capsule by mouth Every Morning Before Breakfast., Disp: 90 capsule, Rfl: 3    fexofenadine (ALLEGRA) 180 MG tablet, Take 1 tablet by mouth Daily., Disp: 90 tablet, Rfl: 0    furosemide (Lasix) 20 MG tablet, Take 1 tablet by mouth Daily As Needed (SOB, swelling, weight gain > 3 lbs)., Disp: 30 tablet, Rfl: 6    sacubitril-valsartan (Entresto) 24-26 MG tablet, Take 1 tablet by mouth 2 (Two) Times a Day., Disp: 180 tablet, Rfl: 3    spironolactone (ALDACTONE) 25 MG tablet, Take 1 tablet by mouth Daily., Disp: 90 tablet, Rfl: 3    Testosterone 1.62 % gel, APPLY 2 PUMPS TOPICALLY TO THE AFFECTED AREA EVERY DAY, Disp: , Rfl:     Allergies:   Allergies   Allergen Reactions    Bactrim [Sulfamethoxazole-Trimethoprim] Rash and Other (See Comments)     Gave increased heartrate-biaxin      Clarithromycin Other (See Comments) and Unknown - Low Severity       Objective     Physical Exam: Please see above  Vital Signs: There were no vitals filed for this visit.  There is no height or weight on file to calculate BMI.          Assessment / Plan      Assessment/Plan:   Diagnoses and all orders for this visit:    1. Upper respiratory tract infection, unspecified type (Primary)  -     doxycycline (MONODOX) 100 MG capsule; Take 1 capsule by mouth 2 (Two) Times a Day.  Dispense: 20 capsule; Refill: 0    Patient failed amoxicillin.  Signs of patient might have been treated to early but his symptoms have improved slightly.  Will go ahead and treat with doxycycline given persistent symptoms.  I would expect him to recover mostly from this antibiotic but he may have some residual symptoms.  If he has residual symptoms like mild cough, would wait another month before retreating.  Patient feels like he is getting sicker and worse-then may need to do further workup or chest x-ray.    No testing  performed today as patient was out of office.      Complete time for this exam was 8 minutes.  Spent 7 minutes on the video visit with the patient additional 1 minute documenting this note and reviewing his chart.      Follow Up:   Return in about 2 months (around 6/10/2024) for Recheck diabetes, htn.    Heladio Valentine DO  [facility] [place]

## 2024-04-10 NOTE — TELEPHONE ENCOUNTER
Spoke to patient who stated he has fluid in ears, clogged sinuses, tenderness under eyes, sinus headache, tenderness above eyes. Stated he has had chronic sinus infections for over 30 years and only a zpak helps him.         Spoke to patient and advised per Dr. Valentine he needs to be seen. Patient voiced understanding and I sent him to the  to schedule an appointment.

## 2024-04-17 ENCOUNTER — TELEPHONE (OUTPATIENT)
Dept: CARDIOLOGY | Facility: CLINIC | Age: 44
End: 2024-04-17

## 2024-04-17 NOTE — TELEPHONE ENCOUNTER
Demarco today 04/17/24 to confirm appointment on 05/22/2024 10:30am with Dr. Mason.  This apointment is at [place] [address] [address].

## 2024-05-22 ENCOUNTER — OFFICE VISIT (OUTPATIENT)
Dept: CARDIOLOGY | Facility: CLINIC | Age: 44
End: 2024-05-22
Payer: MEDICAID

## 2024-05-22 VITALS
HEART RATE: 56 BPM | SYSTOLIC BLOOD PRESSURE: 120 MMHG | HEIGHT: 71 IN | OXYGEN SATURATION: 100 % | BODY MASS INDEX: 27.94 KG/M2 | DIASTOLIC BLOOD PRESSURE: 78 MMHG | WEIGHT: 199.6 LBS

## 2024-05-22 DIAGNOSIS — I48.0 PAROXYSMAL ATRIAL FIBRILLATION: ICD-10-CM

## 2024-05-22 DIAGNOSIS — I44.7 LBBB (LEFT BUNDLE BRANCH BLOCK): ICD-10-CM

## 2024-05-22 DIAGNOSIS — I50.22 CHRONIC HFREF (HEART FAILURE WITH REDUCED EJECTION FRACTION): Primary | ICD-10-CM

## 2024-05-22 NOTE — PROGRESS NOTES
[facility] Cardiology  Office visit  Palomo Pollack  1980  [phone]  There is no work phone number on file.    VISIT DATE:  5/22/2024    PCP: Heladio Valentine DO  [facility] [place] [id number]    CC:  Chief Complaint   Patient presents with    heart failure with reduced ejection fraction       Previous cardiac studies and procedures:  May 2023  Myocardial perfusion imaging    Calcifications visualized in the proximal and mid RCA.    Left ventricular ejection fraction is moderately reduced (Calculated EF = 37%).    Abnormal LV wall motion consistent with moderate hypokinesis of the septal wall.    Large region of moderately decreased perfusion which is predominantly fixed involving the septal wall, apex, and mid portions of the anterior and inferior walls.    Impressions are consistent with a high risk study.  TTE    Left ventricular systolic function is moderately decreased. Calculated left ventricular EF = 28% Left ventricular ejection fraction appears to be 26 - 30%.    The left ventricular cavity is mildly dilated.    The left atrial cavity is mildly dilated.    Moderate tricuspid valve regurgitation is present.    Estimated right ventricular systolic pressure from tricuspid regurgitation is moderately elevated (45-55 mmHg). Calculated right ventricular systolic pressure from tricuspid regurgitation is 50 mmHg.    Cardiac catheterization  Luminal irregularities noted in the proximal mid major epicardial vessels.  LVEDP 21 mmHg  No aortic stenosis    September 2023 TTE    Left ventricular systolic function is moderately decreased. Calculated left ventricular EF = 30% Left ventricular ejection fraction appears to be 31 - 35%.    Left ventricular wall thickness is consistent with mild concentric hypertrophy.    Left ventricular diastolic function was indeterminate.    October 2023 CRT-D [employer]    ASSESSMENT:   Diagnosis Plan   1. Chronic HFrEF  (heart failure with reduced ejection fraction)        2. LBBB (left bundle branch block)        3. Paroxysmal atrial fibrillation              PLAN:  Heart failure with reduced ejection fraction, chronic: Suspect secondary to late onset chemotherapeutic effects.  Currently euvolemic and compensated.  Clinical improvement status post CRT-D.  Has not tolerated further attempted titration of beta-blockade.  Will attempt to gradually titrate Entresto, he was instructed to double up on his nighttime dose for the next 2 weeks and then start taking 2 tablets twice a day after that, if he tolerates this dose will send in prescription for 49-51 mg p.o. twice daily.  Otherwise continue current medical therapy.    Paroxysmal atrial fibrillation: Following burden of arrhythmia through device interrogation.  Eliquis 5 mg p.o. twice daily for stroke prophylaxis.    Subjective  Interval assessment: Compliant with medical therapy.  Denies dyspnea, palpitations, chest discomfort.  No presyncope or syncope.  Denies PND orthopnea.  Back to working on a regular basis without difficulty, fairly physical work.    Initial evaluation: 43-year-old gentleman with persistent limiting dyspnea on exertion over the previous 4 months following COVID-19 infection in December 2022.  Also with intermittent palpitations.  Describes intermittent isolated flip-flop sensations, also with intermittent sustained palpitations in which she feels his heart is beating hard and fast.  Has intermittent left upper chest discomfort rating down his left arm, no obvious triggers.  No alleviating or exacerbating features.  Does have a history of Hodgkin's lymphoma with chemotherapy and chest radiation and stem cell transplant.  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  Diagnosed with diabetes last year, was able to normalize his hemoglobin A1c with dietary changes and  "approximately 50 pound weight loss.    PHYSICAL EXAMINATION:  Vitals:    05/22/24 1008   BP: 120/78   BP Location: Left arm   Patient Position: Sitting   Pulse: 56   SpO2: 100%   Weight: 90.5 kg (199 lb 9.6 oz)   Height: 180.3 cm (71\")       General Appearance:    Alert, cooperative, no distress, appears stated age   Head:    Normocephalic, without obvious abnormality, atraumatic   Eyes:    conjunctiva/corneas clear   Nose:   Nares normal, septum midline, mucosa normal, no drainage   Throat:   Lips, teeth and gums normal   Neck:   Supple, symmetrical, trachea midline, no carotid    bruit or JVD   Lungs:     Clear to auscultation bilaterally, respirations unlabored   Chest Wall:    No tenderness or deformity    Heart:    Regular rate and rhythm, S1 and S2 normal, no murmur, rub   or gallop, normal carotid impulse bilaterally without bruit.   Abdomen:     Soft, non-tender   Extremities:   Extremities normal, atraumatic, no cyanosis or edema   Pulses:   2+ and symmetric all extremities   Skin:   Skin color, texture, turgor normal, no rashes or lesions       Diagnostic Data:  Procedures  Lab Results   Component Value Date    TRIG 93 05/31/2023    HDL 59 05/31/2023     Lab Results   Component Value Date    GLUCOSE 119 (H) 10/23/2023    BUN 17 10/23/2023    CREATININE 1.09 10/23/2023     10/23/2023    K 4.6 10/23/2023     10/23/2023    CO2 26.0 10/23/2023     Lab Results   Component Value Date    HGBA1C 7.00 (H) 05/31/2023     Lab Results   Component Value Date    WBC 6.15 10/23/2023    HGB 15.8 10/23/2023    HCT 46.9 10/23/2023     10/23/2023       Allergies  Allergies   Allergen Reactions    Bactrim [Sulfamethoxazole-Trimethoprim] Rash and Other (See Comments)     Gave increased heartrate-biaxin      Clarithromycin Other (See Comments) and Unknown - Low Severity       Current Medications    Current Outpatient Medications:     acetaminophen (TYLENOL) 325 MG tablet, Take 2 tablets by mouth Every 6 (Six) " Hours As Needed for Mild Pain., Disp: , Rfl:     apixaban (Eliquis) 5 MG tablet tablet, Take 1 tablet by mouth 2 (Two) Times a Day., Disp: 180 tablet, Rfl: 3    carvedilol (COREG) 6.25 MG tablet, Take 1 tablet by mouth 2 (Two) Times a Day., Disp: 180 tablet, Rfl: 3    doxycycline (MONODOX) 100 MG capsule, Take 1 capsule by mouth 2 (Two) Times a Day., Disp: 20 capsule, Rfl: 0    empagliflozin (JARDIANCE) 10 MG tablet tablet, Take 1 tablet by mouth Daily., Disp: 90 tablet, Rfl: 3    esomeprazole (nexIUM) 40 MG capsule, Take 1 capsule by mouth Every Morning Before Breakfast., Disp: 90 capsule, Rfl: 3    fexofenadine (ALLEGRA) 180 MG tablet, Take 1 tablet by mouth Daily., Disp: 90 tablet, Rfl: 0    furosemide (Lasix) 20 MG tablet, Take 1 tablet by mouth Daily As Needed (SOB, swelling, weight gain > 3 lbs)., Disp: 30 tablet, Rfl: 6    sacubitril-valsartan (Entresto) 24-26 MG tablet, Take 1 tablet by mouth 2 (Two) Times a Day., Disp: 180 tablet, Rfl: 3    spironolactone (ALDACTONE) 25 MG tablet, Take 1 tablet by mouth Daily., Disp: 90 tablet, Rfl: 3    Testosterone 1.62 % gel, APPLY 2 PUMPS TOPICALLY TO THE AFFECTED AREA EVERY DAY, Disp: , Rfl:     albuterol sulfate  (90 Base) MCG/ACT inhaler, Inhale 2 puffs Every 4 (Four) Hours As Needed for Wheezing. (Patient not taking: Reported on 2024), Disp: 8 g, Rfl: 2    amoxicillin (AMOXIL) 875 MG tablet, Take 1 tablet by mouth 2 (Two) Times a Day. (Patient not taking: Reported on 2024), Disp: 20 tablet, Rfl: 0          ROS  ROS      SOCIAL HX  Social History     Socioeconomic History    Marital status: Single   Tobacco Use    Smoking status: Former     Current packs/day: 0.00     Average packs/day: 0.3 packs/day for 20.0 years (5.0 ttl pk-yrs)     Types: Cigarettes     Start date: 2002     Quit date: 2022     Years since quittin.5     Passive exposure: Past    Smokeless tobacco: Never    Tobacco comments:     smokes less than .25 ppd   Vaping Use     Vaping status: Never Used   Substance and Sexual Activity    Alcohol use: Not Currently     Alcohol/week: 4.0 standard drinks of alcohol     Types: 4 Shots of liquor per week     Comment: Occasional on weekends    Drug use: Not Currently     Types: Marijuana     Comment: stopped april 2024    Sexual activity: Yes       FAMILY HX  Family History   Problem Relation Age of Onset    Stroke Father     Lung cancer Father     Cancer Father         Positive for cured lung cancer- he was smoker             Alec Mason III, MD, FACC

## 2024-06-13 DIAGNOSIS — K21.9 GASTROESOPHAGEAL REFLUX DISEASE WITHOUT ESOPHAGITIS: ICD-10-CM

## 2024-06-13 RX ORDER — ESOMEPRAZOLE MAGNESIUM 40 MG/1
40 CAPSULE, DELAYED RELEASE ORAL
Qty: 90 CAPSULE | Refills: 3 | Status: SHIPPED | OUTPATIENT
Start: 2024-06-13

## 2024-06-17 ENCOUNTER — LAB (OUTPATIENT)
Dept: LAB | Facility: HOSPITAL | Age: 44
End: 2024-06-17
Payer: MEDICAID

## 2024-06-17 LAB
BASOPHILS # BLD AUTO: 0.05 10*3/MM3 (ref 0–0.2)
BASOPHILS NFR BLD AUTO: 1 % (ref 0–1.5)
DEPRECATED RDW RBC AUTO: 46 FL (ref 37–54)
EOSINOPHIL # BLD AUTO: 0.09 10*3/MM3 (ref 0–0.4)
EOSINOPHIL NFR BLD AUTO: 1.8 % (ref 0.3–6.2)
ERYTHROCYTE [DISTWIDTH] IN BLOOD BY AUTOMATED COUNT: 13.6 % (ref 12.3–15.4)
HCT VFR BLD AUTO: 45.7 % (ref 37.5–51)
HGB BLD-MCNC: 15.3 G/DL (ref 13–17.7)
IMM GRANULOCYTES # BLD AUTO: 0.01 10*3/MM3 (ref 0–0.05)
IMM GRANULOCYTES NFR BLD AUTO: 0.2 % (ref 0–0.5)
LYMPHOCYTES # BLD AUTO: 1.15 10*3/MM3 (ref 0.7–3.1)
LYMPHOCYTES NFR BLD AUTO: 23.1 % (ref 19.6–45.3)
MCH RBC QN AUTO: 31.2 PG (ref 26.6–33)
MCHC RBC AUTO-ENTMCNC: 33.5 G/DL (ref 31.5–35.7)
MCV RBC AUTO: 93.1 FL (ref 79–97)
MONOCYTES # BLD AUTO: 0.47 10*3/MM3 (ref 0.1–0.9)
MONOCYTES NFR BLD AUTO: 9.4 % (ref 5–12)
NEUTROPHILS NFR BLD AUTO: 3.21 10*3/MM3 (ref 1.7–7)
NEUTROPHILS NFR BLD AUTO: 64.5 % (ref 42.7–76)
PLATELET # BLD AUTO: 147 10*3/MM3 (ref 140–450)
PMV BLD AUTO: 10.6 FL (ref 6–12)
RBC # BLD AUTO: 4.91 10*6/MM3 (ref 4.14–5.8)
WBC NRBC COR # BLD AUTO: 4.98 10*3/MM3 (ref 3.4–10.8)

## 2024-06-17 PROCEDURE — 80053 COMPREHEN METABOLIC PANEL: CPT | Performed by: INTERNAL MEDICINE

## 2024-06-17 PROCEDURE — 85025 COMPLETE CBC W/AUTO DIFF WBC: CPT | Performed by: INTERNAL MEDICINE

## 2024-06-18 ENCOUNTER — TELEPHONE (OUTPATIENT)
Dept: CARDIOLOGY | Facility: CLINIC | Age: 44
End: 2024-06-18
Payer: MEDICAID

## 2024-06-18 LAB
ALBUMIN SERPL-MCNC: 4.8 G/DL (ref 3.5–5.2)
ALBUMIN/GLOB SERPL: 1.8 G/DL
ALP SERPL-CCNC: 60 U/L (ref 39–117)
ALT SERPL W P-5'-P-CCNC: 43 U/L (ref 1–41)
ANION GAP SERPL CALCULATED.3IONS-SCNC: 12.2 MMOL/L (ref 5–15)
AST SERPL-CCNC: 40 U/L (ref 1–40)
BILIRUB SERPL-MCNC: 0.7 MG/DL (ref 0–1.2)
BUN SERPL-MCNC: 17 MG/DL (ref 6–20)
BUN/CREAT SERPL: 15.9 (ref 7–25)
CALCIUM SPEC-SCNC: 9.3 MG/DL (ref 8.6–10.5)
CHLORIDE SERPL-SCNC: 102 MMOL/L (ref 98–107)
CO2 SERPL-SCNC: 23.8 MMOL/L (ref 22–29)
CREAT SERPL-MCNC: 1.07 MG/DL (ref 0.76–1.27)
EGFRCR SERPLBLD CKD-EPI 2021: 86.7 ML/MIN/1.73
GLOBULIN UR ELPH-MCNC: 2.7 GM/DL
GLUCOSE SERPL-MCNC: 176 MG/DL (ref 65–99)
POTASSIUM SERPL-SCNC: 3.5 MMOL/L (ref 3.5–5.2)
PROT SERPL-MCNC: 7.5 G/DL (ref 6–8.5)
SODIUM SERPL-SCNC: 138 MMOL/L (ref 136–145)

## 2024-06-18 NOTE — TELEPHONE ENCOUNTER
----- Message from Alec Mason sent at 6/18/2024 10:06 AM EDT -----  Reviewed lab work, continue current medical therapy.

## 2024-07-10 DIAGNOSIS — J30.2 SEASONAL ALLERGIES: ICD-10-CM

## 2024-07-10 RX ORDER — FEXOFENADINE HCL 180 MG/1
180 TABLET ORAL DAILY
Qty: 90 TABLET | Refills: 0 | Status: SHIPPED | OUTPATIENT
Start: 2024-07-10

## 2024-08-07 RX ORDER — FUROSEMIDE 20 MG/1
20 TABLET ORAL DAILY PRN
Qty: 30 TABLET | Refills: 2 | Status: SHIPPED | OUTPATIENT
Start: 2024-08-07

## 2024-08-22 PROBLEM — E83.42 HYPOMAGNESEMIA: Status: RESOLVED | Noted: 2023-05-30 | Resolved: 2024-08-22

## 2024-08-22 PROBLEM — R00.2 PALPITATIONS: Status: RESOLVED | Noted: 2023-04-25 | Resolved: 2024-08-22

## 2024-08-22 PROBLEM — R94.39 ABNORMAL STRESS TEST: Status: RESOLVED | Noted: 2023-05-25 | Resolved: 2024-08-22

## 2024-08-22 PROBLEM — R06.09 DYSPNEA ON EXERTION: Status: RESOLVED | Noted: 2023-04-25 | Resolved: 2024-08-22

## 2024-08-22 PROBLEM — I42.8 NICM (NONISCHEMIC CARDIOMYOPATHY): Status: ACTIVE | Noted: 2024-08-22

## 2024-08-26 ENCOUNTER — OFFICE VISIT (OUTPATIENT)
Dept: CARDIOLOGY | Facility: CLINIC | Age: 44
End: 2024-08-26
Payer: MEDICAID

## 2024-08-26 VITALS
BODY MASS INDEX: 28.56 KG/M2 | OXYGEN SATURATION: 98 % | SYSTOLIC BLOOD PRESSURE: 120 MMHG | HEART RATE: 82 BPM | DIASTOLIC BLOOD PRESSURE: 80 MMHG | WEIGHT: 204 LBS | HEIGHT: 71 IN

## 2024-08-26 DIAGNOSIS — Z95.810 PRESENCE OF BIVENTRICULAR IMPLANTABLE CARDIOVERTER-DEFIBRILLATOR (ICD): ICD-10-CM

## 2024-08-26 DIAGNOSIS — I48.0 PAROXYSMAL ATRIAL FIBRILLATION: ICD-10-CM

## 2024-08-26 DIAGNOSIS — I48.0 PAROXYSMAL ATRIAL FIBRILLATION: Primary | ICD-10-CM

## 2024-08-26 DIAGNOSIS — I50.22 CHRONIC HFREF (HEART FAILURE WITH REDUCED EJECTION FRACTION): Primary | ICD-10-CM

## 2024-08-26 DIAGNOSIS — I44.7 LBBB (LEFT BUNDLE BRANCH BLOCK): ICD-10-CM

## 2024-08-26 PROCEDURE — 99214 OFFICE O/P EST MOD 30 MIN: CPT | Performed by: INTERNAL MEDICINE

## 2024-08-26 PROCEDURE — 93284 PRGRMG EVAL IMPLANTABLE DFB: CPT | Performed by: INTERNAL MEDICINE

## 2024-08-26 RX ORDER — SACUBITRIL AND VALSARTAN 49; 51 MG/1; MG/1
1 TABLET, FILM COATED ORAL 2 TIMES DAILY
COMMUNITY

## 2024-08-26 NOTE — PROGRESS NOTES
Cardiac Electrophysiology Outpatient Follow Up Note            [facility]    Follow Up Office Visit      Palomo Pollack  [id number]  08/26/2024  [unfilled]  [unfilled]    Primary Care Physician: Heladio Valentine DO    Referred By: No ref. provider found    Subjective     Chief Complaint:   Diagnoses and all orders for this visit:    1. Chronic HFrEF (heart failure with reduced ejection fraction) (Primary)    2. LBBB (left bundle branch block)    3. Presence of biventricular implantable cardioverter-defibrillator (ICD)    4. Paroxysmal atrial fibrillation      Chief Complaint   Patient presents with    Chronic HFrEF (heart failure with reduced ejection fraction)       History of Present Illness:   Palomo Pollack is a 44 y.o. male who presents to my electrophysiology clinic for follow up of above complaints.  Recurrent paroxysmal A-fib makes him feel tired and fatigue.  Does not like it..      Past Medical History:   Past Medical History:   Diagnosis Date    Allergic rhinitis     Chronic right ear pain     h/o    Diabetes mellitus     h/o-  doesnt check sugar    Diverticulitis     GERD (gastroesophageal reflux disease)     Headache     Heart failure     History of kidney stones     passed them    History of shingles     1999- after transplant of stem cells    History of transfusion     1999- 1 unit- stem cell transplant -  recieved plts - no reaction recalled    Hodgkin lymphoma     Hypogonadism in male     PTSD (post-traumatic stress disorder)     Stem cells transplant status     Vision blurred     possibly need glasses       Past Surgical History:   Past Surgical History:   Procedure Laterality Date    BIVENTRICULLAR IMPLANTABLE CARDIOVERTER DEFIBRILLATOR PLACEMENT N/A 10/26/2023    Procedure: Implant ICD - bi ventricular; DNS meds; [place];  Surgeon: Wilmer Rojas DO;  Location: [facility] INVASIVE LOCATION;  Service: Cardiovascular;   Laterality: N/A;    CARDIAC CATHETERIZATION N/A 05/30/2023    Procedure: Left Heart Cath;  Surgeon: Alec Mason III, MD;  Location: [facility] CATH INVASIVE LOCATION;  Service: Cardiovascular;  Laterality: N/A;    CENTRAL VENOUS LINE INSERTION  1999    COLONOSCOPY      PORTOCAVAL SHUNT PLACEMENT  1998    WISDOM TOOTH EXTRACTION         Family History:   Family History   Problem Relation Age of Onset    Stroke Father     Lung cancer Father     Cancer Father         Positive for cured lung cancer- he was smoker       Social History:   Social History     Socioeconomic History    Marital status: Single   Tobacco Use    Smoking status: Some Days     Current packs/day: 0.25     Average packs/day: 0.3 packs/day for 21.7 years (5.4 ttl pk-yrs)     Types: Cigarettes     Start date: 11/2002     Last attempt to quit: 11/2022     Passive exposure: Past    Smokeless tobacco: Never    Tobacco comments:     smokes less than .25 ppd   Vaping Use    Vaping status: Never Used   Substance and Sexual Activity    Alcohol use: Not Currently     Alcohol/week: 4.0 standard drinks of alcohol     Types: 4 Shots of liquor per week     Comment: Occasional on weekends    Drug use: Not Currently     Types: Marijuana     Comment: stopped april 2024    Sexual activity: Yes       Medications:     Current Outpatient Medications:     acetaminophen (TYLENOL) 325 MG tablet, Take 2 tablets by mouth Every 6 (Six) Hours As Needed for Mild Pain., Disp: , Rfl:     apixaban (Eliquis) 5 MG tablet tablet, Take 1 tablet by mouth 2 (Two) Times a Day., Disp: 180 tablet, Rfl: 3    carvedilol (COREG) 6.25 MG tablet, Take 1 tablet by mouth 2 (Two) Times a Day., Disp: 180 tablet, Rfl: 3    empagliflozin (JARDIANCE) 10 MG tablet tablet, Take 1 tablet by mouth Daily., Disp: 90 tablet, Rfl: 3    esomeprazole (nexIUM) 40 MG capsule, Take 1 capsule by mouth Every Morning Before Breakfast., Disp: 90 capsule, Rfl: 3    fexofenadine (ALLEGRA) 180 MG tablet, Take 1 tablet by  "mouth Daily., Disp: 90 tablet, Rfl: 0    furosemide (Lasix) 20 MG tablet, Take 1 tablet by mouth Daily As Needed (SOB, swelling, weight gain > 3 lbs)., Disp: 30 tablet, Rfl: 2    sacubitril-valsartan (Entresto) 49-51 MG tablet, Take 1 tablet by mouth 2 (Two) Times a Day., Disp: , Rfl:     spironolactone (ALDACTONE) 25 MG tablet, Take 1 tablet by mouth Daily., Disp: 90 tablet, Rfl: 3    Testosterone 1.62 % gel, APPLY 2 PUMPS TOPICALLY TO THE AFFECTED AREA EVERY DAY, Disp: , Rfl:     albuterol sulfate  (90 Base) MCG/ACT inhaler, Inhale 2 puffs Every 4 (Four) Hours As Needed for Wheezing. (Patient not taking: Reported on 1/22/2024), Disp: 8 g, Rfl: 2    amoxicillin (AMOXIL) 875 MG tablet, Take 1 tablet by mouth 2 (Two) Times a Day. (Patient not taking: Reported on 1/22/2024), Disp: 20 tablet, Rfl: 0    doxycycline (MONODOX) 100 MG capsule, Take 1 capsule by mouth 2 (Two) Times a Day. (Patient not taking: Reported on 8/26/2024), Disp: 20 capsule, Rfl: 0    sacubitril-valsartan (Entresto) 24-26 MG tablet, Take 1 tablet by mouth 2 (Two) Times a Day. (Patient not taking: Reported on 8/26/2024), Disp: 180 tablet, Rfl: 3    Allergies:   Allergies   Allergen Reactions    Bactrim [Sulfamethoxazole-Trimethoprim] Rash and Other (See Comments)     Gave increased heartrate-biaxin      Clarithromycin Other (See Comments) and Unknown - Low Severity       Objective   Vital Signs:   Vitals:    08/26/24 1133   BP: 120/80   BP Location: Left arm   Patient Position: Sitting   Cuff Size: Adult   Pulse: 82   SpO2: 98%   Weight: 92.5 kg (204 lb)   Height: 180.3 cm (71\")       PHYSICAL EXAM  General appearance: Awake, alert, cooperative  Head: Normocephalic, without obvious abnormality, atraumatic  Eyes: Conjunctivae/corneas clear, EOMs intact  Neck: no adenopathy, no carotid bruit, no JVD, and thyroid: not enlarged  Lungs: clear to auscultation bilaterally and no rhonchi or crackles\", ' symmetric  Heart: regular rate and rhythm, S1, " "S2 normal, no murmur, click, rub or gallop  Abdomen: Soft, non-tender, bowel sounds normal,  no organomegaly  Extremities: extremities normal, atraumatic, no cyanosis or edema  Skin: Skin color, turgor normal, no rashes or lesions  Neurologic: Grossly normal     Lab Results   Component Value Date    GLUCOSE 176 (H) 06/17/2024    CALCIUM 9.3 06/17/2024     06/17/2024    K 3.5 06/17/2024    CO2 23.8 06/17/2024     06/17/2024    BUN 17 06/17/2024    CREATININE 1.07 06/17/2024    EGFRIFNONA 75 03/02/2018    BCR 15.9 06/17/2024    ANIONGAP 12.2 06/17/2024     Lab Results   Component Value Date    WBC 4.98 06/17/2024    HGB 15.3 06/17/2024    HCT 45.7 06/17/2024    MCV 93.1 06/17/2024     06/17/2024     No results found for: \"INR\", \"PROTIME\"  Lab Results   Component Value Date    TSH 12.110 (H) 05/29/2023       Cardiac Testing:     I personally viewed and interpreted the patient's EKG/Telemetry/lab data    Procedures    Tobacco Cessation: N/A  Obstructive Sleep Apnea Screening: Completed    Advance Care Planning   ACP discussion was declined by the patient. Patient does not have an advance directive, declines further assistance.       Assessment & Plan    Diagnoses and all orders for this visit:    1. Chronic HFrEF (heart failure with reduced ejection fraction) (Primary)    2. LBBB (left bundle branch block)    3. Presence of biventricular implantable cardioverter-defibrillator (ICD)    4. Paroxysmal atrial fibrillation         Diagnosis Plan   1. Chronic HFrEF (heart failure with reduced ejection fraction)  Stable.  Euvolemic.  New York Heart Association functional class II after CRT.      2. LBBB (left bundle branch block)  CRT in situ.      3. Presence of biventricular implantable cardioverter-defibrillator (ICD)  [employer] resynchronization ICD system.  Paroxysmal A-fib episodes noted.      This patient's Cardiac Implanted Electronic Device was manually interrogated and reprogrammed during " the patient encounter today.  Iterative programming changes were manually made to determine the sensing threshold, pacing threshold, lead impedance as well as underlying cardiac rhythm.  These programming changes were not limited to but included some or all of the following when appropriate: pacing mode, programmed AV delays, blanking periods, and refractory periods.  Data obtained as a result of these manual programing changes informed the patient's CIED permanent programming.        4. Paroxysmal atrial fibrillation  Highly symptomatic recurrent paroxysmal A-fib.  We discussed the option of antiarrhythmic drug therapy.  He is loath to start any new medication.    We discussed the option of catheter ablation as a first-line option.  In the guidelines that this is supported.  We reviewed the risk-benefit profile of A-fib ablation as well.  He wishes to proceed.    I reviewed the specific risk-benefit profile the procedure.  I quoted the patient a 1 to 2% chance of significant procedure complication including but not limited to bleeding at the groin, cardiac perforation, tamponade, stroke, myocardial infarction, death phrenic nerve injury, pulmonary vein stenosis, catheter entrapment of the mitral valve annulus, esophageal injury as well as fistula and other potential complications.    0map  General anesthesia  Preop CT  Do not hold anticoagulation  No medications to hold          Body mass index is 28.45 kg/m².    I spent 46 minutes in consultation with this patient which included more than 65% of this time in direct face-to-face counseling, physical examination and discussion of my assessment and findings and this shared decision making with the patient.  The remainder of the time not spent face-to-face was performing one, some or all of the following actions: preparing to see the patient (e.g. reviewing tests, prior clinicians' notes, etc), ordering medications, tests or procedures, coordination of care,  discussion of the plan with other healthcare providers, documenting clinical information in epic as well as independently interpreting results and communication of these results to the patient family and/or caregiver(s).  Please note that this explicitly excludes time spent on other separate billable services such as performing procedures or test interpretation, when applicable.      Follow Up:       Thank you for allowing me to participate in the care of your patient. Please to not hesitate to contact me with additional questions or concerns.      Wilmer Rojas DO, FACC, RS  Cardiac Electrophysiologist  [facility]

## 2024-08-28 ENCOUNTER — PREP FOR SURGERY (OUTPATIENT)
Dept: OTHER | Facility: HOSPITAL | Age: 44
End: 2024-08-28
Payer: MEDICAID

## 2024-08-28 DIAGNOSIS — I48.0 PAROXYSMAL ATRIAL FIBRILLATION: Primary | ICD-10-CM

## 2024-08-28 RX ORDER — ONDANSETRON 2 MG/ML
4 INJECTION INTRAMUSCULAR; INTRAVENOUS EVERY 6 HOURS PRN
OUTPATIENT
Start: 2024-08-28

## 2024-08-28 RX ORDER — ACETAMINOPHEN 325 MG/1
650 TABLET ORAL EVERY 4 HOURS PRN
OUTPATIENT
Start: 2024-08-28

## 2024-08-28 RX ORDER — NITROGLYCERIN 0.4 MG/1
0.4 TABLET SUBLINGUAL
OUTPATIENT
Start: 2024-08-28

## 2024-08-28 RX ORDER — SODIUM CHLORIDE 9 MG/ML
40 INJECTION, SOLUTION INTRAVENOUS AS NEEDED
OUTPATIENT
Start: 2024-08-28

## 2024-09-12 DIAGNOSIS — I48.0 PAROXYSMAL ATRIAL FIBRILLATION: Primary | ICD-10-CM

## 2024-09-12 NOTE — NURSING NOTE
PRE-PVA ASSESSMENT  Palomo Pollack 1980   [address] [place] [id number]   [phone]  There is no work phone number on file.      Referral Source: Dr. Heladio Valentine   Information obtained from: [x] Medical record review  [x] Patient report  Scheduled for: PVA on 10/03/2024 with Dr. Rojas  Allergies   Allergen Reactions    Bactrim [Sulfamethoxazole-Trimethoprim] Rash and Other (See Comments)     Gave increased heartrate-biaxin      Clarithromycin Other (See Comments) and Unknown - Low Severity       AFib Specific History:  AFIBTYPE: paroxysmal    CHADS-VASc Risk Assessment               1 Total Score    1 DM    Criteria that do not apply:    CHF    Hypertension    Age >/= 75    PRIOR STROKE/TIA/THROMBO    Vascular Disease    Age 65-74    Sex: Female            Anticoagulation: Eliquis 5 mg BID, missed doses?  Cardioversion x 0  Failed AAD(s): 0  Prior Ablation: 0    Is Mr. Pollack aware of his AFib? Yes   Onset: 2024    Exacerbations: exertion, work   Frequency: weekly   Alleviations: rest    Duration: minutes      Symptoms:   [x] Palpitations:    [x] Chest Discomfort:    [] Dizziness:    [x] Presyncope:    [] Lightheadedness:   [] Syncope:    [x] Fatigue:    [x] Other: cough   [x] Short of Breath:     Last Echo(s):  [x] TTE Date: 09/2023                EF: 31-35%             LA: 3.2cm            VHD? Mild MR     Left ventricular systolic function is moderately decreased. Calculated left ventricular EF = 30% Left ventricular ejection fraction appears to be 31 - 35%.    Left ventricular wall thickness is consistent with mild concentric hypertrophy.    Left ventricular diastolic function was indeterminate.          Past medical History:   [x] Diabetes             Tx: none                   Hemoglobin A1C   Date Value Ref Range Status   05/31/2023 7.00 (H) 4.80 - 5.60 % Final   03/31/2023 5.5 % Final           [] HYPOthyroidism NO     [] HYPERthyroidism NO               TSH   Date Value Ref Range  Status   05/29/2023 12.110 (H) 0.270 - 4.200 uIU/mL Final       [x] HTN        [x] Tx: Carvedilol 6.25 mg, spironolactone 25            [x] Heart Failure    [] CVA     NO                          [] TIA NO          [x] Class 2                 [x] Nonischemic                   [] CAD    NO       [] MI  NO            [] Dyslipidemia NO    [] Statin indicated  NO      [x] Ischemic Evaluation       [x] Stress Test: 05/2023     Calcifications visualized in the proximal and mid RCA.    Left ventricular ejection fraction is moderately reduced (Calculated EF = 37%).    Abnormal LV wall motion consistent with moderate hypokinesis of the septal wall.    Large region of moderately decreased perfusion which is predominantly fixed involving the septal wall, apex, and mid portions of the anterior and inferior walls.    Impressions are consistent with a high risk study.                   [x] Heart Cath: 05/2023  IMPRESSION:  Luminal irregularities noted in the proximal mid major epicardial vessels.  LVEDP 21 mmHg  No aortic stenosis      [x] Sleep Apnea Suspected        [x] Discussed with Mr. Pollack         [x] Referral to [facility] sleep medicine         [x] Obesity NO (BMI 28.45)          Summary of Patient Contact:    I spoke with Mr. Pollack about his upcoming PVA.   He was well informed about the procedure from prior discussion with Dr. Rojas and from reading the provided literature.  We discussed the procedure at length including risks, anesthesia, intra-op procedures, recovery, bedrest, normal post-procedure expectations, and success rates.  I answered a few remaining questions. Mr. Pollack verbalized understanding and he is ready to proceed.

## 2024-09-17 ENCOUNTER — TELEPHONE (OUTPATIENT)
Dept: CARDIOLOGY | Facility: CLINIC | Age: 44
End: 2024-09-17
Payer: MEDICAID

## 2024-09-17 ENCOUNTER — APPOINTMENT (OUTPATIENT)
Dept: GENERAL RADIOLOGY | Facility: HOSPITAL | Age: 44
End: 2024-09-17
Payer: MEDICAID

## 2024-09-17 ENCOUNTER — HOSPITAL ENCOUNTER (EMERGENCY)
Facility: HOSPITAL | Age: 44
Discharge: HOME | End: 2024-09-17
Attending: EMERGENCY MEDICINE | Admitting: EMERGENCY MEDICINE
Payer: MEDICAID

## 2024-09-17 VITALS
OXYGEN SATURATION: 100 % | HEART RATE: 97 BPM | TEMPERATURE: 98 F | SYSTOLIC BLOOD PRESSURE: 179 MMHG | BODY MASS INDEX: 27.3 KG/M2 | RESPIRATION RATE: 17 BRPM | HEIGHT: 71 IN | WEIGHT: 195 LBS | DIASTOLIC BLOOD PRESSURE: 82 MMHG

## 2024-09-17 DIAGNOSIS — R07.9 CHEST PAIN, UNSPECIFIED TYPE: Primary | ICD-10-CM

## 2024-09-17 LAB
ALBUMIN SERPL-MCNC: 4.8 G/DL (ref 3.5–5.2)
ALBUMIN/GLOB SERPL: 1.7 G/DL
ALP SERPL-CCNC: 66 U/L (ref 39–117)
ALT SERPL W P-5'-P-CCNC: 42 U/L (ref 1–41)
ANION GAP SERPL CALCULATED.3IONS-SCNC: 11 MMOL/L (ref 5–15)
AST SERPL-CCNC: 38 U/L (ref 1–40)
BASOPHILS # BLD AUTO: 0.06 10*3/MM3 (ref 0–0.2)
BASOPHILS NFR BLD AUTO: 1 % (ref 0–1.5)
BILIRUB SERPL-MCNC: 0.7 MG/DL (ref 0–1.2)
BUN SERPL-MCNC: 19 MG/DL (ref 6–20)
BUN/CREAT SERPL: 14.6 (ref 7–25)
CALCIUM SPEC-SCNC: 9.7 MG/DL (ref 8.6–10.5)
CHLORIDE SERPL-SCNC: 99 MMOL/L (ref 98–107)
CO2 SERPL-SCNC: 26 MMOL/L (ref 22–29)
CREAT SERPL-MCNC: 1.3 MG/DL (ref 0.76–1.27)
DEPRECATED RDW RBC AUTO: 46.5 FL (ref 37–54)
EGFRCR SERPLBLD CKD-EPI 2021: 68.6 ML/MIN/1.73
EOSINOPHIL # BLD AUTO: 0.06 10*3/MM3 (ref 0–0.4)
EOSINOPHIL NFR BLD AUTO: 1 % (ref 0.3–6.2)
ERYTHROCYTE [DISTWIDTH] IN BLOOD BY AUTOMATED COUNT: 13.5 % (ref 12.3–15.4)
GEN 5 2HR TROPONIN T REFLEX: <6 NG/L
GLOBULIN UR ELPH-MCNC: 2.9 GM/DL
GLUCOSE SERPL-MCNC: 200 MG/DL (ref 65–99)
HCT VFR BLD AUTO: 48 % (ref 37.5–51)
HGB BLD-MCNC: 16.6 G/DL (ref 13–17.7)
HOLD SPECIMEN: NORMAL
IMM GRANULOCYTES # BLD AUTO: 0.02 10*3/MM3 (ref 0–0.05)
IMM GRANULOCYTES NFR BLD AUTO: 0.3 % (ref 0–0.5)
LIPASE SERPL-CCNC: 52 U/L (ref 13–60)
LYMPHOCYTES # BLD AUTO: 1.18 10*3/MM3 (ref 0.7–3.1)
LYMPHOCYTES NFR BLD AUTO: 19.1 % (ref 19.6–45.3)
MCH RBC QN AUTO: 32 PG (ref 26.6–33)
MCHC RBC AUTO-ENTMCNC: 34.6 G/DL (ref 31.5–35.7)
MCV RBC AUTO: 92.5 FL (ref 79–97)
MONOCYTES # BLD AUTO: 0.5 10*3/MM3 (ref 0.1–0.9)
MONOCYTES NFR BLD AUTO: 8.1 % (ref 5–12)
NEUTROPHILS NFR BLD AUTO: 4.35 10*3/MM3 (ref 1.7–7)
NEUTROPHILS NFR BLD AUTO: 70.5 % (ref 42.7–76)
NRBC BLD AUTO-RTO: 0 /100 WBC (ref 0–0.2)
NT-PROBNP SERPL-MCNC: 86 PG/ML (ref 0–450)
PLATELET # BLD AUTO: 159 10*3/MM3 (ref 140–450)
PMV BLD AUTO: 9.5 FL (ref 6–12)
POTASSIUM SERPL-SCNC: 4 MMOL/L (ref 3.5–5.2)
PROT SERPL-MCNC: 7.7 G/DL (ref 6–8.5)
QT INTERVAL: 384 MS
QTC INTERVAL: 487 MS
RBC # BLD AUTO: 5.19 10*6/MM3 (ref 4.14–5.8)
SODIUM SERPL-SCNC: 136 MMOL/L (ref 136–145)
TROPONIN T DELTA: NORMAL
TROPONIN T SERPL HS-MCNC: <6 NG/L
WBC NRBC COR # BLD AUTO: 6.17 10*3/MM3 (ref 3.4–10.8)
WHOLE BLOOD HOLD COAG: NORMAL
WHOLE BLOOD HOLD SPECIMEN: NORMAL

## 2024-09-17 PROCEDURE — 84484 ASSAY OF TROPONIN QUANT: CPT | Performed by: EMERGENCY MEDICINE

## 2024-09-17 PROCEDURE — 99284 EMERGENCY DEPT VISIT MOD MDM: CPT

## 2024-09-17 PROCEDURE — 83690 ASSAY OF LIPASE: CPT | Performed by: EMERGENCY MEDICINE

## 2024-09-17 PROCEDURE — 85025 COMPLETE CBC W/AUTO DIFF WBC: CPT | Performed by: EMERGENCY MEDICINE

## 2024-09-17 PROCEDURE — 80053 COMPREHEN METABOLIC PANEL: CPT | Performed by: EMERGENCY MEDICINE

## 2024-09-17 PROCEDURE — 71045 X-RAY EXAM CHEST 1 VIEW: CPT

## 2024-09-17 PROCEDURE — 83880 ASSAY OF NATRIURETIC PEPTIDE: CPT | Performed by: EMERGENCY MEDICINE

## 2024-09-17 PROCEDURE — 93005 ELECTROCARDIOGRAM TRACING: CPT | Performed by: EMERGENCY MEDICINE

## 2024-09-17 PROCEDURE — 93005 ELECTROCARDIOGRAM TRACING: CPT

## 2024-09-17 PROCEDURE — 36415 COLL VENOUS BLD VENIPUNCTURE: CPT

## 2024-09-17 RX ORDER — SODIUM CHLORIDE 0.9 % (FLUSH) 0.9 %
10 SYRINGE (ML) INJECTION AS NEEDED
Status: DISCONTINUED | OUTPATIENT
Start: 2024-09-17 | End: 2024-09-17 | Stop reason: HOSPADM

## 2024-09-17 RX ORDER — ASPIRIN 81 MG/1
324 TABLET, CHEWABLE ORAL ONCE
Status: COMPLETED | OUTPATIENT
Start: 2024-09-17 | End: 2024-09-17

## 2024-09-17 RX ADMIN — ASPIRIN 81 MG 324 MG: 81 TABLET ORAL at 12:36

## 2024-09-18 ENCOUNTER — TELEPHONE (OUTPATIENT)
Dept: CARDIOLOGY | Facility: CLINIC | Age: 44
End: 2024-09-18

## 2024-09-18 NOTE — TELEPHONE ENCOUNTER
Spoke with patient regarding the request for follow up. Currently, patient has a scheduled ablation for October 3. Looking through ED notes, no immediate need to follow up from EP standpoint. Will keep ablation scheduled and follow up per protocol post ablation for EP. Patient verbalized understanding and agreed with plan of care.

## 2024-09-18 NOTE — TELEPHONE ENCOUNTER
"  Caller: Palomo Pollack \"Jamar\"    Relationship to patient: Self    Best call back number: [phone]    New or established patient?  [] New  [x] Established    Date of discharge: 09.17.24    Facility discharged from: [facility]    Diagnosis/Symptoms: CHEST PAIN    Length of stay (If applicable): 6 HOURS    Specialty Only: Did you see a [facility] health provider?    [] Yes  [x] No  If so, who?         "

## 2024-09-18 NOTE — TELEPHONE ENCOUNTER
"  Caller: Palomo Pollack \"Jamar\"    Relationship to patient: Self    Best call back number: [phone]    New or established patient?  [] New  [x] Established    Date of discharge: 09.17.24    Facility discharged from: [facility]     Diagnosis/Symptoms: CHEST PAIN    Length of stay (If applicable): 6 HOURS    Specialty Only: Did you see a [facility] health provider?    [] Yes  [x] No  If so, who?         " 97.3

## 2024-09-23 ENCOUNTER — OFFICE VISIT (OUTPATIENT)
Dept: CARDIOLOGY | Facility: HOSPITAL | Age: 44
End: 2024-09-23
Payer: MEDICAID

## 2024-09-23 VITALS
HEART RATE: 89 BPM | OXYGEN SATURATION: 97 % | HEIGHT: 71 IN | WEIGHT: 199.5 LBS | TEMPERATURE: 96.6 F | BODY MASS INDEX: 27.93 KG/M2 | SYSTOLIC BLOOD PRESSURE: 119 MMHG | DIASTOLIC BLOOD PRESSURE: 70 MMHG | RESPIRATION RATE: 20 BRPM

## 2024-09-23 DIAGNOSIS — I48.0 PAROXYSMAL ATRIAL FIBRILLATION: ICD-10-CM

## 2024-09-23 DIAGNOSIS — R07.89 CHEST PAIN, ATYPICAL: Primary | ICD-10-CM

## 2024-09-23 DIAGNOSIS — I50.22 CHRONIC HFREF (HEART FAILURE WITH REDUCED EJECTION FRACTION): ICD-10-CM

## 2024-09-23 DIAGNOSIS — I95.1 ORTHOSTASIS: ICD-10-CM

## 2024-09-23 LAB
QT INTERVAL: 378 MS
QTC INTERVAL: 475 MS

## 2024-09-23 PROCEDURE — 1160F RVW MEDS BY RX/DR IN RCRD: CPT | Performed by: NURSE PRACTITIONER

## 2024-09-23 PROCEDURE — 99214 OFFICE O/P EST MOD 30 MIN: CPT | Performed by: NURSE PRACTITIONER

## 2024-09-23 PROCEDURE — 1159F MED LIST DOCD IN RCRD: CPT | Performed by: NURSE PRACTITIONER

## 2024-09-23 RX ORDER — BISOPROLOL FUMARATE 10 MG/1
10 TABLET, FILM COATED ORAL DAILY
Qty: 30 TABLET | Refills: 3 | Status: SHIPPED | OUTPATIENT
Start: 2024-09-23

## 2024-09-25 ENCOUNTER — PRE-ADMISSION TESTING (OUTPATIENT)
Dept: PREADMISSION TESTING | Facility: HOSPITAL | Age: 44
End: 2024-09-25
Payer: MEDICAID

## 2024-09-25 ENCOUNTER — HOSPITAL ENCOUNTER (OUTPATIENT)
Dept: CT IMAGING | Facility: HOSPITAL | Age: 44
Discharge: HOME | End: 2024-09-25
Payer: MEDICAID

## 2024-09-25 DIAGNOSIS — I48.0 PAROXYSMAL ATRIAL FIBRILLATION: ICD-10-CM

## 2024-09-25 LAB
DEPRECATED RDW RBC AUTO: 45.4 FL (ref 37–54)
ERYTHROCYTE [DISTWIDTH] IN BLOOD BY AUTOMATED COUNT: 13.3 % (ref 12.3–15.4)
HCT VFR BLD AUTO: 45.6 % (ref 37.5–51)
HGB BLD-MCNC: 15.8 G/DL (ref 13–17.7)
MCH RBC QN AUTO: 32.2 PG (ref 26.6–33)
MCHC RBC AUTO-ENTMCNC: 34.6 G/DL (ref 31.5–35.7)
MCV RBC AUTO: 92.9 FL (ref 79–97)
PLATELET # BLD AUTO: 171 10*3/MM3 (ref 140–450)
PMV BLD AUTO: 9.8 FL (ref 6–12)
RBC # BLD AUTO: 4.91 10*6/MM3 (ref 4.14–5.8)
WBC NRBC COR # BLD AUTO: 5.78 10*3/MM3 (ref 3.4–10.8)

## 2024-09-25 PROCEDURE — 85027 COMPLETE CBC AUTOMATED: CPT

## 2024-09-25 PROCEDURE — 25510000001 IOPAMIDOL PER 1 ML: Performed by: INTERNAL MEDICINE

## 2024-09-25 PROCEDURE — 36415 COLL VENOUS BLD VENIPUNCTURE: CPT

## 2024-09-25 PROCEDURE — 71275 CT ANGIOGRAPHY CHEST: CPT

## 2024-09-25 RX ORDER — IOPAMIDOL 755 MG/ML
100 INJECTION, SOLUTION INTRAVASCULAR
Status: COMPLETED | OUTPATIENT
Start: 2024-09-25 | End: 2024-09-25

## 2024-09-25 RX ADMIN — IOPAMIDOL 95 ML: 755 INJECTION, SOLUTION INTRAVENOUS at 10:31

## 2024-10-02 ENCOUNTER — ANESTHESIA EVENT (OUTPATIENT)
Dept: CARDIOLOGY | Facility: HOSPITAL | Age: 44
End: 2024-10-02
Payer: MEDICAID

## 2024-10-03 ENCOUNTER — HOSPITAL ENCOUNTER (OUTPATIENT)
Facility: HOSPITAL | Age: 44
Discharge: HOME | End: 2024-10-03
Attending: INTERNAL MEDICINE | Admitting: INTERNAL MEDICINE
Payer: MEDICAID

## 2024-10-03 ENCOUNTER — ANESTHESIA (OUTPATIENT)
Dept: CARDIOLOGY | Facility: HOSPITAL | Age: 44
End: 2024-10-03
Payer: MEDICAID

## 2024-10-03 VITALS
HEIGHT: 71 IN | SYSTOLIC BLOOD PRESSURE: 112 MMHG | BODY MASS INDEX: 28.03 KG/M2 | RESPIRATION RATE: 18 BRPM | DIASTOLIC BLOOD PRESSURE: 77 MMHG | WEIGHT: 200.2 LBS | TEMPERATURE: 98 F | HEART RATE: 86 BPM | OXYGEN SATURATION: 96 %

## 2024-10-03 DIAGNOSIS — I48.0 PAROXYSMAL ATRIAL FIBRILLATION: ICD-10-CM

## 2024-10-03 PROBLEM — I48.19 PERSISTENT ATRIAL FIBRILLATION: Status: ACTIVE | Noted: 2024-10-03

## 2024-10-03 LAB
ACT BLD: 372 SECONDS (ref 82–152)
ANION GAP SERPL CALCULATED.3IONS-SCNC: 13 MMOL/L (ref 5–15)
BUN SERPL-MCNC: 21 MG/DL (ref 6–20)
BUN/CREAT SERPL: 17.8 (ref 7–25)
CALCIUM SPEC-SCNC: 9.6 MG/DL (ref 8.6–10.5)
CHLORIDE SERPL-SCNC: 101 MMOL/L (ref 98–107)
CO2 SERPL-SCNC: 26 MMOL/L (ref 22–29)
CREAT SERPL-MCNC: 1.18 MG/DL (ref 0.76–1.27)
EGFRCR SERPLBLD CKD-EPI 2021: 77.1 ML/MIN/1.73
GLUCOSE SERPL-MCNC: 106 MG/DL (ref 65–99)
POTASSIUM SERPL-SCNC: 3.9 MMOL/L (ref 3.5–5.2)
SODIUM SERPL-SCNC: 140 MMOL/L (ref 136–145)

## 2024-10-03 PROCEDURE — 93622 COMP EP EVAL L VENTR PAC&REC: CPT | Performed by: INTERNAL MEDICINE

## 2024-10-03 PROCEDURE — 25010000002 ONDANSETRON PER 1 MG

## 2024-10-03 PROCEDURE — C1766 INTRO/SHEATH,STRBLE,NON-PEEL: HCPCS | Performed by: INTERNAL MEDICINE

## 2024-10-03 PROCEDURE — 25810000003 SODIUM CHLORIDE 0.9 % SOLUTION 250 ML FLEX CONT

## 2024-10-03 PROCEDURE — C1894 INTRO/SHEATH, NON-LASER: HCPCS | Performed by: INTERNAL MEDICINE

## 2024-10-03 PROCEDURE — C1730 CATH, EP, 19 OR FEW ELECT: HCPCS | Performed by: INTERNAL MEDICINE

## 2024-10-03 PROCEDURE — 25010000002 PHENYLEPHRINE 10 MG/ML SOLUTION 1 ML VIAL

## 2024-10-03 PROCEDURE — 25010000002 SUGAMMADEX 200 MG/2ML SOLUTION

## 2024-10-03 PROCEDURE — 36415 COLL VENOUS BLD VENIPUNCTURE: CPT

## 2024-10-03 PROCEDURE — 25010000002 PHENYLEPHRINE 10 MG/ML SOLUTION

## 2024-10-03 PROCEDURE — 93656 COMPRE EP EVAL ABLTJ ATR FIB: CPT | Performed by: INTERNAL MEDICINE

## 2024-10-03 PROCEDURE — 93655 ICAR CATH ABLTJ DSCRT ARRHYT: CPT | Performed by: INTERNAL MEDICINE

## 2024-10-03 PROCEDURE — 25010000002 LIDOCAINE PF 1% 1 % SOLUTION: Performed by: ANESTHESIOLOGY

## 2024-10-03 PROCEDURE — 93005 ELECTROCARDIOGRAM TRACING: CPT | Performed by: INTERNAL MEDICINE

## 2024-10-03 PROCEDURE — S0260 H&P FOR SURGERY: HCPCS | Performed by: PHYSICIAN ASSISTANT

## 2024-10-03 PROCEDURE — 25010000002 PROPOFOL 10 MG/ML EMULSION

## 2024-10-03 PROCEDURE — 25010000002 ADENOSINE PER 6 MG: Performed by: INTERNAL MEDICINE

## 2024-10-03 PROCEDURE — 25010000002 HEPARIN (PORCINE) PER 1000 UNITS: Performed by: INTERNAL MEDICINE

## 2024-10-03 PROCEDURE — 25810000003 LACTATED RINGERS PER 1000 ML: Performed by: ANESTHESIOLOGY

## 2024-10-03 PROCEDURE — 85347 COAGULATION TIME ACTIVATED: CPT

## 2024-10-03 PROCEDURE — C1733 CATH, EP, OTHR THAN COOL-TIP: HCPCS | Performed by: INTERNAL MEDICINE

## 2024-10-03 PROCEDURE — 25010000002 DEXAMETHASONE PER 1 MG

## 2024-10-03 PROCEDURE — 80048 BASIC METABOLIC PNL TOTAL CA: CPT | Performed by: INTERNAL MEDICINE

## 2024-10-03 PROCEDURE — 93623 PRGRMD STIMJ&PACG IV RX NFS: CPT | Performed by: INTERNAL MEDICINE

## 2024-10-03 PROCEDURE — C1760 CLOSURE DEV, VASC: HCPCS | Performed by: INTERNAL MEDICINE

## 2024-10-03 PROCEDURE — C1759 CATH, INTRA ECHOCARDIOGRAPHY: HCPCS | Performed by: INTERNAL MEDICINE

## 2024-10-03 PROCEDURE — 93010 ELECTROCARDIOGRAM REPORT: CPT | Performed by: INTERNAL MEDICINE

## 2024-10-03 PROCEDURE — 25010000002 PROTAMINE SULFATE PER 10 MG: Performed by: INTERNAL MEDICINE

## 2024-10-03 RX ORDER — SODIUM CHLORIDE 9 MG/ML
40 INJECTION, SOLUTION INTRAVENOUS AS NEEDED
Status: DISCONTINUED | OUTPATIENT
Start: 2024-10-03 | End: 2024-10-03 | Stop reason: HOSPADM

## 2024-10-03 RX ORDER — DROPERIDOL 2.5 MG/ML
0.62 INJECTION, SOLUTION INTRAMUSCULAR; INTRAVENOUS ONCE AS NEEDED
Status: DISCONTINUED | OUTPATIENT
Start: 2024-10-03 | End: 2024-10-03 | Stop reason: HOSPADM

## 2024-10-03 RX ORDER — NITROGLYCERIN 0.4 MG/1
0.4 TABLET SUBLINGUAL
Status: DISCONTINUED | OUTPATIENT
Start: 2024-10-03 | End: 2024-10-03 | Stop reason: HOSPADM

## 2024-10-03 RX ORDER — FENTANYL CITRATE 50 UG/ML
50 INJECTION, SOLUTION INTRAMUSCULAR; INTRAVENOUS
Status: DISCONTINUED | OUTPATIENT
Start: 2024-10-03 | End: 2024-10-03 | Stop reason: HOSPADM

## 2024-10-03 RX ORDER — ETOMIDATE 2 MG/ML
INJECTION INTRAVENOUS AS NEEDED
Status: DISCONTINUED | OUTPATIENT
Start: 2024-10-03 | End: 2024-10-03 | Stop reason: SURG

## 2024-10-03 RX ORDER — SODIUM CHLORIDE 0.9 % (FLUSH) 0.9 %
3-10 SYRINGE (ML) INJECTION AS NEEDED
Status: DISCONTINUED | OUTPATIENT
Start: 2024-10-03 | End: 2024-10-03 | Stop reason: HOSPADM

## 2024-10-03 RX ORDER — ADENOSINE 3 MG/ML
INJECTION, SOLUTION INTRAVENOUS
Status: DISCONTINUED | OUTPATIENT
Start: 2024-10-03 | End: 2024-10-03 | Stop reason: HOSPADM

## 2024-10-03 RX ORDER — IPRATROPIUM BROMIDE AND ALBUTEROL SULFATE 2.5; .5 MG/3ML; MG/3ML
3 SOLUTION RESPIRATORY (INHALATION) ONCE AS NEEDED
Status: DISCONTINUED | OUTPATIENT
Start: 2024-10-03 | End: 2024-10-03 | Stop reason: HOSPADM

## 2024-10-03 RX ORDER — SODIUM CHLORIDE 0.9 % (FLUSH) 0.9 %
10 SYRINGE (ML) INJECTION EVERY 12 HOURS SCHEDULED
Status: DISCONTINUED | OUTPATIENT
Start: 2024-10-03 | End: 2024-10-03 | Stop reason: HOSPADM

## 2024-10-03 RX ORDER — FAMOTIDINE 20 MG/1
20 TABLET, FILM COATED ORAL ONCE
Status: COMPLETED | OUTPATIENT
Start: 2024-10-03 | End: 2024-10-03

## 2024-10-03 RX ORDER — MIDAZOLAM HYDROCHLORIDE 1 MG/ML
1 INJECTION INTRAMUSCULAR; INTRAVENOUS
Status: DISCONTINUED | OUTPATIENT
Start: 2024-10-03 | End: 2024-10-03 | Stop reason: HOSPADM

## 2024-10-03 RX ORDER — DROPERIDOL 2.5 MG/ML
0.62 INJECTION, SOLUTION INTRAMUSCULAR; INTRAVENOUS
Status: DISCONTINUED | OUTPATIENT
Start: 2024-10-03 | End: 2024-10-03 | Stop reason: HOSPADM

## 2024-10-03 RX ORDER — HYDROCODONE BITARTRATE AND ACETAMINOPHEN 5; 325 MG/1; MG/1
1 TABLET ORAL ONCE AS NEEDED
Status: DISCONTINUED | OUTPATIENT
Start: 2024-10-03 | End: 2024-10-03 | Stop reason: HOSPADM

## 2024-10-03 RX ORDER — ACETAMINOPHEN 325 MG/1
650 TABLET ORAL EVERY 6 HOURS PRN
Status: DISCONTINUED | OUTPATIENT
Start: 2024-10-03 | End: 2024-10-03 | Stop reason: HOSPADM

## 2024-10-03 RX ORDER — ONDANSETRON 2 MG/ML
INJECTION INTRAMUSCULAR; INTRAVENOUS AS NEEDED
Status: DISCONTINUED | OUTPATIENT
Start: 2024-10-03 | End: 2024-10-03 | Stop reason: SURG

## 2024-10-03 RX ORDER — HEPARIN SODIUM 1000 [USP'U]/ML
INJECTION, SOLUTION INTRAVENOUS; SUBCUTANEOUS
Status: DISCONTINUED | OUTPATIENT
Start: 2024-10-03 | End: 2024-10-03 | Stop reason: HOSPADM

## 2024-10-03 RX ORDER — PROTAMINE SULFATE 10 MG/ML
INJECTION, SOLUTION INTRAVENOUS
Status: DISCONTINUED | OUTPATIENT
Start: 2024-10-03 | End: 2024-10-03 | Stop reason: HOSPADM

## 2024-10-03 RX ORDER — SODIUM CHLORIDE 0.9 % (FLUSH) 0.9 %
3 SYRINGE (ML) INJECTION EVERY 12 HOURS SCHEDULED
Status: DISCONTINUED | OUTPATIENT
Start: 2024-10-03 | End: 2024-10-03 | Stop reason: HOSPADM

## 2024-10-03 RX ORDER — ONDANSETRON 2 MG/ML
4 INJECTION INTRAMUSCULAR; INTRAVENOUS EVERY 6 HOURS PRN
Status: DISCONTINUED | OUTPATIENT
Start: 2024-10-03 | End: 2024-10-03 | Stop reason: HOSPADM

## 2024-10-03 RX ORDER — DEXAMETHASONE SODIUM PHOSPHATE 4 MG/ML
INJECTION, SOLUTION INTRA-ARTICULAR; INTRALESIONAL; INTRAMUSCULAR; INTRAVENOUS; SOFT TISSUE AS NEEDED
Status: DISCONTINUED | OUTPATIENT
Start: 2024-10-03 | End: 2024-10-03 | Stop reason: SURG

## 2024-10-03 RX ORDER — SODIUM CHLORIDE, SODIUM LACTATE, POTASSIUM CHLORIDE, CALCIUM CHLORIDE 600; 310; 30; 20 MG/100ML; MG/100ML; MG/100ML; MG/100ML
9 INJECTION, SOLUTION INTRAVENOUS CONTINUOUS
Status: DISCONTINUED | OUTPATIENT
Start: 2024-10-03 | End: 2024-10-03 | Stop reason: HOSPADM

## 2024-10-03 RX ORDER — PROPOFOL 10 MG/ML
VIAL (ML) INTRAVENOUS AS NEEDED
Status: DISCONTINUED | OUTPATIENT
Start: 2024-10-03 | End: 2024-10-03 | Stop reason: SURG

## 2024-10-03 RX ORDER — SODIUM CHLORIDE 0.9 % (FLUSH) 0.9 %
10 SYRINGE (ML) INJECTION AS NEEDED
Status: DISCONTINUED | OUTPATIENT
Start: 2024-10-03 | End: 2024-10-03 | Stop reason: HOSPADM

## 2024-10-03 RX ORDER — HEPARIN SODIUM 10000 [USP'U]/100ML
INJECTION, SOLUTION INTRAVENOUS
Status: COMPLETED | OUTPATIENT
Start: 2024-10-03 | End: 2024-10-03

## 2024-10-03 RX ORDER — HYDROMORPHONE HYDROCHLORIDE 1 MG/ML
0.5 INJECTION, SOLUTION INTRAMUSCULAR; INTRAVENOUS; SUBCUTANEOUS
Status: DISCONTINUED | OUTPATIENT
Start: 2024-10-03 | End: 2024-10-03 | Stop reason: HOSPADM

## 2024-10-03 RX ORDER — ACETAMINOPHEN 325 MG/1
650 TABLET ORAL EVERY 4 HOURS PRN
Status: DISCONTINUED | OUTPATIENT
Start: 2024-10-03 | End: 2024-10-03 | Stop reason: HOSPADM

## 2024-10-03 RX ORDER — ROCURONIUM BROMIDE 10 MG/ML
INJECTION, SOLUTION INTRAVENOUS AS NEEDED
Status: DISCONTINUED | OUTPATIENT
Start: 2024-10-03 | End: 2024-10-03 | Stop reason: SURG

## 2024-10-03 RX ORDER — ONDANSETRON 2 MG/ML
4 INJECTION INTRAMUSCULAR; INTRAVENOUS ONCE AS NEEDED
Status: DISCONTINUED | OUTPATIENT
Start: 2024-10-03 | End: 2024-10-03 | Stop reason: HOSPADM

## 2024-10-03 RX ORDER — LABETALOL HYDROCHLORIDE 5 MG/ML
5 INJECTION, SOLUTION INTRAVENOUS
Status: DISCONTINUED | OUTPATIENT
Start: 2024-10-03 | End: 2024-10-03 | Stop reason: HOSPADM

## 2024-10-03 RX ORDER — PHENYLEPHRINE HYDROCHLORIDE 10 MG/ML
INJECTION INTRAVENOUS AS NEEDED
Status: DISCONTINUED | OUTPATIENT
Start: 2024-10-03 | End: 2024-10-03 | Stop reason: SURG

## 2024-10-03 RX ORDER — HYDRALAZINE HYDROCHLORIDE 20 MG/ML
5 INJECTION INTRAMUSCULAR; INTRAVENOUS
Status: DISCONTINUED | OUTPATIENT
Start: 2024-10-03 | End: 2024-10-03 | Stop reason: HOSPADM

## 2024-10-03 RX ORDER — LIDOCAINE HYDROCHLORIDE 10 MG/ML
0.5 INJECTION, SOLUTION EPIDURAL; INFILTRATION; INTRACAUDAL; PERINEURAL ONCE AS NEEDED
Status: COMPLETED | OUTPATIENT
Start: 2024-10-03 | End: 2024-10-03

## 2024-10-03 RX ORDER — FAMOTIDINE 10 MG/ML
20 INJECTION, SOLUTION INTRAVENOUS ONCE
Status: DISCONTINUED | OUTPATIENT
Start: 2024-10-03 | End: 2024-10-03 | Stop reason: HOSPADM

## 2024-10-03 RX ORDER — LIDOCAINE HYDROCHLORIDE 5 MG/ML
INJECTION, SOLUTION INFILTRATION; PERINEURAL
Status: DISCONTINUED | OUTPATIENT
Start: 2024-10-03 | End: 2024-10-03 | Stop reason: HOSPADM

## 2024-10-03 RX ADMIN — ROCURONIUM BROMIDE 15 MG: 10 SOLUTION INTRAVENOUS at 15:31

## 2024-10-03 RX ADMIN — SODIUM CHLORIDE, POTASSIUM CHLORIDE, SODIUM LACTATE AND CALCIUM CHLORIDE: 600; 310; 30; 20 INJECTION, SOLUTION INTRAVENOUS at 14:39

## 2024-10-03 RX ADMIN — ROCURONIUM BROMIDE 50 MG: 10 SOLUTION INTRAVENOUS at 14:43

## 2024-10-03 RX ADMIN — DEXAMETHASONE SODIUM PHOSPHATE 4 MG: 4 INJECTION, SOLUTION INTRAMUSCULAR; INTRAVENOUS at 15:43

## 2024-10-03 RX ADMIN — FAMOTIDINE 20 MG: 20 TABLET, FILM COATED ORAL at 12:55

## 2024-10-03 RX ADMIN — SUGAMMADEX 200 MG: 100 INJECTION, SOLUTION INTRAVENOUS at 15:45

## 2024-10-03 RX ADMIN — ROCURONIUM BROMIDE 15 MG: 10 SOLUTION INTRAVENOUS at 15:08

## 2024-10-03 RX ADMIN — ACETAMINOPHEN 650 MG: 325 TABLET ORAL at 17:33

## 2024-10-03 RX ADMIN — PROPOFOL 50 MG: 10 INJECTION, EMULSION INTRAVENOUS at 14:43

## 2024-10-03 RX ADMIN — PHENYLEPHRINE HYDROCHLORIDE 80 MCG: 10 INJECTION INTRAVENOUS at 15:35

## 2024-10-03 RX ADMIN — ETOMIDATE 20 MG: 40 INJECTION, SOLUTION INTRAVENOUS at 14:43

## 2024-10-03 RX ADMIN — PHENYLEPHRINE HYDROCHLORIDE 100 MCG: 10 INJECTION INTRAVENOUS at 14:43

## 2024-10-03 RX ADMIN — PHENYLEPHRINE HYDROCHLORIDE 20 MCG/MIN: 10 INJECTION INTRAVENOUS at 14:52

## 2024-10-03 RX ADMIN — LIDOCAINE HYDROCHLORIDE 50 MG: 10 INJECTION, SOLUTION EPIDURAL; INFILTRATION; INTRACAUDAL; PERINEURAL at 14:43

## 2024-10-03 RX ADMIN — ONDANSETRON 4 MG: 2 INJECTION INTRAMUSCULAR; INTRAVENOUS at 15:43

## 2024-10-03 RX ADMIN — PHENYLEPHRINE HYDROCHLORIDE 80 MCG: 10 INJECTION INTRAVENOUS at 15:37

## 2024-10-03 NOTE — ANESTHESIA PROCEDURE NOTES
Airway  Urgency: elective    Date/Time: 10/3/2024 2:46 PM  Airway not difficult    General Information and Staff    Patient location during procedure: OR  CRNA/CAA: Vasquez Pearl CRNA  SRNA: John Agee SRNA  Indications and Patient Condition  Indications for airway management: airway protection    Preoxygenated: yes  MILS not maintained throughout  Mask difficulty assessment: 2 - vent by mask + OA or adjuvant +/- NMBA    Final Airway Details  Final airway type: endotracheal airway      Successful airway: ETT  Cuffed: yes   Successful intubation technique: video laryngoscopy  Endotracheal tube insertion site: oral  Blade: Faye  Blade size: 3  ETT size (mm): 7.5  Cormack-Lehane Classification: grade I - full view of glottis  Placement verified by: chest auscultation and capnometry   Cuff volume (mL): 10  Measured from: lips  ETT/EBT  to lips (cm): 21  Number of attempts at approach: 1  Assessment: lips, teeth, and gum same as pre-op and atraumatic intubation    Additional Comments  Negative epigastric sounds, Breath sound equal bilaterally with symmetric chest rise and fall

## 2024-10-03 NOTE — ANESTHESIA POSTPROCEDURE EVALUATION
Patient: Palomo Pollack    Procedure Summary       Date: 10/03/24 Room / Location: [facility] CATH/EP LAB F / [facility] EP INVASIVE LOCATION    Anesthesia Start: 1434 Anesthesia Stop: 1555    Procedure: Ablation atrial fibrillation w PFA; CTA prior, GA, no meds to hold Diagnosis:       Paroxysmal atrial fibrillation      (AF)    Providers: Wilmer Rojas DO Provider: Gabriella Mckeon DO    Anesthesia Type: general ASA Status: 4            Anesthesia Type: general    Vitals  Vitals Value Taken Time   /69 10/03/24 1556   Temp 97 °F (36.1 °C) 10/03/24 1556   Pulse 76 10/03/24 1556   Resp 14 10/03/24 1556   SpO2 100 % 10/03/24 1556           Post Anesthesia Care and Evaluation    Patient location during evaluation: PACU  Patient participation: complete - patient participated  Level of consciousness: awake and alert  Pain score: 0  Pain management: adequate    Airway patency: patent  Anesthetic complications: No anesthetic complications  PONV Status: none  Cardiovascular status: hemodynamically stable and acceptable  Respiratory status: nonlabored ventilation, acceptable and nasal cannula  Hydration status: acceptable    Comments: Report given to EP lab rn recovering pt. Pt stable and doing well.

## 2024-10-03 NOTE — H&P
[facility] Electrophysiology    Date of Hospital Visit: 10/03/24    Place of Service: [facility]    Patient Name: Palomo Pollack  :1980      Primary Care Provider: Heladio Valentine DO    Chief complaint: Atrial fibrillation      Problem List:  Active Hospital Problems    Diagnosis  POA    **Paroxysmal atrial fibrillation [I48.0]  Yes     Priority: High    NICM (nonischemic cardiomyopathy) [I42.8]  Yes     Priority: High     Myocardial perfusion scan May 2023: EF 37%, abnormal LV wall motion consistent with moderate hypokinesis of the septal wall, large region of moderately decreased perfusion with a predominantly fixed involving the septal wall, apex, and mid portions of the anterior and inferior walls, impressions consistent with high risk study  Echocardiogram May 2023: EF 28%, moderate TR, RVSP 45 to 40 to 55 mmHg  Heart catheterization May 2023: Luminal irregularities noted in the proximal mid major epicardial vessels, LVEDP 21 mmHg, no aortic stenosis  Echocardiogram 2023: 31 to 35%  Suspect heart failure secondary to chemotherapeutic effects  [employer] resynchronization ICD model G24. 10/26/2023      Presence of biventricular implantable cardioverter-defibrillator (ICD) [Z95.810]  Yes     Priority: Medium    Chronic HFrEF (heart failure with reduced ejection fraction) [I50.22]  Yes     Priority: Medium    LBBB (left bundle branch block) [I44.7]  Yes     Priority: Medium    GERD (gastroesophageal reflux disease) [K21.9]  Yes    PTSD (post-traumatic stress disorder) [F43.10]  Yes       History of Present Illness:  This is a 44 y.o. patient recently evaluated in the electrophysiology clinic for persistent atrial fibrillation.  Symptoms include awareness of palpitations, dizziness, shortness of breath.          Allergies   Allergen Reactions    Bactrim [Sulfamethoxazole-Trimethoprim] Rash and Other (See Comments)     Gave increased heartrate-biaxin       Clarithromycin Unknown - Low Severity    Milk-Related Compounds Other (See Comments)     Increased phlegm        Current Outpatient Medications   Medication Instructions    acetaminophen (TYLENOL) 650 mg, Oral, Every 6 Hours PRN    albuterol sulfate  (90 Base) MCG/ACT inhaler 2 puffs, Inhalation, Every 4 Hours PRN    apixaban (ELIQUIS) 5 mg, Oral, 2 Times Daily (LD this am)    bisoprolol (ZEBETA) 10 mg, Oral, Daily    empagliflozin (JARDIANCE) 10 mg, Oral, Daily    esomeprazole (NEXIUM) 40 mg, Oral, Every Morning Before Breakfast    fexofenadine (ALLEGRA) 180 mg, Oral, Daily    furosemide (LASIX) 20 mg, Oral, Daily PRN    sacubitril-valsartan (Entresto) 49-51 MG tablet 1 tablet, Oral, 2 Times Daily    spironolactone (ALDACTONE) 25 mg, Oral, Daily    Testosterone 1.62 % gel Daily.           Social History     Socioeconomic History    Marital status: Single   Tobacco Use    Smoking status: Some Days     Current packs/day: 0.25     Average packs/day: 0.3 packs/day for 21.8 years (5.5 ttl pk-yrs)     Types: Cigarettes     Start date: 11/2002     Last attempt to quit: 11/2022     Passive exposure: Past    Smokeless tobacco: Never    Tobacco comments:     smokes less than .25 ppd   Vaping Use    Vaping status: Never Used   Substance and Sexual Activity    Alcohol use: Yes     Alcohol/week: 4.0 standard drinks of alcohol     Types: 4 Shots of liquor per week     Comment: Occasional on weekends    Drug use: Not Currently     Types: Marijuana     Comment: stopped april 2024    Sexual activity: Yes       Family History   Problem Relation Age of Onset    Stroke Father     Lung cancer Father     Cancer Father         Positive for cured lung cancer- he was smoker       REVIEW OF SYSTEMS:   Constitutional: Positive for malaise/fatigue. Negative for diaphoresis and weight gain.   Cardiovascular:  Positive for palpitations. Negative for chest pain, claudication, dyspnea on exertion, irregular heartbeat, leg swelling,  "orthopnea, paroxysmal nocturnal dyspnea and syncope.   Respiratory:  Positive for shortness of breath. Negative for cough, sleep disturbances due to breathing and wheezing.    Hematologic/Lymphatic: Negative for bleeding problem.   Musculoskeletal:  Negative for muscle cramps, muscle weakness and myalgias.   Gastrointestinal:  Negative for heartburn.   Neurological:  Negative for weakness.            Objective:  116/76, HR 78, Sat 98%    EXAM:  Constitutional:       Appearance: Well-developed.   Pulmonary:      Effort: Pulmonary effort is normal. No respiratory distress.      Breath sounds: Normal breath sounds. No wheezing. No rales.      Comments: Bases clear  Chest:      Chest wall: Not tender to palpatation.   Cardiovascular:      Normal rate. regular rhythm.      Murmurs: There is no murmur.      No gallop.  No click. No rub.   Pulses:     Intact distal pulses.   Edema:     Peripheral edema absent.   Musculoskeletal: Normal range of motion.     Lab Review:               Estimated Creatinine Clearance: 81.7 mL/min (A) (by C-G formula based on SCr of 1.3 mg/dL (H)).  No results found for: \"INR\", \"PROTIME\"     Assessment:   Persistent atrial fibrillation        Plan:   Atrial fibrillation ablation          TIM Manuel    "

## 2024-10-03 NOTE — ANESTHESIA PREPROCEDURE EVALUATION
Anesthesia Evaluation     Patient summary reviewed and Nursing notes reviewed   NPO Solid Status: > 8 hours  NPO Liquid Status: > 2 hours           Airway   Mallampati: I  TM distance: >3 FB  Neck ROM: full  No difficulty expected  Dental      Pulmonary    (+) a smoker Current, cigarettes,  (-) asthma, shortness of breath, recent URI, sleep apnea  Cardiovascular     ECG reviewed  PT is on anticoagulation therapy  Patient on routine beta blocker    (+) dysrhythmias (LBBB), CHF Systolic <55%  (-) hypertension, past MI, cardiac stents    ROS comment: ECG Atrial-sensed ventricular-paced rhythm  ECHO 2023 EF = 30% mild concentric LVH LVDD indeterminate.    MPS 2023 Calcifications visualized in the proximal and mid RCA.  EF = 37%. moderate hypokinesis of the septal wall.  moderately decreased perfusion which is predominantly fixed involving the septal wall, apex, and mid portions of the anterior and inferior walls.  high risk study.    CATH 2023 · Luminal irregularities noted in the proximal mid major epicardial vessels.  LVEDP 21 mmHg    No aortic stenosis          Neuro/Psych  (+) headaches, psychiatric history  (-) seizures, CVA  GI/Hepatic/Renal/Endo    (+) GERD, renal disease (creat 1.3)- ARF, diabetes mellitus (A1C 7) type 2 poorly controlled  (-) liver disease, no thyroid disorder    Musculoskeletal     Abdominal    Substance History      OB/GYN          Other      history of cancer (lymphoma)    ROS/Med Hx Other: Entresto Eliquis bisoprolol                     Anesthesia Plan    ASA 4     general     ([place] VS [place] )  intravenous induction     Anesthetic plan, risks, benefits, and alternatives have been provided, discussed and informed consent has been obtained with: patient.    Plan discussed with CRNA.      CODE STATUS:

## 2024-10-03 NOTE — OP NOTE
"          Cardiac Electrophysiology Procedure Note           [facility]         CATHETER ABLATION FOR ATRIAL FIBRILLATION (PVI)    PROCEDURES PERFORMED:    Catheter ablation of paroxysmal atrial fibrillation  Catheter ablation of atrial flutter  Full diagnostic EP study  3D electroanatomic mapping  drug infusion / programmed pacing  Intracadiac Echocardiography  transeptal puncture  Left ventricular pacing and recording    PREPROCEDURAL DIAGNOSES:    1. Paroxysmal Atrial fibrillation    2. Typical atrial flutter    POST PROCEDURE DIANGOSES:  As above.    INDICATION FOR PROCEDURE:  Briefly, Palomo Pollack is a 44 y.o. year old male with a history of nonischemic cardiomyopathy left final branch block resynchronization ICD system implanted status post lymphoma and chemotherapy along with stem cell transplant remotely with highly symptomatic drug refractory paroxysmal atrial fibrillation as well as typical atrial flutter who presents today for elective outpatient catheter ablation.    ANTICOAGULATION STRATEGY PRIOR TO AND POST PROCEDURE: DOAC.  The last dose of anticoagulant was confirmed to have been taken this a.m.      PT/INR:  No results found for: \"LABPROT\", \"INR\"  PTT:  No results found for: \"APTT\"    CBC: No results found for: \"WBC\", \"RBC\", \"HGB\", \"HCT\", \"MCV\", \"RDW\", \"PLT\"  BMP:     Sodium   Date Value Ref Range Status   10/03/2024 140 136 - 145 mmol/L Final     Potassium   Date Value Ref Range Status   10/03/2024 3.9 3.5 - 5.2 mmol/L Final     Chloride   Date Value Ref Range Status   10/03/2024 101 98 - 107 mmol/L Final     CO2   Date Value Ref Range Status   10/03/2024 26.0 22.0 - 29.0 mmol/L Final     BUN   Date Value Ref Range Status   10/03/2024 21 (H) 6 - 20 mg/dL Final     Creatinine   Date Value Ref Range Status   10/03/2024 1.18 0.76 - 1.27 mg/dL Final     eGFR   Date Value Ref Range Status   10/03/2024 77.1 >60.0 mL/min/1.73 Final       Vital Signs: BP " "113/84 (BP Location: Right arm, Patient Position: Lying)   Pulse 79   Temp 98 °F (36.7 °C) (Temporal)   Resp 18   Ht 180.3 cm (71\")   Wt 90.8 kg (200 lb 3.2 oz)   SpO2 100%   BMI 27.92 kg/m²      Admit Weight:  90.8 kg (200 lb 3.2 oz)  BMI: Body mass index is 27.92 kg/m².    PROCEDURE NARRATIVE:    The patient was able to give written informed consent after revisiting the key portions of the risk versus benefit profile of the procedure.  This discussion was framed by our lengthy conversations  (please see our detailed notes).  Patient verbalized strong understanding of this discussion and a strong desire to proceed with the procedure.  Please note that this detailed informed consent process utilized mutual and shared decision making process between all parties involved, principally the physician and patient, but also potentially with input from the patient's selected family and friends.    The patient was brought to the EP laboratory in the post absorptive state.  The patient was electively intubated for the procedure and given a general anesthetic by colleagues from anesthesia.  Please see the detailed anesthesia records.    The patient was then prepared and draped in a routing sterile fashion.  Seldinger access was obtained at the bilateral common femoral veins with 3 venipunctures.  J tip wires were advanced into the vascular space.  Short 11, 8 and a long  sheath were placed into the bilateral common femoral veins and the inferior vena cava / right atrium in an over the wire fashion.    The patient was anticoagulated with intravenous heparin (initial bolus and then continuous infusion) with a goal ACT of between 350 and 400 seconds.    A phased array ICE catheter was placed into the right atrium and right ventricle.  This was used for transeptal puncture, monitoring of the pericardial space, monitoring of the ablation catheter position within the heart and monitoring of other cardiac structures such as " the left atrial appendage (to document the absence of thrombus), pulmonary veins, esophagus, mitral valve annulus and other cardiac structures.    Muñoz-septal puncture was performed after heparin administration with a combination of echocardiographic and fluoroscopic guidance.  This was performed with the long sheath, a BRK needle, and a SafeSept transeptal wire.  Catheters and sheaths were advanced safely into the left atrium.  A multielectrode electrophysiology catheter was used for pacing and recording in the left atrium, left atrial appendage, pulmonary veins and left ventricle at various times throughout the procedure.    Left ventricular pacing and recording were performed by placing catheters safely and carefully across the mitral valve annulus to the left ventricle from the left atrium.  Adequate sensing and pacing thresholds were obtained from the left ventricle.  AV conduction ( antegrade ) as well as VA conduction ( retrograde ) were studied.  This was performed as a distinct addition to the diagnostic EP study.  Findings were conclusive for no accessory pathway and VA dissociation.    Catheter ablation was performed to achieve pulmonary vein isolation.  A wide antral circumferential ablation approach was used.  Additional lesions were given within the antral lesion set at the minnie between superior and inferior veins to achieve total isolation, when and where necessary.      We turned our attention to performing typical atrial flutter ablation.  Please note that this is a separate SVT with a distinct mechanism from the patients atrial fibrillation.  Ablation was performed along the cavo tricuspid isthmus beginning at the ventricular aspect in extended to the caval aspect of the cavo tricuspid isthmus.  We then demonstrated that there was bidirectional block with differential pacing maneuvers.     After completing the antral ablation lesion set, I interrogated the pulmonary veins using and documented  pulmonary vein isolation.    Adenosine 12 mg was administered intravenously following ablation to test for other atrial and or ventricular arrhythmias.   Programmed stimulation was performed in an attempt to induce other and additional arrhythmias.  No arrhythmias were induced during administration and washout.    The ICE catheter revealed that there was no pericardial effusion.    Catheters and sheaths were then removed from the body.    Hemostasis was achieved with Vascade closure devices.  Bedrest 2 hours.  Anticipate home later today or tomorrow.      The patient was extubated in routine fashion and transferred to recovery in stable condition.    COMPLICATIONS: none    EBL: minimal    KEY PROCEDURAL FINDINGS:  Successful wide antral circumferential pulmonary vein isolation and typical flutter ablation  Normal [employer] biventricular ICD system device interrogation both before and after the procedure.  Biventricular ICD system I personally confirmed to be programmed on prior to him leaving the room.    POST PROCEDURAL PLAN:    Report was called the the recovery nurse responsible for the patients care.  Uninterrupted anticoagulation for not less than 90 days unless specially instructed otherwise by myself or another member of our EP physician team.  Please note that the patient and the patient’s family have been extensively counseled about this critical requirement and have agreed to comply.  Anticipate discharge this day.  Medications were reconciled, and key changes in medications include: None  The patient will be seen at our office per routine follow up.      Wilmer Rojas DO, FACC, RS  Cardiac Electrophysiologist  [facility]

## 2024-10-04 ENCOUNTER — CALL CENTER PROGRAMS (OUTPATIENT)
Dept: CALL CENTER | Facility: HOSPITAL | Age: 44
End: 2024-10-04
Payer: MEDICAID

## 2024-10-04 DIAGNOSIS — J30.2 SEASONAL ALLERGIES: ICD-10-CM

## 2024-10-04 NOTE — OUTREACH NOTE
PCI/Device Survey      Flowsheet Row Responses   Facility patient discharged from? [facility]   Procedure date 10/03/24   Procedure (if device, specify in description) Ablation   Performing MD Dr. Wilmer Rojas   Attempt successful? No   Unsuccessful attempts Attempt 1            Rand SAWANT - Registered Nurse

## 2024-10-04 NOTE — OUTREACH NOTE
PCI/Device Survey      Flowsheet Row Responses   Facility patient discharged from? [place]   Procedure date 10/03/24   Procedure (if device, specify in description) Ablation   Performing MD Dr. Wilmer Rojas   Attempt successful? No   Unsuccessful attempts Attempt 2            Rand SAWANT - Registered Nurse

## 2024-10-06 LAB
QT INTERVAL: 426 MS
QTC INTERVAL: 488 MS

## 2024-10-07 RX ORDER — FEXOFENADINE HCL 180 MG/1
180 TABLET ORAL DAILY
Qty: 90 TABLET | Refills: 0 | Status: SHIPPED | OUTPATIENT
Start: 2024-10-07

## 2024-10-21 ENCOUNTER — TELEPHONE (OUTPATIENT)
Dept: CARDIOLOGY | Facility: CLINIC | Age: 44
End: 2024-10-21
Payer: MEDICAID

## 2024-10-21 RX ORDER — SACUBITRIL AND VALSARTAN 49; 51 MG/1; MG/1
1 TABLET, FILM COATED ORAL 2 TIMES DAILY
Qty: 60 TABLET | Refills: 0 | Status: SHIPPED | OUTPATIENT
Start: 2024-10-21

## 2024-10-21 NOTE — TELEPHONE ENCOUNTER
[employer] Heart Failure index is 26 and threshold is 16. Recovery threshold is 6. Thoracic impedance is 46.9 ohms. RR is 16.5 rpm      Available HF diagnostics and trends indicate possible fluid accumulation/HF decompensation.      I attempted to call the patient to check symptoms.  No answer, I left message on VM to call back.

## 2024-10-22 NOTE — TELEPHONE ENCOUNTER
I spoke with patient.  He states he has not taken his Lasix 20mg in several days.  He has been eating some food higher in sodium.  Patient states he does have some slight swelling however denies SOB and Chest pain. Patient is S/P AF ablation with PFA on 10/03/2024 and states he did have 10 lbs of weight gain from that and is still about 5 more pounds heavier.     I have instructed patient to re-start his Lasix and I will re-check a HF transmission in one week.  Patient agrees to call us with any new or worsening symptoms.

## 2024-11-04 RX ORDER — FUROSEMIDE 20 MG/1
TABLET ORAL
Qty: 90 TABLET | Refills: 0 | Status: SHIPPED | OUTPATIENT
Start: 2024-11-04

## 2024-11-06 ENCOUNTER — OFFICE VISIT (OUTPATIENT)
Dept: CARDIOLOGY | Facility: HOSPITAL | Age: 44
End: 2024-11-06
Payer: MEDICAID

## 2024-11-06 ENCOUNTER — TELEPHONE (OUTPATIENT)
Dept: CARDIOLOGY | Facility: HOSPITAL | Age: 44
End: 2024-11-06

## 2024-11-06 VITALS
HEART RATE: 87 BPM | DIASTOLIC BLOOD PRESSURE: 70 MMHG | HEIGHT: 71 IN | BODY MASS INDEX: 28.28 KG/M2 | WEIGHT: 202 LBS | OXYGEN SATURATION: 97 % | RESPIRATION RATE: 18 BRPM | SYSTOLIC BLOOD PRESSURE: 100 MMHG | TEMPERATURE: 96.3 F

## 2024-11-06 DIAGNOSIS — I50.22 CHRONIC HFREF (HEART FAILURE WITH REDUCED EJECTION FRACTION): ICD-10-CM

## 2024-11-06 DIAGNOSIS — R42 ORTHOSTATIC DIZZINESS: ICD-10-CM

## 2024-11-06 DIAGNOSIS — I48.0 PAROXYSMAL ATRIAL FIBRILLATION: Primary | ICD-10-CM

## 2024-11-06 DIAGNOSIS — Z98.890 HISTORY OF CARDIAC RADIOFREQUENCY ABLATION (RFA): ICD-10-CM

## 2024-11-06 DIAGNOSIS — Z95.810 PRESENCE OF BIVENTRICULAR IMPLANTABLE CARDIOVERTER-DEFIBRILLATOR (ICD): ICD-10-CM

## 2024-11-06 RX ORDER — SACUBITRIL AND VALSARTAN 49; 51 MG/1; MG/1
0.5 TABLET, FILM COATED ORAL 2 TIMES DAILY
Start: 2024-11-06

## 2024-11-06 NOTE — TELEPHONE ENCOUNTER
----- Message from Angel Gil sent at 11/6/2024  1:52 PM EST -----  Have patient decrease Entresto 1/2 tab twice a day and monitor dizziness.  F/u with Dr. Mason as scheduled.

## 2024-11-06 NOTE — PROGRESS NOTES
"[facility], [facility]    Chief Complaint  Atrial Fibrillation (S/p PvA)    Subjective    History of Present Illness {CC  Problem List  Visit  Diagnosis   Encounters  Notes  Medications  Labs  Result Review Imaging  Media :23}     Palomo Pollack presents to [facility] CARDIOLOGY for   History of Present Illness     44-year-old male with heart failure with reduced EF, LBBB, DM, GERD, PTSD, PAF    Patient had an EP study on 10/3/2024, ablation for PAF and typical atrial flutter.    Pt reports occasional palpitations  the first week, but less frequent.      Weight is down now.  Mild intermittent dyspnea.  Will take extra lasix if needed.     Lasix visit with Deepa.  Coreg was changed to bisoprolol.  No improvement of dizziness.  Will have runny nose, and nausea.  Very little activity, changing positions, lifting will cause dizziness.  No syncope, but feels that dizziness has interfered with daily physical activity.        Objective     Vital Signs:   Vitals:    11/06/24 1300 11/06/24 1348   BP: 112/64 100/70   BP Location: Left arm    Patient Position: Sitting    Cuff Size: Adult    Pulse: 87    Resp: 18    Temp: 96.3 °F (35.7 °C)    TempSrc: Temporal    SpO2: 97%    Weight: 91.6 kg (202 lb)    Height: 180.3 cm (71\")      Body mass index is 28.17 kg/m².  Physical Exam  Vitals reviewed.   Constitutional:       General: He is not in acute distress.  Cardiovascular:      Rate and Rhythm: Normal rate and regular rhythm.   Pulmonary:      Effort: Pulmonary effort is normal.      Breath sounds: Normal breath sounds.   Musculoskeletal:      Right lower leg: No edema.      Left lower leg: No edema.   Skin:     Coloration: Skin is not pale.   Neurological:      Mental Status: He is alert.   Psychiatric:         Mood and Affect: Mood normal.         Behavior: Behavior normal. Behavior is cooperative.              Result Review  Data Reviewed:{ " Labs  Result Review  Imaging  Med Tab  Media :23}   Cardiac heart monitor 10/23/2024: 18 [employer] heart monitor report 10/29/2024: Heart logic index 26    Echocardiogram 9/14/2023: EF 31 to 35%    Lab Results   Component Value Date    WBC 5.78 09/25/2024    HGB 15.8 09/25/2024    HCT 45.6 09/25/2024    MCV 92.9 09/25/2024     09/25/2024     Lab Results   Component Value Date    GLUCOSE 106 (H) 10/03/2024    BUN 21 (H) 10/03/2024    CREATININE 1.18 10/03/2024     10/03/2024    K 3.9 10/03/2024     10/03/2024    CALCIUM 9.6 10/03/2024    PROTEINTOT 7.7 09/17/2024    ALBUMIN 4.8 09/17/2024    ALT 42 (H) 09/17/2024    AST 38 09/17/2024    ALKPHOS 66 09/17/2024    BILITOT 0.7 09/17/2024    GLOB 2.9 09/17/2024    AGRATIO 1.7 09/17/2024    BCR 17.8 10/03/2024    ANIONGAP 13.0 10/03/2024    EGFR 77.1 10/03/2024     Lab Results   Component Value Date    TSH 12.110 (H) 05/29/2023     Lab Results   Component Value Date    CHOL 177 05/31/2023    CHOL 143 04/03/2023     Lab Results   Component Value Date    TRIG 93 05/31/2023    TRIG 80 04/03/2023     Lab Results   Component Value Date    HDL 59 05/31/2023    HDL 48 04/03/2023     Lab Results   Component Value Date     (H) 05/31/2023    LDL 79 04/03/2023                   Assessment and Plan {CC Problem List  Visit Diagnosis  ROS  Review (Popup)  Health Maintenance  Quality  BestPractice  Medications  SmartSets  SnapShot Encounters  Media :23}   1. Paroxysmal atrial fibrillation  Status post ablation, beta-blocker  Eliquis for stroke prevention      2. Chronic HFrEF (heart failure with reduced ejection fraction)  EF 31 to 35%  Entresto, bisoprolol, spironolactone, Jardiance  Lasix    3. Presence of biventricular implantable cardioverter-defibrillator (ICD)  Reviewed recent remote check.    4. History of cardiac radiofrequency ablation (RFA)  Discussed postprocedural expectations and management.  Reviewed the role of the heart  valve center.    5. Orthostatic dizziness  Repeat blood pressure today 100/72.  Frequent dizziness when at work, bending, changing positions, lifting.  Decrease enstresto 1/2 tab BID    F/u with cardiology as scheduled scheduled.         Follow Up {Instructions Charge Capture  Follow-up Communications :23}   No follow-ups on file.    Patient was given instructions and counseling regarding his condition or for health maintenance advice. Please see specific information pulled into the AVS if appropriate.  Patient was instructed to call the [facility] Center with any questions, concerns, or worsening symptoms.

## 2024-11-07 ENCOUNTER — TELEPHONE (OUTPATIENT)
Dept: CARDIOLOGY | Facility: HOSPITAL | Age: 44
End: 2024-11-07
Payer: MEDICAID

## 2024-11-07 NOTE — TELEPHONE ENCOUNTER
Patient notified and will decrease Entresto to 1/2 tablet BID. Advised to monitor his dizziness and keep f/u with Marlon.

## 2024-11-20 RX ORDER — SACUBITRIL AND VALSARTAN 49; 51 MG/1; MG/1
0.5 TABLET, FILM COATED ORAL 2 TIMES DAILY
Start: 2024-11-20

## 2024-11-21 ENCOUNTER — OFFICE VISIT (OUTPATIENT)
Dept: CARDIOLOGY | Facility: CLINIC | Age: 44
End: 2024-11-21
Payer: MEDICAID

## 2024-11-21 VITALS
OXYGEN SATURATION: 98 % | DIASTOLIC BLOOD PRESSURE: 82 MMHG | BODY MASS INDEX: 28.42 KG/M2 | WEIGHT: 203 LBS | HEART RATE: 94 BPM | SYSTOLIC BLOOD PRESSURE: 134 MMHG | HEIGHT: 71 IN

## 2024-11-21 DIAGNOSIS — K21.9 GASTROESOPHAGEAL REFLUX DISEASE WITHOUT ESOPHAGITIS: ICD-10-CM

## 2024-11-21 DIAGNOSIS — I42.8 NICM (NONISCHEMIC CARDIOMYOPATHY): ICD-10-CM

## 2024-11-21 DIAGNOSIS — I48.0 PAROXYSMAL ATRIAL FIBRILLATION: Primary | ICD-10-CM

## 2024-11-21 DIAGNOSIS — I44.7 LBBB (LEFT BUNDLE BRANCH BLOCK): ICD-10-CM

## 2024-11-21 PROCEDURE — 99214 OFFICE O/P EST MOD 30 MIN: CPT | Performed by: INTERNAL MEDICINE

## 2024-11-21 RX ORDER — SPIRONOLACTONE 25 MG/1
25 TABLET ORAL DAILY
Qty: 90 TABLET | Refills: 3 | Status: SHIPPED | OUTPATIENT
Start: 2024-11-21

## 2024-11-21 RX ORDER — SACUBITRIL AND VALSARTAN 49; 51 MG/1; MG/1
1 TABLET, FILM COATED ORAL 2 TIMES DAILY
Qty: 180 TABLET | Refills: 3 | Status: SHIPPED | OUTPATIENT
Start: 2024-11-21

## 2024-11-21 RX ORDER — ESOMEPRAZOLE MAGNESIUM 40 MG/1
40 CAPSULE, DELAYED RELEASE ORAL
Qty: 90 CAPSULE | Refills: 3 | Status: SHIPPED | OUTPATIENT
Start: 2024-11-21

## 2024-11-21 RX ORDER — METOPROLOL SUCCINATE 25 MG/1
25 TABLET, EXTENDED RELEASE ORAL DAILY
Qty: 30 TABLET | Refills: 5 | Status: SHIPPED | OUTPATIENT
Start: 2024-11-21

## 2024-11-21 NOTE — PROGRESS NOTES
[facility] Cardiology  Office visit  Palomo Pollack  1980  [phone]  There is no work phone number on file.    VISIT DATE:  11/21/2024    PCP: Heladio Valentine DO  [address] [address]    CC:  Chief Complaint   Patient presents with    Chronic HFrEF (heart failure with reduced ejection fraction)       Previous cardiac studies and procedures:  May 2023  Myocardial perfusion imaging    Calcifications visualized in the proximal and mid RCA.    Left ventricular ejection fraction is moderately reduced (Calculated EF = 37%).    Abnormal LV wall motion consistent with moderate hypokinesis of the septal wall.    Large region of moderately decreased perfusion which is predominantly fixed involving the septal wall, apex, and mid portions of the anterior and inferior walls.    Impressions are consistent with a high risk study.  TTE    Left ventricular systolic function is moderately decreased. Calculated left ventricular EF = 28% Left ventricular ejection fraction appears to be 26 - 30%.    The left ventricular cavity is mildly dilated.    The left atrial cavity is mildly dilated.    Moderate tricuspid valve regurgitation is present.    Estimated right ventricular systolic pressure from tricuspid regurgitation is moderately elevated (45-55 mmHg). Calculated right ventricular systolic pressure from tricuspid regurgitation is 50 mmHg.    Cardiac catheterization  Luminal irregularities noted in the proximal mid major epicardial vessels.  LVEDP 21 mmHg  No aortic stenosis    September 2023 TTE    Left ventricular systolic function is moderately decreased. Calculated left ventricular EF = 30% Left ventricular ejection fraction appears to be 31 - 35%.    Left ventricular wall thickness is consistent with mild concentric hypertrophy.    Left ventricular diastolic function was indeterminate.    October 2023 CRT-D [employer]    October 2024:  PVI    ASSESSMENT:   Diagnosis Plan   1. Paroxysmal atrial fibrillation        2. NICM (nonischemic cardiomyopathy)        3. LBBB (left bundle branch block)        4. Gastroesophageal reflux disease without esophagitis  esomeprazole (nexIUM) 40 MG capsule            PLAN:  Heart failure with reduced ejection fraction, chronic: Suspect secondary to late onset chemotherapeutic effects.  Currently euvolemic and compensated.  Clinical improvement status post CRT-D.  Transitioning bisoprolol to Toprol-XL 25 mg p.o. nightly.  Continue current dose of Entresto, spironolactone and Jardiance.  If he continues to have significant orthostasis we will begin to wean back on Entresto.    Paroxysmal atrial fibrillation: Status post PVI.  Eliquis 5 mg p.o. twice daily for stroke prophylaxis.    Subjective  Interval assessment: Denies dyspnea, palpitations or chest discomfort.  Still with intermittent episodes of orthostasis, he over the past couple months has had a couple falls with injury because of it.  Experienced some runny nose and abdominal discomfort after switching from carvedilol to bisoprolol.    Initial evaluation: 43-year-old gentleman with persistent limiting dyspnea on exertion over the previous 4 months following COVID-19 infection in December 2022.  Also with intermittent palpitations.  Describes intermittent isolated flip-flop sensations, also with intermittent sustained palpitations in which she feels his heart is beating hard and fast.  Has intermittent left upper chest discomfort rating down his left arm, no obvious triggers.  No alleviating or exacerbating features.  Does have a history of Hodgkin's lymphoma with chemotherapy and chest radiation and stem cell transplant.  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  Diagnosed with diabetes last year, was able to normalize his hemoglobin A1c with dietary changes and  "approximately 50 pound weight loss.    PHYSICAL EXAMINATION:  Vitals:    11/21/24 0836   BP: 134/82   BP Location: Right arm   Patient Position: Sitting   Pulse: 94   SpO2: 98%   Weight: 92.1 kg (203 lb)   Height: 180.3 cm (71\")         General Appearance:    Alert, cooperative, no distress, appears stated age   Head:    Normocephalic, without obvious abnormality, atraumatic   Eyes:    conjunctiva/corneas clear   Nose:   Nares normal, septum midline, mucosa normal, no drainage   Throat:   Lips, teeth and gums normal   Neck:   Supple, symmetrical, trachea midline, no carotid    bruit or JVD   Lungs:     Clear to auscultation bilaterally, respirations unlabored   Chest Wall:    No tenderness or deformity    Heart:    Regular rate and rhythm, S1 and S2 normal, no murmur, rub   or gallop, normal carotid impulse bilaterally without bruit.   Abdomen:     Soft, non-tender   Extremities:   Extremities normal, atraumatic, no cyanosis or edema   Pulses:   2+ and symmetric all extremities   Skin:   Skin color, texture, turgor normal, no rashes or lesions       Diagnostic Data:  Procedures  Lab Results   Component Value Date    TRIG 93 05/31/2023    HDL 59 05/31/2023     Lab Results   Component Value Date    GLUCOSE 106 (H) 10/03/2024    BUN 21 (H) 10/03/2024    CREATININE 1.18 10/03/2024     10/03/2024    K 3.9 10/03/2024     10/03/2024    CO2 26.0 10/03/2024     Lab Results   Component Value Date    HGBA1C 7.00 (H) 05/31/2023     Lab Results   Component Value Date    WBC 5.78 09/25/2024    HGB 15.8 09/25/2024    HCT 45.6 09/25/2024     09/25/2024       Allergies  Allergies   Allergen Reactions    Bactrim [Sulfamethoxazole-Trimethoprim] Rash and Other (See Comments)     Gave increased heartrate-biaxin      Clarithromycin Unknown - Low Severity    Milk-Related Compounds Other (See Comments)     Increased phlegm        Current Medications    Current Outpatient Medications:     acetaminophen (TYLENOL) 325 MG " tablet, Take 2 tablets by mouth Every 6 (Six) Hours As Needed for Mild Pain., Disp: , Rfl:     apixaban (Eliquis) 5 MG tablet tablet, Take 1 tablet by mouth 2 (Two) Times a Day., Disp: 180 tablet, Rfl: 3    empagliflozin (JARDIANCE) 10 MG tablet tablet, Take 1 tablet by mouth Daily., Disp: 90 tablet, Rfl: 3    esomeprazole (nexIUM) 40 MG capsule, Take 1 capsule by mouth Every Morning Before Breakfast., Disp: 90 capsule, Rfl: 3    fexofenadine (ALLEGRA) 180 MG tablet, Take 1 tablet by mouth Daily., Disp: 90 tablet, Rfl: 0    furosemide (LASIX) 20 MG tablet, TAKE 1 TABLET BY MOUTH ONCE DAILY AS NEEDED FOR SHORTNESS OF BREATH, SWELLING, WEIGHT GAIN > 3 LBS, Disp: 90 tablet, Rfl: 0    sacubitril-valsartan (Entresto) 49-51 MG tablet, Take 1 tablet by mouth 2 (Two) Times a Day., Disp: 180 tablet, Rfl: 3    spironolactone (ALDACTONE) 25 MG tablet, Take 1 tablet by mouth Daily., Disp: 90 tablet, Rfl: 3    Testosterone 1.62 % gel, Daily., Disp: , Rfl:     metoprolol succinate XL (TOPROL-XL) 25 MG 24 hr tablet, Take 1 tablet by mouth Daily., Disp: 30 tablet, Rfl: 5          ROS  ROS      SOCIAL HX  Social History     Socioeconomic History    Marital status: Single   Tobacco Use    Smoking status: Some Days     Current packs/day: 0.25     Average packs/day: 0.3 packs/day for 22.0 years (5.5 ttl pk-yrs)     Types: Cigarettes     Start date: 11/2002     Last attempt to quit: 11/2022     Passive exposure: Past    Smokeless tobacco: Never    Tobacco comments:     smokes less than .25 ppd   Vaping Use    Vaping status: Never Used   Substance and Sexual Activity    Alcohol use: Yes     Alcohol/week: 4.0 standard drinks of alcohol     Types: 4 Shots of liquor per week     Comment: Occasional on weekends    Drug use: Not Currently     Types: Marijuana     Comment: stopped april 2024    Sexual activity: Yes       FAMILY HX  Family History   Problem Relation Age of Onset    Stroke Father     Lung cancer Father     Cancer Father          Positive for cured lung cancer- he was smoker             Alec Mason III, MD, FACC

## 2024-12-30 DIAGNOSIS — J30.2 SEASONAL ALLERGIES: ICD-10-CM

## 2024-12-30 RX ORDER — FEXOFENADINE HCL 180 MG/1
180 TABLET ORAL DAILY
Qty: 90 TABLET | Refills: 0 | Status: SHIPPED | OUTPATIENT
Start: 2024-12-30

## 2025-01-13 ENCOUNTER — OFFICE VISIT (OUTPATIENT)
Dept: CARDIOLOGY | Facility: CLINIC | Age: 45
End: 2025-01-13
Payer: MEDICAID

## 2025-01-13 VITALS
OXYGEN SATURATION: 97 % | WEIGHT: 203 LBS | SYSTOLIC BLOOD PRESSURE: 138 MMHG | BODY MASS INDEX: 28.42 KG/M2 | DIASTOLIC BLOOD PRESSURE: 60 MMHG | HEIGHT: 71 IN | HEART RATE: 99 BPM

## 2025-01-13 DIAGNOSIS — I50.22 CHRONIC HFREF (HEART FAILURE WITH REDUCED EJECTION FRACTION): ICD-10-CM

## 2025-01-13 DIAGNOSIS — Z95.810 PRESENCE OF BIVENTRICULAR IMPLANTABLE CARDIOVERTER-DEFIBRILLATOR (ICD): ICD-10-CM

## 2025-01-13 DIAGNOSIS — I48.0 PAROXYSMAL ATRIAL FIBRILLATION: Primary | ICD-10-CM

## 2025-01-13 PROCEDURE — 93283 PRGRMG EVAL IMPLANTABLE DFB: CPT | Performed by: PHYSICIAN ASSISTANT

## 2025-01-13 PROCEDURE — 99214 OFFICE O/P EST MOD 30 MIN: CPT | Performed by: PHYSICIAN ASSISTANT

## 2025-01-13 NOTE — PROGRESS NOTES
Cardiac Electrophysiology Outpatient Follow Up Note            [facility] at [facility]    Follow Up Office Visit      Palomo Pollack  [id number]      Primary Care Physician: Heladio Valentine DO      Subjective     Chief Complaint:   Diagnoses and all orders for this visit:    1. Paroxysmal atrial fibrillation (Primary)    2. Presence of biventricular implantable cardioverter-defibrillator (ICD)    3. Chronic HFrEF (heart failure with reduced ejection fraction)        Chief Complaint   Patient presents with    Chronic HFrEF (heart failure with reduced ejection fraction)       History of Present Illness:   Palomo Pollack is a 44 y.o. male who presents to electrophysiology clinic for follow up of atrial fibrillation.  He is status post successful ablation October 3, 2024.  Since procedure he denies any difficulty with chest pain dizziness near syncope denies any ICD shocks.  He said occasional palpitation but these are pretty short nature just lasting a few seconds much improved than previously.  Overall he is doing well with exception of try to tolerate medications he is try to balance low blood pressure and medications but seems to be doing well at this time.  He works full-time for [employer] and is quite active with his job.    Past Medical History:   Past Medical History:   Diagnosis Date    Allergic rhinitis     Atrial fibrillation     CHF (congestive heart failure)     Chronic right ear pain     h/o    Diabetes mellitus     h/o-  doesnt check sugar    Diverticulitis     GERD (gastroesophageal reflux disease)     Headache     Heart failure     History of kidney stones     passed them    History of shingles     1999- after transplant of stem cells    History of transfusion     1998-bhlex; 1999 [place], [place]- 1 unit- stem cell transplant -  recieved plts - no reaction recalled    Hodgkin lymphoma     Hypogonadism in male     PTSD (post-traumatic stress  disorder)     Stem cells transplant status     Vision blurred     possibly need glasses       Past Surgical History:   Past Surgical History:   Procedure Laterality Date    BIVENTRICULLAR IMPLANTABLE CARDIOVERTER DEFIBRILLATOR PLACEMENT N/A 10/26/2023    Procedure: Implant ICD - bi ventricular; DNS meds; [place];  Surgeon: Wilmer Rojas DO;  Location:  [facility] EP INVASIVE LOCATION;  Service: Cardiovascular;  Laterality: N/A;    CARDIAC CATHETERIZATION N/A 05/30/2023    Procedure: Left Heart Cath;  Surgeon: Alec Mason III, MD;  Location:  [facility] CATH INVASIVE LOCATION;  Service: Cardiovascular;  Laterality: N/A;    CARDIAC ELECTROPHYSIOLOGY PROCEDURE N/A 10/3/2024    Procedure: Ablation atrial fibrillation w PFA; CTA prior, GA, no meds to hold;  Surgeon: Wilmer Rojas DO;  Location:  [facility] EP INVASIVE LOCATION;  Service: Cardiovascular;  Laterality: N/A;    CENTRAL VENOUS LINE INSERTION  1999    removed since    COLONOSCOPY      OTHER SURGICAL HISTORY      stem cell transplant    PORTOCAVAL SHUNT PLACEMENT  1998    WISDOM TOOTH EXTRACTION         Family History:   Family History   Problem Relation Age of Onset    Stroke Father     Lung cancer Father     Cancer Father         Positive for cured lung cancer- he was smoker       Social History:   Social History     Socioeconomic History    Marital status: Single   Tobacco Use    Smoking status: Some Days     Current packs/day: 0.25     Average packs/day: 0.3 packs/day for 22.1 years (5.5 ttl pk-yrs)     Types: Cigarettes     Start date: 11/2002     Last attempt to quit: 11/2022     Passive exposure: Past    Smokeless tobacco: Never    Tobacco comments:     smokes less than .25 ppd   Vaping Use    Vaping status: Never Used   Substance and Sexual Activity    Alcohol use: Yes     Alcohol/week: 4.0 standard drinks of alcohol     Types: 4 Shots of liquor per week     Comment: Occasional on weekends    Drug use: Not Currently     Types: Marijuana     Comment: stopped april  "2024    Sexual activity: Yes       Medications:     Current Outpatient Medications:     acetaminophen (TYLENOL) 325 MG tablet, Take 2 tablets by mouth Every 6 (Six) Hours As Needed for Mild Pain., Disp: , Rfl:     apixaban (Eliquis) 5 MG tablet tablet, Take 1 tablet by mouth 2 (Two) Times a Day., Disp: 180 tablet, Rfl: 3    empagliflozin (JARDIANCE) 10 MG tablet tablet, Take 1 tablet by mouth Daily., Disp: 90 tablet, Rfl: 3    esomeprazole (nexIUM) 40 MG capsule, Take 1 capsule by mouth Every Morning Before Breakfast., Disp: 90 capsule, Rfl: 3    fexofenadine (ALLEGRA) 180 MG tablet, Take 1 tablet by mouth Daily., Disp: 90 tablet, Rfl: 0    furosemide (LASIX) 20 MG tablet, TAKE 1 TABLET BY MOUTH ONCE DAILY AS NEEDED FOR SHORTNESS OF BREATH, SWELLING, WEIGHT GAIN > 3 LBS, Disp: 90 tablet, Rfl: 0    metoprolol succinate XL (TOPROL-XL) 25 MG 24 hr tablet, Take 1 tablet by mouth Daily., Disp: 30 tablet, Rfl: 5    sacubitril-valsartan (Entresto) 49-51 MG tablet, Take 1 tablet by mouth 2 (Two) Times a Day., Disp: 180 tablet, Rfl: 3    spironolactone (ALDACTONE) 25 MG tablet, Take 1 tablet by mouth Daily., Disp: 90 tablet, Rfl: 3    Testosterone 1.62 % gel, Daily., Disp: , Rfl:     Allergies:   Allergies   Allergen Reactions    Bactrim [Sulfamethoxazole-Trimethoprim] Rash and Other (See Comments)     Gave increased heartrate-biaxin      Clarithromycin Unknown - Low Severity    Milk-Related Compounds Other (See Comments)     Increased phlegm        Objective   Vital Signs:   Vitals:    01/13/25 1412   BP: 138/60   BP Location: Right arm   Patient Position: Sitting   Cuff Size: Adult   Pulse: 99   SpO2: 97%   Weight: 92.1 kg (203 lb)   Height: 180.3 cm (71\")       PHYSICAL EXAM  General appearance: Awake, alert, cooperative  Head: Normocephalic, without obvious abnormality, atraumatic  Eyes: Conjunctivae/corneas clear, EOMs intact  Neck: no adenopathy, no carotid bruit, no JVD, and thyroid: not enlarged  Lungs: clear to " "auscultation bilaterally and no rhonchi or crackles\", ' symmetric  Heart: regular rate and rhythm, S1, S2 normal, no murmur, click, rub or gallop  Abdomen: Soft, non-tender, bowel sounds normal,  no organomegaly  Extremities: extremities normal, atraumatic, no cyanosis or edema  Skin: Skin color, turgor normal, no rashes or lesions  Neurologic: Grossly normal     Lab Results   Component Value Date    GLUCOSE 106 (H) 10/03/2024    CALCIUM 9.6 10/03/2024     10/03/2024    K 3.9 10/03/2024    CO2 26.0 10/03/2024     10/03/2024    BUN 21 (H) 10/03/2024    CREATININE 1.18 10/03/2024    EGFRIFNONA 75 03/02/2018    BCR 17.8 10/03/2024    ANIONGAP 13.0 10/03/2024     Lab Results   Component Value Date    WBC 5.78 09/25/2024    HGB 15.8 09/25/2024    HCT 45.6 09/25/2024    MCV 92.9 09/25/2024     09/25/2024     No results found for: \"INR\", \"PROTIME\"  Lab Results   Component Value Date    TSH 12.110 (H) 05/29/2023       Cardiac Testing:   [facility] bivicd.  Mode DDD rate 60 a paced 1% CRT 94%.  Normal thresholds impedances.  Battery voltage 3.5 years.  Sinus tach 102.  I personally viewed and interpreted the patient's EKG/Telemetry/lab data    Procedures    Tobacco Cessation: N/A  Obstructive Sleep Apnea Screening: Completed    Advance Care Planning   ACP discussion was declined by the patient. Patient does not have an advance directive, declines further assistance.       Assessment & Plan    Diagnoses and all orders for this visit:    1. Paroxysmal atrial fibrillation (Primary)    2. Presence of biventricular implantable cardioverter-defibrillator (ICD)    3. Chronic HFrEF (heart failure with reduced ejection fraction)           Diagnosis Plan   1. Chronic HFrEF (heart failure with reduced ejection fraction)  Stable.  Euvolemic.  New York Heart Association functional class II after CRT.      2. LBBB (left bundle branch block)  CRT in situ.      3. Presence of biventricular implantable " cardioverter-defibrillator (ICD)  [employer] resynchronization ICD system.  Paroxysmal A-fib episodes noted.      This patient's Cardiac Implanted Electronic Device was manually interrogated and reprogrammed during the patient encounter today.  Iterative programming changes were manually made to determine the sensing threshold, pacing threshold, lead impedance as well as underlying cardiac rhythm.  These programming changes were not limited to but included some or all of the following when appropriate: pacing mode, programmed AV delays, blanking periods, and refractory periods.  Data obtained as a result of these manual programing changes informed the patient's CIED permanent programming.        4. Paroxysmal atrial fibrillation  PVA October 3, 2024. No afib by device interrogation.           Body mass index is 28.31 kg/m².  Electronically signed by TIM Herring, 01/13/25, 3:15 PM EST.

## 2025-02-03 RX ORDER — FUROSEMIDE 20 MG/1
TABLET ORAL
Qty: 90 TABLET | Refills: 0 | Status: SHIPPED | OUTPATIENT
Start: 2025-02-03

## 2025-02-20 ENCOUNTER — OFFICE VISIT (OUTPATIENT)
Dept: FAMILY MEDICINE CLINIC | Facility: CLINIC | Age: 45
End: 2025-02-20
Payer: MEDICAID

## 2025-02-20 VITALS
HEART RATE: 102 BPM | DIASTOLIC BLOOD PRESSURE: 74 MMHG | WEIGHT: 199.6 LBS | BODY MASS INDEX: 27.84 KG/M2 | OXYGEN SATURATION: 97 % | RESPIRATION RATE: 20 BRPM | TEMPERATURE: 97.8 F | SYSTOLIC BLOOD PRESSURE: 134 MMHG

## 2025-02-20 DIAGNOSIS — R10.32 LEFT LOWER QUADRANT ABDOMINAL PAIN: ICD-10-CM

## 2025-02-20 DIAGNOSIS — K59.04 CHRONIC IDIOPATHIC CONSTIPATION: ICD-10-CM

## 2025-02-20 DIAGNOSIS — E11.9 TYPE 2 DIABETES MELLITUS WITHOUT COMPLICATION, WITHOUT LONG-TERM CURRENT USE OF INSULIN: Primary | ICD-10-CM

## 2025-02-20 LAB
EXPIRATION DATE: ABNORMAL
EXPIRATION DATE: NORMAL
HBA1C MFR BLD: 6.1 % (ref 4.5–5.7)
Lab: ABNORMAL
Lab: NORMAL
POC ALBUMIN, URINE: 150 MG/L
POC CREATININE, URINE: 300 MG/DL
POC URINE ALB/CREA RATIO: NORMAL

## 2025-02-20 PROCEDURE — 82044 UR ALBUMIN SEMIQUANTITATIVE: CPT | Performed by: FAMILY MEDICINE

## 2025-02-20 PROCEDURE — 82570 ASSAY OF URINE CREATININE: CPT | Performed by: FAMILY MEDICINE

## 2025-02-20 PROCEDURE — 99214 OFFICE O/P EST MOD 30 MIN: CPT | Performed by: FAMILY MEDICINE

## 2025-02-20 PROCEDURE — 83036 HEMOGLOBIN GLYCOSYLATED A1C: CPT | Performed by: FAMILY MEDICINE

## 2025-02-20 PROCEDURE — 1126F AMNT PAIN NOTED NONE PRSNT: CPT | Performed by: FAMILY MEDICINE

## 2025-02-20 PROCEDURE — 3044F HG A1C LEVEL LT 7.0%: CPT | Performed by: FAMILY MEDICINE

## 2025-02-20 RX ORDER — DICYCLOMINE HYDROCHLORIDE 10 MG/1
10 CAPSULE ORAL
Qty: 120 CAPSULE | Refills: 11 | Status: SHIPPED | OUTPATIENT
Start: 2025-02-20

## 2025-02-20 NOTE — PROGRESS NOTES
Follow Up Office Visit      Patient Name: Palomo Polalck  : 1980   MRN: [id number]     Chief Complaint:    Chief Complaint   Patient presents with    Abdominal Pain       History of Present Illness: Palomo Pollack is a 44 y.o. male who is here today to follow up with abdominal pain and constipation.  These are chronic issues for the patient.  He thinks that overall they have been worsening over the past month or so.  Patient denies any diarrhea.    Patient does not have a family history of IBS.  He had a colonoscopy with polyps previously      Physical exam: Patient's abdominal exam was fairly benign, he did have some mild tenderness in left lower quadrant.  No distention noted      Subjective        I have reviewed and the following portions of the patient's history were updated as appropriate: past family history, past medical history, past social history, past surgical history and problem list.    Medications:     Current Outpatient Medications:     acetaminophen (TYLENOL) 325 MG tablet, Take 2 tablets by mouth Every 6 (Six) Hours As Needed for Mild Pain., Disp: , Rfl:     apixaban (Eliquis) 5 MG tablet tablet, Take 1 tablet by mouth 2 (Two) Times a Day., Disp: 180 tablet, Rfl: 3    empagliflozin (JARDIANCE) 10 MG tablet tablet, Take 1 tablet by mouth Daily., Disp: 90 tablet, Rfl: 3    esomeprazole (nexIUM) 40 MG capsule, Take 1 capsule by mouth Every Morning Before Breakfast., Disp: 90 capsule, Rfl: 3    fexofenadine (ALLEGRA) 180 MG tablet, Take 1 tablet by mouth Daily., Disp: 90 tablet, Rfl: 0    furosemide (LASIX) 20 MG tablet, TAKE 1 TABLET BY MOUTH ONCE DAILY AS NEEDED FOR SHORTNESS OF BREATH ,  SWELLING,  WEIGHT  GAIN >3 LBS, Disp: 90 tablet, Rfl: 0    metoprolol succinate XL (TOPROL-XL) 25 MG 24 hr tablet, Take 1 tablet by mouth Daily., Disp: 30 tablet, Rfl: 5    sacubitril-valsartan (Entresto) 49-51 MG tablet, Take 1 tablet by mouth 2 (Two) Times a Day., Disp: 180 tablet,  Rfl: 3    spironolactone (ALDACTONE) 25 MG tablet, Take 1 tablet by mouth Daily., Disp: 90 tablet, Rfl: 3    Testosterone 1.62 % gel, Daily., Disp: , Rfl:     dicyclomine (BENTYL) 10 MG capsule, Take 1 capsule by mouth 4 (Four) Times a Day Before Meals & at Bedtime., Disp: 120 capsule, Rfl: 11    Allergies:   Allergies   Allergen Reactions    Bactrim [Sulfamethoxazole-Trimethoprim] Rash and Other (See Comments)     Gave increased heartrate-biaxin      Clarithromycin Unknown - Low Severity    Milk-Related Compounds Other (See Comments)     Increased phlegm        Objective     Physical Exam: Please see above  Vital Signs:   Vitals:    02/20/25 1007   BP: 134/74   BP Location: Left arm   Patient Position: Sitting   Cuff Size: Adult   Pulse: 102   Resp: 20   Temp: 97.8 °F (36.6 °C)   TempSrc: Infrared   SpO2: 97%   Weight: 90.5 kg (199 lb 9.6 oz)     Body mass index is 27.84 kg/m².          Assessment / Plan      Assessment/Plan:   Diagnoses and all orders for this visit:    1. Type 2 diabetes mellitus without complication, without long-term current use of insulin (Primary)  -     POC Glycosylated Hemoglobin (Hb A1C)  -     Albumin/Creatinine Ratio Urine    2. Chronic idiopathic constipation    3. Left lower quadrant abdominal pain  -     dicyclomine (BENTYL) 10 MG capsule; Take 1 capsule by mouth 4 (Four) Times a Day Before Meals & at Bedtime.  Dispense: 120 capsule; Refill: 11    Diabetes were well-controlled-continue Jardiance.  Check A1c in 6 months    Constipation and abdominal pain-diagnosis at this time is IBS.  Just be constipation that is chronic.  Will recommend MiraLAX daily and he can adjust the dose as needed for consistent stooling.  Start Bentyl for abdominal pain.  Benign exam today.  Will reach out for colonoscopy records and order new 1 when due.    Follow-up in 6 months for diabetes.  Follow-up sooner for IBS/constipation.  If MiraLAX does not work I recommend trying Linzess      Follow Up:   Return  in about 6 months (around 8/20/2025) for diabetes.    Heladio Valentine DO  [facility]

## 2025-03-04 ENCOUNTER — HOSPITAL ENCOUNTER (OUTPATIENT)
Facility: HOSPITAL | Age: 45
Setting detail: OBSERVATION
LOS: 1 days | Discharge: HOME | End: 2025-03-07
Attending: STUDENT IN AN ORGANIZED HEALTH CARE EDUCATION/TRAINING PROGRAM | Admitting: INTERNAL MEDICINE
Payer: MEDICAID

## 2025-03-04 ENCOUNTER — APPOINTMENT (OUTPATIENT)
Facility: HOSPITAL | Age: 45
End: 2025-03-04
Payer: MEDICAID

## 2025-03-04 DIAGNOSIS — K57.80 DIVERTICULAR DISEASE OF INTESTINE WITH PERFORATION AND ABSCESS: ICD-10-CM

## 2025-03-04 DIAGNOSIS — K57.20 ABSCESS OF SIGMOID COLON DUE TO DIVERTICULITIS: Primary | ICD-10-CM

## 2025-03-04 LAB
ALBUMIN SERPL-MCNC: 4.4 G/DL (ref 3.5–5.2)
ALBUMIN/GLOB SERPL: 1.4 G/DL
ALP SERPL-CCNC: 108 U/L (ref 39–117)
ALT SERPL W P-5'-P-CCNC: 17 U/L (ref 1–41)
ANION GAP SERPL CALCULATED.3IONS-SCNC: 12.6 MMOL/L (ref 5–15)
AST SERPL-CCNC: 24 U/L (ref 1–40)
BASOPHILS # BLD AUTO: 0.03 10*3/MM3 (ref 0–0.2)
BASOPHILS NFR BLD AUTO: 0.4 % (ref 0–1.5)
BILIRUB SERPL-MCNC: 0.5 MG/DL (ref 0–1.2)
BILIRUB UR QL STRIP: ABNORMAL
BUN SERPL-MCNC: 9 MG/DL (ref 6–20)
BUN/CREAT SERPL: 9.5 (ref 7–25)
CALCIUM SPEC-SCNC: 9.2 MG/DL (ref 8.6–10.5)
CHLORIDE SERPL-SCNC: 99 MMOL/L (ref 98–107)
CLARITY UR: CLEAR
CO2 SERPL-SCNC: 27.4 MMOL/L (ref 22–29)
COLOR UR: YELLOW
CREAT SERPL-MCNC: 0.95 MG/DL (ref 0.76–1.27)
DEPRECATED RDW RBC AUTO: 44.2 FL (ref 37–54)
EGFRCR SERPLBLD CKD-EPI 2021: 100 ML/MIN/1.73
EOSINOPHIL # BLD AUTO: 0.03 10*3/MM3 (ref 0–0.4)
EOSINOPHIL NFR BLD AUTO: 0.4 % (ref 0.3–6.2)
ERYTHROCYTE [DISTWIDTH] IN BLOOD BY AUTOMATED COUNT: 12.8 % (ref 12.3–15.4)
GLOBULIN UR ELPH-MCNC: 3.2 GM/DL
GLUCOSE BLDC GLUCOMTR-MCNC: 103 MG/DL (ref 70–130)
GLUCOSE SERPL-MCNC: 126 MG/DL (ref 65–99)
GLUCOSE UR STRIP-MCNC: ABNORMAL MG/DL
HCT VFR BLD AUTO: 43.5 % (ref 37.5–51)
HGB BLD-MCNC: 14.6 G/DL (ref 13–17.7)
HGB UR QL STRIP.AUTO: NEGATIVE
HOLD SPECIMEN: NORMAL
IMM GRANULOCYTES # BLD AUTO: 0.01 10*3/MM3 (ref 0–0.05)
IMM GRANULOCYTES NFR BLD AUTO: 0.1 % (ref 0–0.5)
KETONES UR QL STRIP: ABNORMAL
LEUKOCYTE ESTERASE UR QL STRIP.AUTO: NEGATIVE
LIPASE SERPL-CCNC: 26 U/L (ref 13–60)
LYMPHOCYTES # BLD AUTO: 0.68 10*3/MM3 (ref 0.7–3.1)
LYMPHOCYTES NFR BLD AUTO: 8.2 % (ref 19.6–45.3)
MCH RBC QN AUTO: 30.9 PG (ref 26.6–33)
MCHC RBC AUTO-ENTMCNC: 33.6 G/DL (ref 31.5–35.7)
MCV RBC AUTO: 92.2 FL (ref 79–97)
MONOCYTES # BLD AUTO: 0.76 10*3/MM3 (ref 0.1–0.9)
MONOCYTES NFR BLD AUTO: 9.1 % (ref 5–12)
NEUTROPHILS NFR BLD AUTO: 6.81 10*3/MM3 (ref 1.7–7)
NEUTROPHILS NFR BLD AUTO: 81.8 % (ref 42.7–76)
NITRITE UR QL STRIP: NEGATIVE
PH UR STRIP.AUTO: 6 [PH] (ref 5–8)
PLATELET # BLD AUTO: 168 10*3/MM3 (ref 140–450)
PMV BLD AUTO: 9.3 FL (ref 6–12)
POTASSIUM SERPL-SCNC: 4.1 MMOL/L (ref 3.5–5.2)
PROT SERPL-MCNC: 7.6 G/DL (ref 6–8.5)
PROT UR QL STRIP: ABNORMAL
RBC # BLD AUTO: 4.72 10*6/MM3 (ref 4.14–5.8)
SODIUM SERPL-SCNC: 139 MMOL/L (ref 136–145)
SP GR UR STRIP: 1.02 (ref 1–1.03)
UROBILINOGEN UR QL STRIP: ABNORMAL
WBC NRBC COR # BLD AUTO: 8.32 10*3/MM3 (ref 3.4–10.8)
WHOLE BLOOD HOLD COAG: NORMAL
WHOLE BLOOD HOLD SPECIMEN: NORMAL

## 2025-03-04 PROCEDURE — 83690 ASSAY OF LIPASE: CPT | Performed by: STUDENT IN AN ORGANIZED HEALTH CARE EDUCATION/TRAINING PROGRAM

## 2025-03-04 PROCEDURE — 25510000001 IOPAMIDOL 61 % SOLUTION: Performed by: STUDENT IN AN ORGANIZED HEALTH CARE EDUCATION/TRAINING PROGRAM

## 2025-03-04 PROCEDURE — 81003 URINALYSIS AUTO W/O SCOPE: CPT | Performed by: STUDENT IN AN ORGANIZED HEALTH CARE EDUCATION/TRAINING PROGRAM

## 2025-03-04 PROCEDURE — 96375 TX/PRO/DX INJ NEW DRUG ADDON: CPT

## 2025-03-04 PROCEDURE — 96365 THER/PROPH/DIAG IV INF INIT: CPT

## 2025-03-04 PROCEDURE — 25010000002 PIPERACILLIN SOD-TAZOBACTAM PER 1 G

## 2025-03-04 PROCEDURE — G0378 HOSPITAL OBSERVATION PER HR: HCPCS

## 2025-03-04 PROCEDURE — 99285 EMERGENCY DEPT VISIT HI MDM: CPT

## 2025-03-04 PROCEDURE — 74177 CT ABD & PELVIS W/CONTRAST: CPT

## 2025-03-04 PROCEDURE — 82948 REAGENT STRIP/BLOOD GLUCOSE: CPT

## 2025-03-04 PROCEDURE — 80053 COMPREHEN METABOLIC PANEL: CPT | Performed by: STUDENT IN AN ORGANIZED HEALTH CARE EDUCATION/TRAINING PROGRAM

## 2025-03-04 PROCEDURE — 85025 COMPLETE CBC W/AUTO DIFF WBC: CPT | Performed by: STUDENT IN AN ORGANIZED HEALTH CARE EDUCATION/TRAINING PROGRAM

## 2025-03-04 PROCEDURE — 74018 RADEX ABDOMEN 1 VIEW: CPT

## 2025-03-04 PROCEDURE — 25810000003 SODIUM CHLORIDE 0.9 % SOLUTION

## 2025-03-04 PROCEDURE — 93005 ELECTROCARDIOGRAM TRACING: CPT | Performed by: FAMILY MEDICINE

## 2025-03-04 PROCEDURE — 25010000002 MORPHINE PER 10 MG: Performed by: STUDENT IN AN ORGANIZED HEALTH CARE EDUCATION/TRAINING PROGRAM

## 2025-03-04 PROCEDURE — 99223 1ST HOSP IP/OBS HIGH 75: CPT | Performed by: FAMILY MEDICINE

## 2025-03-04 PROCEDURE — 25010000002 ONDANSETRON PER 1 MG: Performed by: STUDENT IN AN ORGANIZED HEALTH CARE EDUCATION/TRAINING PROGRAM

## 2025-03-04 RX ORDER — PANTOPRAZOLE SODIUM 40 MG/1
40 TABLET, DELAYED RELEASE ORAL
Status: DISCONTINUED | OUTPATIENT
Start: 2025-03-05 | End: 2025-03-07 | Stop reason: HOSPADM

## 2025-03-04 RX ORDER — SODIUM CHLORIDE 9 MG/ML
40 INJECTION, SOLUTION INTRAVENOUS AS NEEDED
Status: DISCONTINUED | OUTPATIENT
Start: 2025-03-04 | End: 2025-03-07 | Stop reason: SDUPTHER

## 2025-03-04 RX ORDER — SACUBITRIL AND VALSARTAN 49; 51 MG/1; MG/1
1 TABLET, FILM COATED ORAL 2 TIMES DAILY
Status: DISCONTINUED | OUTPATIENT
Start: 2025-03-04 | End: 2025-03-07 | Stop reason: HOSPADM

## 2025-03-04 RX ORDER — NITROGLYCERIN 0.4 MG/1
0.4 TABLET SUBLINGUAL
Status: DISCONTINUED | OUTPATIENT
Start: 2025-03-04 | End: 2025-03-07 | Stop reason: HOSPADM

## 2025-03-04 RX ORDER — HYDROCODONE BITARTRATE AND ACETAMINOPHEN 5; 325 MG/1; MG/1
1 TABLET ORAL ONCE
Status: COMPLETED | OUTPATIENT
Start: 2025-03-04 | End: 2025-03-04

## 2025-03-04 RX ORDER — ACETAMINOPHEN 160 MG/5ML
500 SOLUTION ORAL EVERY 6 HOURS PRN
Status: DISCONTINUED | OUTPATIENT
Start: 2025-03-04 | End: 2025-03-07 | Stop reason: HOSPADM

## 2025-03-04 RX ORDER — ACETAMINOPHEN 650 MG/1
650 SUPPOSITORY RECTAL EVERY 4 HOURS PRN
Status: DISCONTINUED | OUTPATIENT
Start: 2025-03-04 | End: 2025-03-07 | Stop reason: HOSPADM

## 2025-03-04 RX ORDER — SODIUM CHLORIDE 0.9 % (FLUSH) 0.9 %
10 SYRINGE (ML) INJECTION EVERY 12 HOURS SCHEDULED
Status: DISCONTINUED | OUTPATIENT
Start: 2025-03-04 | End: 2025-03-07 | Stop reason: HOSPADM

## 2025-03-04 RX ORDER — NICOTINE POLACRILEX 4 MG
15 LOZENGE BUCCAL
Status: DISCONTINUED | OUTPATIENT
Start: 2025-03-04 | End: 2025-03-07 | Stop reason: HOSPADM

## 2025-03-04 RX ORDER — MORPHINE SULFATE 2 MG/ML
2 INJECTION, SOLUTION INTRAMUSCULAR; INTRAVENOUS EVERY 4 HOURS PRN
Status: DISCONTINUED | OUTPATIENT
Start: 2025-03-04 | End: 2025-03-07 | Stop reason: HOSPADM

## 2025-03-04 RX ORDER — OXYCODONE AND ACETAMINOPHEN 5; 325 MG/1; MG/1
1 TABLET ORAL EVERY 4 HOURS PRN
Status: DISCONTINUED | OUTPATIENT
Start: 2025-03-04 | End: 2025-03-07 | Stop reason: HOSPADM

## 2025-03-04 RX ORDER — IBUPROFEN 600 MG/1
1 TABLET ORAL
Status: DISCONTINUED | OUTPATIENT
Start: 2025-03-04 | End: 2025-03-07 | Stop reason: HOSPADM

## 2025-03-04 RX ORDER — CETIRIZINE HYDROCHLORIDE 10 MG/1
10 TABLET ORAL DAILY
Status: DISCONTINUED | OUTPATIENT
Start: 2025-03-05 | End: 2025-03-07 | Stop reason: HOSPADM

## 2025-03-04 RX ORDER — SODIUM CHLORIDE 0.9 % (FLUSH) 0.9 %
10 SYRINGE (ML) INJECTION AS NEEDED
Status: DISCONTINUED | OUTPATIENT
Start: 2025-03-04 | End: 2025-03-07 | Stop reason: SDUPTHER

## 2025-03-04 RX ORDER — NALOXONE HCL 0.4 MG/ML
0.4 VIAL (ML) INJECTION
Status: DISCONTINUED | OUTPATIENT
Start: 2025-03-04 | End: 2025-03-07 | Stop reason: HOSPADM

## 2025-03-04 RX ORDER — SODIUM CHLORIDE 9 MG/ML
10 INJECTION, SOLUTION INTRAMUSCULAR; INTRAVENOUS; SUBCUTANEOUS AS NEEDED
Status: DISCONTINUED | OUTPATIENT
Start: 2025-03-04 | End: 2025-03-07 | Stop reason: SDUPTHER

## 2025-03-04 RX ORDER — IOPAMIDOL 612 MG/ML
100 INJECTION, SOLUTION INTRAVASCULAR
Status: COMPLETED | OUTPATIENT
Start: 2025-03-04 | End: 2025-03-04

## 2025-03-04 RX ORDER — DEXTROSE MONOHYDRATE 25 G/50ML
25 INJECTION, SOLUTION INTRAVENOUS
Status: DISCONTINUED | OUTPATIENT
Start: 2025-03-04 | End: 2025-03-07 | Stop reason: HOSPADM

## 2025-03-04 RX ORDER — ONDANSETRON 2 MG/ML
4 INJECTION INTRAMUSCULAR; INTRAVENOUS ONCE
Status: COMPLETED | OUTPATIENT
Start: 2025-03-04 | End: 2025-03-04

## 2025-03-04 RX ORDER — ACETAMINOPHEN 500 MG
500 TABLET ORAL EVERY 6 HOURS PRN
Status: DISCONTINUED | OUTPATIENT
Start: 2025-03-04 | End: 2025-03-07 | Stop reason: HOSPADM

## 2025-03-04 RX ORDER — METOPROLOL SUCCINATE 25 MG/1
25 TABLET, EXTENDED RELEASE ORAL DAILY
Status: DISCONTINUED | OUTPATIENT
Start: 2025-03-04 | End: 2025-03-07 | Stop reason: HOSPADM

## 2025-03-04 RX ORDER — SPIRONOLACTONE 25 MG/1
25 TABLET ORAL DAILY
Status: DISCONTINUED | OUTPATIENT
Start: 2025-03-05 | End: 2025-03-07 | Stop reason: HOSPADM

## 2025-03-04 RX ADMIN — HYDROCODONE BITARTRATE AND ACETAMINOPHEN 1 TABLET: 5; 325 TABLET ORAL at 17:41

## 2025-03-04 RX ADMIN — SODIUM CHLORIDE 500 ML: 9 INJECTION, SOLUTION INTRAVENOUS at 10:14

## 2025-03-04 RX ADMIN — OXYCODONE HYDROCHLORIDE AND ACETAMINOPHEN 1 TABLET: 5; 325 TABLET ORAL at 20:37

## 2025-03-04 RX ADMIN — MORPHINE SULFATE 4 MG: 4 INJECTION, SOLUTION INTRAMUSCULAR; INTRAVENOUS at 12:36

## 2025-03-04 RX ADMIN — SODIUM CHLORIDE 4.5 G: 9 INJECTION, SOLUTION INTRAVENOUS at 22:08

## 2025-03-04 RX ADMIN — ONDANSETRON 4 MG: 2 INJECTION INTRAMUSCULAR; INTRAVENOUS at 12:36

## 2025-03-04 RX ADMIN — EMPAGLIFLOZIN 10 MG: 10 TABLET, FILM COATED ORAL at 22:09

## 2025-03-04 RX ADMIN — IOPAMIDOL 81 ML: 612 INJECTION, SOLUTION INTRAVENOUS at 11:17

## 2025-03-04 RX ADMIN — METOPROLOL SUCCINATE 25 MG: 25 TABLET, EXTENDED RELEASE ORAL at 22:09

## 2025-03-04 RX ADMIN — Medication 10 ML: at 20:39

## 2025-03-04 RX ADMIN — PIPERACILLIN SODIUM AND TAZOBACTAM SODIUM 4.5 G: 4; .5 INJECTION, POWDER, LYOPHILIZED, FOR SOLUTION INTRAVENOUS at 12:37

## 2025-03-04 NOTE — ED NOTES
[facility]    Nursing Report ED to Floor:  Mental status: alert and oriented x 4   Ambulatory status: up with assist   Oxygen Therapy:  n/a  Cardiac Rhythm: sr  Admitted from: er  Safety Concerns: fall risk   Precautions: none  Social Issues: none  ED Room #:  8    ED Nurse Phone Extension - [phone] or may call [phone].      HPI:   Chief Complaint   Patient presents with    Abdominal Pain       Past Medical History:  Past Medical History:   Diagnosis Date    Allergic rhinitis     Atrial fibrillation     CHF (congestive heart failure)     Chronic right ear pain     h/o    Diabetes mellitus     h/o-  doesnt check sugar    Diverticulitis     GERD (gastroesophageal reflux disease)     Headache     Heart failure     History of kidney stones     passed them    History of shingles     1999- after transplant of stem cells    History of transfusion     1998-bhlex; 1999 [place], [place]- 1 unit- stem cell transplant -  recieved plts - no reaction recalled    Hodgkin lymphoma     Hypogonadism in male     PTSD (post-traumatic stress disorder)     Stem cells transplant status     Vision blurred     possibly need glasses        Past Surgical History:  Past Surgical History:   Procedure Laterality Date    BIVENTRICULLAR IMPLANTABLE CARDIOVERTER DEFIBRILLATOR PLACEMENT N/A 10/26/2023    Procedure: Implant ICD - bi ventricular; DNS meds; [place];  Surgeon: Wilmer Rojas DO;  Location:  [facility] EP INVASIVE LOCATION;  Service: Cardiovascular;  Laterality: N/A;    CARDIAC CATHETERIZATION N/A 05/30/2023    Procedure: Left Heart Cath;  Surgeon: Alec Mason III, MD;  Location:  [facility] CATH INVASIVE LOCATION;  Service: Cardiovascular;  Laterality: N/A;    CARDIAC ELECTROPHYSIOLOGY PROCEDURE N/A 10/3/2024    Procedure: Ablation atrial fibrillation w PFA; CTA prior, [place], no meds to hold;  Surgeon: Wilmer Rojas DO;  Location:  [facility] EP INVASIVE LOCATION;  Service: Cardiovascular;  Laterality: N/A;    CENTRAL VENOUS LINE INSERTION   1999    removed since    COLONOSCOPY      OTHER SURGICAL HISTORY      stem cell transplant    PORTOCAVAL SHUNT PLACEMENT  1998    WISDOM TOOTH EXTRACTION          Admitting Doctor:   No admitting provider for patient encounter.    Consulting Provider(s):  Consults       No orders found from 2/3/2025 to 3/5/2025.             Admitting Diagnosis:   The encounter diagnosis was Abscess of sigmoid colon due to diverticulitis.    Most Recent Vitals:   Vitals:    03/04/25 1214 03/04/25 1345 03/04/25 1400 03/04/25 1430   BP:  121/85 119/85 118/79   BP Location:       Patient Position:       Pulse: 98 98 94 93   Resp:       Temp:       TempSrc:       SpO2: 97% 96% 94% 95%   Weight:       Height:           Active LDAs/IV Access:   Lines, Drains & Airways       Active LDAs       Name Placement date Placement time Site Days    Peripheral IV 03/04/25 0918 Right Antecubital 03/04/25 0918  Antecubital  less than 1                    Labs (abnormal labs have a star):   Labs Reviewed   COMPREHENSIVE METABOLIC PANEL - Abnormal; Notable for the following components:       Result Value    Glucose 126 (*)     All other components within normal limits    Narrative:     GFR Categories in Chronic Kidney Disease (CKD)      GFR Category          GFR (mL/min/1.73)    Interpretation  G1                     90 or greater         Normal or high (1)  G2                      60-89                Mild decrease (1)  G3a                   45-59                Mild to moderate decrease  G3b                   30-44                Moderate to severe decrease  G4                    15-29                Severe decrease  G5                    14 or less           Kidney failure          (1)In the absence of evidence of kidney disease, neither GFR category G1 or G2 fulfill the criteria for CKD.    eGFR calculation 2021 CKD-EPI creatinine equation, which does not include race as a factor   URINALYSIS W/ MICROSCOPIC IF INDICATED (NO CULTURE) - Abnormal;  Notable for the following components:    Glucose, UA >=1000 mg/dL (3+) (*)     Ketones, UA Trace (*)     Bilirubin, UA Small (1+) (*)     Protein, UA Trace (*)     All other components within normal limits    Narrative:     Urine microscopic not indicated.   CBC WITH AUTO DIFFERENTIAL - Abnormal; Notable for the following components:    Neutrophil % 81.8 (*)     Lymphocyte % 8.2 (*)     Lymphocytes, Absolute 0.68 (*)     All other components within normal limits   LIPASE - Normal   RAINBOW DRAW    Narrative:     The following orders were created for panel order [place] Draw.  Procedure                               Abnormality         Status                     ---------                               -----------         ------                     Green Top (Gel)[[id number]]                                  Final result               Lavender Top[[id number]]                                     Final result               Gold Top - SST[[id number]]                                   Final result               Gray Top[[id number]]                                         Final result               Light Blue Top[[id number]]                                   Final result                 Please view results for these tests on the individual orders.   CBC AND DIFFERENTIAL    Narrative:     The following orders were created for panel order CBC & Differential.  Procedure                               Abnormality         Status                     ---------                               -----------         ------                     CBC Auto Differential[[id number]]        Abnormal            Final result                 Please view results for these tests on the individual orders.   GREEN TOP   LAVENDER TOP   GOLD TOP - SST   GRAY TOP   LIGHT BLUE TOP       Meds Given in ED:   Medications   Sodium Chloride (PF) 0.9 % 10 mL (has no administration in time range)   sodium chloride 0.9 % bolus 500 mL (0 mL Intravenous Stopped 3/4/25  1346)   iopamidol (ISOVUE-300) 61 % injection 100 mL (81 mL Intravenous Given 3/4/25 1117)   piperacillin-tazobactam (ZOSYN) 4.5 g IVPB in 100 mL NS MBP (CD) (0 g Intravenous Stopped 3/4/25 1346)   ondansetron (ZOFRAN) injection 4 mg (4 mg Intravenous Given 3/4/25 1236)   morphine injection 4 mg (4 mg Intravenous Given 3/4/25 1236)           Last NIH score:                                                          Dysphagia screening results:        [place]indy Coma Scale:  No data recorded     CIWA:        Restraint Type:            Isolation Status:  No active isolations

## 2025-03-04 NOTE — FSED PROVIDER NOTE
CHIEF COMPLAINT  Abdominal Pain     HISTORY OF PRESENT ILLNESS:  Palomo Pollack is a 44 y.o. male who presents with lower abdominal pain that radiates to his left lower quadrant for the past 2 weeks.  States that he saw his primary care doctor 2 weeks ago and he thought that he had irritable bowel syndrome so started him on MiraLAX.  States that he has been having loose bowel movements but his pain is not improved.  He denies fever nausea or vomiting.      PAST MEDICAL HISTORY  Past Medical History:   Diagnosis Date    Allergic rhinitis     Atrial fibrillation     CHF (congestive heart failure)     Chronic right ear pain     h/o    Diabetes mellitus     h/o-  doesnt check sugar    Diverticulitis     GERD (gastroesophageal reflux disease)     Headache     Heart failure     History of kidney stones     passed them    History of shingles     1999- after transplant of stem cells    History of transfusion     1998-bhlex; 1999 [place], [place]-  unit- stem cell transplant -  recieved plts - no reaction recalled    Hodgkin lymphoma     Hypogonadism in male     PTSD (post-traumatic stress disorder)     Stem cells transplant status     Vision blurred     possibly need glasses     Reviewed the imported nursing notes    PAST SURGICAL HISTORY  Past Surgical History:   Procedure Laterality Date    BIVENTRICULLAR IMPLANTABLE CARDIOVERTER DEFIBRILLATOR PLACEMENT N/A 10/26/2023    Procedure: Implant ICD - bi ventricular; DNS meds; [place];  Surgeon: Wilmer Rojas DO;  Location:  [facility] INVASIVE LOCATION;  Service: Cardiovascular;  Laterality: N/A;    CARDIAC CATHETERIZATION N/A 05/30/2023    Procedure: Left Heart Cath;  Surgeon: Alec Mason III, MD;  Location:  [facility] INVASIVE LOCATION;  Service: Cardiovascular;  Laterality: N/A;    CARDIAC ELECTROPHYSIOLOGY PROCEDURE N/A 10/3/2024    Procedure: Ablation atrial fibrillation w PFA; CTA prior, [place], no meds to hold;  Surgeon: Wilmer Rojas DO;  Location:  [facility]  INVASIVE LOCATION;  Service: Cardiovascular;  Laterality: N/A;    CENTRAL VENOUS LINE INSERTION  1999    removed since    COLONOSCOPY      OTHER SURGICAL HISTORY      stem cell transplant    PORTOCAVAL SHUNT PLACEMENT  1998    WISDOM TOOTH EXTRACTION       Reviewed the imported nursing notes    ALLERGIES  Allergies   Allergen Reactions    Bactrim [Sulfamethoxazole-Trimethoprim] Rash and Other (See Comments)     Gave increased heartrate-biaxin      Clarithromycin Unknown - Low Severity    Milk-Related Compounds Other (See Comments)     Increased phlegm        MEDICATIONS       Medication List        ASK your doctor about these medications      acetaminophen 325 MG tablet  Commonly known as: TYLENOL     apixaban 5 MG tablet tablet  Commonly known as: Eliquis  Take 1 tablet by mouth 2 (Two) Times a Day.     dicyclomine 10 MG capsule  Commonly known as: BENTYL  Take 1 capsule by mouth 4 (Four) Times a Day Before Meals & at Bedtime.     empagliflozin 10 MG tablet tablet  Commonly known as: JARDIANCE  Take 1 tablet by mouth Daily.     Entresto 49-51 MG tablet  Generic drug: sacubitril-valsartan  Take 1 tablet by mouth 2 (Two) Times a Day.     esomeprazole 40 MG capsule  Commonly known as: nexIUM  Take 1 capsule by mouth Every Morning Before Breakfast.     fexofenadine 180 MG tablet  Commonly known as: ALLEGRA  Take 1 tablet by mouth Daily.     furosemide 20 MG tablet  Commonly known as: LASIX  TAKE 1 TABLET BY MOUTH ONCE DAILY AS NEEDED FOR SHORTNESS OF BREATH ,  SWELLING,  WEIGHT  GAIN >3 LBS     metoprolol succinate XL 25 MG 24 hr tablet  Commonly known as: TOPROL-XL  Take 1 tablet by mouth Daily.     spironolactone 25 MG tablet  Commonly known as: ALDACTONE  Take 1 tablet by mouth Daily.     Testosterone 1.62 % gel            SOCIAL HISTORY    Social History     Tobacco Use    Smoking status: Some Days     Current packs/day: 0.25     Average packs/day: 0.2 packs/day for 22.3 years (5.6 ttl pk-yrs)     Types: Cigarettes  "    Start date: 11/2002     Last attempt to quit: 11/2022     Passive exposure: Past    Smokeless tobacco: Never    Tobacco comments:     smokes less than .25 ppd   Vaping Use    Vaping status: Never Used   Substance Use Topics    Alcohol use: Yes     Alcohol/week: 4.0 standard drinks of alcohol     Types: 4 Shots of liquor per week     Comment: Occasional on weekends    Drug use: Not Currently     Types: Marijuana     Comment: stopped april 2024     The patient otherwise denies any further social history.  Reviewed the imported nursing notes      FAMILY HISTORY  Family History   Problem Relation Age of Onset    Stroke Father     Lung cancer Father     Cancer Father         Positive for cured lung cancer- he was smoker     The patient otherwise patient denies any further family history.  Reviewed the imported nursing notes      REVIEW OF SYSTEMS  Reviewed and negative/not pertinent unless mentioned in the HPI        PHYSICAL EXAM  Vitals: /79   Pulse 93   Temp 99.1 °F (37.3 °C) (Oral)   Resp 18   Ht 180.3 cm (71\")   Wt 88.5 kg (195 lb)   SpO2 95%   BMI 27.20 kg/m²      General Appearance: Awake and Alert, cooperative, no distress   Head:  Normocephalic, atraumatic   Eyes: Conjunctiva clear, corneas clear   Ears:  Normal external ears,   Nose: Nares normal and clear   Throat: Lips, mucosa moist   Neck:  Trachea midline, no stridor   Lungs:  Clear to auscultation bilaterally, respirations unlabored   Heart: Regular, No rub   Abdomen: Nondistended, soft, normal bowel sounds throughout, tenderness palpation left lower quadrant and suprapubic region   Genitourinary:  Pelvic:  Rectal: Not Performed  Not Performed  Not Performed   Extremities: Extremities Atraumatic   Vascular: Present in all extremities   Skin:  no rashes or lesions   Neurologic: Non Focal , CN 2-12 grossly intact, GCS 15   Psyc: Not Performed         MEDICAL DECISION MAKING - ED COURSE/PROCEDURE/DDX:    PULSE OX: Interpretation by me on room " air Is normal  SpO2 Readings from Last 1 Encounters:   03/04/25 95%          CARDIAC MONITOR: Normal sinus rhythm  Pulse Readings from Last 1 Encounters:   03/04/25 93            I reviewed the nursing notes: YES  I reviewed and discussed with EMS:   External documents reviewed:   Additional history taken from: Previous notes     Discussed with consulting physician hospitalist and general surgery additionally, the admitting / accepting provider was contacted with handoff      LAB REVIEWED: Interpretation of all unique test ordered  Labs Reviewed   COMPREHENSIVE METABOLIC PANEL - Abnormal; Notable for the following components:       Result Value    Glucose 126 (*)     All other components within normal limits    Narrative:     GFR Categories in Chronic Kidney Disease (CKD)      GFR Category          GFR (mL/min/1.73)    Interpretation  G1                     90 or greater         Normal or high (1)  G2                      60-89                Mild decrease (1)  G3a                   45-59                Mild to moderate decrease  G3b                   30-44                Moderate to severe decrease  G4                    15-29                Severe decrease  G5                    14 or less           Kidney failure          (1)In the absence of evidence of kidney disease, neither GFR category G1 or G2 fulfill the criteria for CKD.    eGFR calculation 2021 CKD-EPI creatinine equation, which does not include race as a factor   URINALYSIS W/ MICROSCOPIC IF INDICATED (NO CULTURE) - Abnormal; Notable for the following components:    Glucose, UA >=1000 mg/dL (3+) (*)     Ketones, UA Trace (*)     Bilirubin, UA Small (1+) (*)     Protein, UA Trace (*)     All other components within normal limits    Narrative:     Urine microscopic not indicated.   CBC WITH AUTO DIFFERENTIAL - Abnormal; Notable for the following components:    Neutrophil % 81.8 (*)     Lymphocyte % 8.2 (*)     Lymphocytes, Absolute 0.68 (*)     All other  components within normal limits   LIPASE - Normal   RAINBOW DRAW    Narrative:     The following orders were created for panel order [place] Draw.  Procedure                               Abnormality         Status                     ---------                               -----------         ------                     Green Top (Gel)[[id number]]                                  Final result               Lavender Top[[id number]]                                     Final result               Gold Top - SST[[id number]]                                   Final result               Gray Top[[id number]]                                         Final result               Light Blue Top[[id number]]                                   Final result                 Please view results for these tests on the individual orders.   CBC AND DIFFERENTIAL    Narrative:     The following orders were created for panel order CBC & Differential.  Procedure                               Abnormality         Status                     ---------                               -----------         ------                     CBC Auto Differential[[id number]]        Abnormal            Final result                 Please view results for these tests on the individual orders.   GREEN TOP   LAVENDER TOP   GOLD TOP - SST   GRAY TOP   LIGHT BLUE TOP       IMAGING REVIEWED  My X-ray interpretation: Right-sided stool burden  My CT interpretation: Sigmoid diverticulitis with a small abscess without any localized perforation  My Ultrasound interpretation:     CT Abdomen Pelvis With Contrast   Final Result   Impression:   1.Mid to distal sigmoid diverticulitis with associated phlegmon and relatively small abscess.            Electronically Signed: Rosalio Mcclendon MD     3/4/2025 11:31 AM EST     Workstation ID: [id number]      XR Abdomen KUB   Final Result   Impression:   Mild to moderate stool without obstruction.         Electronically Signed: Kiko Read MD      3/4/2025 9:50 AM EST     Workstation ID: [id number]          Procedures:    Decision rules/scores:        Drug therapy provided in the ED and monitored as needed given IV/PO :   Medications   Sodium Chloride (PF) 0.9 % 10 mL (has no administration in time range)   sodium chloride 0.9 % bolus 500 mL (0 mL Intravenous Stopped 3/4/25 1346)   iopamidol (ISOVUE-300) 61 % injection 100 mL (81 mL Intravenous Given 3/4/25 1117)   piperacillin-tazobactam (ZOSYN) 4.5 g IVPB in 100 mL NS MBP (CD) (0 g Intravenous Stopped 3/4/25 1346)   ondansetron (ZOFRAN) injection 4 mg (4 mg Intravenous Given 3/4/25 1236)   morphine injection 4 mg (4 mg Intravenous Given 3/4/25 1236)       Vitals Trend:  Vitals:    03/04/25 1214 03/04/25 1345 03/04/25 1400 03/04/25 1430   BP:  121/85 119/85 118/79   BP Location:       Patient Position:       Pulse: 98 98 94 93   Resp:       Temp:       TempSrc:       SpO2: 97% 96% 94% 95%   Weight:       Height:           Palomo Pollack is a 44 y.o. male who presents to the emergency department with the acute illness as stated within HPI  Shared medical decision making regarding a detailed discussion with the patient concerning this ED encounter, the differential diagnosis considered constipation, IBS, diverticulitis, abscess, perforation, urinary retention, nephrolithiasis, and the emergency room imaging and lab testing done today The patient and/or family have been given an opportunity to ask questions and exhibit an understanding of what happened today in the emergency room.        ED Course as of 03/04/25 1651   Tue Mar 04, 2025   1154 Urinalysis With Microscopic If Indicated (No Culture) - Urine, Clean Catch(!) [NC]   1154 Comprehensive Metabolic Panel(!) [NC]   1154 Lipase [NC]   1154 CBC & Differential(!)  Unremarkable labs [NC]   1215 Patient has a small abscess and phlegmon in his sigmoid colon.  His labs are unremarkable.  Afebrile.  Not septic.  Spoke with general surgery, Dr. Plaza,  recommend admission with Zosyn and continued exams in the hospital for clinical improvement. [NC]   1240 Spoke with the hospitalist who has agreed to take the patient [NC]   1650 The patient states that he preferred to drive himself to [facility] on [address].  He has the address.  I do not suspect any clinical deterioration I think that he can have his wife drive him.  He is asking to maintain the IV so he does not have to be poked again I think this is reasonable. [NC]      ED Course User Index  [NC] Kwaku Gil PA-C           Condition considered emergent due to:  1) Acuity  2) Failure or unavailable treatment in the outpatient setting  3) Risk of deterioration and decompensation given the multiple differential diagnoses that  need to be ruled out      Amount and/or Complexity of Data Reviewed  Clinical lab tests: as above noted in MDM  Tests in the radiology section of CPT®: as above noted in MDM  Tests in the medicine section of CPT®: as above noted in MDM  Independent visualization of images, tracings, or specimens: as above noted in MDM        CLINICAL IMPRESSION:  Final diagnoses:   Abscess of sigmoid colon due to diverticulitis      DISPOSITION:  Admit      As with my usual standard practice patient was advised to return for any reason for any  complaint at any time whatsoever. Please refer to the discharge instructions, AVS paperwork  and prescriptions written for treatment plan.        Electronically signed by Kwaku Gil PA-C, 03/04/25, 10:03 AM EST.      This report was dictated utilizing a voice recognition system which has a propensity to miss  small words such as a, an, the, no, he,she,him, her,his and not. Please read this report carefully,  and if any discrepancy or uncertainty is present, please contact the author directly for  clarification

## 2025-03-05 LAB
ANION GAP SERPL CALCULATED.3IONS-SCNC: 11 MMOL/L (ref 5–15)
BUN SERPL-MCNC: 13 MG/DL (ref 6–20)
BUN/CREAT SERPL: 13.1 (ref 7–25)
CALCIUM SPEC-SCNC: 8.8 MG/DL (ref 8.6–10.5)
CHLORIDE SERPL-SCNC: 98 MMOL/L (ref 98–107)
CO2 SERPL-SCNC: 27 MMOL/L (ref 22–29)
CREAT SERPL-MCNC: 0.99 MG/DL (ref 0.76–1.27)
DEPRECATED RDW RBC AUTO: 44.5 FL (ref 37–54)
EGFRCR SERPLBLD CKD-EPI 2021: 95.1 ML/MIN/1.73
ERYTHROCYTE [DISTWIDTH] IN BLOOD BY AUTOMATED COUNT: 13.1 % (ref 12.3–15.4)
GLUCOSE BLDC GLUCOMTR-MCNC: 120 MG/DL (ref 70–130)
GLUCOSE BLDC GLUCOMTR-MCNC: 121 MG/DL (ref 70–130)
GLUCOSE BLDC GLUCOMTR-MCNC: 86 MG/DL (ref 70–130)
GLUCOSE BLDC GLUCOMTR-MCNC: 94 MG/DL (ref 70–130)
GLUCOSE SERPL-MCNC: 87 MG/DL (ref 65–99)
HCT VFR BLD AUTO: 39.7 % (ref 37.5–51)
HGB BLD-MCNC: 13.2 G/DL (ref 13–17.7)
MCH RBC QN AUTO: 30.9 PG (ref 26.6–33)
MCHC RBC AUTO-ENTMCNC: 33.2 G/DL (ref 31.5–35.7)
MCV RBC AUTO: 93 FL (ref 79–97)
PLATELET # BLD AUTO: 147 10*3/MM3 (ref 140–450)
PMV BLD AUTO: 9.7 FL (ref 6–12)
POTASSIUM SERPL-SCNC: 4 MMOL/L (ref 3.5–5.2)
RBC # BLD AUTO: 4.27 10*6/MM3 (ref 4.14–5.8)
SODIUM SERPL-SCNC: 136 MMOL/L (ref 136–145)
WBC NRBC COR # BLD AUTO: 8.46 10*3/MM3 (ref 3.4–10.8)

## 2025-03-05 PROCEDURE — 82948 REAGENT STRIP/BLOOD GLUCOSE: CPT

## 2025-03-05 PROCEDURE — 96376 TX/PRO/DX INJ SAME DRUG ADON: CPT

## 2025-03-05 PROCEDURE — 80048 BASIC METABOLIC PNL TOTAL CA: CPT | Performed by: FAMILY MEDICINE

## 2025-03-05 PROCEDURE — 96375 TX/PRO/DX INJ NEW DRUG ADDON: CPT

## 2025-03-05 PROCEDURE — 25010000002 PIPERACILLIN SOD-TAZOBACTAM PER 1 G

## 2025-03-05 PROCEDURE — 25010000002 MORPHINE PER 10 MG: Performed by: FAMILY MEDICINE

## 2025-03-05 PROCEDURE — G0378 HOSPITAL OBSERVATION PER HR: HCPCS

## 2025-03-05 PROCEDURE — 85027 COMPLETE CBC AUTOMATED: CPT | Performed by: FAMILY MEDICINE

## 2025-03-05 PROCEDURE — 96366 THER/PROPH/DIAG IV INF ADDON: CPT

## 2025-03-05 PROCEDURE — 99232 SBSQ HOSP IP/OBS MODERATE 35: CPT | Performed by: INTERNAL MEDICINE

## 2025-03-05 PROCEDURE — 25010000002 HYDROMORPHONE PER 4 MG: Performed by: SURGERY

## 2025-03-05 RX ORDER — AMOXICILLIN 250 MG
2 CAPSULE ORAL 2 TIMES DAILY
Status: DISCONTINUED | OUTPATIENT
Start: 2025-03-05 | End: 2025-03-07 | Stop reason: HOSPADM

## 2025-03-05 RX ORDER — HYDROMORPHONE HYDROCHLORIDE 1 MG/ML
0.2 INJECTION, SOLUTION INTRAMUSCULAR; INTRAVENOUS; SUBCUTANEOUS ONCE
Status: COMPLETED | OUTPATIENT
Start: 2025-03-05 | End: 2025-03-05

## 2025-03-05 RX ADMIN — MORPHINE SULFATE 2 MG: 2 INJECTION, SOLUTION INTRAMUSCULAR; INTRAVENOUS at 04:34

## 2025-03-05 RX ADMIN — PANTOPRAZOLE SODIUM 40 MG: 40 TABLET, DELAYED RELEASE ORAL at 05:36

## 2025-03-05 RX ADMIN — HYDROMORPHONE HYDROCHLORIDE 0.2 MG: 1 INJECTION, SOLUTION INTRAMUSCULAR; INTRAVENOUS; SUBCUTANEOUS at 19:24

## 2025-03-05 RX ADMIN — OXYCODONE HYDROCHLORIDE AND ACETAMINOPHEN 1 TABLET: 5; 325 TABLET ORAL at 21:00

## 2025-03-05 RX ADMIN — Medication 10 ML: at 09:11

## 2025-03-05 RX ADMIN — SODIUM CHLORIDE 4.5 G: 9 INJECTION, SOLUTION INTRAVENOUS at 20:56

## 2025-03-05 RX ADMIN — SODIUM CHLORIDE 4.5 G: 9 INJECTION, SOLUTION INTRAVENOUS at 05:36

## 2025-03-05 RX ADMIN — CETIRIZINE HYDROCHLORIDE 10 MG: 10 TABLET, FILM COATED ORAL at 09:10

## 2025-03-05 RX ADMIN — OXYCODONE HYDROCHLORIDE AND ACETAMINOPHEN 1 TABLET: 5; 325 TABLET ORAL at 09:10

## 2025-03-05 RX ADMIN — MORPHINE SULFATE 2 MG: 2 INJECTION, SOLUTION INTRAMUSCULAR; INTRAVENOUS at 23:49

## 2025-03-05 RX ADMIN — OXYCODONE HYDROCHLORIDE AND ACETAMINOPHEN 1 TABLET: 5; 325 TABLET ORAL at 12:44

## 2025-03-05 RX ADMIN — OXYCODONE HYDROCHLORIDE AND ACETAMINOPHEN 1 TABLET: 5; 325 TABLET ORAL at 16:42

## 2025-03-05 RX ADMIN — OXYCODONE HYDROCHLORIDE AND ACETAMINOPHEN 1 TABLET: 5; 325 TABLET ORAL at 05:36

## 2025-03-05 RX ADMIN — EMPAGLIFLOZIN 10 MG: 10 TABLET, FILM COATED ORAL at 20:55

## 2025-03-05 RX ADMIN — OXYCODONE HYDROCHLORIDE AND ACETAMINOPHEN 1 TABLET: 5; 325 TABLET ORAL at 00:27

## 2025-03-05 RX ADMIN — SODIUM CHLORIDE 4.5 G: 9 INJECTION, SOLUTION INTRAVENOUS at 12:44

## 2025-03-05 RX ADMIN — METOPROLOL SUCCINATE 25 MG: 25 TABLET, EXTENDED RELEASE ORAL at 20:55

## 2025-03-05 RX ADMIN — Medication 10 ML: at 21:02

## 2025-03-05 RX ADMIN — MORPHINE SULFATE 2 MG: 2 INJECTION, SOLUTION INTRAMUSCULAR; INTRAVENOUS at 18:27

## 2025-03-05 RX ADMIN — SENNOSIDES AND DOCUSATE SODIUM 2 TABLET: 50; 8.6 TABLET ORAL at 12:44

## 2025-03-05 NOTE — H&P
[facility] Medicine Services  HISTORY AND PHYSICAL    Patient Name: Palomo Pollack  : 1980  MRN: [id number]  Primary Care Physician: Heladio Valentine DO  Date of admission: 3/4/2025      Subjective   Subjective     Chief Complaint:  Abdominal pain     HPI:  Palomo Pollack is a 44 y.o. male history of atrial fibrillation, congestive heart failure, diverticulitis, presented to [facility] emergency department due to lower abdominal pain.  Previously saw his primary care doctor thought it was irritable bowel syndrome started him on a bowel regimen.  Abdominal pain has not improved.  He denies taking his medications this morning.  Denies chest pain shortness of breath nausea vomiting fevers chills.      Personal History     Past Medical History:   Diagnosis Date    Allergic rhinitis     Atrial fibrillation     CHF (congestive heart failure)     Chronic right ear pain     h/o    Diabetes mellitus     h/o-  doesnt check sugar    Diverticulitis     GERD (gastroesophageal reflux disease)     Headache     Heart failure     History of kidney stones     passed them    History of shingles     - after transplant of stem cells    History of transfusion     -bhlex;  [place], [place]- 1 unit- stem cell transplant -  recieved plts - no reaction recalled    Hodgkin lymphoma     Hypogonadism in male     PTSD (post-traumatic stress disorder)     Stem cells transplant status     Vision blurred     possibly need glasses         Oncology Problem List:  History of Lymphoma (10/05/2017; Status: Active)    Oncology/Hematology History     Past Surgical History:   Procedure Laterality Date    BIVENTRICULLAR IMPLANTABLE CARDIOVERTER DEFIBRILLATOR PLACEMENT N/A 10/26/2023    Procedure: Implant ICD - bi ventricular; DNS meds; [place];  Surgeon: Wilmer Rojas DO;  Location: [facility] INVASIVE LOCATION;  Service: Cardiovascular;  Laterality: N/A;    CARDIAC CATHETERIZATION N/A  05/30/2023    Procedure: Left Heart Cath;  Surgeon: Alec Mason III, MD;  Location:  [facility] CATH INVASIVE LOCATION;  Service: Cardiovascular;  Laterality: N/A;    CARDIAC ELECTROPHYSIOLOGY PROCEDURE N/A 10/3/2024    Procedure: Ablation atrial fibrillation w PFA; CTA prior, [place], no meds to hold;  Surgeon: Wilmer Rojas DO;  Location:  [facility] INVASIVE LOCATION;  Service: Cardiovascular;  Laterality: N/A;    CENTRAL VENOUS LINE INSERTION  1999    removed since    COLONOSCOPY      OTHER SURGICAL HISTORY      stem cell transplant    PORTOCAVAL SHUNT PLACEMENT  1998    WISDOM TOOTH EXTRACTION         Family History: family history includes Cancer in his father; Lung cancer in his father; Stroke in his father.     Social History:  reports that he has been smoking cigarettes. He started smoking about 22 years ago. He has a 5.6 pack-year smoking history. He has been exposed to tobacco smoke. He has never used smokeless tobacco. He reports current alcohol use of about 4.0 standard drinks of alcohol per week. He reports that he does not currently use drugs after having used the following drugs: Marijuana.  Social History     Social History Narrative    Not on file       Medications:  Available home medication information reviewed.  Testosterone, acetaminophen, apixaban, dicyclomine, empagliflozin, esomeprazole, fexofenadine, furosemide, metoprolol succinate XL, sacubitril-valsartan, and spironolactone    Allergies   Allergen Reactions    Bactrim [Sulfamethoxazole-Trimethoprim] Rash and Other (See Comments)     Gave increased heartrate-biaxin      Clarithromycin Unknown - Low Severity    Milk-Related Compounds Other (See Comments)     Increased phlegm        Objective   Objective     Vital Signs:   Temp:  [98.7 °F (37.1 °C)-99.4 °F (37.4 °C)] 99.4 °F (37.4 °C)  Heart Rate:  [] 113  Resp:  [18] 18  BP: (118-139)/(70-96) 137/96       Physical Exam   Constitutional:  male no acute distress, awake, alert  HENT:  NCAT, mucous membranes moist  Respiratory: Clear to auscultation bilaterally, respiratory effort normal   Cardiovascular: RRR, no murmurs, rubs, or gallops  Gastrointestinal: Positive bowel sounds, soft, nontender, nondistended  Musculoskeletal: No bilateral ankle edema  Psychiatric: Appropriate affect, cooperative  Neurologic: Oriented x 3, speech clear  Skin: No rashes    Result Review:  I have personally reviewed the results from the time of this admission to 3/4/2025 23:14 EST and agree with these findings:  [x]  Laboratory list / accordion  []  Microbiology  [x]  Radiology  []  EKG/Telemetry   []  Cardiology/Vascular   []  Pathology  []  Old records  []  Other:        LAB RESULTS:      Lab 03/04/25  0918   WBC 8.32   HEMOGLOBIN 14.6   HEMATOCRIT 43.5   PLATELETS 168   NEUTROS ABS 6.81   IMMATURE GRANS (ABS) 0.01   LYMPHS ABS 0.68*   MONOS ABS 0.76   EOS ABS 0.03   MCV 92.2         Lab 03/04/25  0918   SODIUM 139   POTASSIUM 4.1   CHLORIDE 99   CO2 27.4   ANION GAP 12.6   BUN 9   CREATININE 0.95   EGFR 100.0   GLUCOSE 126*   CALCIUM 9.2         Lab 03/04/25  0918   TOTAL PROTEIN 7.6   ALBUMIN 4.4   GLOBULIN 3.2   ALT (SGPT) 17   AST (SGOT) 24   BILIRUBIN 0.5   ALK PHOS 108   LIPASE 26                     UA          3/4/2025    10:05   Urinalysis   Specific [place], UA 1.020    Ketones, UA Trace    Blood, UA Negative    Leukocytes, UA Negative    Nitrite, UA Negative        Microbiology Results (last 10 days)       ** No results found for the last 240 hours. **            CT Abdomen Pelvis With Contrast    Result Date: 3/4/2025  CT ABDOMEN PELVIS W CONTRAST Date of Exam: 3/4/2025 11:16 AM EST Indication: LLQ pain. Comparison: 3/2/2018 Technique: Axial CT images were obtained of the abdomen and pelvis following the uneventful intravenous administration of and 1 cc Isovue-300. Reconstructed coronal and sagittal images were also obtained. Automated exposure control and iterative construction methods were used.  Findings: LUNG BASES:  Unremarkable without mass or infiltrate. LIVER: Steatosis without focal lesion. There is more profound area of steatosis along the fissure for the falciform ligament. BILIARY/GALLBLADDER:  Unremarkable SPLEEN:  Unremarkable PANCREAS:  Unremarkable ADRENAL:  Unremarkable KIDNEYS:  Unremarkable parenchyma with no solid mass identified. No obstruction.  No calculus identified. GASTROINTESTINAL/MESENTERY: There is mural thickening with surrounding inflammatory change involving the mid to distal sigmoid colon. There are numerous diverticula in this region. Findings are presumably related to acute diverticulitis. There is irregular soft tissue density posterior and cephalad to this region suggestive of phlegmon. Additionally, there is a rim-enhancing fluid collection consistent with abscess measuring 2.3 cm in axial diameter (image 129, series 2). This measures approximately 3 cm in CC dimension (image 58, series 900). No evidence of obstruction. No additional sites of inflammation. MESENTERIC VESSELS:  Patent. AORTA/IVC:  Normal caliber. RETROPERITONEUM/LYMPH NODES:  Unremarkable REPRODUCTIVE:  Unremarkable BLADDER:  Unremarkable OSSEUS STRUCTURES:  Typical for age with no acute process identified.     Impression: Impression: 1.Mid to distal sigmoid diverticulitis with associated phlegmon and relatively small abscess. Electronically Signed: Rosalio Mcclendon MD  3/4/2025 11:31 AM EST  Workstation ID: [id number]    XR Abdomen KUB    Result Date: 3/4/2025  XR ABDOMEN KUB Date of Exam: 3/4/2025 9:22 AM EST Indication: constipation Comparison: None available. Findings: Mild to moderate stool. No dilated loops of bowel. No gastric distention. No visualized renal calcifications. Overall mild degenerative related osseous change in the spine and hip joints. Pelvic phleboliths.     Impression: Impression: Mild to moderate stool without obstruction. Electronically Signed: Kiko Read MD  3/4/2025 9:50 AM EST   Workstation ID: [id number]     Results for orders placed during the hospital encounter of 09/07/23    Adult Transthoracic Echo Complete W/ Cont if Necessary Per Protocol    Interpretation Summary    Left ventricular systolic function is moderately decreased. Calculated left ventricular EF = 30% Left ventricular ejection fraction appears to be 31 - 35%.    Left ventricular wall thickness is consistent with mild concentric hypertrophy.    Left ventricular diastolic function was indeterminate.      Assessment & Plan   Assessment & Plan     Mid to distal sigmoid diverticulitis with associated phlegmon and relatively small abscess  -[facility] spoke to on call General Surgery who recommended Zosyn -Serial abdominal exams   -clear diet now and NPO after midnight     Paroxysmal atrial fibrillation (Primary)  Presence of biventricular implantable cardioverter-defibrillator (ICD)  Chronic HFrEF (heart failure with reduced ejection fraction)  -ECHO: EF: 31-35  -continue home medications except hold Eliquis in case of surgery. Patient aware of risks/benefits of holding AC    HgA1c 6.1  -Prediabetes     VTE Prophylaxis:  Pharmacologic & mechanical VTE prophylaxis orders are present.          CODE STATUS:    Code Status and Medical Interventions: CPR (Attempt to Resuscitate); Full Support   Ordered at: 03/04/25 9409     Level Of Support Discussed With:    Patient     Code Status (Patient has no pulse and is not breathing):    CPR (Attempt to Resuscitate)     Medical Interventions (Patient has pulse or is breathing):    Full Support       Expected Discharge   Expected discharge date/ time has not been documented.     Luma Kim MD  03/04/25

## 2025-03-05 NOTE — CONSULTS
INFECTIOUS DISEASE CONSULT/INITIAL HOSPITAL VISIT    Palomo Pollack  1980  [id number]    Date of Consult: 3/5/2025    Admission Date: 3/4/2025      Requesting Provider: No ref. provider found  Evaluating Physician: Brandon Seth MD    Reason for Consultation: sigmoid diverticulitis    History of present illness:    Patient is a 44 y.o. male with atrial fibrillation, CHF, diverticulitis was evaluated at [facility] for abdominal pain, CT scan concerning for diverticulitis, abscess; admitted started on iv abx.  Estimated he has had episodes of diverticulitis about 10 times since 2017.  Usually responds to oral antibiotics. On zosyn, general surgery involved; there is consideration for outpatient surgery         Past Medical History:   Diagnosis Date    Allergic rhinitis     Atrial fibrillation     CHF (congestive heart failure)     Chronic right ear pain     h/o    Diabetes mellitus     h/o-  doesnt check sugar    Diverticulitis     GERD (gastroesophageal reflux disease)     Headache     Heart failure     History of kidney stones     passed them    History of shingles     1999- after transplant of stem cells    History of transfusion     1998-bhlex; 1999 [place], [place]-  unit- stem cell transplant -  recieved plts - no reaction recalled    Hodgkin lymphoma     Hypogonadism in male     PTSD (post-traumatic stress disorder)     Stem cells transplant status     Vision blurred     possibly need glasses       Past Surgical History:   Procedure Laterality Date    BIVENTRICULLAR IMPLANTABLE CARDIOVERTER DEFIBRILLATOR PLACEMENT N/A 10/26/2023    Procedure: Implant ICD - bi ventricular; DNS meds; [place];  Surgeon: Wilmer Rojas DO;  Location: [facility] EP INVASIVE LOCATION;  Service: Cardiovascular;  Laterality: N/A;    CARDIAC CATHETERIZATION N/A 05/30/2023    Procedure: Left Heart Cath;  Surgeon: Alec Mason III, MD;  Location:  [facility] CATH INVASIVE LOCATION;  Service: Cardiovascular;   Laterality: N/A;    CARDIAC ELECTROPHYSIOLOGY PROCEDURE N/A 10/3/2024    Procedure: Ablation atrial fibrillation w PFA; CTA prior, GA, no meds to hold;  Surgeon: Wilmer Rojas DO;  Location: [facility] INVASIVE LOCATION;  Service: Cardiovascular;  Laterality: N/A;    CENTRAL VENOUS LINE INSERTION  1999    removed since    COLONOSCOPY      OTHER SURGICAL HISTORY      stem cell transplant    PORTOCAVAL SHUNT PLACEMENT  1998    WISDOM TOOTH EXTRACTION         Family History   Problem Relation Age of Onset    Stroke Father     Lung cancer Father     Cancer Father         Positive for cured lung cancer- he was smoker       Social History     Socioeconomic History    Marital status: Single   Tobacco Use    Smoking status: Some Days     Current packs/day: 0.25     Average packs/day: 0.3 packs/day for 22.3 years (5.6 ttl pk-yrs)     Types: Cigarettes     Start date: 11/2002     Last attempt to quit: 11/2022     Passive exposure: Past    Smokeless tobacco: Never    Tobacco comments:     smokes less than .25 ppd   Vaping Use    Vaping status: Never Used   Substance and Sexual Activity    Alcohol use: Yes     Alcohol/week: 4.0 standard drinks of alcohol     Types: 4 Shots of liquor per week     Comment: Occasional on weekends    Drug use: Not Currently     Types: Marijuana     Comment: stopped april 2024    Sexual activity: Yes       Allergies   Allergen Reactions    Bactrim [Sulfamethoxazole-Trimethoprim] Rash and Other (See Comments)     Gave increased heartrate-biaxin      Clarithromycin Unknown - Low Severity    Milk-Related Compounds Other (See Comments)     Increased phlegm          Medication:    Current Facility-Administered Medications:     acetaminophen (TYLENOL) tablet 500 mg, 500 mg, Oral, Q6H PRN **OR** acetaminophen (TYLENOL) 160 MG/5ML oral solution 500 mg, 500 mg, Oral, Q6H PRN **OR** acetaminophen (TYLENOL) suppository 650 mg, 650 mg, Rectal, Q4H PRN, Luma Kim MD    [Held by provider] apixaban  (ELIQUIS) tablet 5 mg, 5 mg, Oral, BID, Luma Kim MD    Calcium Replacement - Follow Nurse / BPA Driven Protocol, , Not Applicable, PRN, Luma Kim MD    cetirizine (zyrTEC) tablet 10 mg, 10 mg, Oral, Daily, Luma Kim MD, 10 mg at 03/05/25 0910    dextrose (D50W) (25 g/50 mL) IV injection 25 g, 25 g, Intravenous, Q15 Min PRN, Luma Kim MD    dextrose (GLUTOSE) oral gel 15 g, 15 g, Oral, Q15 Min PRN, Luma Kim MD    empagliflozin (JARDIANCE) tablet 10 mg, 10 mg, Oral, Daily, Luma Kim MD, 10 mg at 03/04/25 2209    glucagon (GLUCAGEN) injection 1 mg, 1 mg, Intramuscular, Q15 Min PRN, Luma Kim MD    Magnesium Standard Dose Replacement - Follow Nurse / BPA Driven Protocol, , Not Applicable, PRN, Luma Kim MD    metoprolol succinate XL (TOPROL-XL) 24 hr tablet 25 mg, 25 mg, Oral, Daily, Luma Kim MD, 25 mg at 03/04/25 2209    morphine injection 2 mg, 2 mg, Intravenous, Q4H PRN, 2 mg at 03/05/25 0434 **AND** naloxone (NARCAN) injection 0.4 mg, 0.4 mg, Intravenous, Q5 Min PRN, Luma Kim MD    nitroglycerin (NITROSTAT) SL tablet 0.4 mg, 0.4 mg, Sublingual, Q5 Min PRN, Luma Kim MD    oxyCODONE-acetaminophen (PERCOCET) 5-325 MG per tablet 1 tablet, 1 tablet, Oral, Q4H PRN, Luma Kim MD, 1 tablet at 03/05/25 1642    pantoprazole (PROTONIX) EC tablet 40 mg, 40 mg, Oral, Q AM, Luma Kim MD, 40 mg at 03/05/25 0536    Pharmacy Consult - Pharmacy to dose, , Not Applicable, Continuous PRN, Luma Kim MD    Phosphorus Replacement - Follow Nurse / BPA Driven Protocol, , Not Applicable, PRN, Luma Kim MD    piperacillin-tazobactam (ZOSYN) 4.5 g IVPB in 100 mL NS MBP (CD), 4.5 g, Intravenous, Q8H, Janine Cook, [facility], Currently Infusing at 03/05/25 1451    Potassium Replacement - Follow Nurse / BPA Driven Protocol, , Not Applicable, PRN, Luma Kim MD    [Held by provider] sacubitril-valsartan (ENTRESTO) 49-51  MG tablet 1 tablet, 1 tablet, Oral, BID, Luma Kim MD    sennosides-docusate (PERICOLACE) 8.6-50 MG per tablet 2 tablet, 2 tablet, Oral, BID, Thu Goodman MD, 2 tablet at 25 1244    Sodium Chloride (PF) 0.9 % 10 mL, 10 mL, Intravenous, PRN, Luma Kim MD    sodium chloride 0.9 % flush 10 mL, 10 mL, Intravenous, Q12H, Luma Kim MD, 10 mL at 25 0911    sodium chloride 0.9 % flush 10 mL, 10 mL, Intravenous, PRN, Luma Kim MD    sodium chloride 0.9 % infusion 40 mL, 40 mL, Intravenous, PRN, Luma Kim MD    [Held by provider] spironolactone (ALDACTONE) tablet 25 mg, 25 mg, Oral, Daily, Luma Kim MD    Antibiotics:  Anti-Infectives (From admission, onward)      Ordered     Dose/Rate Route Frequency Start Stop    25  piperacillin-tazobactam (ZOSYN) 4.5 g IVPB in 100 mL NS MBP (CD)        Ordering Provider: Janine Cook RPH    4.5 g  over 4 Hours Intravenous Every 8 Hours 25 1211  piperacillin-tazobactam (ZOSYN) 4.5 g IVPB in 100 mL NS MBP (CD)        Ordering Provider: Kwaku Gil PA-C    4.5 g  over 30 Minutes Intravenous Once 25 1230 25 1346              Review of Systems:  Reports abdominal pain, constipation      Physical Exam:   Vital Signs  Temp (24hrs), Av.5 °F (36.9 °C), Min:97.5 °F (36.4 °C), Max:99.4 °F (37.4 °C)    Temp  Min: 97.5 °F (36.4 °C)  Max: 99.4 °F (37.4 °C)  BP  Min: 114/72  Max: 142/84  Pulse  Min: 85  Max: 113  Resp  Min: 18  Max: 20  SpO2  Min: 93 %  Max: 99 %    GENERAL: Awake and alert, in no acute distress.   HEENT: Normocephalic, atraumatic.  PERRL. EOMI. No conjunctival injection. No icterus.no ext oral lesions    HEART: RRR; No murmur, rubs, gallops.   LUNGS: Clear to auscultation bilaterally   ABDOMEN: Soft, nontender,   EXT:  No edema  :  Without Funez catheter.  MSK: No joint effusions or erythema  SKIN: no rash   NEURO: Oriented to PPT.  Motor 5/5  "strength      Laboratory Data    Results from last 7 days   Lab Units 03/05/25  0355 03/04/25  0918   WBC 10*3/mm3 8.46 8.32   HEMOGLOBIN g/dL 13.2 14.6   HEMATOCRIT % 39.7 43.5   PLATELETS 10*3/mm3 147 168     Results from last 7 days   Lab Units 03/05/25  0355   SODIUM mmol/L 136   POTASSIUM mmol/L 4.0   CHLORIDE mmol/L 98   CO2 mmol/L 27.0   BUN mg/dL 13   CREATININE mg/dL 0.99   GLUCOSE mg/dL 87   CALCIUM mg/dL 8.8     Results from last 7 days   Lab Units 03/04/25  0918   ALK PHOS U/L 108   BILIRUBIN mg/dL 0.5   ALT (SGPT) U/L 17   AST (SGOT) U/L 24                         Estimated Creatinine Clearance: 109.9 mL/min (by C-G formula based on SCr of 0.99 mg/dL).      Microbiology:  No results found for: \"ACANTHNAEG\", \"AFBCX\", \"BPERTUSSISCX\", \"BLOODCX\"  No results found for: \"BCIDPCR\", \"CXREFLEX\", \"CSFCX\", \"CULTURETIS\"  No results found for: \"CULTURES\", \"HSVCX\", \"URCX\"  No results found for: \"EYECULTURE\", \"GCCX\", \"HSVCULTURE\", \"LABHSV\"  No results found for: \"LEGIONELLA\", \"MRSACX\", \"MUMPSCX\", \"MYCOPLASCX\"  No results found for: \"NOCARDIACX\", \"STOOLCX\"  No results found for: \"THROATCX\", \"UNSTIMCULT\", \"URINECX\", \"CULTURE\", \"VZVCULTUR\"  No results found for: \"VIRALCULTU\", \"WOUNDCX\"        Radiology:  Imaging Results (Last 72 Hours)       Procedure Component Value Units Date/Time    CT Abdomen Pelvis With Contrast [[id number]] Collected: 03/04/25 1126     Updated: 03/04/25 1134    Narrative:      CT ABDOMEN PELVIS W CONTRAST    Date of Exam: 3/4/2025 11:16 AM EST    Indication: LLQ pain.    Comparison: 3/2/2018    Technique: Axial CT images were obtained of the abdomen and pelvis following the uneventful intravenous administration of and 1 cc Isovue-300. Reconstructed coronal and sagittal images were also obtained. Automated exposure control and iterative   construction methods were used.      Findings:  LUNG BASES:  Unremarkable without mass or infiltrate.    LIVER: Steatosis without focal lesion. There is more " profound area of steatosis along the fissure for the falciform ligament.  BILIARY/GALLBLADDER:  Unremarkable  SPLEEN:  Unremarkable  PANCREAS:  Unremarkable  ADRENAL:  Unremarkable  KIDNEYS:  Unremarkable parenchyma with no solid mass identified. No obstruction.  No calculus identified.  GASTROINTESTINAL/MESENTERY: There is mural thickening with surrounding inflammatory change involving the mid to distal sigmoid colon. There are numerous diverticula in this region. Findings are presumably related to acute diverticulitis. There is   irregular soft tissue density posterior and cephalad to this region suggestive of phlegmon. Additionally, there is a rim-enhancing fluid collection consistent with abscess measuring 2.3 cm in axial diameter (image 129, series 2). This measures   approximately 3 cm in CC dimension (image 58, series 900). No evidence of obstruction. No additional sites of inflammation.  MESENTERIC VESSELS:  Patent.  AORTA/IVC:  Normal caliber.    RETROPERITONEUM/LYMPH NODES:  Unremarkable    REPRODUCTIVE:  Unremarkable  BLADDER:  Unremarkable    OSSEUS STRUCTURES:  Typical for age with no acute process identified.        Impression:      Impression:  1.Mid to distal sigmoid diverticulitis with associated phlegmon and relatively small abscess.        Electronically Signed: Rosalio Mcclendon MD    3/4/2025 11:31 AM EST    Workstation ID: [id number]    XR Abdomen KUB [[id number]] Collected: 03/04/25 0949     Updated: 03/04/25 0953    Narrative:      XR ABDOMEN KUB    Date of Exam: 3/4/2025 9:22 AM EST    Indication: constipation    Comparison: None available.    Findings:  Mild to moderate stool. No dilated loops of bowel. No gastric distention. No visualized renal calcifications. Overall mild degenerative related osseous change in the spine and hip joints. Pelvic phleboliths.      Impression:      Impression:  Mild to moderate stool without obstruction.      Electronically Signed: Kiko Read MD    3/4/2025 9:50  AM EST    Workstation ID: [id number]              Impression:   Recurrent diverticulitis sigmoid colon complicated by abscess, phlegmon  Abdominal pain      PLAN/RECOMMENDATIONS:   Thank you for asking us to see Palomo Pollack, I recommend the following:  Continue zosyn      Alternatives are ertapenem, eravacycline.    Anticipate 2 weeks of iv abx, serial CT scan.    Agree with surgical consultation    Recurrent diverticulitis likely to recur; after inflammation decreases elective surgery best chance of cure       Brandon Seth MD  3/5/2025  16:55 EST

## 2025-03-05 NOTE — PROGRESS NOTES
[facility] Medicine Services  PROGRESS NOTE    Patient Name: Palomo Pollack  : 1980  MRN: [id number]    Date of Admission: 3/4/2025  Primary Care Physician: Heladio Valentine DO    Subjective   Subjective     CC: acute abdominal pain     HPI:  pain is controlled by analgesics but is severe when they wear off       Objective   Objective     Vital Signs:   Temp:  [98.4 °F (36.9 °C)-99.4 °F (37.4 °C)] 98.4 °F (36.9 °C)  Heart Rate:  [] 88  Resp:  [18] 18  BP: (114-142)/(70-96) 114/72     Physical Exam:  Gen:  WD/WN  Neuro: alert and oriented, clear speech,   HEENT:  NC/AT PERRL, OP benign  Neck:  Supple, no LAD  Heart RRR   Abd:  Soft, nontender,   Extrem:  No c/c/e      Results Reviewed:  LAB RESULTS:      Lab 25  0355 25  0918   WBC 8.46 8.32   HEMOGLOBIN 13.2 14.6   HEMATOCRIT 39.7 43.5   PLATELETS 147 168   NEUTROS ABS  --  6.81   IMMATURE GRANS (ABS)  --  0.01   LYMPHS ABS  --  0.68*   MONOS ABS  --  0.76   EOS ABS  --  0.03   MCV 93.0 92.2         Lab 25  0355 25  0918   SODIUM 136 139   POTASSIUM 4.0 4.1   CHLORIDE 98 99   CO2 27.0 27.4   ANION GAP 11.0 12.6   BUN 13 9   CREATININE 0.99 0.95   EGFR 95.1 100.0   GLUCOSE 87 126*   CALCIUM 8.8 9.2         Lab 25  0918   TOTAL PROTEIN 7.6   ALBUMIN 4.4   GLOBULIN 3.2   ALT (SGPT) 17   AST (SGOT) 24   BILIRUBIN 0.5   ALK PHOS 108   LIPASE 26                     Brief Urine Lab Results  (Last result in the past 365 days)        Color   Clarity   Blood   Leuk Est   Nitrite   Protein   CREAT   Urine HCG        25 1005 Yellow   Clear   Negative   Negative   Negative   Trace                   Microbiology Results Abnormal       None            CT Abdomen Pelvis With Contrast    Result Date: 3/4/2025  CT ABDOMEN PELVIS W CONTRAST Date of Exam: 3/4/2025 11:16 AM EST Indication: LLQ pain. Comparison: 3/2/2018 Technique: Axial CT images were obtained of the abdomen and pelvis following the  uneventful intravenous administration of and 1 cc Isovue-300. Reconstructed coronal and sagittal images were also obtained. Automated exposure control and iterative construction methods were used. Findings: LUNG BASES:  Unremarkable without mass or infiltrate. LIVER: Steatosis without focal lesion. There is more profound area of steatosis along the fissure for the falciform ligament. BILIARY/GALLBLADDER:  Unremarkable SPLEEN:  Unremarkable PANCREAS:  Unremarkable ADRENAL:  Unremarkable KIDNEYS:  Unremarkable parenchyma with no solid mass identified. No obstruction.  No calculus identified. GASTROINTESTINAL/MESENTERY: There is mural thickening with surrounding inflammatory change involving the mid to distal sigmoid colon. There are numerous diverticula in this region. Findings are presumably related to acute diverticulitis. There is irregular soft tissue density posterior and cephalad to this region suggestive of phlegmon. Additionally, there is a rim-enhancing fluid collection consistent with abscess measuring 2.3 cm in axial diameter (image 129, series 2). This measures approximately 3 cm in CC dimension (image 58, series 900). No evidence of obstruction. No additional sites of inflammation. MESENTERIC VESSELS:  Patent. AORTA/IVC:  Normal caliber. RETROPERITONEUM/LYMPH NODES:  Unremarkable REPRODUCTIVE:  Unremarkable BLADDER:  Unremarkable OSSEUS STRUCTURES:  Typical for age with no acute process identified.     Impression: Impression: 1.Mid to distal sigmoid diverticulitis with associated phlegmon and relatively small abscess. Electronically Signed: Rosalio Mcclendon MD  3/4/2025 11:31 AM EST  Workstation ID: [id number]    XR Abdomen KUB    Result Date: 3/4/2025  XR ABDOMEN KUB Date of Exam: 3/4/2025 9:22 AM EST Indication: constipation Comparison: None available. Findings: Mild to moderate stool. No dilated loops of bowel. No gastric distention. No visualized renal calcifications. Overall mild degenerative related  osseous change in the spine and hip joints. Pelvic phleboliths.     Impression: Impression: Mild to moderate stool without obstruction. Electronically Signed: Kiko Read MD  3/4/2025 9:50 AM EST  Workstation ID: [id number]     Results for orders placed during the hospital encounter of 09/07/23    Adult Transthoracic Echo Complete W/ Cont if Necessary Per Protocol    Interpretation Summary    Left ventricular systolic function is moderately decreased. Calculated left ventricular EF = 30% Left ventricular ejection fraction appears to be 31 - 35%.    Left ventricular wall thickness is consistent with mild concentric hypertrophy.    Left ventricular diastolic function was indeterminate.      Current medications:  Scheduled Meds:[Held by provider] apixaban, 5 mg, Oral, BID  cetirizine, 10 mg, Oral, Daily  empagliflozin, 10 mg, Oral, Daily  metoprolol succinate XL, 25 mg, Oral, Daily  pantoprazole, 40 mg, Oral, Q AM  piperacillin-tazobactam, 4.5 g, Intravenous, Q8H  [Held by provider] sacubitril-valsartan, 1 tablet, Oral, BID  sodium chloride, 10 mL, Intravenous, Q12H  [Held by provider] spironolactone, 25 mg, Oral, Daily      Continuous Infusions:Pharmacy Consult - Pharmacy to dose,       PRN Meds:.  acetaminophen **OR** acetaminophen **OR** acetaminophen    Calcium Replacement - Follow Nurse / BPA Driven Protocol    dextrose    dextrose    glucagon (human recombinant)    Magnesium Standard Dose Replacement - Follow Nurse / BPA Driven Protocol    Morphine **AND** naloxone    nitroglycerin    oxyCODONE-acetaminophen    Pharmacy Consult - Pharmacy to dose    Phosphorus Replacement - Follow Nurse / BPA Driven Protocol    Potassium Replacement - Follow Nurse / BPA Driven Protocol    Sodium Chloride (PF)    sodium chloride    sodium chloride    Assessment & Plan   Assessment & Plan     Active Hospital Problems    Diagnosis  POA    **Diverticular disease of intestine with perforation and abscess [K57.80]  Yes      Resolved  Hospital Problems   No resolved problems to display.        Brief Hospital Course to date:  Palomo Pollack is a 44 y.o. male w history of atrial fibrillation, CHF, diverticulitis, presented to [facility] emergency department due to lower abdominal pain.        sigmoid diverticulitis, perforated  phlegmon and small abscess  - CT noted.  Abscess 2/3cm  -- Gen Surg and ID  consultants appreciated  - abx   - Diet started  - hold Eliquis for now      Paroxysmal atrial fibrillation (Primary)  Presence of biventricular implantable cardioverter-defibrillator (ICD)  Chronic HFrEF (heart failure with reduced ejection fraction)  -ECHO: EF: 31-35  -Holding eliquis      HgA1c 6.1  -Prediabetes            Expected Discharge Location and Transportation: home by car  Expected Discharge tbd  Expected discharge date/ time has not been documented.     VTE Prophylaxis:  Pharmacologic & mechanical VTE prophylaxis orders are present.         AM-PAC 6 Clicks Score (PT): 24 (03/04/25 9909)    CODE STATUS:   Code Status and Medical Interventions: CPR (Attempt to Resuscitate); Full Support   Ordered at: 03/04/25 1539     Level Of Support Discussed With:    Patient     Code Status (Patient has no pulse and is not breathing):    CPR (Attempt to Resuscitate)     Medical Interventions (Patient has pulse or is breathing):    Full Support       hTu Goodman MD  03/05/25

## 2025-03-05 NOTE — PROGRESS NOTES
"          Clinical Nutrition Assessment     Patient Name: Palomo Pollack  YOB: 1980  MRN: [id number]  Date of Encounter: 03/05/25 14:16 EST  Admission date: 3/4/2025  Reason for Visit: MST score 2+, Unintentional weight loss, Reduced oral intake    Assessment   Nutrition Assessment   Admission Diagnosis:  Diverticular disease of intestine with perforation and abscess [K57.80]    Problem List:    Diverticular disease of intestine with perforation and abscess      PMH:   He  has a past medical history of Allergic rhinitis, Atrial fibrillation, CHF (congestive heart failure), Chronic right ear pain, Diabetes mellitus, Diverticulitis, GERD (gastroesophageal reflux disease), Headache, Heart failure, History of kidney stones, History of shingles, History of transfusion, Hodgkin lymphoma, Hypogonadism in male, PTSD (post-traumatic stress disorder), Stem cells transplant status, and Vision blurred.    PSH:  He  has a past surgical history that includes Colonoscopy; Portocaval shunt placement (1998); Central venous catheter insertion (1999); Cardiac catheterization (N/A, 05/30/2023); [place] tooth extraction; biventricullar implantable cardioverter defibrillator placement (N/A, 10/26/2023); Other surgical history; and Cardiac electrophysiology procedure (N/A, 10/3/2024).    Applicable Nutrition History:     Anthropometrics     Height: Height: 180.3 cm (71\")  Last Filed Weight: Weight: 95.3 kg (210 lb) (03/05/25 1300)  Method: Weight Method: Bed scale  BMI: BMI (Calculated): 29.3    UBW:     Weight       Weight (kg) Weight (lbs) Weight Method Visit Report   1/24/2024 95.255 kg  210 lb   --    5/22/2024 90.538 kg  199 lb 9.6 oz   --    8/26/2024 92.534 kg  204 lb   --    9/17/2024 88.451 kg  195 lb  Stated     9/23/2024 90.493 kg  199 lb 8 oz   --    10/3/2024 90.81 kg  200 lb 3.2 oz  Standing scale     10/29/2024 88.451 kg  195 lb  Stated     11/6/2024 91.627 kg  202 lb   --    11/21/2024 92.08 kg  203 " lb   --    1/13/2025 92.08 kg  203 lb   --    2/20/2025 90.538 kg  199 lb 9.6 oz   --    3/4/2025 88.451 kg  195 lb  Stated     3/5/2025 95.255 kg  210 lb  Bed scale       Weight change: No significant changes    Nutrition Focused Physical Exam    Date:  3/5       Pt does not meet criteria for malnutrition diagnosis, at this time.      Subjective   Reported/Observed/Food/Nutrition Related History:     Visited with patient at bedside. He is tolerating some clears and diet just advanced to LF for dinner per surgeon. Patient with recurrent diverticulitis and plan for likely OP colon rxn. Patient states he has not had major changes to PO intakes or weight, was eating less over the past 2 weeks due to diarrhea and not wanting to eat as it all was going right through him. He states he thinks he may have lost ~5 lb, unable to verify. He reports normal appetite and hoping to get back to eating normally. Denies offer for supplement, encouraged to request if unable to return to normal eating soon. Pt denies further dietary needs/preferences or nutritional questions/concerns at this time, NKFA.     Current Nutrition Prescription   PO: Diet: Gastrointestinal; Fiber-Restricted; Texture: Soft to Chew (NDD 3); Soft to Chew: Whole Meat; Fluid Consistency: Thin (IDDSI 0)  Oral Nutrition Supplement:   Intake: Insufficient data    Assessment & Plan   Nutrition Diagnosis   Date:  3/5            Updated:    Problem Altered GI function    Etiology Diverticulitis    Signs/Symptoms Diarrhea, abdominal pain   Status: New    Goal:   Nutrition to support treatment and Tolerate PO, Increase intake    Nutrition Intervention      Follow treatment progress, Care plan reviewed, Advise alternate selection, Advised available snacks, Interview for preferences, Menu provided, Encourage intake, Supplement provided    Encourage PO as tolerated, low fiber diet.    Monitoring/Evaluation:   Per protocol, PO intake, Supplement intake, Weight, GI status,  Symptoms, POC/GOC    Yulissa Sepulveda RD  Time Spent: 25m

## 2025-03-05 NOTE — CONSULTS
Patient Name:  Palomo Pollack  YOB: 1980  [id number]       Patient Care Team:  Heladio Valentine DO as PCP - General (Family Medicine)  Alec Mason III, MD as Cardiologist (Cardiology)  Wilmer Rojas DO as Consulting Physician (Cardiology)      General Surgery Consult Note     Date of Consultation: 03/05/25    Referring Physician - Thu Goodman MD    Reason for Consult - Diverticulitis    Subjective     I have been asked to see  Palomo Pollack , a 44 y.o. male in consultation for diverticulitis from Dr. Goodman. The patient has has multiple episodes of diverticulitis since 2017. Over the last several months he has had persistent nausea, diarrhea, and LLQ abdominal pain. Subjective fevers/chills intermittently. Due to acute worsening of pain, he presented to ER for further evaluation. Pain currently minimal.       Allergy:   Allergies   Allergen Reactions    Bactrim [Sulfamethoxazole-Trimethoprim] Rash and Other (See Comments)     Gave increased heartrate-biaxin      Clarithromycin Unknown - Low Severity    Milk-Related Compounds Other (See Comments)     Increased phlegm        Medications:  [Held by provider] apixaban, 5 mg, Oral, BID  cetirizine, 10 mg, Oral, Daily  empagliflozin, 10 mg, Oral, Daily  metoprolol succinate XL, 25 mg, Oral, Daily  pantoprazole, 40 mg, Oral, Q AM  piperacillin-tazobactam, 4.5 g, Intravenous, Q8H  [Held by provider] sacubitril-valsartan, 1 tablet, Oral, BID  sodium chloride, 10 mL, Intravenous, Q12H  [Held by provider] spironolactone, 25 mg, Oral, Daily      Pharmacy Consult - Pharmacy to dose,       No current facility-administered medications on file prior to encounter.     Current Outpatient Medications on File Prior to Encounter   Medication Sig    acetaminophen (TYLENOL) 325 MG tablet Take 2 tablets by mouth Every 6 (Six) Hours As Needed for Mild Pain.    apixaban (Eliquis) 5 MG tablet tablet Take 1 tablet by mouth 2 (Two) Times a Day.     dicyclomine (BENTYL) 10 MG capsule Take 1 capsule by mouth 4 (Four) Times a Day Before Meals & at Bedtime.    empagliflozin (JARDIANCE) 10 MG tablet tablet Take 1 tablet by mouth Daily.    esomeprazole (nexIUM) 40 MG capsule Take 1 capsule by mouth Every Morning Before Breakfast.    fexofenadine (ALLEGRA) 180 MG tablet Take 1 tablet by mouth Daily.    furosemide (LASIX) 20 MG tablet TAKE 1 TABLET BY MOUTH ONCE DAILY AS NEEDED FOR SHORTNESS OF BREATH ,  SWELLING,  WEIGHT  GAIN >3 LBS    metoprolol succinate XL (TOPROL-XL) 25 MG 24 hr tablet Take 1 tablet by mouth Daily.    sacubitril-valsartan (Entresto) 49-51 MG tablet Take 1 tablet by mouth 2 (Two) Times a Day.    spironolactone (ALDACTONE) 25 MG tablet Take 1 tablet by mouth Daily.    Testosterone 1.62 % gel Daily.       PMHx:   Past Medical History:   Diagnosis Date    Allergic rhinitis     Atrial fibrillation     CHF (congestive heart failure)     Chronic right ear pain     h/o    Diabetes mellitus     h/o-  doesnt check sugar    Diverticulitis     GERD (gastroesophageal reflux disease)     Headache     Heart failure     History of kidney stones     passed them    History of shingles     1999- after transplant of stem cells    History of transfusion     1998-bhlex; 1999 [place], [place]- 1 unit- stem cell transplant -  recieved plts - no reaction recalled    Hodgkin lymphoma     Hypogonadism in male     PTSD (post-traumatic stress disorder)     Stem cells transplant status     Vision blurred     possibly need glasses       Past Surgical History:  Past Surgical History:   Procedure Laterality Date    BIVENTRICULLAR IMPLANTABLE CARDIOVERTER DEFIBRILLATOR PLACEMENT N/A 10/26/2023    Procedure: Implant ICD - bi ventricular; DNS meds; [place];  Surgeon: Wilmer Rojas DO;  Location: [facility] INVASIVE LOC[facility];  Service: Cardiovascular;  Laterality: N/A;    CARDIAC CATHETERIZATION N/A 05/30/2023    Procedure: Left Heart Cath;  Surgeon: Alec Mason III, MD;  Location:   [facility] CATH INVASIVE LOCATION;  Service: Cardiovascular;  Laterality: N/A;    CARDIAC ELECTROPHYSIOLOGY PROCEDURE N/A 10/3/2024    Procedure: Ablation atrial fibrillation w PFA; CTA prior, [place], no meds to hold;  Surgeon: Wilmer Rojas DO;  Location:  [facility] EP INVASIVE LOCATION;  Service: Cardiovascular;  Laterality: N/A;    CENTRAL VENOUS LINE INSERTION  1999    removed since    COLONOSCOPY      OTHER SURGICAL HISTORY      stem cell transplant    PORTOCAVAL SHUNT PLACEMENT  1998    WISDOM TOOTH EXTRACTION          Family History:   Family History   Problem Relation Age of Onset    Stroke Father     Lung cancer Father     Cancer Father         Positive for cured lung cancer- he was smoker        Social History:   Social History     Socioeconomic History    Marital status: Single   Tobacco Use    Smoking status: Some Days     Current packs/day: 0.25     Average packs/day: 0.3 packs/day for 22.3 years (5.6 ttl pk-yrs)     Types: Cigarettes     Start date: 11/2002     Last attempt to quit: 11/2022     Passive exposure: Past    Smokeless tobacco: Never    Tobacco comments:     smokes less than .25 ppd   Vaping Use    Vaping status: Never Used   Substance and Sexual Activity    Alcohol use: Yes     Alcohol/week: 4.0 standard drinks of alcohol     Types: 4 Shots of liquor per week     Comment: Occasional on weekends    Drug use: Not Currently     Types: Marijuana     Comment: stopped april 2024    Sexual activity: Yes         Review of Systems       Constitutional: See HPI   Eyes: Denies visual changes    Cardiovascular: Denies chest pain, palpitations   Pulmonary: Denies cough or shortness of breath   Abdominal/ GI: See HPI    Genitourinary: Denies dysuria or hematuria   Musculoskeletal: Denies any but chronic joint aches, pains or deformities   Psychiatric: No recent mood changes   Neurologic: No paresthesias or loss of function          Objective     Physical Exam:      Vital Signs  /72 (BP Location: Right arm,  "Patient Position: Lying)   Pulse 88   Temp 98.4 °F (36.9 °C) (Oral)   Resp 18   Ht 180.3 cm (71\")   Wt 88.5 kg (195 lb)   SpO2 95%   BMI 27.20 kg/m²     Intake/Output Summary (Last 24 hours) at 3/5/2025 0752  Last data filed at 3/4/2025 2208  Gross per 24 hour   Intake 700 ml   Output --   Net 700 ml         Physical Exam:    Head: Normocephalic, atraumatic.   Eyes: Pupils equal, round, react to light and accommodation.   Mouth: Oral mucosa without lesions  Neck: No masses, lymphadenopathy or carotid bruits bilaterally  CV: Rhythm and rate regular, no murmurs, rubs or gallops  Lungs: Clear to auscultation bilaterally  Abdomen: Bowel sounds positive, soft, mild LLQ abdominal tenderness  Groin : No obvious hernias bilaterally  Extremities:  No cyanosis, clubbing or edema bilaterally  Lymphatics: No abnormal lymphadenopathy appreciated  Neurologic: No gross deficits      Results Review: I have personally reviewed all of the recent lab and imaging results available at this time.   Cr 0.99  WBC 8.5  Hgb 13.2    CT AP 3/4/2025:  Impression:  1.Mid to distal sigmoid diverticulitis with associated phlegmon and relatively small abscess.       Assessment & Plan     Assessment and Plan:    Patient with recurrent diverticulitis with small abscess seen on CT. Will consult ID for assistance in acute antibiotic regimen. Given mild symptoms currently, will start clear liquid diet and can advance diet as tolerate. Provided no worsening symptoms, he should be safe for outpatient evaluation for lap sigmoid colon resection. Continue to hold Eliquis until he has shown he can tolerate diet without new pain.       I discussed the patient's findings and my recommendations with the patient and/or family, as well as the primary team     Rosas Plaza MD  03/05/25  07:52 EST      "

## 2025-03-05 NOTE — CASE MANAGEMENT/SOCIAL WORK
Discharge Planning Assessment  [facility]     Patient Name: Palomo Pollack  MRN: [id number]  Today's Date: 3/5/2025    Admit Date: 3/4/2025    Plan: Home   Discharge Needs Assessment       Row Name 03/05/25 0959       Living Environment    People in Home significant other    Current Living Arrangements home    Potentially Unsafe Housing Conditions none    In the past 12 months has the electric, gas, oil, or water company threatened to shut off services in your home? No    Primary Care Provided by self    Provides Primary Care For no one    Family Caregiver if Needed significant other    Quality of Family Relationships helpful;involved;supportive    Able to Return to Prior Arrangements yes       Resource/Environmental Concerns    Resource/Environmental Concerns none    Transportation Concerns none       Transportation Needs    In the past 12 months, has lack of transportation kept you from medical appointments or from getting medications? no    In the past 12 months, has lack of transportation kept you from meetings, work, or from getting things needed for daily living? No       Food Insecurity    Within the past 12 months, you worried that your food would run out before you got the money to buy more. Never true    Within the past 12 months, the food you bought just didn't last and you didn't have money to get more. Never true       Transition Planning    Patient/Family Anticipates Transition to home with family    Patient/Family Anticipated Services at Transition     Transportation Anticipated family or friend will provide       Discharge Needs Assessment    Readmission Within the Last 30 Days no previous admission in last 30 days    Equipment Currently Used at Home none    Do you want help finding or keeping work or a job? I do not need or want help    Do you want help with school or training? For example, starting or completing job training or getting a high school diploma, GED or equivalent  No                   Discharge Plan       Row Name 03/05/25 1000       Plan    Plan Home    Patient/Family in Agreement with Plan yes    Plan Comments Spoke to patient and significant other at bedside to initiate discharge planning. Patient lives at home with his siginificant other in [facility]. He is independent with ADL's and does not use DME or home oxygen. He is not current with home health. PCP is Heladio Valentine. Verified [facility] [place]. His plan is home with significant other. Significant other to transport. CM will continue to follow.    Final Discharge Disposition Code 01 - home or self-care                  Continued Care and Services - Admitted Since 3/4/2025    No active coordination exists for this encounter.          Demographic Summary       Row Name 03/05/25 0959       General Information    Admission Type inpatient    Arrived From emergency department    Referral Source admission list    Reason for Consult discharge planning    Preferred Language English                   Functional Status       Row Name 03/05/25 0959       Functional Status    Usual Activity Tolerance good    Current Activity Tolerance good       Physical Activity    On average, how many days per week do you engage in moderate to strenuous exercise (like a brisk walk)? 7 days    On average, how many minutes do you engage in exercise at this level? 30 min    Number of minutes of exercise per week 210       Functional Status, IADL    Medications independent    Meal Preparation independent    Housekeeping independent    Laundry independent    Shopping independent    If for any reason you need help with day-to-day activities such as bathing, preparing meals, shopping, managing finances, etc., do you get the help you need? I get all the help I need                   Psychosocial    No documentation.                  Abuse/Neglect    No documentation.                  Legal    No documentation.                  Substance Abuse     No documentation.                  Patient Forms    No documentation.                     Danna North RN

## 2025-03-05 NOTE — PLAN OF CARE
Problem: Adult Inpatient Plan of Care  Goal: Absence of Hospital-Acquired Illness or Injury  Intervention: Identify and Manage Fall Risk  Recent Flowsheet Documentation  Taken 3/5/2025 0434 by Paloma Crowell RN  Safety Promotion/Fall Prevention:   activity supervised   assistive device/personal items within reach   clutter free environment maintained   fall prevention program maintained   lighting adjusted   nonskid shoes/slippers when out of bed   room organization consistent   safety round/check completed  Taken 3/5/2025 0220 by Paloma Crowell RN  Safety Promotion/Fall Prevention:   activity supervised   assistive device/personal items within reach   clutter free environment maintained   fall prevention program maintained   lighting adjusted   nonskid shoes/slippers when out of bed   safety round/check completed   room organization consistent  Taken 3/5/2025 0130 by Paloma Crowell RN  Safety Promotion/Fall Prevention:   activity supervised   assistive device/personal items within reach   clutter free environment maintained   fall prevention program maintained   lighting adjusted   nonskid shoes/slippers when out of bed   room organization consistent   safety round/check completed  Taken 3/5/2025 0027 by Paloma Crowell RN  Safety Promotion/Fall Prevention:   activity supervised   assistive device/personal items within reach   clutter free environment maintained   fall prevention program maintained   lighting adjusted   nonskid shoes/slippers when out of bed   room organization consistent   safety round/check completed  Taken 3/4/2025 2200 by Paloma Crowell, RN  Safety Promotion/Fall Prevention:   activity supervised   assistive device/personal items within reach   clutter free environment maintained   lighting adjusted   nonskid shoes/slippers when out of bed   room organization consistent   safety round/check completed  Taken 3/4/2025 1936 by Paloma Crowell RN  Safety Promotion/Fall  Prevention:   activity supervised   assistive device/personal items within reach   clutter free environment maintained   lighting adjusted   nonskid shoes/slippers when out of bed   room organization consistent   safety round/check completed  Intervention: Prevent Skin Injury  Recent Flowsheet Documentation  Taken 3/5/2025 0434 by Paloma Crowell RN  Body Position:   position changed independently   supine  Taken 3/5/2025 0220 by Paloma Crowell RN  Body Position:   position changed independently   side-lying   left  Taken 3/5/2025 0130 by Paloma Crowell RN  Body Position: position changed independently  Taken 3/5/2025 0027 by Paloma Crowell RN  Body Position:   position changed independently   supine  Taken 3/4/2025 2200 by Paloma Crowell RN  Body Position: position changed independently  Taken 3/4/2025 1936 by Paloma Crowell RN  Body Position: supine  Skin Protection: (pt refused allevyns) other (see comments)  Intervention: Prevent and Manage VTE (Venous Thromboembolism) Risk  Recent Flowsheet Documentation  Taken 3/4/2025 1946 by Paloma Crowell RN  VTE Prevention/Management:   bilateral   compression stockings on   SCDs (sequential compression devices) on  Intervention: Prevent Infection  Recent Flowsheet Documentation  Taken 3/5/2025 0434 by Paloma Crowell RN  Infection Prevention:   environmental surveillance performed   equipment surfaces disinfected   hand hygiene promoted   rest/sleep promoted   single patient room provided  Taken 3/5/2025 0220 by Paloma rCowell RN  Infection Prevention: environmental surveillance performed  Taken 3/5/2025 0130 by Paloma Crowell RN  Infection Prevention:   environmental surveillance performed   hand hygiene promoted   rest/sleep promoted   single patient room provided  Taken 3/5/2025 0027 by Paloma Crowell RN  Infection Prevention:   environmental surveillance performed   rest/sleep promoted   single patient room provided  Taken 3/4/2025 2200  Pt AOx4, BP 76/42, HR 64, T 98.4 oral, 18 RR, 98% O2 SAT on 2L o2 via NC. LR @ 30mL infusing as ordered. NAD noted. Pt denies lightheadedness or any changes in mentation. Pt resting comfortably in bed. by Paloma Crowell RN  Infection Prevention:   environmental surveillance performed   rest/sleep promoted   single patient room provided  Taken 3/4/2025 1936 by Paloma Crowell RN  Infection Prevention:   rest/sleep promoted   single patient room provided   hand hygiene promoted   environmental surveillance performed  Goal: Optimal Comfort and Wellbeing  Intervention: Monitor Pain and Promote Comfort  Recent Flowsheet Documentation  Taken 3/5/2025 0434 by aPloma Crowell RN  Pain Management Interventions:   care clustered   pain medication given   pillow support provided   quiet environment facilitated  Taken 3/5/2025 0027 by Paloma Crowell RN  Pain Management Interventions:   relaxation techniques promoted   quiet environment facilitated   pain medication given  Taken 3/4/2025 2037 by Paloma Crowell RN  Pain Management Interventions:   pain medication given   care clustered   pillow support provided  Intervention: Provide Person-Centered Care  Recent Flowsheet Documentation  Taken 3/4/2025 1936 by Paloma Crowell RN  Trust Relationship/Rapport:   care explained   choices provided   emotional support provided   thoughts/feelings acknowledged   reassurance provided   questions encouraged  Goal: Readiness for Transition of Care  Intervention: Mutually Develop Transition Plan  Recent Flowsheet Documentation  Taken 3/4/2025 1919 by Paloma Crowell RN  Transportation Anticipated: family or friend will provide  Patient/Family Anticipated Services at Transition: none  Patient/Family Anticipates Transition to: home with family  Taken 3/4/2025 1918 by Paloma Crowell, RN  Equipment Currently Used at Home: none  Patient/Family Anticipated Services at Transition: none  Patient/Family Anticipates Transition to: home with family  Taken 3/4/2025 1917 by Paloma Crowell RN  Equipment Currently Used at Home: none   Goal Outcome Evaluation:                                             Pt AOx4, BP 76/42, HR 64, T 98.4 oral, 18 RR, 98% O2 SAT on 2L o2 via NC. LR @ 30mL infusing as ordered. NAD noted. Pt denies lightheadedness or any changes in mentation. Pt due to void. Pt resting comfortably in bed.

## 2025-03-06 LAB
ANION GAP SERPL CALCULATED.3IONS-SCNC: 11 MMOL/L (ref 5–15)
BASOPHILS # BLD AUTO: 0.04 10*3/MM3 (ref 0–0.2)
BASOPHILS NFR BLD AUTO: 0.6 % (ref 0–1.5)
BUN SERPL-MCNC: 9 MG/DL (ref 6–20)
BUN/CREAT SERPL: 11 (ref 7–25)
CALCIUM SPEC-SCNC: 9.6 MG/DL (ref 8.6–10.5)
CHLORIDE SERPL-SCNC: 97 MMOL/L (ref 98–107)
CO2 SERPL-SCNC: 28 MMOL/L (ref 22–29)
CREAT SERPL-MCNC: 0.82 MG/DL (ref 0.76–1.27)
DEPRECATED RDW RBC AUTO: 45 FL (ref 37–54)
EGFRCR SERPLBLD CKD-EPI 2021: 109.7 ML/MIN/1.73
EOSINOPHIL # BLD AUTO: 0.07 10*3/MM3 (ref 0–0.4)
EOSINOPHIL NFR BLD AUTO: 1 % (ref 0.3–6.2)
ERYTHROCYTE [DISTWIDTH] IN BLOOD BY AUTOMATED COUNT: 13.2 % (ref 12.3–15.4)
GLUCOSE BLDC GLUCOMTR-MCNC: 112 MG/DL (ref 70–130)
GLUCOSE BLDC GLUCOMTR-MCNC: 78 MG/DL (ref 70–130)
GLUCOSE BLDC GLUCOMTR-MCNC: 82 MG/DL (ref 70–130)
GLUCOSE BLDC GLUCOMTR-MCNC: 96 MG/DL (ref 70–130)
GLUCOSE SERPL-MCNC: 95 MG/DL (ref 65–99)
HCT VFR BLD AUTO: 39.7 % (ref 37.5–51)
HGB BLD-MCNC: 13.1 G/DL (ref 13–17.7)
IMM GRANULOCYTES # BLD AUTO: 0.04 10*3/MM3 (ref 0–0.05)
IMM GRANULOCYTES NFR BLD AUTO: 0.6 % (ref 0–0.5)
LYMPHOCYTES # BLD AUTO: 0.8 10*3/MM3 (ref 0.7–3.1)
LYMPHOCYTES NFR BLD AUTO: 12 % (ref 19.6–45.3)
MCH RBC QN AUTO: 31 PG (ref 26.6–33)
MCHC RBC AUTO-ENTMCNC: 33 G/DL (ref 31.5–35.7)
MCV RBC AUTO: 93.9 FL (ref 79–97)
MONOCYTES # BLD AUTO: 0.66 10*3/MM3 (ref 0.1–0.9)
MONOCYTES NFR BLD AUTO: 9.9 % (ref 5–12)
NEUTROPHILS NFR BLD AUTO: 5.08 10*3/MM3 (ref 1.7–7)
NEUTROPHILS NFR BLD AUTO: 75.9 % (ref 42.7–76)
NRBC BLD AUTO-RTO: 0 /100 WBC (ref 0–0.2)
PLATELET # BLD AUTO: 145 10*3/MM3 (ref 140–450)
PMV BLD AUTO: 9.7 FL (ref 6–12)
POTASSIUM SERPL-SCNC: 4.1 MMOL/L (ref 3.5–5.2)
RBC # BLD AUTO: 4.23 10*6/MM3 (ref 4.14–5.8)
SODIUM SERPL-SCNC: 136 MMOL/L (ref 136–145)
WBC NRBC COR # BLD AUTO: 6.69 10*3/MM3 (ref 3.4–10.8)

## 2025-03-06 PROCEDURE — 96366 THER/PROPH/DIAG IV INF ADDON: CPT

## 2025-03-06 PROCEDURE — 99232 SBSQ HOSP IP/OBS MODERATE 35: CPT | Performed by: NURSE PRACTITIONER

## 2025-03-06 PROCEDURE — 25010000002 PIPERACILLIN SOD-TAZOBACTAM PER 1 G

## 2025-03-06 PROCEDURE — 80048 BASIC METABOLIC PNL TOTAL CA: CPT | Performed by: SURGERY

## 2025-03-06 PROCEDURE — 25810000003 SODIUM CHLORIDE 0.9 % SOLUTION 250 ML FLEX CONT

## 2025-03-06 PROCEDURE — 82948 REAGENT STRIP/BLOOD GLUCOSE: CPT

## 2025-03-06 PROCEDURE — G0378 HOSPITAL OBSERVATION PER HR: HCPCS

## 2025-03-06 PROCEDURE — 25010000002 ERTAPENEM PER 500 MG: Performed by: INTERNAL MEDICINE

## 2025-03-06 PROCEDURE — 85025 COMPLETE CBC W/AUTO DIFF WBC: CPT | Performed by: SURGERY

## 2025-03-06 RX ADMIN — OXYCODONE HYDROCHLORIDE AND ACETAMINOPHEN 1 TABLET: 5; 325 TABLET ORAL at 14:07

## 2025-03-06 RX ADMIN — SENNOSIDES AND DOCUSATE SODIUM 2 TABLET: 50; 8.6 TABLET ORAL at 20:06

## 2025-03-06 RX ADMIN — PANTOPRAZOLE SODIUM 40 MG: 40 TABLET, DELAYED RELEASE ORAL at 05:37

## 2025-03-06 RX ADMIN — SODIUM CHLORIDE 4.5 G: 9 INJECTION, SOLUTION INTRAVENOUS at 05:37

## 2025-03-06 RX ADMIN — OXYCODONE HYDROCHLORIDE AND ACETAMINOPHEN 1 TABLET: 5; 325 TABLET ORAL at 05:38

## 2025-03-06 RX ADMIN — METOPROLOL SUCCINATE 25 MG: 25 TABLET, EXTENDED RELEASE ORAL at 20:06

## 2025-03-06 RX ADMIN — Medication 10 ML: at 20:06

## 2025-03-06 RX ADMIN — OXYCODONE HYDROCHLORIDE AND ACETAMINOPHEN 1 TABLET: 5; 325 TABLET ORAL at 21:45

## 2025-03-06 RX ADMIN — ERTAPENEM 1000 MG: 1 INJECTION INTRAMUSCULAR; INTRAVENOUS at 17:59

## 2025-03-06 RX ADMIN — OXYCODONE HYDROCHLORIDE AND ACETAMINOPHEN 1 TABLET: 5; 325 TABLET ORAL at 09:20

## 2025-03-06 RX ADMIN — OXYCODONE HYDROCHLORIDE AND ACETAMINOPHEN 1 TABLET: 5; 325 TABLET ORAL at 01:12

## 2025-03-06 RX ADMIN — SENNOSIDES AND DOCUSATE SODIUM 2 TABLET: 50; 8.6 TABLET ORAL at 09:20

## 2025-03-06 RX ADMIN — SODIUM CHLORIDE 4.5 G: 9 INJECTION, SOLUTION INTRAVENOUS at 14:07

## 2025-03-06 RX ADMIN — OXYCODONE HYDROCHLORIDE AND ACETAMINOPHEN 1 TABLET: 5; 325 TABLET ORAL at 17:59

## 2025-03-06 RX ADMIN — Medication 10 ML: at 09:20

## 2025-03-06 RX ADMIN — CETIRIZINE HYDROCHLORIDE 10 MG: 10 TABLET, FILM COATED ORAL at 09:20

## 2025-03-06 RX ADMIN — EMPAGLIFLOZIN 10 MG: 10 TABLET, FILM COATED ORAL at 20:06

## 2025-03-06 NOTE — CASE MANAGEMENT/SOCIAL WORK
Continued Stay Note  [facility]     Patient Name: Palomo Pollack  MRN: [id number]  Today's Date: 3/6/2025    Admit Date: 3/4/2025    Plan: Home with significant other   Discharge Plan       Row Name 03/06/25 1253       Plan    Plan Home with significant other    Patient/Family in Agreement with Plan yes    Plan Comments CM spoke to patient at bedside to discuss possibility of IV antibiotics after discharge. Patient lives at home with his significant other and would be agreeable to doing infusions at home with his significant other to assist. He is agreeable to go weekly for PICC care & lab work and would prefer [facility] [place]. Once ID's final recommendations are finalized, CM will make appropriate arrangements. At this time, the patient does not have a PICC. CM will continue to follow.    Final Discharge Disposition Code 01 - home or self-care                   Discharge Codes    No documentation.                 Expected Discharge Date and Time       Expected Discharge Date Expected Discharge Time    Mar 7, 2025               Danna North RN

## 2025-03-06 NOTE — PROGRESS NOTES
"Patient Name:  Palomo Pollack  YOB: 1980  [id number]    Surgery Progress Note    Date of visit: 3/6/2025    Subjective   Patient with increased left lower quadrant abdominal pain after eating yesterday.  Patient made n.p.o. and pain is improved since.  No fevers or chills.  No vomiting.         Objective       /76 (BP Location: Right arm, Patient Position: Lying)   Pulse 88   Temp 98.2 °F (36.8 °C) (Oral)   Resp 18   Ht 180.3 cm (71\")   Wt 95.3 kg (210 lb)   SpO2 90%   BMI 29.29 kg/m²     Intake/Output Summary (Last 24 hours) at 3/6/2025 0845  Last data filed at 3/5/2025 2303  Gross per 24 hour   Intake --   Output 920 ml   Net -920 ml       CV:  Rhythm regular and rate regular  L:  Clear to auscultation bilaterally  Abd:  Bowel sounds positive, soft, mild left lower quadrant tenderness without rebound or guarding  Ext:  No cyanosis, clubbing, edema    Recent labs and imaging that are back at this time have been reviewed.          Assessment & Plan     Patient with acute recurrent diverticulitis with small abscess.  Restart clear liquid diet and assess for tolerance.  IV antibiotics per infectious disease specialist.  Pain control.  Check labs this morning.        Rosas Plaza MD  3/6/2025  08:45 EST      "

## 2025-03-06 NOTE — PROGRESS NOTES
[facility] Medicine Services  PROGRESS NOTE    Patient Name: Palomo Pollack  : 1980  MRN: [id number]    Date of Admission: 3/4/2025  Primary Care Physician: Heladio Valentine DO    Subjective   Subjective     CC: acute abdominal pain     HPI:    Pt reports abdominal pain is a 91/0 prior to pain med this morning. Currently 2/3 after meds. Pt reports BM yesterday.    Copied text in this note has been reviewed and is accurate as of 25.         Objective   Objective     Vital Signs:   Temp:  [97.5 °F (36.4 °C)-98.5 °F (36.9 °C)] 98.5 °F (36.9 °C)  Heart Rate:  [] 89  Resp:  [16-18] 16  BP: (114-137)/(76-86) 123/80     Physical Exam:  Constitutional: Awake, alert, NAD  HENT: NCAT, mucous membranes moist  Respiratory: Clear to auscultation bilaterally, non-labored  Cardiovascular: RRR, no murmurs, rubs, or gallop  Gastrointestinal: Positive bowel sounds, soft, non-distended, mild LLQ abd pain  Musculoskeletal: No bilateral ankle edema, no clubbing or cyanosis to extremities  Psychiatric: Appropriate affect, cooperative  Neurologic: Oriented x 3, PALOMARES, speech clear  Skin: No rashes        Results Reviewed:  LAB RESULTS:      Lab 25  0355 25  0918   WBC 8.46 8.32   HEMOGLOBIN 13.2 14.6   HEMATOCRIT 39.7 43.5   PLATELETS 147 168   NEUTROS ABS  --  6.81   IMMATURE GRANS (ABS)  --  0.01   LYMPHS ABS  --  0.68*   MONOS ABS  --  0.76   EOS ABS  --  0.03   MCV 93.0 92.2         Lab 25  0355 25  0918   SODIUM 136 139   POTASSIUM 4.0 4.1   CHLORIDE 98 99   CO2 27.0 27.4   ANION GAP 11.0 12.6   BUN 13 9   CREATININE 0.99 0.95   EGFR 95.1 100.0   GLUCOSE 87 126*   CALCIUM 8.8 9.2         Lab 25  0918   TOTAL PROTEIN 7.6   ALBUMIN 4.4   GLOBULIN 3.2   ALT (SGPT) 17   AST (SGOT) 24   BILIRUBIN 0.5   ALK PHOS 108   LIPASE 26                     Brief Urine Lab Results  (Last result in the past 365 days)        Color   Clarity   Blood   Leuk Est   Nitrite    Protein   CREAT   Urine HCG        03/04/25 1005 Yellow   Clear   Negative   Negative   Negative   Trace                   Microbiology Results Abnormal       None            CT Abdomen Pelvis With Contrast    Result Date: 3/4/2025  CT ABDOMEN PELVIS W CONTRAST Date of Exam: 3/4/2025 11:16 AM EST Indication: LLQ pain. Comparison: 3/2/2018 Technique: Axial CT images were obtained of the abdomen and pelvis following the uneventful intravenous administration of and 1 cc Isovue-300. Reconstructed coronal and sagittal images were also obtained. Automated exposure control and iterative construction methods were used. Findings: LUNG BASES:  Unremarkable without mass or infiltrate. LIVER: Steatosis without focal lesion. There is more profound area of steatosis along the fissure for the falciform ligament. BILIARY/GALLBLADDER:  Unremarkable SPLEEN:  Unremarkable PANCREAS:  Unremarkable ADRENAL:  Unremarkable KIDNEYS:  Unremarkable parenchyma with no solid mass identified. No obstruction.  No calculus identified. GASTROINTESTINAL/MESENTERY: There is mural thickening with surrounding inflammatory change involving the mid to distal sigmoid colon. There are numerous diverticula in this region. Findings are presumably related to acute diverticulitis. There is irregular soft tissue density posterior and cephalad to this region suggestive of phlegmon. Additionally, there is a rim-enhancing fluid collection consistent with abscess measuring 2.3 cm in axial diameter (image 129, series 2). This measures approximately 3 cm in CC dimension (image 58, series 900). No evidence of obstruction. No additional sites of inflammation. MESENTERIC VESSELS:  Patent. AORTA/IVC:  Normal caliber. RETROPERITONEUM/LYMPH NODES:  Unremarkable REPRODUCTIVE:  Unremarkable BLADDER:  Unremarkable OSSEUS STRUCTURES:  Typical for age with no acute process identified.     Impression: Impression: 1.Mid to distal sigmoid diverticulitis with associated phlegmon  and relatively small abscess. Electronically Signed: Rosalio Mcclendon MD  3/4/2025 11:31 AM EST  Workstation ID: [id number]     Results for orders placed during the hospital encounter of 09/07/23    Adult Transthoracic Echo Complete W/ Cont if Necessary Per Protocol    Interpretation Summary    Left ventricular systolic function is moderately decreased. Calculated left ventricular EF = 30% Left ventricular ejection fraction appears to be 31 - 35%.    Left ventricular wall thickness is consistent with mild concentric hypertrophy.    Left ventricular diastolic function was indeterminate.      Current medications:  Scheduled Meds:[Held by provider] apixaban, 5 mg, Oral, BID  cetirizine, 10 mg, Oral, Daily  empagliflozin, 10 mg, Oral, Daily  metoprolol succinate XL, 25 mg, Oral, Daily  pantoprazole, 40 mg, Oral, Q AM  piperacillin-tazobactam, 4.5 g, Intravenous, Q8H  [Held by provider] sacubitril-valsartan, 1 tablet, Oral, BID  senna-docusate sodium, 2 tablet, Oral, BID  sodium chloride, 10 mL, Intravenous, Q12H  [Held by provider] spironolactone, 25 mg, Oral, Daily      Continuous Infusions:Pharmacy Consult - Pharmacy to dose,       PRN Meds:.  acetaminophen **OR** acetaminophen **OR** acetaminophen    Calcium Replacement - Follow Nurse / BPA Driven Protocol    dextrose    dextrose    glucagon (human recombinant)    Magnesium Standard Dose Replacement - Follow Nurse / BPA Driven Protocol    Morphine **AND** naloxone    nitroglycerin    oxyCODONE-acetaminophen    Pharmacy Consult - Pharmacy to dose    Phosphorus Replacement - Follow Nurse / BPA Driven Protocol    Potassium Replacement - Follow Nurse / BPA Driven Protocol    Sodium Chloride (PF)    sodium chloride    sodium chloride    Assessment & Plan   Assessment & Plan     Active Hospital Problems    Diagnosis  POA    **Diverticular disease of intestine with perforation and abscess [K57.80]  Yes      Resolved Hospital Problems   No resolved problems to display.         Brief Hospital Course to date:  Palomo Pollack is a 44 y.o. male w history of atrial fibrillation, CHF, diverticulitis, presented to [facility] emergency department due to lower abdominal pain.  Pt found with sigmoid diverticulitis perforation with phlegmon and small abscess (2-3 cm). General surgery and ID consulted.       sigmoid diverticulitis, perforated  phlegmon and small abscess  - CT noted.  Abscess 2/3cm  -- Gen Surg and ID  consultants appreciated  - abx   - Diet started  - hold Eliquis for now      Paroxysmal atrial fibrillation (Primary)  Presence of biventricular implantable cardioverter-defibrillator (ICD)  Chronic HFrEF (heart failure with reduced ejection fraction)  -ECHO: EF: 31-35  -Holding eliquis      HgA1c 6.1  -Prediabetes   -glucose appropriate.        Expected Discharge Location and Transportation: home by car  Expected Discharge tbd  Expected Discharge Date: 3/7/2025; Expected Discharge Time:      VTE Prophylaxis:  Pharmacologic & mechanical VTE prophylaxis orders are present.         AM-PAC 6 Clicks Score (PT): 24 (03/05/25 2000)    CODE STATUS:   Code Status and Medical Interventions: CPR (Attempt to Resuscitate); Full Support   Ordered at: 03/04/25 9163     Level Of Support Discussed With:    Patient     Code Status (Patient has no pulse and is not breathing):    CPR (Attempt to Resuscitate)     Medical Interventions (Patient has pulse or is breathing):    Full Support       YARA Guerra  03/06/25

## 2025-03-06 NOTE — PROGRESS NOTES
INFECTIOUS DISEASE CONSULT/INITIAL HOSPITAL VISIT    Palomo Pollack  1980  [id number]    Date of Consult: 3/6/2025    Admission Date: 3/4/2025      Requesting Provider: No ref. provider found  Evaluating Physician: Brandon Seth MD    Reason for Consultation: sigmoid diverticulitis    History of present illness:    Patient is a 44 y.o. male with atrial fibrillation, CHF, diverticulitis was evaluated at [facility] for abdominal pain, CT scan concerning for diverticulitis, abscess; admitted started on iv abx.  Estimated he has had episodes of diverticulitis about 10 times since 2017.  Usually responds to oral antibiotics. On zosyn, general surgery involved; there is consideration for outpatient surgery     3/6/25 ate a piece of meat an had abdominal pain, doing better with clear liquid diet ; family by bedside; no complaitns        Past Medical History:   Diagnosis Date    Allergic rhinitis     Atrial fibrillation     CHF (congestive heart failure)     Chronic right ear pain     h/o    Diabetes mellitus     h/o-  doesnt check sugar    Diverticulitis     GERD (gastroesophageal reflux disease)     Headache     Heart failure     History of kidney stones     passed them    History of shingles     1999- after transplant of stem cells    History of transfusion     1998-bhlex; 1999 [place]- 1 unit- stem cell transplant -  recieved plts - no reaction recalled    Hodgkin lymphoma     Hypogonadism in male     PTSD (post-traumatic stress disorder)     Stem cells transplant status     Vision blurred     possibly need glasses       Past Surgical History:   Procedure Laterality Date    BIVENTRICULLAR IMPLANTABLE CARDIOVERTER DEFIBRILLATOR PLACEMENT N/A 10/26/2023    Procedure: Implant ICD - bi ventricular; DNS meds; [place];  Surgeon: Wilmer Rojas DO;  Location: [facility] INVASIVE LOCATION;  Service: Cardiovascular;  Laterality: N/A;    CARDIAC CATHETERIZATION N/A 05/30/2023    Procedure: Left  Heart Cath;  Surgeon: Alec Mason III, MD;  Location:  [facility] CATH INVASIVE LOCATION;  Service: Cardiovascular;  Laterality: N/A;    CARDIAC ELECTROPHYSIOLOGY PROCEDURE N/A 10/3/2024    Procedure: Ablation atrial fibrillation w PFA; CTA prior, [place], no meds to hold;  Surgeon: Wilmer Rojas DO;  Location:  [facility] EP INVASIVE LOCATION;  Service: Cardiovascular;  Laterality: N/A;    CENTRAL VENOUS LINE INSERTION  1999    removed since    COLONOSCOPY      OTHER SURGICAL HISTORY      stem cell transplant    PORTOCAVAL SHUNT PLACEMENT  1998    WISDOM TOOTH EXTRACTION         Family History   Problem Relation Age of Onset    Stroke Father     Lung cancer Father     Cancer Father         Positive for cured lung cancer- he was smoker       Social History     Socioeconomic History    Marital status: Single   Tobacco Use    Smoking status: Some Days     Current packs/day: 0.25     Average packs/day: 0.3 packs/day for 22.3 years (5.6 ttl pk-yrs)     Types: Cigarettes     Start date: 11/2002     Last attempt to quit: 11/2022     Passive exposure: Past    Smokeless tobacco: Never    Tobacco comments:     smokes less than .25 ppd   Vaping Use    Vaping status: Never Used   Substance and Sexual Activity    Alcohol use: Yes     Alcohol/week: 4.0 standard drinks of alcohol     Types: 4 Shots of liquor per week     Comment: Occasional on weekends    Drug use: Not Currently     Types: Marijuana     Comment: stopped april 2024    Sexual activity: Yes       Allergies   Allergen Reactions    Bactrim [Sulfamethoxazole-Trimethoprim] Rash and Other (See Comments)     Gave increased heartrate-biaxin      Clarithromycin Unknown - Low Severity    Milk-Related Compounds Other (See Comments)     Increased phlegm          Medication:    Current Facility-Administered Medications:     acetaminophen (TYLENOL) tablet 500 mg, 500 mg, Oral, Q6H PRN **OR** acetaminophen (TYLENOL) 160 MG/5ML oral solution 500 mg, 500 mg, Oral, Q6H PRN **OR** acetaminophen  (TYLENOL) suppository 650 mg, 650 mg, Rectal, Q4H PRN, Luma Kim MD    [Held by provider] apixaban (ELIQUIS) tablet 5 mg, 5 mg, Oral, BID, Luma Kim MD    Calcium Replacement - Follow Nurse / BPA Driven Protocol, , Not Applicable, PRN, Luma Kim MD    cetirizine (zyrTEC) tablet 10 mg, 10 mg, Oral, Daily, Luma Kim MD, 10 mg at 03/06/25 0920    dextrose (D50W) (25 g/50 mL) IV injection 25 g, 25 g, Intravenous, Q15 Min PRN, Luma Kim MD    dextrose (GLUTOSE) oral gel 15 g, 15 g, Oral, Q15 Min PRN, Luma Kim MD    empagliflozin (JARDIANCE) tablet 10 mg, 10 mg, Oral, Daily, Luma Kim MD, 10 mg at 03/05/25 2055    glucagon (GLUCAGEN) injection 1 mg, 1 mg, Intramuscular, Q15 Min PRN, Luma Kim MD    Magnesium Standard Dose Replacement - Follow Nurse / BPA Driven Protocol, , Not Applicable, PRN, Luma Kim MD    metoprolol succinate XL (TOPROL-XL) 24 hr tablet 25 mg, 25 mg, Oral, Daily, Luma Kim MD, 25 mg at 03/05/25 2055    morphine injection 2 mg, 2 mg, Intravenous, Q4H PRN, 2 mg at 03/05/25 2349 **AND** naloxone (NARCAN) injection 0.4 mg, 0.4 mg, Intravenous, Q5 Min PRN, Luma Kim MD    nitroglycerin (NITROSTAT) SL tablet 0.4 mg, 0.4 mg, Sublingual, Q5 Min PRN, Luma Kim MD    oxyCODONE-acetaminophen (PERCOCET) 5-325 MG per tablet 1 tablet, 1 tablet, Oral, Q4H PRN, Luma Kim MD, 1 tablet at 03/06/25 1407    pantoprazole (PROTONIX) EC tablet 40 mg, 40 mg, Oral, Q AM, Luma Kim MD, 40 mg at 03/06/25 0537    Pharmacy Consult - Pharmacy to dose, , Not Applicable, Continuous PRN, Luma Kim MD    Phosphorus Replacement - Follow Nurse / BPA Driven Protocol, , Not Applicable, Julio LUJAN Laurie, MD    piperacillin-tazobactam (ZOSYN) 4.5 g IVPB in 100 mL NS MBP (CD), 4.5 g, Intravenous, Q8H, Janine Cook, [facility], Last Rate: 0 mL/hr at 03/05/25 1717, 4.5 g at 03/06/25 1407    Potassium Replacement - Follow  Nurse / BPA Driven Protocol, , Not Applicable, PRN, Luma Kim MD    [Held by provider] sacubitril-valsartan (ENTRESTO) 49-51 MG tablet 1 tablet, 1 tablet, Oral, BID, Luma Kim MD    sennosides-docusate (PERICOLACE) 8.6-50 MG per tablet 2 tablet, 2 tablet, Oral, BID, Thu Goodman MD, 2 tablet at 25 0920    Sodium Chloride (PF) 0.9 % 10 mL, 10 mL, Intravenous, PRN, Luma Kim MD    sodium chloride 0.9 % flush 10 mL, 10 mL, Intravenous, Q12H, Luma Kim MD, 10 mL at 25 0920    sodium chloride 0.9 % flush 10 mL, 10 mL, Intravenous, PRN, Luma Kim MD    sodium chloride 0.9 % infusion 40 mL, 40 mL, Intravenous, PRN, Luma Kim MD    [Held by provider] spironolactone (ALDACTONE) tablet 25 mg, 25 mg, Oral, Daily, Luma Kim MD    Antibiotics:  Anti-Infectives (From admission, onward)      Ordered     Dose/Rate Route Frequency Start Stop    25  piperacillin-tazobactam (ZOSYN) 4.5 g IVPB in 100 mL NS MBP (CD)        Ordering Provider: Janine Cook RPH    4.5 g  over 4 Hours Intravenous Every 8 Hours 25 2130    25 1211  piperacillin-tazobactam (ZOSYN) 4.5 g IVPB in 100 mL NS MBP (CD)        Ordering Provider: Kwaku Gil PA-C    4.5 g  over 30 Minutes Intravenous Once 25 1230 25 1346              Review of Systems:  Reports abdominal pain, constipation      Physical Exam:   Vital Signs  Temp (24hrs), Av.2 °F (36.8 °C), Min:97.7 °F (36.5 °C), Max:98.5 °F (36.9 °C)    Temp  Min: 97.7 °F (36.5 °C)  Max: 98.5 °F (36.9 °C)  BP  Min: 114/76  Max: 137/86  Pulse  Min: 88  Max: 104  Resp  Min: 16  Max: 18  SpO2  Min: 90 %  Max: 97 %    GENERAL: Awake and alert, in no acute distress.   HEENT: Normocephalic, atraumatic.  PERRL. EOMI. No conjunctival injection. No icterus.no ext oral lesions    HEART: RRR; No murmur,    LUNGS: Clear to auscultation bilaterally   ABDOMEN: Soft, nontender,   EXT:  No edema  :   "Without Funez catheter.  MSK: No joint effusions or erythema  SKIN: no rash   NEURO: Oriented to PPT.  Motor 5/5 strength      Laboratory Data    Results from last 7 days   Lab Units 03/06/25  1409 03/05/25  0355 03/04/25  0918   WBC 10*3/mm3 6.69 8.46 8.32   HEMOGLOBIN g/dL 13.1 13.2 14.6   HEMATOCRIT % 39.7 39.7 43.5   PLATELETS 10*3/mm3 145 147 168     Results from last 7 days   Lab Units 03/06/25  1409   SODIUM mmol/L 136   POTASSIUM mmol/L 4.1   CHLORIDE mmol/L 97*   CO2 mmol/L 28.0   BUN mg/dL 9   CREATININE mg/dL 0.82   GLUCOSE mg/dL 95   CALCIUM mg/dL 9.6     Results from last 7 days   Lab Units 03/04/25  0918   ALK PHOS U/L 108   BILIRUBIN mg/dL 0.5   ALT (SGPT) U/L 17   AST (SGOT) U/L 24                         Estimated Creatinine Clearance: 132.6 mL/min (by C-G formula based on SCr of 0.82 mg/dL).      Microbiology:  No results found for: \"ACANTHNAEG\", \"AFBCX\", \"BPERTUSSISCX\", \"BLOODCX\"  No results found for: \"BCIDPCR\", \"CXREFLEX\", \"CSFCX\", \"CULTURETIS\"  No results found for: \"CULTURES\", \"HSVCX\", \"URCX\"  No results found for: \"EYECULTURE\", \"GCCX\", \"HSVCULTURE\", \"LABHSV\"  No results found for: \"LEGIONELLA\", \"MRSACX\", \"MUMPSCX\", \"MYCOPLASCX\"  No results found for: \"NOCARDIACX\", \"STOOLCX\"  No results found for: \"THROATCX\", \"UNSTIMCULT\", \"URINECX\", \"CULTURE\", \"VZVCULTUR\"  No results found for: \"VIRALCULTU\", \"WOUNDCX\"        Radiology:  Imaging Results (Last 72 Hours)       Procedure Component Value Units Date/Time    CT Abdomen Pelvis With Contrast [[id number]] Collected: 03/04/25 1126     Updated: 03/04/25 1134    Narrative:      CT ABDOMEN PELVIS W CONTRAST    Date of Exam: 3/4/2025 11:16 AM EST    Indication: LLQ pain.    Comparison: 3/2/2018    Technique: Axial CT images were obtained of the abdomen and pelvis following the uneventful intravenous administration of and 1 cc Isovue-300. Reconstructed coronal and sagittal images were also obtained. Automated exposure control and iterative   construction " methods were used.      Findings:  LUNG BASES:  Unremarkable without mass or infiltrate.    LIVER: Steatosis without focal lesion. There is more profound area of steatosis along the fissure for the falciform ligament.  BILIARY/GALLBLADDER:  Unremarkable  SPLEEN:  Unremarkable  PANCREAS:  Unremarkable  ADRENAL:  Unremarkable  KIDNEYS:  Unremarkable parenchyma with no solid mass identified. No obstruction.  No calculus identified.  GASTROINTESTINAL/MESENTERY: There is mural thickening with surrounding inflammatory change involving the mid to distal sigmoid colon. There are numerous diverticula in this region. Findings are presumably related to acute diverticulitis. There is   irregular soft tissue density posterior and cephalad to this region suggestive of phlegmon. Additionally, there is a rim-enhancing fluid collection consistent with abscess measuring 2.3 cm in axial diameter (image 129, series 2). This measures   approximately 3 cm in CC dimension (image 58, series 900). No evidence of obstruction. No additional sites of inflammation.  MESENTERIC VESSELS:  Patent.  AORTA/IVC:  Normal caliber.    RETROPERITONEUM/LYMPH NODES:  Unremarkable    REPRODUCTIVE:  Unremarkable  BLADDER:  Unremarkable    OSSEUS STRUCTURES:  Typical for age with no acute process identified.        Impression:      Impression:  1.Mid to distal sigmoid diverticulitis with associated phlegmon and relatively small abscess.        Electronically Signed: Rosalio Mcclendon MD    3/4/2025 11:31 AM EST    Workstation ID: [id number]    XR Abdomen KUB [[id number]] Collected: 03/04/25 0949     Updated: 03/04/25 0953    Narrative:      XR ABDOMEN KUB    Date of Exam: 3/4/2025 9:22 AM EST    Indication: constipation    Comparison: None available.    Findings:  Mild to moderate stool. No dilated loops of bowel. No gastric distention. No visualized renal calcifications. Overall mild degenerative related osseous change in the spine and hip joints. Pelvic  phleboliths.      Impression:      Impression:  Mild to moderate stool without obstruction.      Electronically Signed: Kiko Read MD    3/4/2025 9:50 AM EST    Workstation ID: [id number]              Impression:   Recurrent diverticulitis sigmoid colon complicated by abscess, phlegmon  Abdominal pain      PLAN/RECOMMENDATIONS:   Thank you for asking us to see Palomo Pollack, I recommend the following:  Continue zosyn      Alternatives are ertapenem, eravacycline.    Anticipate 2 weeks of iv abx, serial CT scan.    Agree with surgical consultation    Recurrent diverticulitis likely to recur; after inflammation decreases elective surgery best chance of cure    Patient needs two weeks of abx  Request picc for tomorrow  Change abx to q24h invanz  Mandaen can arrange for home iv abx upon discharge  D/w family  Dc/cm time 30 minutes       Brandon Seth MD  3/6/2025  15:46 EST

## 2025-03-07 ENCOUNTER — APPOINTMENT (OUTPATIENT)
Facility: HOSPITAL | Age: 45
End: 2025-03-07
Payer: MEDICAID

## 2025-03-07 ENCOUNTER — HOSPITAL ENCOUNTER (EMERGENCY)
Facility: HOSPITAL | Age: 45
Discharge: HOME | End: 2025-03-07
Attending: STUDENT IN AN ORGANIZED HEALTH CARE EDUCATION/TRAINING PROGRAM
Payer: MEDICAID

## 2025-03-07 ENCOUNTER — READMISSION MANAGEMENT (OUTPATIENT)
Dept: CALL CENTER | Facility: HOSPITAL | Age: 45
End: 2025-03-07
Payer: MEDICAID

## 2025-03-07 ENCOUNTER — NURSE TRIAGE (OUTPATIENT)
Dept: CALL CENTER | Facility: HOSPITAL | Age: 45
End: 2025-03-07
Payer: MEDICAID

## 2025-03-07 VITALS
OXYGEN SATURATION: 93 % | SYSTOLIC BLOOD PRESSURE: 125 MMHG | DIASTOLIC BLOOD PRESSURE: 77 MMHG | HEART RATE: 98 BPM | RESPIRATION RATE: 18 BRPM | BODY MASS INDEX: 29.4 KG/M2 | WEIGHT: 210 LBS | TEMPERATURE: 98.4 F | HEIGHT: 71 IN

## 2025-03-07 VITALS
HEART RATE: 90 BPM | HEIGHT: 71 IN | DIASTOLIC BLOOD PRESSURE: 91 MMHG | SYSTOLIC BLOOD PRESSURE: 131 MMHG | RESPIRATION RATE: 17 BRPM | OXYGEN SATURATION: 100 % | TEMPERATURE: 98.7 F | BODY MASS INDEX: 29.38 KG/M2 | WEIGHT: 209.88 LBS

## 2025-03-07 DIAGNOSIS — T82.838A BLEEDING FROM PERIPHERALLY INSERTED CENTRAL CATHETER (PICC), INITIAL ENCOUNTER: Primary | ICD-10-CM

## 2025-03-07 LAB
ALBUMIN SERPL-MCNC: 4.3 G/DL (ref 3.5–5.2)
ALBUMIN/GLOB SERPL: 1.1 G/DL
ALP SERPL-CCNC: 94 U/L (ref 39–117)
ALT SERPL W P-5'-P-CCNC: 21 U/L (ref 1–41)
ANION GAP SERPL CALCULATED.3IONS-SCNC: 14 MMOL/L (ref 5–15)
ANION GAP SERPL CALCULATED.3IONS-SCNC: 16.4 MMOL/L (ref 5–15)
AST SERPL-CCNC: 27 U/L (ref 1–40)
BASOPHILS # BLD AUTO: 0.04 10*3/MM3 (ref 0–0.2)
BASOPHILS # BLD AUTO: 0.04 10*3/MM3 (ref 0–0.2)
BASOPHILS NFR BLD AUTO: 0.5 % (ref 0–1.5)
BASOPHILS NFR BLD AUTO: 0.5 % (ref 0–1.5)
BILIRUB SERPL-MCNC: 0.4 MG/DL (ref 0–1.2)
BUN SERPL-MCNC: 13 MG/DL (ref 6–20)
BUN SERPL-MCNC: 8 MG/DL (ref 6–20)
BUN/CREAT SERPL: 12.6 (ref 7–25)
BUN/CREAT SERPL: 8.9 (ref 7–25)
CALCIUM SPEC-SCNC: 9.4 MG/DL (ref 8.6–10.5)
CALCIUM SPEC-SCNC: 9.9 MG/DL (ref 8.6–10.5)
CHLORIDE SERPL-SCNC: 93 MMOL/L (ref 98–107)
CHLORIDE SERPL-SCNC: 97 MMOL/L (ref 98–107)
CO2 SERPL-SCNC: 24 MMOL/L (ref 22–29)
CO2 SERPL-SCNC: 27.6 MMOL/L (ref 22–29)
CREAT SERPL-MCNC: 0.9 MG/DL (ref 0.76–1.27)
CREAT SERPL-MCNC: 1.03 MG/DL (ref 0.76–1.27)
DEPRECATED RDW RBC AUTO: 43.6 FL (ref 37–54)
DEPRECATED RDW RBC AUTO: 44.2 FL (ref 37–54)
EGFRCR SERPLBLD CKD-EPI 2021: 106.7 ML/MIN/1.73
EGFRCR SERPLBLD CKD-EPI 2021: 90.7 ML/MIN/1.73
EOSINOPHIL # BLD AUTO: 0.08 10*3/MM3 (ref 0–0.4)
EOSINOPHIL # BLD AUTO: 0.13 10*3/MM3 (ref 0–0.4)
EOSINOPHIL NFR BLD AUTO: 1.1 % (ref 0.3–6.2)
EOSINOPHIL NFR BLD AUTO: 1.8 % (ref 0.3–6.2)
ERYTHROCYTE [DISTWIDTH] IN BLOOD BY AUTOMATED COUNT: 12.7 % (ref 12.3–15.4)
ERYTHROCYTE [DISTWIDTH] IN BLOOD BY AUTOMATED COUNT: 12.9 % (ref 12.3–15.4)
GLOBULIN UR ELPH-MCNC: 3.8 GM/DL
GLUCOSE BLDC GLUCOMTR-MCNC: 133 MG/DL (ref 70–130)
GLUCOSE BLDC GLUCOMTR-MCNC: 90 MG/DL (ref 70–130)
GLUCOSE SERPL-MCNC: 79 MG/DL (ref 65–99)
GLUCOSE SERPL-MCNC: 97 MG/DL (ref 65–99)
HCT VFR BLD AUTO: 39.9 % (ref 37.5–51)
HCT VFR BLD AUTO: 42.5 % (ref 37.5–51)
HGB BLD-MCNC: 12.8 G/DL (ref 13–17.7)
HGB BLD-MCNC: 13.9 G/DL (ref 13–17.7)
IMM GRANULOCYTES # BLD AUTO: 0.01 10*3/MM3 (ref 0–0.05)
IMM GRANULOCYTES # BLD AUTO: 0.08 10*3/MM3 (ref 0–0.05)
IMM GRANULOCYTES NFR BLD AUTO: 0.1 % (ref 0–0.5)
IMM GRANULOCYTES NFR BLD AUTO: 1.1 % (ref 0–0.5)
LYMPHOCYTES # BLD AUTO: 0.63 10*3/MM3 (ref 0.7–3.1)
LYMPHOCYTES # BLD AUTO: 0.97 10*3/MM3 (ref 0.7–3.1)
LYMPHOCYTES NFR BLD AUTO: 13.2 % (ref 19.6–45.3)
LYMPHOCYTES NFR BLD AUTO: 8.6 % (ref 19.6–45.3)
MCH RBC QN AUTO: 30.5 PG (ref 26.6–33)
MCH RBC QN AUTO: 30.5 PG (ref 26.6–33)
MCHC RBC AUTO-ENTMCNC: 32.1 G/DL (ref 31.5–35.7)
MCHC RBC AUTO-ENTMCNC: 32.7 G/DL (ref 31.5–35.7)
MCV RBC AUTO: 93.2 FL (ref 79–97)
MCV RBC AUTO: 95 FL (ref 79–97)
MONOCYTES # BLD AUTO: 0.58 10*3/MM3 (ref 0.1–0.9)
MONOCYTES # BLD AUTO: 0.81 10*3/MM3 (ref 0.1–0.9)
MONOCYTES NFR BLD AUTO: 11 % (ref 5–12)
MONOCYTES NFR BLD AUTO: 7.9 % (ref 5–12)
NEUTROPHILS NFR BLD AUTO: 5.32 10*3/MM3 (ref 1.7–7)
NEUTROPHILS NFR BLD AUTO: 6.01 10*3/MM3 (ref 1.7–7)
NEUTROPHILS NFR BLD AUTO: 72.4 % (ref 42.7–76)
NEUTROPHILS NFR BLD AUTO: 81.8 % (ref 42.7–76)
NRBC BLD AUTO-RTO: 0 /100 WBC (ref 0–0.2)
PLATELET # BLD AUTO: 152 10*3/MM3 (ref 140–450)
PLATELET # BLD AUTO: 175 10*3/MM3 (ref 140–450)
PMV BLD AUTO: 9.4 FL (ref 6–12)
PMV BLD AUTO: 9.6 FL (ref 6–12)
POTASSIUM SERPL-SCNC: 4 MMOL/L (ref 3.5–5.2)
POTASSIUM SERPL-SCNC: 4.1 MMOL/L (ref 3.5–5.2)
PROT SERPL-MCNC: 8.1 G/DL (ref 6–8.5)
RBC # BLD AUTO: 4.2 10*6/MM3 (ref 4.14–5.8)
RBC # BLD AUTO: 4.56 10*6/MM3 (ref 4.14–5.8)
SODIUM SERPL-SCNC: 135 MMOL/L (ref 136–145)
SODIUM SERPL-SCNC: 137 MMOL/L (ref 136–145)
WBC NRBC COR # BLD AUTO: 7.35 10*3/MM3 (ref 3.4–10.8)
WBC NRBC COR # BLD AUTO: 7.35 10*3/MM3 (ref 3.4–10.8)

## 2025-03-07 PROCEDURE — 99283 EMERGENCY DEPT VISIT LOW MDM: CPT

## 2025-03-07 PROCEDURE — 73060 X-RAY EXAM OF HUMERUS: CPT

## 2025-03-07 PROCEDURE — 85025 COMPLETE CBC W/AUTO DIFF WBC: CPT

## 2025-03-07 PROCEDURE — C1751 CATH, INF, PER/CENT/MIDLINE: HCPCS

## 2025-03-07 PROCEDURE — 85025 COMPLETE CBC W/AUTO DIFF WBC: CPT | Performed by: SURGERY

## 2025-03-07 PROCEDURE — 99239 HOSP IP/OBS DSCHRG MGMT >30: CPT | Performed by: INTERNAL MEDICINE

## 2025-03-07 PROCEDURE — 36415 COLL VENOUS BLD VENIPUNCTURE: CPT

## 2025-03-07 PROCEDURE — 25010000002 ERTAPENEM PER 500 MG: Performed by: INTERNAL MEDICINE

## 2025-03-07 PROCEDURE — 80053 COMPREHEN METABOLIC PANEL: CPT | Performed by: SURGERY

## 2025-03-07 PROCEDURE — C1894 INTRO/SHEATH, NON-LASER: HCPCS

## 2025-03-07 PROCEDURE — 82948 REAGENT STRIP/BLOOD GLUCOSE: CPT

## 2025-03-07 PROCEDURE — 71045 X-RAY EXAM CHEST 1 VIEW: CPT

## 2025-03-07 PROCEDURE — G0378 HOSPITAL OBSERVATION PER HR: HCPCS

## 2025-03-07 RX ORDER — SODIUM CHLORIDE 0.9 % (FLUSH) 0.9 %
10 SYRINGE (ML) INJECTION AS NEEDED
Status: DISCONTINUED | OUTPATIENT
Start: 2025-03-07 | End: 2025-03-07 | Stop reason: HOSPADM

## 2025-03-07 RX ORDER — AMOXICILLIN 250 MG
2 CAPSULE ORAL 2 TIMES DAILY
Qty: 60 TABLET | Refills: 1 | Status: SHIPPED | OUTPATIENT
Start: 2025-03-07

## 2025-03-07 RX ORDER — SODIUM CHLORIDE 0.9 % (FLUSH) 0.9 %
20 SYRINGE (ML) INJECTION AS NEEDED
Status: DISCONTINUED | OUTPATIENT
Start: 2025-03-07 | End: 2025-03-07 | Stop reason: HOSPADM

## 2025-03-07 RX ORDER — SODIUM CHLORIDE 9 MG/ML
40 INJECTION, SOLUTION INTRAVENOUS AS NEEDED
Status: DISCONTINUED | OUTPATIENT
Start: 2025-03-07 | End: 2025-03-07 | Stop reason: HOSPADM

## 2025-03-07 RX ORDER — SODIUM CHLORIDE 0.9 % (FLUSH) 0.9 %
10 SYRINGE (ML) INJECTION EVERY 12 HOURS SCHEDULED
Status: DISCONTINUED | OUTPATIENT
Start: 2025-03-07 | End: 2025-03-07 | Stop reason: HOSPADM

## 2025-03-07 RX ORDER — OXYCODONE AND ACETAMINOPHEN 5; 325 MG/1; MG/1
1 TABLET ORAL EVERY 8 HOURS PRN
Qty: 12 TABLET | Refills: 0 | Status: SHIPPED | OUTPATIENT
Start: 2025-03-07

## 2025-03-07 RX ADMIN — OXYCODONE HYDROCHLORIDE AND ACETAMINOPHEN 1 TABLET: 5; 325 TABLET ORAL at 14:54

## 2025-03-07 RX ADMIN — PANTOPRAZOLE SODIUM 40 MG: 40 TABLET, DELAYED RELEASE ORAL at 05:25

## 2025-03-07 RX ADMIN — OXYCODONE HYDROCHLORIDE AND ACETAMINOPHEN 1 TABLET: 5; 325 TABLET ORAL at 05:25

## 2025-03-07 RX ADMIN — Medication 10 ML: at 10:07

## 2025-03-07 RX ADMIN — ERTAPENEM 1000 MG: 1 INJECTION INTRAMUSCULAR; INTRAVENOUS at 13:57

## 2025-03-07 RX ADMIN — SENNOSIDES AND DOCUSATE SODIUM 2 TABLET: 50; 8.6 TABLET ORAL at 10:07

## 2025-03-07 RX ADMIN — CETIRIZINE HYDROCHLORIDE 10 MG: 10 TABLET, FILM COATED ORAL at 10:07

## 2025-03-07 NOTE — DISCHARGE INSTR - APPOINTMENTS
Follow-up with Dr. Seth on 3/14/25 at 10:30 AM.      [facility] at [phone]  For weekly PICC care and lab work. See appointment times below.          Teaching, antibiotic, & supplies

## 2025-03-07 NOTE — TELEPHONE ENCOUNTER
Patient had PICC line placed today. Bleeding under dressing. Dressing becoming loose and blood leaking from under it. Does not see a nurse until next week. Reviewed protocol with patient's significant other. Advised to go to Urgent Care or ER for dressing change.    Reason for Disposition   [1] Mild bleeding at IV site AND [2] not stopped after following Care Advice    Additional Information   Negative: SEVERE difficulty breathing (e.g., struggling for each breath, speaks in single words)   Negative: Shock suspected (e.g., cold/pale/clammy skin, too weak to stand, low BP, rapid pulse)   Negative: Cracked or broken central line or PICC Line   Negative: Sounds like a life-threatening emergency to the triager   Negative: IV not running or running slowly   Negative: [1] Difficulty breathing AND [2] not severe   Negative: Moderate bleeding around IV site  (Exception: Mild bleeding that stops quickly with direct pressure.)   Negative: [1] Chest pain AND [2] has central or PICC line   Negative: [1] Chest or neck swelling AND [2] has a central or PICC line  (Exception: Mild puffiness or swelling just around IV site.)   Negative: [1] Arm or leg swelling AND [2] has a central or PICC line  (Exception: Mild puffiness or swelling just around IV site.)   Negative: Fever > 100.4 F (38.0 C)   Negative: Patient sounds very sick or weak to the triager   Negative: Central or PICC line has moved out of position (or can see cuff slipping out of skin; or more line externally exposed than before)   Negative: Pus or cloudy fluid from IV site   Negative: [1] Red streak at IV site AND [2] palpable cord   Negative: [1] Red streak at IV site AND [2] longer than 1 inch (2.5 cm)   Negative: [1] Skin redness AND [2] extends > 1 inch (2.5 cm) from IV site   Negative: Skin swelling at IV site  (Exception: IV recently removed and has small lump or small amount of skin swelling.)   Negative: [1] Raised bruise at IV site AND [2] getting larger (or  "size > 2 inches (5 cm)    Answer Assessment - Initial Assessment Questions  1. SYMPTOM:  \"What's the main symptom you're concerned about?\" (e.g., pain, redness, swelling, pus)      Bleeding, pain  2. ONSET: \"When did the symptoms  start?\"      Today   3. IV TYPE: \"What kind of IV line do you have?\" (e.g., central line, PICC, peripheral IV)      PICC line  4. IV LOCATION - SITE: \"Where does the IV enter your body?\"      Right arm  5. IV START DATE: \"When was this IV put in?\"      Today   6. IV REASON: \"Why do you have this IV line?\"      Antibiotics   7. IV FUNCTION: \"Describe how the IV is running?\" (e.g., running normally, running slowly, not running, unable to flush)       No   8. PAIN: \"Is there any pain?\" If Yes, ask: \"How bad is the pain?\" (e.g., scale 1-10; or mild, moderate, severe) \"Describe the pain.\" (e.g., burning, throbbing, shooting, sharp, etc.)    - NONE (0): no pain    - MILD (1-3): doesn't interfere with normal activities     - MODERATE (4-7): interferes with normal activities or awakens from sleep     - SEVERE (8-10): excruciating pain, unable to do any normal activities       None, taking pain medication  9. SWELLING: \"Is there any swelling at your IV site?\"       MARIBEL  10. FEVER: \"Do you have a fever?\" If Yes, ask: \"What is your temperature, how was it measured, and when did it start?\"         No   11. OTHER SYMPTOMS: \"Do you have any other symptoms?\" (e.g., shaking chills, weakness)        No   12. VISITING NURSE: \"Do you have a visiting nurse?\" (e.g., home health nurse, IV infusion nurse)        No   13. PUMP: \"Is it on a pump?\" If Yes,, ask: \"Is there an alarm and what is the message?\"        No    Protocols used: IV Site and Other Symptoms-ADULT-AH    "

## 2025-03-07 NOTE — PLAN OF CARE
Problem: Adult Inpatient Plan of Care  Goal: Plan of Care Review  Outcome: Progressing  Flowsheets (Taken 3/7/2025 0630)  Progress: improving  Plan of Care Reviewed With: patient  Goal: Patient-Specific Goal (Individualized)  Outcome: Progressing     Problem: Adult Inpatient Plan of Care  Goal: Absence of Hospital-Acquired Illness or Injury  Intervention: Identify and Manage Fall Risk  Recent Flowsheet Documentation  Taken 3/7/2025 0400 by Boom Munguia RN  Safety Promotion/Fall Prevention:   room organization consistent   safety round/check completed   fall prevention program maintained   clutter free environment maintained   assistive device/personal items within reach  Taken 3/7/2025 0000 by Boom Munguia RN  Safety Promotion/Fall Prevention:   room organization consistent   lighting adjusted   gait belt   clutter free environment maintained   activity supervised  Taken 3/6/2025 1920 by Boom Munguia RN  Safety Promotion/Fall Prevention:   safety round/check completed   lighting adjusted   gait belt   fall prevention program maintained   clutter free environment maintained  Intervention: Prevent Skin Injury  Recent Flowsheet Documentation  Taken 3/7/2025 0400 by Boom Munguia RN  Body Position: position changed independently  Skin Protection:   transparent dressing maintained   silicone foam dressing in place  Taken 3/7/2025 0000 by Boom Munguia RN  Body Position: position changed independently  Skin Protection:   transparent dressing maintained   pulse oximeter probe site changed  Taken 3/6/2025 1920 by Boom Munguia RN  Body Position: position changed independently  Skin Protection: transparent dressing maintained  Intervention: Prevent and Manage VTE (Venous Thromboembolism) Risk  Recent Flowsheet Documentation  Taken 3/6/2025 1920 by Boom Munguia RN  VTE Prevention/Management: patient refused intervention  Intervention: Prevent  Infection  Recent Flowsheet Documentation  Taken 3/6/2025 1920 by Boom Munguia, RN  Infection Prevention:   hand hygiene promoted   personal protective equipment utilized   rest/sleep promoted   single patient room provided   Goal Outcome Evaluation:  Plan of Care Reviewed With: patient        Progress: improving

## 2025-03-07 NOTE — CASE MANAGEMENT/SOCIAL WORK
Case Management Discharge Note      Final Note: Patient is discharging today. CM spoke to patient and significant other at bedside. His plan is home with his significant other. [facility] will do teaching, bring antibiotic and supplies to bedside around 1500. PICC to be placed prior to discharge. [facility] [place] has been arranged for 3/11/25 at 1000 for PICC care & labs. Follow-up with Dr. Seth arranged for 3/14 at 1030. CM added appointment info to AVS. Significant other will transport. Faxed ID's final rec's to [phone]. ID's final rec's: 1) invanz 1 g iv daily x 12 days  2)weekly picc care  3)check cbc, cmp, esr,  q Monday  4)fax ID note to [facility] [id number]  5)schedule f/u with me in 1 week. No other needs noted.         Selected Continued Care - Admitted Since 3/4/2025       Destination    No services have been selected for the patient.                Durable Medical Equipment    No services have been selected for the patient.                Dialysis/Infusion       Service Provider Services Address Phone Fax Patient Preferred    [facility] IV Therapy [address] [facility] [address] [phone] --       Internal Comment last updated by Danna North, RN 3/7/2025 1311    Follow-up with Dr. Seth on 3/14/25 at 10:30 AM.               [facility] [address] [facility] [address] [phone] --       Internal Comment last updated by Danna North, RN 3/7/2025 1236    Teaching, antibiotic & supplies                         Home Medical Care    No services have been selected for the patient.                Therapy    No services have been selected for the patient.                Community Resources    No services have been selected for the patient.                Community & DME    No services have been selected for the patient.                     Transportation Services  Private: Car    Final Discharge Disposition Code: 01 - home or self-care

## 2025-03-07 NOTE — DISCHARGE SUMMARY
[facility] Medicine Services  DISCHARGE SUMMARY    Patient Name: Palomo Pollack  : 1980  MRN: [id number]    Date of Admission: 3/4/2025  9:08 AM  Date of Discharge:  3/7/2025  Primary Care Physician: Heladio Valentine DO    Consults       Date and Time Order Name Status Description    3/5/2025 12:32 PM Inpatient Infectious Diseases Consult Completed     3/4/2025  7:53 PM Inpatient General Surgery Consult Completed             Hospital Course     Presenting Problem: abdominal pain     Active Hospital Problems    Diagnosis  POA    **Diverticular disease of intestine with perforation and abscess [K57.80]  Yes      Resolved Hospital Problems   No resolved problems to display.          Hospital Course:  Palomo Pollack is a 44 y.o. male w CHF and Afib on Eliquis.  He presented with persistent lower abdominal pain.      Sigmoid diverticulitis with perforation  Abscess 2.3cm   - He was admitted, got Zosyn and supportive care  - Was followed by Danielle Plaza and Dorinda   - Is now tolerating food and has decreased pain and is ready to go home  - PICC placement today 3/7; he will get ertapenem daily home infusions   - anticipate scheduled resection in the near future     CHF, Afib  - Eliquis has been on hold - restarting at discharge  - continue to hold Entresto and diuretics as he is normotensive and having subnormal PO intake.  I discussed with him that he may need to restart these when eating better.        Discharge Follow Up Recommendations for outpatient labs/diagnostics:   - FU with [facility] Dr Seth in one week    - FU with Gen Surg Dr Plaza in 2 weeks     Day of Discharge     HPI:   Yesterday morning he was unable to tolerate solid food but today he has eaten with minimal discomfort.      Review of Systems  No nausea     Vital Signs:   Temp:  [97.1 °F (36.2 °C)-98.9 °F (37.2 °C)] 98.4 °F (36.9 °C)  Heart Rate:  [89-98] 98  Resp:  [17-18] 18  BP: (118-142)/(77-92)  125/77      Physical Exam:  Gen:  WD/WN  Neuro: alert and oriented, clear speech, follows commands, grossly nonfocal  HEENT:  NC/AT PERRL, OP benign  Neck:  Supple, no LAD  Heart RRR   Lungs not labored   Abd:  Soft, minimally tender   Extrem:  No c/c/e      Pertinent  and/or Most Recent Results     LAB RESULTS:      Lab 03/07/25  0257 03/06/25  1409 03/05/25  0355 03/04/25  0918   WBC 7.35 6.69 8.46 8.32   HEMOGLOBIN 12.8* 13.1 13.2 14.6   HEMATOCRIT 39.9 39.7 39.7 43.5   PLATELETS 152 145 147 168   NEUTROS ABS 5.32 5.08  --  6.81   IMMATURE GRANS (ABS) 0.08* 0.04  --  0.01   LYMPHS ABS 0.97 0.80  --  0.68*   MONOS ABS 0.81 0.66  --  0.76   EOS ABS 0.13 0.07  --  0.03   MCV 95.0 93.9 93.0 92.2         Lab 03/07/25  0257 03/06/25  1409 03/05/25  0355 03/04/25  0918   SODIUM 135* 136 136 139   POTASSIUM 4.0 4.1 4.0 4.1   CHLORIDE 97* 97* 98 99   CO2 24.0 28.0 27.0 27.4   ANION GAP 14.0 11.0 11.0 12.6   BUN 8 9 13 9   CREATININE 0.90 0.82 0.99 0.95   EGFR 106.7 109.7 95.1 100.0   GLUCOSE 79 95 87 126*   CALCIUM 9.4 9.6 8.8 9.2         Lab 03/04/25  0918   TOTAL PROTEIN 7.6   ALBUMIN 4.4   GLOBULIN 3.2   ALT (SGPT) 17   AST (SGOT) 24   BILIRUBIN 0.5   ALK PHOS 108   LIPASE 26                     Brief Urine Lab Results  (Last result in the past 365 days)        Color   Clarity   Blood   Leuk Est   Nitrite   Protein   CREAT   Urine HCG        03/04/25 1005 Yellow   Clear   Negative   Negative   Negative   Trace                 Microbiology Results (last 10 days)       ** No results found for the last 240 hours. **            CT Abdomen Pelvis With Contrast    Result Date: 3/4/2025  CT ABDOMEN PELVIS W CONTRAST Date of Exam: 3/4/2025 11:16 AM EST Indication: LLQ pain. Comparison: 3/2/2018 Technique: Axial CT images were obtained of the abdomen and pelvis following the uneventful intravenous administration of and 1 cc Isovue-300. Reconstructed coronal and sagittal images were also obtained. Automated exposure control and  iterative construction methods were used. Findings: LUNG BASES:  Unremarkable without mass or infiltrate. LIVER: Steatosis without focal lesion. There is more profound area of steatosis along the fissure for the falciform ligament. BILIARY/GALLBLADDER:  Unremarkable SPLEEN:  Unremarkable PANCREAS:  Unremarkable ADRENAL:  Unremarkable KIDNEYS:  Unremarkable parenchyma with no solid mass identified. No obstruction.  No calculus identified. GASTROINTESTINAL/MESENTERY: There is mural thickening with surrounding inflammatory change involving the mid to distal sigmoid colon. There are numerous diverticula in this region. Findings are presumably related to acute diverticulitis. There is irregular soft tissue density posterior and cephalad to this region suggestive of phlegmon. Additionally, there is a rim-enhancing fluid collection consistent with abscess measuring 2.3 cm in axial diameter (image 129, series 2). This measures approximately 3 cm in CC dimension (image 58, series 900). No evidence of obstruction. No additional sites of inflammation. MESENTERIC VESSELS:  Patent. AORTA/IVC:  Normal caliber. RETROPERITONEUM/LYMPH NODES:  Unremarkable REPRODUCTIVE:  Unremarkable BLADDER:  Unremarkable OSSEUS STRUCTURES:  Typical for age with no acute process identified.     Impression: 1.Mid to distal sigmoid diverticulitis with associated phlegmon and relatively small abscess. Electronically Signed: Rosalio Mcclendon MD  3/4/2025 11:31 AM EST  Workstation ID: [id number]    XR Abdomen KUB    Result Date: 3/4/2025  XR ABDOMEN KUB Date of Exam: 3/4/2025 9:22 AM EST Indication: constipation Comparison: None available. Findings: Mild to moderate stool. No dilated loops of bowel. No gastric distention. No visualized renal calcifications. Overall mild degenerative related osseous change in the spine and hip joints. Pelvic phleboliths.     Impression: Mild to moderate stool without obstruction. Electronically Signed: Kiko Read MD   3/4/2025 9:50 AM EST  Workstation ID: [id number]             Results for orders placed during the hospital encounter of 09/07/23    Adult Transthoracic Echo Complete W/ Cont if Necessary Per Protocol    Interpretation Summary    Left ventricular systolic function is moderately decreased. Calculated left ventricular EF = 30% Left ventricular ejection fraction appears to be 31 - 35%.    Left ventricular wall thickness is consistent with mild concentric hypertrophy.    Left ventricular diastolic function was indeterminate.        Discharge Details        Discharge Medications        New Medications        Instructions Start Date   ertapenem 1,000 mg in sodium chloride 0.9 % 100 mL IVPB   1,000 mg, Intravenous, Every 24 Hours      oxyCODONE-acetaminophen 5-325 MG per tablet  Commonly known as: PERCOCET   1 tablet, Oral, Every 8 Hours PRN      sennosides-docusate 8.6-50 MG per tablet  Commonly known as: PERICOLACE   2 tablets, Oral, 2 Times Daily, Adjust as needed             Continue These Medications        Instructions Start Date   apixaban 5 MG tablet tablet  Commonly known as: Eliquis   5 mg, Oral, 2 Times Daily      empagliflozin 10 MG tablet tablet  Commonly known as: JARDIANCE   10 mg, Oral, Daily      esomeprazole 40 MG capsule  Commonly known as: nexIUM   40 mg, Oral, Every Morning Before Breakfast      fexofenadine 180 MG tablet  Commonly known as: ALLEGRA   180 mg, Oral, Daily      metoprolol succinate XL 25 MG 24 hr tablet  Commonly known as: TOPROL-XL   25 mg, Oral, Daily      Testosterone 1.62 % gel   Daily.             Stop These Medications      acetaminophen 325 MG tablet  Commonly known as: TYLENOL     dicyclomine 10 MG capsule  Commonly known as: BENTYL     Entresto 49-51 MG tablet  Generic drug: sacubitril-valsartan     furosemide 20 MG tablet  Commonly known as: LASIX     spironolactone 25 MG tablet  Commonly known as: ALDACTONE              Allergies   Allergen Reactions    Bactrim  [Sulfamethoxazole-Trimethoprim] Rash and Other (See Comments)     Gave increased heartrate-biaxin      Clarithromycin Unknown - Low Severity    Milk-Related Compounds Other (See Comments)     Increased phlegm          Discharge Disposition:  Home or Self Care    Diet:  Hospital:  Diet Order   Procedures    Diet: Gastrointestinal; Fiber-Restricted; Texture: Soft to Chew (NDD 3); Soft to Chew: Whole Meat; Fluid Consistency: Thin (IDDSI 0)         Restrictions or Other Recommendations:   - for vomiting, fever, or increased pain:  call Dr Seth or Dr Plaza        CODE STATUS:    Code Status and Medical Interventions: CPR (Attempt to Resuscitate); Full Support   Ordered at: 03/04/25 2146     Level Of Support Discussed With:    Patient     Code Status (Patient has no pulse and is not breathing):    CPR (Attempt to Resuscitate)     Medical Interventions (Patient has pulse or is breathing):    Full Support       Future Appointments   Date Time Provider Department Center   3/11/2025 10:00 AM [facility] OPI None   3/18/2025 10:00 AM [facility] OPI None   3/25/2025 10:00 AM [facility] OPI None   3/26/2025  9:45 AM Heladio Valentine DO [facility]   4/14/2025 10:15 AM Macario Conte PA E [facility]   6/3/2025  1:30 PM Alec Mason III, MD [facility]   8/22/2025  9:30 AM Heladio Valentine DO [facility]   10/13/2025 10:30 AM Wilmer Rojas DO E [facility]       Additional Instructions for the Follow-ups that You Need to Schedule       Discharge Follow-up with Specified Provider: salo seth; 1 Week   As directed      To: salo seth   Follow Up: 1 Week        Discharge Follow-up with Specified Provider: dr plaza; 2 Weeks   As directed      To: dr plaza   Follow Up: 2 Weeks              Thu Goodman MD  03/07/25      Time Spent on Discharge:  I spent  40  minutes on this discharge activity which included: face-to-face encounter with the patient, reviewing the data  in the system, coordination of the care with the nursing staff as well as consultants, documentation, and entering orders.

## 2025-03-07 NOTE — DISCHARGE PLACEMENT REQUEST
"Lorrie Torres \"Jamar\" (44 y.o. Male)       Date of Birth   1980    Social Security Number       Address   [address] [id number]    Home Phone   [phone]    MRN   [id number]       Mandaeism   Christianity    Marital Status   Single                            Admission Date   3/4/25    Admission Type   Emergency    Admitting Provider   Thu Goodman MD    Attending Provider   Thu Goodman MD    Department, Room/Bed   [facility] 5G, S548/1       Discharge Date       Discharge Disposition   Home or Self Care    Discharge Destination                                 Attending Provider: Thu Goodman MD    Allergies: Bactrim [Sulfamethoxazole-trimethoprim], Clarithromycin, Milk-related Compounds    Isolation: None   Infection: None   Code Status: CPR    Ht: 180.3 cm (71\")   Wt: 95.3 kg (210 lb)    Admission Cmt: None   Principal Problem: Diverticular disease of intestine with perforation and abscess [K57.80]                   Active Insurance as of 3/4/2025       Primary Coverage       Payor Plan Insurance Group Employer/Plan Group    [facility] COMMUNITY PLAN [facility] PLAN [facility] [place]       Payor Plan Address Payor Plan Phone Number Payor Plan Fax Number Effective Dates    [address]   5/1/2022 - None Entered    [facility] [address]         Subscriber Name Subscriber Birth Date Member ID       LORRIE TORRES 1980 [id number]                     Emergency Contacts        (Rel.) Home Phone Work Phone Mobile Phone    Slime Saavedra (Significant Other) [phone] -- [phone]              Insurance Information                  [facility] COMMUNITY PLAN [facility] Phone: --    Subscriber: Lorrie Torres Subscriber#: [id number]    Group#: [id number] Precert#: --    Authorization#: -- Effective Date: --           [facility] 5G  [address] [facility] [address]  Phone:  [phone]  Fax:  " [phone]        Patient: ROOM: [facility]   [place] Pollack MRN:  [id number]   [address] [place] [address] :  1980  SSN:    Phone: [phone] Sex:  M   PCP: Heladio Valentine                Emergency Contact Information      Name Relation Home Work Mobile     Slime Saavedra Significant Other [phone]       Other Contacts    None on File         INSURANCE PAYOR PLAN GROUP # SUBSCRIBER ID   Primary:    [facility] COMMUNITY PLAN OF [place] [id number] [id number]   Admitting Diagnosis: Diverticular disease of intestine with perforation and abscess [K57.80]  Order Date:  Mar 7, 2025        Inpatient Case Management  Consult       (Order ID: [id number])     Diagnosis:         Priority:  Routine Expected Date:   Expiration Date:        Interval:  Once Count:    Comments: 1) invanz 1 g iv daily x 12 days  2)weekly picc care  3)check cbc, cmp, esr,  q Monday  4)fax ID note to [facility] [id number]  5)schedule f/u with me in 1 week  Reason for consult? Other (see comments)  Comments: please arrange home iv abx        Authorizing Provider:Brandon Seth MD  Authorizing Provider's NPI: [id number]  Order Entered By: Brandon Seth MD 3/7/2025 12:51 PM     Electronically signed by: Brandon Seth MD 3/7/2025 12:51 PM

## 2025-03-07 NOTE — DISCHARGE PLACEMENT REQUEST
"Lorrie Torres \"Jamar\" (44 y.o. Male)     DHARA PROCTORER RN  [phone]            Date of Birth   1980    Social Security Number       Address   [address] [place], [place] [address]    Home Phone   [phone]    MRN   [id number]       Congregational   Amish    Marital Status   Single                            Admission Date   3/4/25    Admission Type   Emergency    Admitting Provider   Thu Goodman MD    Attending Provider   Thu Goodman MD    Department, Room/Bed   [facility] 5G, S548/1       Discharge Date       Discharge Disposition   Home or Self Care    Discharge Destination                                 Attending Provider: Thu Goodman MD    Allergies: Bactrim [Sulfamethoxazole-trimethoprim], Clarithromycin, Milk-related Compounds    Isolation: None   Infection: None   Code Status: CPR    Ht: 180.3 cm (71\")   Wt: 95.3 kg (210 lb)    Admission Cmt: None   Principal Problem: Diverticular disease of intestine with perforation and abscess [K57.80]                   Active Insurance as of 3/4/2025       Primary Coverage       Payor Plan Insurance Group Employer/Plan Group    [facility] COMMUNITY [facility] [place]       Payor Plan Address Payor Plan Phone Number Payor Plan Fax Number Effective Dates    [address]   5/1/2022 - None Entered    [facility] [address]         Subscriber Name Subscriber Birth Date Member ID       LORRIE TORRES 1980 [id number]                     Emergency Contacts        (Rel.) Home Phone Work Phone Mobile Phone    Slime Saavedra (Significant Other) [phone] -- [phone]              Insurance Information                  [facility] PLAN [facility] Phone: --    Subscriber: Jaki Lorriestephen Christy Subscriber#: [id number]    Group#: [id number] Precert#: --    Authorization#: -- Effective Date: --           [facility] 5G  [address] [facility] " [address]  Phone:  [phone]  Fax:  [phone]        Patient: ROOM: [facility]   [place] Pollack MRN:  [id number]   [address] [place] [id number] :  1980  SSN:    Phone: [phone] Sex:  M   PCP: Heladio Valentine                Emergency Contact Information      Name Relation Home Work Mobile     Slime Saavedra Significant Other [phone]       Other Contacts    None on File         INSURANCE PAYOR PLAN GROUP # SUBSCRIBER ID   Primary:    [facility] COMMUNITY PLAN OF [place] [id number] [place] [id number]   Admitting Diagnosis: Diverticular disease of intestine with perforation and abscess [K57.80]  Order Date:  Mar 7, 2025        Inpatient Case Management  Consult       (Order ID: [id number])     Diagnosis:         Priority:  Routine Expected Date:   Expiration Date:        Interval:  Once Count:    Comments: 1) invanz 1 g iv daily x 12 days  2)weekly picc care  3)check cbc, cmp, esr,  q Monday  4)fax ID note to [facility] [id number]  5)schedule f/u with me in 1 week  Reason for consult? Other (see comments)  Comments: please arrange home iv abx        Authorizing Provider:Brandon Seth MD  Authorizing Provider's NPI: [id number]  Order Entered By: Brandon Seth MD 3/7/2025 12:51 PM     Electronically signed by: Brandon Seth MD 3/7/2025 12:51 PM

## 2025-03-07 NOTE — PROGRESS NOTES
INFECTIOUS DISEASE  f/u     Palomo Pollack  1980  [id number]    Date of Consult: 3/7/2025    Admission Date: 3/4/2025      Requesting Provider: No ref. provider found  Evaluating Physician: Brandon Seth MD    Reason for Consultation: sigmoid diverticulitis    History of present illness:    Patient is a 44 y.o. male with atrial fibrillation, CHF, diverticulitis was evaluated at [facility] for abdominal pain, CT scan concerning for diverticulitis, abscess; admitted started on iv abx.  Estimated he has had episodes of diverticulitis about 10 times since 2017.  Usually responds to oral antibiotics. On zosyn, general surgery involved; there is consideration for outpatient surgery     3/6/25 ate a piece of meat an had abdominal pain, doing better with clear liquid diet ; family by bedside; no complaitns    3/7/25; patient discharged before I could see them today;    Past Medical History:   Diagnosis Date    Allergic rhinitis     Atrial fibrillation     CHF (congestive heart failure)     Chronic right ear pain     h/o    Diabetes mellitus     h/o-  doesnt check sugar    Diverticulitis     GERD (gastroesophageal reflux disease)     Headache     Heart failure     History of kidney stones     passed them    History of shingles     1999- after transplant of stem cells    History of transfusion     1998-bhlex; 1999 [place], [place]- 1 unit- stem cell transplant -  recieved plts - no reaction recalled    Hodgkin lymphoma     Hypogonadism in male     PTSD (post-traumatic stress disorder)     Stem cells transplant status     Vision blurred     possibly need glasses       Past Surgical History:   Procedure Laterality Date    BIVENTRICULLAR IMPLANTABLE CARDIOVERTER DEFIBRILLATOR PLACEMENT N/A 10/26/2023    Procedure: Implant ICD - bi ventricular; DNS meds; [place];  Surgeon: Wilmer Rojas DO;  Location: [facility] INVASIVE LOCATION;  Service: Cardiovascular;  Laterality: N/A;    CARDIAC CATHETERIZATION  N/A 05/30/2023    Procedure: Left Heart Cath;  Surgeon: Alec Mason III, MD;  Location:  [facility] CATH INVASIVE LOCATION;  Service: Cardiovascular;  Laterality: N/A;    CARDIAC ELECTROPHYSIOLOGY PROCEDURE N/A 10/3/2024    Procedure: Ablation atrial fibrillation w PFA; CTA prior, [place], no meds to hold;  Surgeon: Wilmer Rojas DO;  Location:  [facility] EP INVASIVE LOCATION;  Service: Cardiovascular;  Laterality: N/A;    CENTRAL VENOUS LINE INSERTION  1999    removed since    COLONOSCOPY      OTHER SURGICAL HISTORY      stem cell transplant    PORTOCAVAL SHUNT PLACEMENT  1998    WISDOM TOOTH EXTRACTION         Family History   Problem Relation Age of Onset    Stroke Father     Lung cancer Father     Cancer Father         Positive for cured lung cancer- he was smoker       Social History     Socioeconomic History    Marital status: Single   Tobacco Use    Smoking status: Some Days     Current packs/day: 0.25     Average packs/day: 0.3 packs/day for 22.3 years (5.6 ttl pk-yrs)     Types: Cigarettes     Start date: 11/2002     Last attempt to quit: 11/2022     Passive exposure: Past    Smokeless tobacco: Never    Tobacco comments:     smokes less than .25 ppd   Vaping Use    Vaping status: Never Used   Substance and Sexual Activity    Alcohol use: Yes     Alcohol/week: 4.0 standard drinks of alcohol     Types: 4 Shots of liquor per week     Comment: Occasional on weekends    Drug use: Not Currently     Types: Marijuana     Comment: stopped april 2024    Sexual activity: Yes       Allergies   Allergen Reactions    Bactrim [Sulfamethoxazole-Trimethoprim] Rash and Other (See Comments)     Gave increased heartrate-biaxin      Clarithromycin Unknown - Low Severity    Milk-Related Compounds Other (See Comments)     Increased phlegm          Medication:    Current Facility-Administered Medications:     acetaminophen (TYLENOL) tablet 500 mg, 500 mg, Oral, Q6H PRN **OR** acetaminophen (TYLENOL) 160 MG/5ML oral solution 500 mg, 500 mg,  Oral, Q6H PRN **OR** acetaminophen (TYLENOL) suppository 650 mg, 650 mg, Rectal, Q4H PRN, Luma Kim MD    [Held by provider] apixaban (ELIQUIS) tablet 5 mg, 5 mg, Oral, BID, Lmua Kim MD    Calcium Replacement - Follow Nurse / BPA Driven Protocol, , Not Applicable, PRN, Luma Kim MD    cetirizine (zyrTEC) tablet 10 mg, 10 mg, Oral, Daily, Luma Kim MD, 10 mg at 03/07/25 1007    dextrose (D50W) (25 g/50 mL) IV injection 25 g, 25 g, Intravenous, Q15 Min PRN, Luma Kim MD    dextrose (GLUTOSE) oral gel 15 g, 15 g, Oral, Q15 Min PRN, Luma Kim MD    empagliflozin (JARDIANCE) tablet 10 mg, 10 mg, Oral, Daily, Luma Kim MD, 10 mg at 03/06/25 2006    ertapenem (INVanz) 1,000 mg in sodium chloride 0.9 % 100 mL MBP, 1,000 mg, Intravenous, Q24H, Brandon Seth MD, Last Rate: 200 mL/hr at 03/06/25 1759, 1,000 mg at 03/06/25 1759    glucagon (GLUCAGEN) injection 1 mg, 1 mg, Intramuscular, Q15 Min PRN, Luma Kim MD    Magnesium Standard Dose Replacement - Follow Nurse / BPA Driven Protocol, , Not Applicable, PRN, Luma Kim MD    metoprolol succinate XL (TOPROL-XL) 24 hr tablet 25 mg, 25 mg, Oral, Daily, Luma Kim MD, 25 mg at 03/06/25 2006    morphine injection 2 mg, 2 mg, Intravenous, Q4H PRN, 2 mg at 03/05/25 5329 **AND** naloxone (NARCAN) injection 0.4 mg, 0.4 mg, Intravenous, Q5 Min PRN, Luma Kim MD    nitroglycerin (NITROSTAT) SL tablet 0.4 mg, 0.4 mg, Sublingual, Q5 Min PRN, Luma Kim MD    oxyCODONE-acetaminophen (PERCOCET) 5-325 MG per tablet 1 tablet, 1 tablet, Oral, Q4H PRN, Luma Kim MD, 1 tablet at 03/07/25 0525    pantoprazole (PROTONIX) EC tablet 40 mg, 40 mg, Oral, Q AM, Luma Kim MD, 40 mg at 03/07/25 0525    Pharmacy Consult - Pharmacy to dose, , Not Applicable, Continuous PRN, Luma Kim MD    Phosphorus Replacement - Follow Nurse / BPA Driven Protocol, , Not Applicable, PRJulio ROSAS  MD Luma    Potassium Replacement - Follow Nurse / BPA Driven Protocol, , Not Applicable, PRN, Luma Kim MD    [Held by provider] sacubitril-valsartan (ENTRESTO) 49-51 MG tablet 1 tablet, 1 tablet, Oral, BID, Luma Kim MD    sennosides-docusate (PERICOLACE) 8.6-50 MG per tablet 2 tablet, 2 tablet, Oral, BID, Thu Goodman MD, 2 tablet at 25 1007    Sodium Chloride (PF) 0.9 % 10 mL, 10 mL, Intravenous, PRN, Luma Kim MD    sodium chloride 0.9 % flush 10 mL, 10 mL, Intravenous, Q12H, Luma Kim MD, 10 mL at 25 1007    sodium chloride 0.9 % flush 10 mL, 10 mL, Intravenous, PRN, Luma Kim MD    sodium chloride 0.9 % infusion 40 mL, 40 mL, Intravenous, PRN, Luma Kim MD    [Held by provider] spironolactone (ALDACTONE) tablet 25 mg, 25 mg, Oral, Daily, Luma Kim MD    Antibiotics:  Anti-Infectives (From admission, onward)      Ordered     Dose/Rate Route Frequency Start Stop    25 1222  ertapenem 1,000 mg in sodium chloride 0.9 % 100 mL IVPB        Ordering Provider: Thu Goodman MD    1,000 mg Intravenous Every 24 Hours 25 0000 25 2359    25 1552  ertapenem (INVanz) 1,000 mg in sodium chloride 0.9 % 100 mL MBP        Ordering Provider: Brandon Seth MD    1,000 mg  200 mL/hr over 30 Minutes Intravenous Every 24 Hours 25 1730 25 1729    25 2055  piperacillin-tazobactam (ZOSYN) 4.5 g IVPB in 100 mL NS MBP (CD)  Status:  Discontinued        Ordering Provider: Janine Cook RPH    4.5 g  over 4 Hours Intravenous Every 8 Hours 25 2130 25 1552    25 1211  piperacillin-tazobactam (ZOSYN) 4.5 g IVPB in 100 mL NS MBP (CD)        Ordering Provider: Kwaku Gil PA-C    4.5 g  over 30 Minutes Intravenous Once 25 1230 25 1346              Review of Systems:  Reports abdominal pain, constipation      Physical Exam:   Vital Signs  Temp (24hrs), Av.1 °F (36.7 °C), Min:97.1  "°F (36.2 °C), Max:98.9 °F (37.2 °C)    Temp  Min: 97.1 °F (36.2 °C)  Max: 98.9 °F (37.2 °C)  BP  Min: 118/82  Max: 142/89  Pulse  Min: 89  Max: 98  Resp  Min: 17  Max: 18  SpO2  Min: 93 %  Max: 93 %        Laboratory Data    Results from last 7 days   Lab Units 03/07/25  0257 03/06/25  1409 03/05/25  0355   WBC 10*3/mm3 7.35 6.69 8.46   HEMOGLOBIN g/dL 12.8* 13.1 13.2   HEMATOCRIT % 39.9 39.7 39.7   PLATELETS 10*3/mm3 152 145 147     Results from last 7 days   Lab Units 03/07/25  0257   SODIUM mmol/L 135*   POTASSIUM mmol/L 4.0   CHLORIDE mmol/L 97*   CO2 mmol/L 24.0   BUN mg/dL 8   CREATININE mg/dL 0.90   GLUCOSE mg/dL 79   CALCIUM mg/dL 9.4     Results from last 7 days   Lab Units 03/04/25  0918   ALK PHOS U/L 108   BILIRUBIN mg/dL 0.5   ALT (SGPT) U/L 17   AST (SGOT) U/L 24                         Estimated Creatinine Clearance: 120.8 mL/min (by C-G formula based on SCr of 0.9 mg/dL).      Microbiology:  No results found for: \"ACANTHNAEG\", \"AFBCX\", \"BPERTUSSISCX\", \"BLOODCX\"  No results found for: \"BCIDPCR\", \"CXREFLEX\", \"CSFCX\", \"CULTURETIS\"  No results found for: \"CULTURES\", \"HSVCX\", \"URCX\"  No results found for: \"EYECULTURE\", \"GCCX\", \"HSVCULTURE\", \"LABHSV\"  No results found for: \"LEGIONELLA\", \"MRSACX\", \"MUMPSCX\", \"MYCOPLASCX\"  No results found for: \"NOCARDIACX\", \"STOOLCX\"  No results found for: \"THROATCX\", \"UNSTIMCULT\", \"URINECX\", \"CULTURE\", \"VZVCULTUR\"  No results found for: \"VIRALCULTU\", \"WOUNDCX\"        Radiology:  Imaging Results (Last 72 Hours)       ** No results found for the last 72 hours. **              Impression:   Recurrent diverticulitis sigmoid colon complicated by abscess, phlegmon  Abdominal pain      PLAN/RECOMMENDATIONS:   Thank you for asking us to see Palomo Pollack, I recommend the following:  Continue zosyn      Alternatives are ertapenem, eravacycline.    Anticipate 2 weeks of iv abx, serial CT scan.    Agree with surgical consultation    Recurrent diverticulitis likely to recur; " after inflammation decreases elective surgery best chance of cure    Complete at least 14 days of omar bx from admission  1) invanz 1 g iv daily x 12 days  2)weekly picc care  3)check cbc, cmp, esr,  q Monday  4)fax ID note to [facility] [id number]  5)schedule f/u with me in 1 week  Dc/cm time 30 minutes     D/w Dr. Maxine Seth MD  3/7/2025  12:49 EST

## 2025-03-07 NOTE — OUTREACH NOTE
Prep Survey      Flowsheet Row Responses   [facility] facility patient discharged from? Non-BH   Is LACE score < 7 ? Non-BH Discharge   Eligibility Not Eligible   What are the reasons patient is not eligible? Readmitted   Does the patient have one of the following disease processes/diagnoses(primary or secondary)? Other   Prep survey completed? Yes            Gracie WHEATLEY - Registered Nurse

## 2025-03-08 NOTE — DISCHARGE INSTRUCTIONS
Return to the emergency department if you have any new or worsening symptoms and continue with ertapenem treatment as previously prescribed.

## 2025-03-08 NOTE — FSED PROVIDER NOTE
Subjective   History of Present Illness  44-year-old male presents to the emergency department today with complaints of a PICC line issue.  Patient states that he was admitted on Tuesday for diverticulitis with abscess and discharged to 1/2 hours prior to arrival.  Patient states when he got home he noticed bleeding and a saturated bandage from the area of his PICC line where he is supposed to receive ertapenem starting tomorrow.  Patient denies any fever, chills or further complications/symptomatology at today's visit.  Review of Systems   Constitutional: Negative.    HENT: Negative.     Eyes: Negative.    Respiratory: Negative.     Cardiovascular: Negative.    Gastrointestinal: Negative.    Endocrine: Negative.    Genitourinary: Negative.    Musculoskeletal: Negative.    Skin: Negative.    Allergic/Immunologic: Negative.    Neurological: Negative.    Hematological:         Bleeding   Psychiatric/Behavioral: Negative.         Past Medical History:   Diagnosis Date    Allergic rhinitis     Atrial fibrillation     CHF (congestive heart failure)     Chronic right ear pain     h/o    Diabetes mellitus     h/o-  doesnt check sugar    Diverticulitis     GERD (gastroesophageal reflux disease)     Headache     Heart failure     History of kidney stones     passed them    History of shingles     1999- after transplant of stem cells    History of transfusion     1998-bhlex; 1999 [place], [place]- 1 unit- stem cell transplant -  recieved plts - no reaction recalled    Hodgkin lymphoma     Hypogonadism in male     PTSD (post-traumatic stress disorder)     Stem cells transplant status     Vision blurred     possibly need glasses       Allergies   Allergen Reactions    Bactrim [Sulfamethoxazole-Trimethoprim] Rash and Other (See Comments)     Gave increased heartrate-biaxin      Clarithromycin Unknown - Low Severity    Milk-Related Compounds Other (See Comments)     Increased phlegm        Past Surgical History:   Procedure  Laterality Date    BIVENTRICULLAR IMPLANTABLE CARDIOVERTER DEFIBRILLATOR PLACEMENT N/A 10/26/2023    Procedure: Implant ICD - bi ventricular; DNS meds; [place];  Surgeon: Wilmer Rojas DO;  Location:  [facility] EP INVASIVE LOCATION;  Service: Cardiovascular;  Laterality: N/A;    CARDIAC CATHETERIZATION N/A 05/30/2023    Procedure: Left Heart Cath;  Surgeon: Alec Mason III, MD;  Location:  [facility] CATH INVASIVE LOCATION;  Service: Cardiovascular;  Laterality: N/A;    CARDIAC ELECTROPHYSIOLOGY PROCEDURE N/A 10/3/2024    Procedure: Ablation atrial fibrillation w PFA; CTA prior, [place], no meds to hold;  Surgeon: Wilmer Rojas DO;  Location:  [facility] EP INVASIVE LOCATION;  Service: Cardiovascular;  Laterality: N/A;    CENTRAL VENOUS LINE INSERTION  1999    removed since    COLONOSCOPY      OTHER SURGICAL HISTORY      stem cell transplant    PORTOCAVAL SHUNT PLACEMENT  1998    WISDOM TOOTH EXTRACTION         Family History   Problem Relation Age of Onset    Stroke Father     Lung cancer Father     Cancer Father         Positive for cured lung cancer- he was smoker       Social History     Socioeconomic History    Marital status: Single   Tobacco Use    Smoking status: Some Days     Current packs/day: 0.25     Average packs/day: 0.3 packs/day for 22.3 years (5.6 ttl pk-yrs)     Types: Cigarettes     Start date: 11/2002     Last attempt to quit: 11/2022     Passive exposure: Past    Smokeless tobacco: Never    Tobacco comments:     smokes less than .25 ppd   Vaping Use    Vaping status: Never Used   Substance and Sexual Activity    Alcohol use: Yes     Alcohol/week: 4.0 standard drinks of alcohol     Types: 4 Shots of liquor per week     Comment: Occasional on weekends    Drug use: Not Currently     Types: Marijuana     Comment: stopped april 2024    Sexual activity: Yes           Objective   Physical Exam  Constitutional:       Appearance: Normal appearance.   HENT:      Head: Normocephalic and atraumatic.   Eyes:      Pupils:  Pupils are equal, round, and reactive to light.   Cardiovascular:      Rate and Rhythm: Normal rate.      Heart sounds: Normal heart sounds.   Pulmonary:      Effort: Pulmonary effort is normal.      Breath sounds: Normal breath sounds.   Abdominal:      General: Abdomen is flat. Bowel sounds are normal.      Palpations: Abdomen is soft.   Musculoskeletal:         General: Normal range of motion.      Cervical back: Normal range of motion and neck supple.   Skin:     General: Skin is warm.      Capillary Refill: Capillary refill takes less than 2 seconds.      Comments: Bleeding from area of PICC line   Neurological:      General: No focal deficit present.      Mental Status: He is alert.   Psychiatric:         Mood and Affect: Mood normal.         Behavior: Behavior normal.         Procedures           ED Course      Patient exam conducted and lab/imaging evaluated.  Patient physical exam did not reveal any evidence of induration, warmth, or erythema but did reveal an area of dried and clotted blood.  Patient x-ray revealed normal findings in relation to PICC line.  Patient lab work unremarkable at this time.  Patient had dressing changed and there was no evidence of active bleeding from the site this is likely just due to the initial insertion.  Patient area cleaned and new bandage was applied and flushed.  PICC line flushed well and patient is educated to continue with Invanz tomorrow as previously instructed and return to the emergency department if he has any new or worsening symptoms.                                     Medical Decision Making  Problems Addressed:  Bleeding from peripherally inserted central catheter (PICC), initial encounter: complicated acute illness or injury    Amount and/or Complexity of Data Reviewed  Labs: ordered.  Radiology: ordered.        Final diagnoses:   Bleeding from peripherally inserted central catheter (PICC), initial encounter       ED Disposition  ED Disposition       ED  Disposition   Discharge    Condition   Stable    Comment   --               Heladio Valentine DO  [address] [place] [id number] [phone]    Schedule an appointment as soon as possible for a visit            Medication List      No changes were made to your prescriptions during this visit.

## 2025-03-10 LAB
QT INTERVAL: 390 MS
QTC INTERVAL: 515 MS

## 2025-03-11 ENCOUNTER — HOSPITAL ENCOUNTER (OUTPATIENT)
Facility: HOSPITAL | Age: 45
Discharge: HOME | End: 2025-03-11
Admitting: INTERNAL MEDICINE
Payer: MEDICAID

## 2025-03-11 VITALS
TEMPERATURE: 99.1 F | SYSTOLIC BLOOD PRESSURE: 153 MMHG | HEART RATE: 96 BPM | RESPIRATION RATE: 18 BRPM | DIASTOLIC BLOOD PRESSURE: 93 MMHG | BODY MASS INDEX: 27.38 KG/M2 | HEIGHT: 71 IN | WEIGHT: 195.6 LBS

## 2025-03-11 DIAGNOSIS — K57.80 DIVERTICULITIS OF INTESTINE WITH ABSCESS, UNSPECIFIED BLEEDING STATUS, UNSPECIFIED PART OF INTESTINAL TRACT: Primary | ICD-10-CM

## 2025-03-11 LAB
ALBUMIN SERPL-MCNC: 4.1 G/DL (ref 3.5–5.2)
ALBUMIN/GLOB SERPL: 1.1 G/DL
ALP SERPL-CCNC: 103 U/L (ref 39–117)
ALT SERPL W P-5'-P-CCNC: 18 U/L (ref 1–41)
ANION GAP SERPL CALCULATED.3IONS-SCNC: 13.7 MMOL/L (ref 5–15)
AST SERPL-CCNC: 26 U/L (ref 1–40)
BILIRUB SERPL-MCNC: 0.5 MG/DL (ref 0–1.2)
BUN SERPL-MCNC: 12 MG/DL (ref 6–20)
BUN/CREAT SERPL: 11.9 (ref 7–25)
CALCIUM SPEC-SCNC: 9.6 MG/DL (ref 8.6–10.5)
CHLORIDE SERPL-SCNC: 95 MMOL/L (ref 98–107)
CO2 SERPL-SCNC: 26.3 MMOL/L (ref 22–29)
CREAT SERPL-MCNC: 1.01 MG/DL (ref 0.76–1.27)
DEPRECATED RDW RBC AUTO: 43.4 FL (ref 37–54)
EGFRCR SERPLBLD CKD-EPI 2021: 92.9 ML/MIN/1.73
ERYTHROCYTE [DISTWIDTH] IN BLOOD BY AUTOMATED COUNT: 12.7 % (ref 12.3–15.4)
ERYTHROCYTE [SEDIMENTATION RATE] IN BLOOD: 53 MM/HR (ref 0–15)
GLOBULIN UR ELPH-MCNC: 3.7 GM/DL
GLUCOSE SERPL-MCNC: 141 MG/DL (ref 65–99)
HCT VFR BLD AUTO: 40.5 % (ref 37.5–51)
HGB BLD-MCNC: 13.4 G/DL (ref 13–17.7)
MCH RBC QN AUTO: 30.2 PG (ref 26.6–33)
MCHC RBC AUTO-ENTMCNC: 33.1 G/DL (ref 31.5–35.7)
MCV RBC AUTO: 91.4 FL (ref 79–97)
PLATELET # BLD AUTO: 202 10*3/MM3 (ref 140–450)
PMV BLD AUTO: 9.6 FL (ref 6–12)
POTASSIUM SERPL-SCNC: 3.8 MMOL/L (ref 3.5–5.2)
PROT SERPL-MCNC: 7.8 G/DL (ref 6–8.5)
RBC # BLD AUTO: 4.43 10*6/MM3 (ref 4.14–5.8)
SODIUM SERPL-SCNC: 135 MMOL/L (ref 136–145)
WBC NRBC COR # BLD AUTO: 8.27 10*3/MM3 (ref 3.4–10.8)

## 2025-03-11 PROCEDURE — 80053 COMPREHEN METABOLIC PANEL: CPT | Performed by: INTERNAL MEDICINE

## 2025-03-11 PROCEDURE — 85652 RBC SED RATE AUTOMATED: CPT | Performed by: INTERNAL MEDICINE

## 2025-03-11 PROCEDURE — 85027 COMPLETE CBC AUTOMATED: CPT | Performed by: INTERNAL MEDICINE

## 2025-03-11 PROCEDURE — 36592 COLLECT BLOOD FROM PICC: CPT

## 2025-03-17 ENCOUNTER — TRANSCRIBE ORDERS (OUTPATIENT)
Dept: ADMINISTRATIVE | Facility: HOSPITAL | Age: 45
End: 2025-03-17
Payer: MEDICAID

## 2025-03-17 ENCOUNTER — HOSPITAL ENCOUNTER (OUTPATIENT)
Facility: HOSPITAL | Age: 45
Discharge: HOME | End: 2025-03-17
Admitting: INTERNAL MEDICINE
Payer: MEDICAID

## 2025-03-17 DIAGNOSIS — K57.20 DIVERTICULITIS OF LARGE INTESTINE WITH ABSCESS WITHOUT BLEEDING: ICD-10-CM

## 2025-03-17 DIAGNOSIS — K57.20 DIVERTICULITIS OF LARGE INTESTINE WITH ABSCESS WITHOUT BLEEDING: Primary | ICD-10-CM

## 2025-03-17 PROCEDURE — 74177 CT ABD & PELVIS W/CONTRAST: CPT

## 2025-03-17 PROCEDURE — 25510000001 IOPAMIDOL 61 % SOLUTION: Performed by: INTERNAL MEDICINE

## 2025-03-17 RX ORDER — IOPAMIDOL 612 MG/ML
100 INJECTION, SOLUTION INTRAVASCULAR
Status: COMPLETED | OUTPATIENT
Start: 2025-03-17 | End: 2025-03-17

## 2025-03-17 RX ADMIN — IOPAMIDOL 94 ML: 612 INJECTION, SOLUTION INTRAVENOUS at 16:31

## 2025-03-18 ENCOUNTER — HOSPITAL ENCOUNTER (OUTPATIENT)
Facility: HOSPITAL | Age: 45
Discharge: HOME | End: 2025-03-18
Admitting: INTERNAL MEDICINE
Payer: MEDICAID

## 2025-03-18 VITALS
HEIGHT: 71 IN | WEIGHT: 197.4 LBS | HEART RATE: 92 BPM | RESPIRATION RATE: 18 BRPM | SYSTOLIC BLOOD PRESSURE: 146 MMHG | TEMPERATURE: 98.9 F | DIASTOLIC BLOOD PRESSURE: 94 MMHG | BODY MASS INDEX: 27.64 KG/M2

## 2025-03-18 DIAGNOSIS — K57.80 DIVERTICULITIS OF INTESTINE WITH ABSCESS, UNSPECIFIED BLEEDING STATUS, UNSPECIFIED PART OF INTESTINAL TRACT: ICD-10-CM

## 2025-03-18 LAB
ALBUMIN SERPL-MCNC: 4.2 G/DL (ref 3.5–5.2)
ALBUMIN/GLOB SERPL: 1.3 G/DL
ALP SERPL-CCNC: 114 U/L (ref 39–117)
ALT SERPL W P-5'-P-CCNC: 75 U/L (ref 1–41)
ANION GAP SERPL CALCULATED.3IONS-SCNC: 11.1 MMOL/L (ref 5–15)
AST SERPL-CCNC: 63 U/L (ref 1–40)
BILIRUB SERPL-MCNC: 0.2 MG/DL (ref 0–1.2)
BUN SERPL-MCNC: 9 MG/DL (ref 6–20)
BUN/CREAT SERPL: 10.2 (ref 7–25)
CALCIUM SPEC-SCNC: 9.3 MG/DL (ref 8.6–10.5)
CHLORIDE SERPL-SCNC: 102 MMOL/L (ref 98–107)
CO2 SERPL-SCNC: 26.9 MMOL/L (ref 22–29)
CREAT SERPL-MCNC: 0.88 MG/DL (ref 0.76–1.27)
DEPRECATED RDW RBC AUTO: 44.8 FL (ref 37–54)
EGFRCR SERPLBLD CKD-EPI 2021: 107.4 ML/MIN/1.73
ERYTHROCYTE [DISTWIDTH] IN BLOOD BY AUTOMATED COUNT: 13.3 % (ref 12.3–15.4)
ERYTHROCYTE [SEDIMENTATION RATE] IN BLOOD: 31 MM/HR (ref 0–15)
GLOBULIN UR ELPH-MCNC: 3.3 GM/DL
GLUCOSE SERPL-MCNC: 115 MG/DL (ref 65–99)
HCT VFR BLD AUTO: 39.4 % (ref 37.5–51)
HGB BLD-MCNC: 12.9 G/DL (ref 13–17.7)
MCH RBC QN AUTO: 30 PG (ref 26.6–33)
MCHC RBC AUTO-ENTMCNC: 32.7 G/DL (ref 31.5–35.7)
MCV RBC AUTO: 91.6 FL (ref 79–97)
PLATELET # BLD AUTO: 197 10*3/MM3 (ref 140–450)
PMV BLD AUTO: 9.2 FL (ref 6–12)
POTASSIUM SERPL-SCNC: 4.1 MMOL/L (ref 3.5–5.2)
PROT SERPL-MCNC: 7.5 G/DL (ref 6–8.5)
RBC # BLD AUTO: 4.3 10*6/MM3 (ref 4.14–5.8)
SODIUM SERPL-SCNC: 140 MMOL/L (ref 136–145)
WBC NRBC COR # BLD AUTO: 4.97 10*3/MM3 (ref 3.4–10.8)

## 2025-03-18 PROCEDURE — 85027 COMPLETE CBC AUTOMATED: CPT | Performed by: INTERNAL MEDICINE

## 2025-03-18 PROCEDURE — 80053 COMPREHEN METABOLIC PANEL: CPT | Performed by: INTERNAL MEDICINE

## 2025-03-18 PROCEDURE — 36592 COLLECT BLOOD FROM PICC: CPT

## 2025-03-18 PROCEDURE — 85652 RBC SED RATE AUTOMATED: CPT | Performed by: INTERNAL MEDICINE

## 2025-03-19 ENCOUNTER — HOSPITAL ENCOUNTER (OUTPATIENT)
Facility: HOSPITAL | Age: 45
Discharge: HOME | End: 2025-03-19
Admitting: INTERNAL MEDICINE
Payer: MEDICAID

## 2025-03-19 VITALS
WEIGHT: 197.4 LBS | DIASTOLIC BLOOD PRESSURE: 92 MMHG | HEART RATE: 96 BPM | SYSTOLIC BLOOD PRESSURE: 144 MMHG | RESPIRATION RATE: 18 BRPM | TEMPERATURE: 98.7 F | BODY MASS INDEX: 27.64 KG/M2 | HEIGHT: 71 IN

## 2025-03-19 DIAGNOSIS — K57.80 DIVERTICULAR DISEASE OF INTESTINE WITH PERFORATION AND ABSCESS: Primary | ICD-10-CM

## 2025-03-19 PROCEDURE — 36589 REMOVAL TUNNELED CV CATH: CPT

## 2025-03-19 PROCEDURE — G0463 HOSPITAL OUTPT CLINIC VISIT: HCPCS

## 2025-03-26 ENCOUNTER — OFFICE VISIT (OUTPATIENT)
Dept: FAMILY MEDICINE CLINIC | Facility: CLINIC | Age: 45
End: 2025-03-26
Payer: MEDICAID

## 2025-03-26 VITALS
WEIGHT: 196 LBS | DIASTOLIC BLOOD PRESSURE: 82 MMHG | SYSTOLIC BLOOD PRESSURE: 128 MMHG | HEIGHT: 71 IN | BODY MASS INDEX: 27.44 KG/M2 | OXYGEN SATURATION: 98 % | HEART RATE: 98 BPM | RESPIRATION RATE: 18 BRPM | TEMPERATURE: 98.4 F

## 2025-03-26 DIAGNOSIS — R10.32 LEFT LOWER QUADRANT ABDOMINAL PAIN: Primary | ICD-10-CM

## 2025-03-26 RX ORDER — EPINEPHRINE 0.3 MG/.3ML
0.3 INJECTION INTRAMUSCULAR ONCE
COMMUNITY
Start: 2025-03-07

## 2025-03-26 RX ORDER — SPIRONOLACTONE 25 MG/1
25 TABLET ORAL DAILY
COMMUNITY

## 2025-03-26 RX ORDER — FUROSEMIDE 20 MG/1
20 TABLET ORAL 2 TIMES DAILY PRN
COMMUNITY

## 2025-03-26 NOTE — PROGRESS NOTES
Follow Up Office Visit      Patient Name: Palomo Pollack  : 1980   MRN: [id number]     Chief Complaint:    Chief Complaint   Patient presents with    Diverticular disease of intestine with perforation and absc     fu       History of Present Illness: Palomo Pollack is a 45 y.o. male who is here today to follow up with diverticular disease to the point of perforation.  Patient has been on a PICC line with antibiotic infusion.  He is following up with the surgeon to discuss colonoscopy and further treatment.  Being seen by [place] surgeons.  We do not have records at this time    Patient followed up outside the window of the hospital follow-up recommendation      Exam: Patient's mood and affect is appropriate      Subjective        I have reviewed and the following portions of the patient's history were updated as appropriate: past family history, past medical history, past social history, past surgical history and problem list.    Medications:     Current Outpatient Medications:     apixaban (Eliquis) 5 MG tablet tablet, Take 1 tablet by mouth 2 (Two) Times a Day., Disp: 180 tablet, Rfl: 3    empagliflozin (JARDIANCE) 10 MG tablet tablet, Take 1 tablet by mouth Daily., Disp: 90 tablet, Rfl: 3    EpiPen 2-Albert 0.3 MG/0.3ML solution auto-injector injection, 0.3 mL 1 (One) Time., Disp: , Rfl:     esomeprazole (nexIUM) 40 MG capsule, Take 1 capsule by mouth Every Morning Before Breakfast., Disp: 90 capsule, Rfl: 3    fexofenadine (ALLEGRA) 180 MG tablet, Take 1 tablet by mouth Daily., Disp: 90 tablet, Rfl: 0    furosemide (LASIX) 20 MG tablet, Take 1 tablet by mouth 2 (Two) Times a Day As Needed., Disp: , Rfl:     metoprolol succinate XL (TOPROL-XL) 25 MG 24 hr tablet, Take 1 tablet by mouth Daily., Disp: 30 tablet, Rfl: 5    sacubitril-valsartan (ENTRESTO) 49-51 MG tablet, Take 1 tablet by mouth 2 (Two) Times a Day., Disp: , Rfl:     spironolactone (ALDACTONE) 25 MG tablet, Take 1 tablet  "by mouth Daily., Disp: , Rfl:     Testosterone 1.62 % gel, Daily., Disp: , Rfl:     Allergies:   Allergies   Allergen Reactions    Bactrim [Sulfamethoxazole-Trimethoprim] Rash and Other (See Comments)     Gave increased heartrate-biaxin      Clarithromycin Unknown - Low Severity    Milk-Related Compounds Other (See Comments)     Increased phlegm        Objective     Physical Exam: Please see above  Vital Signs:   Vitals:    03/26/25 0958   BP: 128/82   Pulse: 98   Resp: 18   Temp: 98.4 °F (36.9 °C)   TempSrc: Temporal   SpO2: 98%   Weight: 88.9 kg (196 lb)   Height: 180.3 cm (70.98\")     Body mass index is 27.35 kg/m².          Assessment / Plan      Assessment/Plan:   Diagnoses and all orders for this visit:    1. Left lower quadrant abdominal pain (Primary)    Patient's pain is improving, he has follow-up with the general surgeon for diverticular disease.  Pain medication has been stopped.  Says symptoms have improved substantially.  No further follow-up needed for this office, we will see him back for annual exam or sooner as needed.  Recommend patient follow-up with surgeon as scheduled and to watch for any worsening symptoms.    Follow Up:   Return if symptoms worsen or fail to improve.    Heladio Valentine, DO  [facility] [place]     "

## 2025-03-31 ENCOUNTER — TRANSCRIBE ORDERS (OUTPATIENT)
Dept: ADMINISTRATIVE | Facility: HOSPITAL | Age: 45
End: 2025-03-31
Payer: MEDICAID

## 2025-03-31 DIAGNOSIS — K57.20 DIVERTICULITIS OF LARGE INTESTINE WITH PERFORATION WITHOUT ABSCESS OR BLEEDING: Primary | ICD-10-CM

## 2025-04-01 DIAGNOSIS — J30.2 SEASONAL ALLERGIES: ICD-10-CM

## 2025-04-01 RX ORDER — FEXOFENADINE HCL 180 MG/1
180 TABLET ORAL DAILY
Qty: 90 TABLET | Refills: 0 | Status: SHIPPED | OUTPATIENT
Start: 2025-04-01

## 2025-04-03 ENCOUNTER — PREP FOR SURGERY (OUTPATIENT)
Dept: OTHER | Facility: HOSPITAL | Age: 45
End: 2025-04-03
Payer: MEDICAID

## 2025-04-03 ENCOUNTER — HOSPITAL ENCOUNTER (OUTPATIENT)
Facility: HOSPITAL | Age: 45
Discharge: HOME | End: 2025-04-03
Admitting: SURGERY
Payer: MEDICAID

## 2025-04-03 DIAGNOSIS — K57.20 DIVERTICULITIS OF LARGE INTESTINE WITH PERFORATION WITHOUT ABSCESS OR BLEEDING: ICD-10-CM

## 2025-04-03 DIAGNOSIS — Z12.11 SCREEN FOR COLON CANCER: Primary | ICD-10-CM

## 2025-04-03 DIAGNOSIS — R19.7 DIARRHEA, UNSPECIFIED TYPE: ICD-10-CM

## 2025-04-03 PROCEDURE — 74177 CT ABD & PELVIS W/CONTRAST: CPT

## 2025-04-03 PROCEDURE — 25510000001 IOPAMIDOL 61 % SOLUTION: Performed by: SURGERY

## 2025-04-03 RX ORDER — IOPAMIDOL 612 MG/ML
85 INJECTION, SOLUTION INTRAVASCULAR
Status: COMPLETED | OUTPATIENT
Start: 2025-04-03 | End: 2025-04-03

## 2025-04-03 RX ADMIN — IOPAMIDOL 81 ML: 612 INJECTION, SOLUTION INTRAVENOUS at 08:54

## 2025-04-10 ENCOUNTER — TELEPHONE (OUTPATIENT)
Dept: GASTROENTEROLOGY | Facility: CLINIC | Age: 45
End: 2025-04-10
Payer: MEDICAID

## 2025-04-10 NOTE — TELEPHONE ENCOUNTER
PATIENT IS SCHEDULED FOR A COLONOSCOPY WITH DR. WILSON IN ENDO ON 4/22/25 - NEEDS CARDIAC CLEARANCE; DR. GARCÍA - BLOOD THINNER  AND DEFIBRILLATOR.

## 2025-04-14 ENCOUNTER — OFFICE VISIT (OUTPATIENT)
Dept: CARDIOLOGY | Facility: CLINIC | Age: 45
End: 2025-04-14
Payer: MEDICAID

## 2025-04-14 VITALS
SYSTOLIC BLOOD PRESSURE: 118 MMHG | OXYGEN SATURATION: 99 % | HEART RATE: 99 BPM | WEIGHT: 195.2 LBS | DIASTOLIC BLOOD PRESSURE: 76 MMHG | HEIGHT: 71 IN | BODY MASS INDEX: 27.33 KG/M2

## 2025-04-14 DIAGNOSIS — I43 CARDIOMYOPATHY, HYPERTENSIVE, WITHOUT HEART FAILURE: Primary | ICD-10-CM

## 2025-04-14 DIAGNOSIS — I11.9 CARDIOMYOPATHY, HYPERTENSIVE, WITHOUT HEART FAILURE: Primary | ICD-10-CM

## 2025-04-14 PROCEDURE — 99214 OFFICE O/P EST MOD 30 MIN: CPT | Performed by: PHYSICIAN ASSISTANT

## 2025-04-14 NOTE — PROGRESS NOTES
Cardiac Electrophysiology Outpatient Follow Up Note            [facility]    Follow Up Office Visit      Palomo Pollack  [id number]      Primary Care Physician: Heladio Valentine DO      Subjective     Chief Complaint:   There are no diagnoses linked to this encounter.      Chief Complaint   Patient presents with    Paroxysmal atrial fibrillation       History of Present Illness:   Palomo Pollack is a 45 y.o. male who presents to electrophysiology clinic for follow up of atrial fibrillation.  He is status post successful ablation October 3, 2024.  He is here today after recent visit with our office telling me that he is having a bout with diverticulitis and may need surgery.  He is scheduled for colonoscopy and subsequent possible surgical revision.  From a heart standpoint he has had no trouble.  He has had no chest pain dizziness ICD shocks or A-fib.  He is compliant with his medications.  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      Past Medical History:   Past Medical History:   Diagnosis Date    Allergic rhinitis     Atrial fibrillation     CHF (congestive heart failure)     Chronic right ear pain     h/o    Diabetes mellitus     h/o-  doesnt check sugar    Diverticulitis     GERD (gastroesophageal reflux disease)     Headache     Heart failure     History of kidney stones     passed them    History of shingles     1999- after transplant of stem cells    History of transfusion     1998-bhlex; 1999 [place], [place]- 1 unit- stem cell transplant -  recieved plts - no reaction recalled    Hodgkin lymphoma     Hypogonadism in male     PTSD (post-traumatic stress disorder)     Stem cells transplant status     Vision blurred     possibly need glasses       Past Surgical History:   Past Surgical History:   Procedure Laterality Date    BIVENTRICULLAR IMPLANTABLE CARDIOVERTER DEFIBRILLATOR PLACEMENT N/A  10/26/2023    Procedure: Implant ICD - bi ventricular; DNS meds; [place];  Surgeon: Wilmer Rojas DO;  Location:  [facility];  Service: Cardiovascular;  Laterality: N/A;    CARDIAC CATHETERIZATION N/A 05/30/2023    Procedure: Left Heart Cath;  Surgeon: Alec Mason III, MD;  Location:  [facility];  Service: Cardiovascular;  Laterality: N/A;    CARDIAC ELECTROPHYSIOLOGY PROCEDURE N/A 10/3/2024    Procedure: Ablation atrial fibrillation w PFA; CTA prior, GA, no meds to hold;  Surgeon: Wilmer Rojas DO;  Location:  [facility];  Service: Cardiovascular;  Laterality: N/A;    CENTRAL VENOUS LINE INSERTION  1999    removed since    COLONOSCOPY      OTHER SURGICAL HISTORY      stem cell transplant    PORTOCAVAL SHUNT PLACEMENT  1998    WISDOM TOOTH EXTRACTION         Family History:   Family History   Problem Relation Age of Onset    Stroke Father     Lung cancer Father     Cancer Father         Positive for cured lung cancer- he was smoker       Social History:   Social History     Socioeconomic History    Marital status: Single   Tobacco Use    Smoking status: Some Days     Current packs/day: 0.25     Average packs/day: 0.3 packs/day for 22.4 years (5.6 ttl pk-yrs)     Types: Cigarettes     Start date: 11/2002     Last attempt to quit: 11/2022     Passive exposure: Past    Smokeless tobacco: Never    Tobacco comments:     smokes less than .25 ppd   Vaping Use    Vaping status: Never Used   Substance and Sexual Activity    Alcohol use: Yes     Alcohol/week: 4.0 standard drinks of alcohol     Types: 4 Shots of liquor per week     Comment: Occasional on weekends    Drug use: Not Currently     Types: Marijuana     Comment: stopped april 2024    Sexual activity: Yes       Medications:     Current Outpatient Medications:     apixaban (Eliquis) 5 MG tablet tablet, Take 1 tablet by mouth 2 (Two) Times a Day., Disp: 180 tablet, Rfl: 3    empagliflozin (JARDIANCE) 10 MG tablet  "tablet, Take 1 tablet by mouth Daily., Disp: 90 tablet, Rfl: 3    EpiPen 2-Albert 0.3 MG/0.3ML solution auto-injector injection, 0.3 mL 1 (One) Time., Disp: , Rfl:     esomeprazole (nexIUM) 40 MG capsule, Take 1 capsule by mouth Every Morning Before Breakfast., Disp: 90 capsule, Rfl: 3    fexofenadine (ALLEGRA) 180 MG tablet, Take 1 tablet by mouth Daily., Disp: 90 tablet, Rfl: 0    furosemide (LASIX) 20 MG tablet, Take 1 tablet by mouth 2 (Two) Times a Day As Needed., Disp: , Rfl:     metoprolol succinate XL (TOPROL-XL) 25 MG 24 hr tablet, Take 1 tablet by mouth Daily., Disp: 30 tablet, Rfl: 5    sacubitril-valsartan (ENTRESTO) 49-51 MG tablet, Take 1 tablet by mouth 2 (Two) Times a Day., Disp: , Rfl:     spironolactone (ALDACTONE) 25 MG tablet, Take 1 tablet by mouth Daily., Disp: , Rfl:     Testosterone 1.62 % gel, Daily., Disp: , Rfl:     Allergies:   Allergies   Allergen Reactions    Bactrim [Sulfamethoxazole-Trimethoprim] Rash and Other (See Comments)     Gave increased heartrate-biaxin      Clarithromycin Unknown - Low Severity    Milk-Related Compounds Other (See Comments)     Increased phlegm        Objective   Vital Signs:   Vitals:    04/14/25 0958   BP: 118/76   BP Location: Right arm   Patient Position: Sitting   Cuff Size: Adult   Pulse: 99   SpO2: 99%   Weight: 88.5 kg (195 lb 3.2 oz)   Height: 180.3 cm (71\")       PHYSICAL EXAM  General appearance: Awake, alert, cooperative  Head: Normocephalic, without obvious abnormality, atraumatic  Eyes: Conjunctivae/corneas clear, EOMs intact  Neck: no adenopathy, no carotid bruit, no JVD, and thyroid: not enlarged  Lungs: clear to auscultation bilaterally and no rhonchi or crackles\", ' symmetric  Heart: regular rate and rhythm, S1, S2 normal, no murmur, click, rub or gallop  Abdomen: Soft, non-tender, bowel sounds normal,  no organomegaly  Extremities: extremities normal, atraumatic, no cyanosis or edema  Skin: Skin color, turgor normal, no rashes or " "lesions  Neurologic: Grossly normal     Lab Results   Component Value Date    GLUCOSE 115 (H) 03/18/2025    CALCIUM 9.3 03/18/2025     03/18/2025    K 4.1 03/18/2025    CO2 26.9 03/18/2025     03/18/2025    BUN 9 03/18/2025    CREATININE 0.88 03/18/2025    EGFRIFNONA 75 03/02/2018    BCR 10.2 03/18/2025    ANIONGAP 11.1 03/18/2025     Lab Results   Component Value Date    WBC 4.97 03/18/2025    HGB 12.9 (L) 03/18/2025    HCT 39.4 03/18/2025    MCV 91.6 03/18/2025     03/18/2025     No results found for: \"INR\", \"PROTIME\"  Lab Results   Component Value Date    TSH 12.110 (H) 05/29/2023       Cardiac Testing:   Patsnap bivicd.  Patsnap bivicd.  DDD rate 60 CRT 92%.  Normal thresholds impedance.  Battery voltage 8.5 years.  No arrhythmias.    I personally viewed and interpreted the patient's EKG/Telemetry/lab data    Procedures    Tobacco Cessation: N/A  Obstructive Sleep Apnea Screening: Completed    Advance Care Planning   ACP discussion was declined by the patient. Patient does not have an advance directive, declines further assistance.       Assessment & Plan    There are no diagnoses linked to this encounter.         Diagnosis Plan   1. Chronic HFrEF (heart failure with reduced ejection fraction)  Stable.  Euvolemic.  New York Heart Association functional class II after CRT.      2. LBBB (left bundle branch block)  CRT in situ.      3. Presence of biventricular implantable cardioverter-defibrillator (ICD)  Normal interrogation.         4. Paroxysmal atrial fibrillation  PVA October 3, 2024. No afib by device interrogation.         Overall stable course again normal interrogation today no A-fib, VT or ICD  shocks.  Heart failure wise he is euvolemic he is having any worsening chest pain or shortness of breath.  Will recheck echocardiogram to establish LV function which was last checked over a year ago.  Otherwise to be considered acceptable risk to pursue GI surgery if he needs " to be.  Return follow-up as scheduled.  Electronically signed by TIM Herring, 04/14/25, 12:03 PM EDT.

## 2025-04-15 ENCOUNTER — HOSPITAL ENCOUNTER (OUTPATIENT)
Dept: CARDIOLOGY | Facility: HOSPITAL | Age: 45
Discharge: HOME | End: 2025-04-15
Admitting: PHYSICIAN ASSISTANT
Payer: MEDICAID

## 2025-04-15 DIAGNOSIS — I11.9 CARDIOMYOPATHY, HYPERTENSIVE, WITHOUT HEART FAILURE: ICD-10-CM

## 2025-04-15 DIAGNOSIS — I43 CARDIOMYOPATHY, HYPERTENSIVE, WITHOUT HEART FAILURE: ICD-10-CM

## 2025-04-15 PROCEDURE — 93306 TTE W/DOPPLER COMPLETE: CPT

## 2025-04-16 ENCOUNTER — PRE-ADMISSION TESTING (OUTPATIENT)
Dept: PREADMISSION TESTING | Facility: HOSPITAL | Age: 45
End: 2025-04-16
Payer: MEDICAID

## 2025-04-16 VITALS — BODY MASS INDEX: 27.22 KG/M2 | HEIGHT: 71 IN | WEIGHT: 194.45 LBS

## 2025-04-16 LAB
AORTIC DIMENSIONLESS INDEX: 0.47 (DI)
AV MEAN PRESS GRAD SYS DOP V1V2: 5.5 MMHG
AV VMAX SYS DOP: 161 CM/SEC
BH CV ECHO MEAS - AO MAX PG: 10.4 MMHG
BH CV ECHO MEAS - AO ROOT AREA (BSA CORRECTED): 1.5 CM2
BH CV ECHO MEAS - AO ROOT DIAM: 3.2 CM
BH CV ECHO MEAS - AO V2 VTI: 26 CM
BH CV ECHO MEAS - AVA(I,D): 2.11 CM2
BH CV ECHO MEAS - EDV(CUBED): 105.3 ML
BH CV ECHO MEAS - EDV(MOD-SP2): 126 ML
BH CV ECHO MEAS - EDV(MOD-SP4): 158 ML
BH CV ECHO MEAS - EF(MOD-SP2): 41.7 %
BH CV ECHO MEAS - EF(MOD-SP4): 41 %
BH CV ECHO MEAS - ESV(CUBED): 28.2 ML
BH CV ECHO MEAS - ESV(MOD-SP2): 73.5 ML
BH CV ECHO MEAS - ESV(MOD-SP4): 93.2 ML
BH CV ECHO MEAS - FS: 35.5 %
BH CV ECHO MEAS - IVS/LVPW: 0.86 CM
BH CV ECHO MEAS - IVSD: 1 CM
BH CV ECHO MEAS - LA DIMENSION: 3.7 CM
BH CV ECHO MEAS - LAT PEAK E' VEL: 12.9 CM/SEC
BH CV ECHO MEAS - LV DIASTOLIC VOL/BSA (35-75): 75.8 CM2
BH CV ECHO MEAS - LV MASS(C)D: 185.7 GRAMS
BH CV ECHO MEAS - LV MAX PG: 2.31 MMHG
BH CV ECHO MEAS - LV MEAN PG: 1 MMHG
BH CV ECHO MEAS - LV SYSTOLIC VOL/BSA (12-30): 44.7 CM2
BH CV ECHO MEAS - LV V1 MAX: 76 CM/SEC
BH CV ECHO MEAS - LV V1 VTI: 12.1 CM
BH CV ECHO MEAS - LVIDD: 4.7 CM
BH CV ECHO MEAS - LVIDS: 3 CM
BH CV ECHO MEAS - LVOT AREA: 4.5 CM2
BH CV ECHO MEAS - LVOT DIAM: 2.4 CM
BH CV ECHO MEAS - LVPWD: 1.17 CM
BH CV ECHO MEAS - MED PEAK E' VEL: 7 CM/SEC
BH CV ECHO MEAS - MV A MAX VEL: 95.4 CM/SEC
BH CV ECHO MEAS - MV E MAX VEL: 87 CM/SEC
BH CV ECHO MEAS - MV E/A: 0.91
BH CV ECHO MEAS - PA ACC TIME: 0.11 SEC
BH CV ECHO MEAS - SV(LVOT): 54.7 ML
BH CV ECHO MEAS - SV(MOD-SP2): 52.5 ML
BH CV ECHO MEAS - SV(MOD-SP4): 64.8 ML
BH CV ECHO MEAS - SVI(LVOT): 26.3 ML/M2
BH CV ECHO MEAS - SVI(MOD-SP2): 25.2 ML/M2
BH CV ECHO MEAS - SVI(MOD-SP4): 31.1 ML/M2
BH CV ECHO MEAS - TAPSE (>1.6): 1.56 CM
BH CV ECHO MEAS - TR MAX PG: 27 MMHG
BH CV ECHO MEAS - TR MAX VEL: 258.8 CM/SEC
BH CV ECHO MEASUREMENTS AVERAGE E/E' RATIO: 8.74
BH CV XLRA - RV BASE: 3.5 CM
BH CV XLRA - RV LENGTH: 6 CM
BH CV XLRA - RV MID: 2.9 CM
BH CV XLRA - TDI S': 14.5 CM/SEC
DEPRECATED RDW RBC AUTO: 47.6 FL (ref 37–54)
ERYTHROCYTE [DISTWIDTH] IN BLOOD BY AUTOMATED COUNT: 14.2 % (ref 12.3–15.4)
HCT VFR BLD AUTO: 48.3 % (ref 37.5–51)
HGB BLD-MCNC: 15.8 G/DL (ref 13–17.7)
INR PPP: 1.13 (ref 0.89–1.12)
IVRT: 85 MS
LV EF BIPLANE MOD: 42.5 %
MCH RBC QN AUTO: 29.7 PG (ref 26.6–33)
MCHC RBC AUTO-ENTMCNC: 32.7 G/DL (ref 31.5–35.7)
MCV RBC AUTO: 90.8 FL (ref 79–97)
PLATELET # BLD AUTO: 226 10*3/MM3 (ref 140–450)
PMV BLD AUTO: 9.3 FL (ref 6–12)
POTASSIUM SERPL-SCNC: 4.6 MMOL/L (ref 3.5–5.2)
PROTHROMBIN TIME: 15.2 SECONDS (ref 12.2–15.3)
RBC # BLD AUTO: 5.32 10*6/MM3 (ref 4.14–5.8)
WBC NRBC COR # BLD AUTO: 6.12 10*3/MM3 (ref 3.4–10.8)

## 2025-04-16 PROCEDURE — 85610 PROTHROMBIN TIME: CPT

## 2025-04-16 PROCEDURE — 36415 COLL VENOUS BLD VENIPUNCTURE: CPT

## 2025-04-16 PROCEDURE — 85027 COMPLETE CBC AUTOMATED: CPT

## 2025-04-16 PROCEDURE — 84132 ASSAY OF SERUM POTASSIUM: CPT

## 2025-04-16 NOTE — PAT
Cardiac clearance and EKG in chart dated 4/14/25      The following information and instructions were given:    Do not eat, drink, smoke or chew gum after midnight the night before surgery. This includes no mints.  Take all routine, prescribed medications including heart and blood pressure medicines with a sip of water unless otherwise instructed by your physician.   Do NOT take diabetic medication unless instructed by your physician.    DO NOT shave for two days before your procedure.  Do not wear makeup.      DO NOT wear fingernail polish (gel/regular) and/or acrylic/artificial nails on the day of surgery.   If you had a recent manicure and would rather not remove polish or artificial nails, the minimum requirement is that the polish/artificial nails must be removed from the middle finger on each hand.      Remove all jewelry (advise to go to jeweler if unable to remove).  Jewelry, especially rings, can no longer be taped for surgery.    Leave anything you consider valuable at home.    Bring the following with you the day of your procedure (when applicable):       -Picture ID and insurance cards       -Co-pay/deductible required by insurance       -Medications in the original bottles or detailed list (must include name of medication, dosage, and frequency).  This includes all over-the-counter medications if not brought to PAT       -Copy of advance directive, living will or power of  documents if not brought to PAT       -PAT Pass    Educational handout related to procedure given to patient.  Educational handout also includes general information related to their recovery that mentions signs and symptoms of infection and when to call the doctor.    Patient must a  the day of the procedure and the  must remain in the Endoscopy   Waiting room throughout the procedure.

## 2025-04-16 NOTE — DISCHARGE INSTRUCTIONS
The following information and instructions were given:    Do not eat, drink, smoke or chew gum after midnight the night before surgery. This includes no mints.  Take all routine, prescribed medications including heart and blood pressure medicines with a sip of water unless otherwise instructed by your physician.   Do NOT take diabetic medication unless instructed by your physician.    DO NOT shave for two days before your procedure.  Do not wear makeup.      DO NOT wear fingernail polish (gel/regular) and/or acrylic/artificial nails on the day of surgery.   If you had a recent manicure and would rather not remove polish or artificial nails, the minimum requirement is that the polish/artificial nails must be removed from the middle finger on each hand.      Remove all jewelry (advise to go to jeweler if unable to remove).  Jewelry, especially rings, can no longer be taped for surgery.    Leave anything you consider valuable at home.    Bring the following with you the day of your procedure (when applicable):       -Picture ID and insurance cards       -Co-pay/deductible required by insurance       -Medications in the original bottles or detailed list (must include name of medication, dosage, and frequency).  This includes all over-the-counter medications if not brought to PAT       -Copy of advance directive, living will or power of  documents if not brought to PAT       -PAT Pass    Educational handout related to procedure given to patient.  Educational handout also includes general information related to their recovery that mentions signs and symptoms of infection and when to call the doctor.    Patient must a  the day of the procedure and the  must remain in the Endoscopy   Waiting room throughout the procedure.

## 2025-04-21 ENCOUNTER — ANESTHESIA EVENT (OUTPATIENT)
Dept: GASTROENTEROLOGY | Facility: HOSPITAL | Age: 45
End: 2025-04-21
Payer: MEDICAID

## 2025-04-22 ENCOUNTER — ANESTHESIA (OUTPATIENT)
Dept: GASTROENTEROLOGY | Facility: HOSPITAL | Age: 45
End: 2025-04-22
Payer: MEDICAID

## 2025-04-22 ENCOUNTER — HOSPITAL ENCOUNTER (OUTPATIENT)
Facility: HOSPITAL | Age: 45
Setting detail: HOSPITAL OUTPATIENT SURGERY
Discharge: HOME | End: 2025-04-22
Attending: INTERNAL MEDICINE | Admitting: INTERNAL MEDICINE
Payer: MEDICAID

## 2025-04-22 VITALS
RESPIRATION RATE: 16 BRPM | TEMPERATURE: 97.6 F | HEART RATE: 91 BPM | OXYGEN SATURATION: 96 % | DIASTOLIC BLOOD PRESSURE: 92 MMHG | SYSTOLIC BLOOD PRESSURE: 123 MMHG

## 2025-04-22 LAB — GLUCOSE BLDC GLUCOMTR-MCNC: 107 MG/DL (ref 70–130)

## 2025-04-22 PROCEDURE — 25010000002 LIDOCAINE PF 1% 1 % SOLUTION

## 2025-04-22 PROCEDURE — 82948 REAGENT STRIP/BLOOD GLUCOSE: CPT

## 2025-04-22 PROCEDURE — 25810000003 LACTATED RINGERS PER 1000 ML: Performed by: ANESTHESIOLOGY

## 2025-04-22 PROCEDURE — 25010000002 FAMOTIDINE 10 MG/ML SOLUTION: Performed by: ANESTHESIOLOGY

## 2025-04-22 PROCEDURE — 45330 DIAGNOSTIC SIGMOIDOSCOPY: CPT | Performed by: INTERNAL MEDICINE

## 2025-04-22 PROCEDURE — S0260 H&P FOR SURGERY: HCPCS | Performed by: INTERNAL MEDICINE

## 2025-04-22 PROCEDURE — 25010000002 PROPOFOL 10 MG/ML EMULSION

## 2025-04-22 RX ORDER — LIDOCAINE HYDROCHLORIDE 10 MG/ML
INJECTION, SOLUTION EPIDURAL; INFILTRATION; INTRACAUDAL; PERINEURAL AS NEEDED
Status: DISCONTINUED | OUTPATIENT
Start: 2025-04-22 | End: 2025-04-22 | Stop reason: SURG

## 2025-04-22 RX ORDER — EPHEDRINE SULFATE 50 MG/ML
INJECTION INTRAVENOUS AS NEEDED
Status: DISCONTINUED | OUTPATIENT
Start: 2025-04-22 | End: 2025-04-22

## 2025-04-22 RX ORDER — SODIUM CHLORIDE, SODIUM LACTATE, POTASSIUM CHLORIDE, CALCIUM CHLORIDE 600; 310; 30; 20 MG/100ML; MG/100ML; MG/100ML; MG/100ML
9 INJECTION, SOLUTION INTRAVENOUS CONTINUOUS
Status: DISCONTINUED | OUTPATIENT
Start: 2025-04-23 | End: 2025-04-22 | Stop reason: HOSPADM

## 2025-04-22 RX ORDER — FAMOTIDINE 10 MG/ML
20 INJECTION, SOLUTION INTRAVENOUS ONCE
Status: COMPLETED | OUTPATIENT
Start: 2025-04-22 | End: 2025-04-22

## 2025-04-22 RX ORDER — SODIUM CHLORIDE 0.9 % (FLUSH) 0.9 %
10 SYRINGE (ML) INJECTION EVERY 12 HOURS SCHEDULED
Status: DISCONTINUED | OUTPATIENT
Start: 2025-04-22 | End: 2025-04-22 | Stop reason: HOSPADM

## 2025-04-22 RX ORDER — LIDOCAINE HYDROCHLORIDE 10 MG/ML
0.5 INJECTION, SOLUTION EPIDURAL; INFILTRATION; INTRACAUDAL; PERINEURAL ONCE AS NEEDED
Status: DISCONTINUED | OUTPATIENT
Start: 2025-04-22 | End: 2025-04-22 | Stop reason: HOSPADM

## 2025-04-22 RX ORDER — IPRATROPIUM BROMIDE AND ALBUTEROL SULFATE 2.5; .5 MG/3ML; MG/3ML
3 SOLUTION RESPIRATORY (INHALATION) ONCE AS NEEDED
Status: DISCONTINUED | OUTPATIENT
Start: 2025-04-22 | End: 2025-04-22 | Stop reason: HOSPADM

## 2025-04-22 RX ORDER — FAMOTIDINE 20 MG/1
20 TABLET, FILM COATED ORAL ONCE
Status: DISCONTINUED | OUTPATIENT
Start: 2025-04-22 | End: 2025-04-22 | Stop reason: HOSPADM

## 2025-04-22 RX ORDER — ONDANSETRON 2 MG/ML
4 INJECTION INTRAMUSCULAR; INTRAVENOUS ONCE AS NEEDED
Status: DISCONTINUED | OUTPATIENT
Start: 2025-04-22 | End: 2025-04-22 | Stop reason: HOSPADM

## 2025-04-22 RX ORDER — PROPOFOL 10 MG/ML
VIAL (ML) INTRAVENOUS AS NEEDED
Status: DISCONTINUED | OUTPATIENT
Start: 2025-04-22 | End: 2025-04-22 | Stop reason: SURG

## 2025-04-22 RX ORDER — MIDAZOLAM HYDROCHLORIDE 1 MG/ML
1 INJECTION, SOLUTION INTRAMUSCULAR; INTRAVENOUS
Status: DISCONTINUED | OUTPATIENT
Start: 2025-04-22 | End: 2025-04-22 | Stop reason: HOSPADM

## 2025-04-22 RX ORDER — SODIUM CHLORIDE 0.9 % (FLUSH) 0.9 %
10 SYRINGE (ML) INJECTION AS NEEDED
Status: DISCONTINUED | OUTPATIENT
Start: 2025-04-22 | End: 2025-04-22 | Stop reason: HOSPADM

## 2025-04-22 RX ADMIN — SODIUM CHLORIDE, POTASSIUM CHLORIDE, SODIUM LACTATE AND CALCIUM CHLORIDE: 600; 310; 30; 20 INJECTION, SOLUTION INTRAVENOUS at 08:43

## 2025-04-22 RX ADMIN — PROPOFOL 50 MG: 10 INJECTION, EMULSION INTRAVENOUS at 08:47

## 2025-04-22 RX ADMIN — LIDOCAINE HYDROCHLORIDE 100 MG: 10 INJECTION, SOLUTION EPIDURAL; INFILTRATION; INTRACAUDAL; PERINEURAL at 08:47

## 2025-04-22 RX ADMIN — PROPOFOL 120 MCG/KG/MIN: 10 INJECTION, EMULSION INTRAVENOUS at 08:48

## 2025-04-22 RX ADMIN — FAMOTIDINE 20 MG: 10 INJECTION, SOLUTION INTRAVENOUS at 07:34

## 2025-04-22 NOTE — ANESTHESIA PREPROCEDURE EVALUATION
Anesthesia Evaluation     Patient summary reviewed and Nursing notes reviewed   NPO Solid Status: > 8 hours  NPO Liquid Status: > 2 hours           Airway   Mallampati: III  TM distance: >3 FB  Neck ROM: full  No difficulty expected  Dental      Pulmonary - normal exam   (+) a smoker Current, cigarettes,  Cardiovascular - normal exam    ECG reviewed  PT is on anticoagulation therapy  Patient on routine beta blocker    (+) pacemaker ICD, hypertension, dysrhythmias (LBBB) Atrial Fib, CHF (On entresto)     ROS comment: ECHO 04/2025: EF 42.5%, borderline cLVH, normal diastolic function, trace to mild AI, mild TR (normal RVSP)      EKG 3/4/25:    Atrial-sensed ventricular-paced rhythm  Abnormal ECG      Neuro/Psych  (+) headaches, psychiatric history PTSD  GI/Hepatic/Renal/Endo    (+) GERD, renal disease- stones, diabetes mellitus type 2    Musculoskeletal     Abdominal    Substance History      OB/GYN          Other      history of cancer (Hodgkin Lymphoma s/p stem cell transplant)    ROS/Med Hx Other: Eliquis                Anesthesia Plan    ASA 3     general     intravenous induction     Anesthetic plan, risks, benefits, and alternatives have been provided, discussed and informed consent has been obtained with: patient.    Plan discussed with CRNA.    CODE STATUS:

## 2025-04-22 NOTE — H&P
[facility]    Referring Provider: Randy White MD    Reason for Consultation: here for colonoscopy    Chief complaint here for colonoscopy    History of present illness:  Palomo Pollack is a 45 y.o. male who presents to inpatient endoscopy for screening colonoscopy. Last colonoscopy 2017 at . No report is scanned into the chart. H/o sHF after covid s/p ICD. He had pain early April and went to get a CT a/p which suggested sigmoid diverticulitis and a focal small abscess.       Allergies:  Bactrim [sulfamethoxazole-trimethoprim], Clarithromycin, and Milk-related compounds    Scheduled Meds:  famotidine, 20 mg, Oral, Once  sodium chloride, 10 mL, Intravenous, Q12H         Infusions:  [START ON 4/23/2025] lactated ringers, 9 mL/hr        PRN Meds:    ipratropium-albuterol    lidocaine PF 1%    midazolam    ondansetron    sodium chloride    Home Meds:  Medications Prior to Admission   Medication Sig Dispense Refill Last Dose/Taking    empagliflozin (JARDIANCE) 10 MG tablet tablet Take 1 tablet by mouth Daily. 90 tablet 3 4/21/2025    esomeprazole (nexIUM) 40 MG capsule Take 1 capsule by mouth Every Morning Before Breakfast. 90 capsule 3 4/21/2025 Morning    fexofenadine (ALLEGRA) 180 MG tablet Take 1 tablet by mouth Daily. 90 tablet 0 4/21/2025 Morning    furosemide (LASIX) 20 MG tablet Take 1 tablet by mouth 2 (Two) Times a Day As Needed (swelling).   Past Month    metoprolol succinate XL (TOPROL-XL) 25 MG 24 hr tablet Take 1 tablet by mouth Daily. 30 tablet 5 4/21/2025 Evening    sacubitril-valsartan (ENTRESTO) 49-51 MG tablet Take 1 tablet by mouth 2 (Two) Times a Day.   4/21/2025 Evening    spironolactone (ALDACTONE) 25 MG tablet Take 1 tablet by mouth Daily.   4/21/2025 Morning    Testosterone 1.62 % gel Apply 1 bottle topically to the appropriate area as directed Daily. 1 pump for each shoulder   4/22/2025 Morning    apixaban (Eliquis) 5 MG tablet tablet Take 1 tablet by mouth 2 (Two)  Times a Day. (Patient taking differently: Take 1 tablet by mouth 2 (Two) Times a Day. Pt knows to stop 2 days prior to colonoscopy per Dr's order) 180 tablet 3 4/19/2025 Evening    EpiPen 2-Albert 0.3 MG/0.3ML solution auto-injector injection 0.3 mL 1 (One) Time.          ROS: Review of Systems  All other systems reviewed and are negative.    PAST MED HX: Pt  has a past medical history of Allergic rhinitis, Atrial fibrillation, CHF (congestive heart failure), Chronic right ear pain, Diabetes mellitus, Diverticulitis, GERD (gastroesophageal reflux disease), Headache, Heart failure, History of kidney stones, History of shingles, History of transfusion, Hodgkin lymphoma, Hypertension, Hypogonadism in male, PTSD (post-traumatic stress disorder), Stem cells transplant status, and Vision blurred.  PAST SURG HX: Pt  has a past surgical history that includes Colonoscopy; Portocaval shunt placement (1998); Central venous catheter insertion (1999); Cardiac catheterization (N/A, 05/30/2023); [place] tooth extraction; biventricullar implantable cardioverter defibrillator placement (N/A, 10/26/2023); Other surgical history; and Cardiac electrophysiology procedure (N/A, 10/3/2024).  FAM HX: family history includes Cancer in his father; Lung cancer in his father; Stroke in his father.  SOC HX: Pt  reports that he has been smoking cigarettes. He started smoking about 22 years ago. He has a 5.6 pack-year smoking history. He has been exposed to tobacco smoke. He has never used smokeless tobacco. He reports that he does not currently use alcohol. He reports that he does not currently use drugs after having used the following drugs: Marijuana.    /89 (BP Location: Left arm, Patient Position: Lying)   Pulse 98   Temp 97.1 °F (36.2 °C) (Temporal)   Resp 16   SpO2 94%     Physical Exam  Wt Readings from Last 3 Encounters:   04/16/25 88.2 kg (194 lb 7.1 oz)   04/14/25 88.5 kg (195 lb 3.2 oz)   03/26/25 88.9 kg (196 lb)   ,body mass  index is unknown because there is no height or weight on file.    General Appearance:  Vitals as above. no acute distress  Head/face:  Normocephalic, atraumatic  Eyes:   EOMI, no conjunctivitis or icterus   Nose/Sinuses:  Nares patent bilaterally without discharge or lesions  Mouth/Throat:  Normal oral movements without dyskinesia  Neck:  trachea is midline, no thyromegaly  Lungs:  Normal work of breathing effort, no overt rales  Heart:  Regular rate, no overt palpable thrill or grade VI M  Abdomen:  Nondistended, no guarding or rebound tenderness  Neurologic:  Alert; no focal deficits; age appropriate behavior and speech  Psychiatric: mood and affect are congruent  Vascular: extremities without edema  Skin: no rash or cyanosis.          Results Review:   I reviewed the patient's new clinical results.    Lab Results   Component Value Date    WBC 6.12 04/16/2025    HGB 15.8 04/16/2025    HCT 48.3 04/16/2025    MCV 90.8 04/16/2025     04/16/2025       Lab Results   Component Value Date    GLUCOSE 115 (H) 03/18/2025    BUN 9 03/18/2025    CREATININE 0.88 03/18/2025    EGFRIFNONA 75 03/02/2018    BCR 10.2 03/18/2025    CO2 26.9 03/18/2025    CALCIUM 9.3 03/18/2025    ALBUMIN 4.2 03/18/2025    AST 63 (H) 03/18/2025    ALT 75 (H) 03/18/2025       ASSESSMENTS/PLANS  1.) HCM  2.) systolic heart failure, s/p aicd; onset: covid  3.) Abnormal liver enzymes  4.) Sigmoid diverticulitis 4/2025 with small abscess  Patient referred via open access to colonoscopy which can actually be quite dangerous if abscess present due to perforation.  Patient is prepped and ready for colonoscopy. I will proceed to take a look to rule out mass effect in sigmoid and if a lot of resistance, abort sigmoidoscopy.           I discussed the patient's findings and my recommendations with the patient    Randy White MD  04/22/25  08:42 EDT

## 2025-04-22 NOTE — ANESTHESIA POSTPROCEDURE EVALUATION
Patient: Palomo Pollack    Procedure Summary       Date: 04/22/25 Room / Location:  [facility]    Anesthesia Start: 0843 Anesthesia Stop: 0903    Procedure: COLONOSCOPY Diagnosis:       Screen for colon cancer      Diarrhea, unspecified type      (Screen for colon cancer [Z12.11])      (Diarrhea, unspecified type [R19.7])    Surgeons: Randy White MD Provider: Moisés Vargas MD    Anesthesia Type: general ASA Status: 3            Anesthesia Type: general    Vitals  Vitals Value Taken Time   /83 04/22/25 09:03   Temp 97.6 °F (36.4 °C) 04/22/25 09:03   Pulse 91 04/22/25 09:03   Resp 12 04/22/25 09:03   SpO2 97 % 04/22/25 09:03           Post Anesthesia Care and Evaluation    Patient location during evaluation: PACU  Patient participation: complete - patient participated  Level of consciousness: sleepy but conscious  Pain management: adequate    Airway patency: patent  Anesthetic complications: No anesthetic complications  PONV Status: none  Cardiovascular status: hemodynamically stable and acceptable  Respiratory status: nonlabored ventilation, acceptable and nasal cannula  Hydration status: acceptable

## 2025-04-28 ENCOUNTER — PREP FOR SURGERY (OUTPATIENT)
Dept: OTHER | Facility: HOSPITAL | Age: 45
End: 2025-04-28
Payer: MEDICAID

## 2025-05-02 RX ORDER — FUROSEMIDE 20 MG/1
TABLET ORAL
Qty: 90 TABLET | Refills: 1 | Status: SHIPPED | OUTPATIENT
Start: 2025-05-02

## 2025-05-02 NOTE — TELEPHONE ENCOUNTER
Lab Results   Component Value Date    GLUCOSE 115 (H) 03/18/2025    BUN 9 03/18/2025    CREATININE 0.88 03/18/2025     03/18/2025    K 4.6 04/16/2025     03/18/2025    CALCIUM 9.3 03/18/2025    PROTEINTOT 7.5 03/18/2025    ALBUMIN 4.2 03/18/2025    ALT 75 (H) 03/18/2025    AST 63 (H) 03/18/2025    ALKPHOS 114 03/18/2025    BILITOT 0.2 03/18/2025    GLOB 3.3 03/18/2025    AGRATIO 1.3 03/18/2025    BCR 10.2 03/18/2025    ANIONGAP 11.1 03/18/2025    EGFR 107.4 03/18/2025

## 2025-05-19 RX ORDER — METOPROLOL SUCCINATE 25 MG/1
25 TABLET, EXTENDED RELEASE ORAL DAILY
Qty: 90 TABLET | Refills: 0 | Status: SHIPPED | OUTPATIENT
Start: 2025-05-19

## 2025-05-21 ENCOUNTER — PRE-ADMISSION TESTING (OUTPATIENT)
Dept: PREADMISSION TESTING | Facility: HOSPITAL | Age: 45
End: 2025-05-21
Payer: MEDICAID

## 2025-05-21 VITALS — HEIGHT: 71 IN | BODY MASS INDEX: 27.84 KG/M2 | WEIGHT: 198.85 LBS

## 2025-05-21 LAB
ANION GAP SERPL CALCULATED.3IONS-SCNC: 14 MMOL/L (ref 5–15)
BUN SERPL-MCNC: 16 MG/DL (ref 6–20)
BUN/CREAT SERPL: 14.8 (ref 7–25)
CALCIUM SPEC-SCNC: 9.7 MG/DL (ref 8.6–10.5)
CHLORIDE SERPL-SCNC: 100 MMOL/L (ref 98–107)
CO2 SERPL-SCNC: 25 MMOL/L (ref 22–29)
CREAT SERPL-MCNC: 1.08 MG/DL (ref 0.76–1.27)
DEPRECATED RDW RBC AUTO: 52.2 FL (ref 37–54)
EGFRCR SERPLBLD CKD-EPI 2021: 85.2 ML/MIN/1.73
ERYTHROCYTE [DISTWIDTH] IN BLOOD BY AUTOMATED COUNT: 15.9 % (ref 12.3–15.4)
GLUCOSE SERPL-MCNC: 189 MG/DL (ref 65–99)
HBA1C MFR BLD: 6.5 % (ref 4.8–5.6)
HCT VFR BLD AUTO: 46.9 % (ref 37.5–51)
HGB BLD-MCNC: 15.3 G/DL (ref 13–17.7)
INR PPP: 1.12 (ref 0.89–1.12)
MCH RBC QN AUTO: 29.1 PG (ref 26.6–33)
MCHC RBC AUTO-ENTMCNC: 32.6 G/DL (ref 31.5–35.7)
MCV RBC AUTO: 89.3 FL (ref 79–97)
PLATELET # BLD AUTO: 141 10*3/MM3 (ref 140–450)
PMV BLD AUTO: 9.6 FL (ref 6–12)
POTASSIUM SERPL-SCNC: 4.1 MMOL/L (ref 3.5–5.2)
PROTHROMBIN TIME: 15.1 SECONDS (ref 12.2–15.3)
RBC # BLD AUTO: 5.25 10*6/MM3 (ref 4.14–5.8)
SODIUM SERPL-SCNC: 139 MMOL/L (ref 136–145)
WBC NRBC COR # BLD AUTO: 6.52 10*3/MM3 (ref 3.4–10.8)

## 2025-05-21 PROCEDURE — 80048 BASIC METABOLIC PNL TOTAL CA: CPT

## 2025-05-21 PROCEDURE — 83036 HEMOGLOBIN GLYCOSYLATED A1C: CPT

## 2025-05-21 PROCEDURE — 36415 COLL VENOUS BLD VENIPUNCTURE: CPT

## 2025-05-21 PROCEDURE — 85610 PROTHROMBIN TIME: CPT

## 2025-05-21 PROCEDURE — 85027 COMPLETE CBC AUTOMATED: CPT

## 2025-05-21 RX ORDER — DIPHENOXYLATE HYDROCHLORIDE AND ATROPINE SULFATE 2.5; .025 MG/1; MG/1
1 TABLET ORAL DAILY
COMMUNITY

## 2025-05-21 NOTE — PAT
"Patient to apply Chlorhexadine wipes  to surgical area (as instructed) the night before procedure and the AM of procedure. Wipes provided.    Ten (8 ounce) Impact Advanced Recovery Nutritional Drinks distributed to patient from Pre Admission Testing Department.  Verbal and written instructions given that patient must consume two (8 ounce) Impact drinks a day for five days before surgery.  Flavoring tip sheet provided as well the brochure \"Go in Stronger. Get Home Sooner\" that explains benefits of nutritional drinks to enhance recovery. Patient/family verbalized understanding.     Patient instructed to drink 20 ounces of Gatorade or Gatorlyte (if diabetic) and it needs to be completed 1 hour (for Main OR patients) or 2 hours (scheduled  section & [facility] patients) before given arrival time for procedure (NO RED Gatorade and NO Gatorade Zero).    Patient verbalized understanding.    Patient viewed general PAT education video as instructed in their preoperative information received from their surgeon.  Patient stated the general PAT education video was viewed in its entirety and survey completed.  Copies of PAT general education handouts (Incentive Spirometry, Meds to Beds Program, Patient Belongings, Pre-op skin preparation instructions, Blood Glucose testing, Visitor policy, Surgery FAQ, Code H) distributed to patient if not printed. Education related to the PAT pass and skin preparation for surgery (if applicable) completed in PAT as a reinforcement to PAT education video. Patient instructed to return PAT pass provided today as well as completed skin preparation sheet (if applicable) on the day of procedure.     Additionally if patient had not viewed video yet but intended to view it at home or in our waiting area, then referred them to the handout with QR code/link provided during PAT visit.  Encouraged patient/family to read PAT general education handouts thoroughly and notify PAT staff with any questions or " concerns. Patient verbalized understanding of all information and priority content.    Ju Hinson GI navigator notified of upcoming surgery     EKG from 4-14-25 in epic and on chart  Icd report on chart from 5-7-25    Patient reported to taking Eliquis and has not received instructions to hold, Left message with TIM Gutierrez RN and Lucila in Dr Plaza's office to call patient with instructions, note left to hold on chart until received

## 2025-05-27 ENCOUNTER — ANESTHESIA EVENT (OUTPATIENT)
Dept: PERIOP | Facility: HOSPITAL | Age: 45
End: 2025-05-27
Payer: MEDICAID

## 2025-05-28 ENCOUNTER — HOSPITAL ENCOUNTER (INPATIENT)
Facility: HOSPITAL | Age: 45
LOS: 3 days | Discharge: HOME | End: 2025-05-31
Attending: SURGERY | Admitting: SURGERY
Payer: MEDICAID

## 2025-05-28 ENCOUNTER — ANESTHESIA EVENT CONVERTED (OUTPATIENT)
Dept: ANESTHESIOLOGY | Facility: HOSPITAL | Age: 45
End: 2025-05-28
Payer: MEDICAID

## 2025-05-28 ENCOUNTER — ANESTHESIA (OUTPATIENT)
Dept: PERIOP | Facility: HOSPITAL | Age: 45
End: 2025-05-28
Payer: MEDICAID

## 2025-05-28 DIAGNOSIS — K57.80 DIVERTICULAR DISEASE OF INTESTINE WITH PERFORATION AND ABSCESS: Primary | ICD-10-CM

## 2025-05-28 DIAGNOSIS — K57.92 DIVERTICULITIS: ICD-10-CM

## 2025-05-28 PROBLEM — K56.699 DIVERTICULAR STRICTURE: Status: ACTIVE | Noted: 2025-05-28

## 2025-05-28 LAB
GLUCOSE BLDC GLUCOMTR-MCNC: 168 MG/DL (ref 70–130)
GLUCOSE BLDC GLUCOMTR-MCNC: 98 MG/DL (ref 70–130)

## 2025-05-28 PROCEDURE — 25010000002 INDOCYANINE GREEN 25 MG RECONSTITUTED SOLUTION: Performed by: UROLOGY

## 2025-05-28 PROCEDURE — 25010000002 DEXAMETHASONE PER 1 MG: Performed by: ANESTHESIOLOGY

## 2025-05-28 PROCEDURE — 0D1B4Z4 BYPASS ILEUM TO CUTANEOUS, PERCUTANEOUS ENDOSCOPIC APPROACH: ICD-10-PCS | Performed by: SURGERY

## 2025-05-28 PROCEDURE — 25010000002 LIDOCAINE PF 1% 1 % SOLUTION: Performed by: ANESTHESIOLOGY

## 2025-05-28 PROCEDURE — 52332 CYSTOSCOPY AND TREATMENT: CPT | Performed by: UROLOGY

## 2025-05-28 PROCEDURE — 25010000002 FENTANYL CITRATE (PF) 100 MCG/2ML SOLUTION: Performed by: ANESTHESIOLOGY

## 2025-05-28 PROCEDURE — 25010000002 HYDROMORPHONE 1 MG/ML SOLUTION

## 2025-05-28 PROCEDURE — 8E0W4CZ ROBOTIC ASSISTED PROCEDURE OF TRUNK REGION, PERCUTANEOUS ENDOSCOPIC APPROACH: ICD-10-PCS | Performed by: SURGERY

## 2025-05-28 PROCEDURE — 25010000002 FENTANYL CITRATE (PF) 50 MCG/ML SOLUTION

## 2025-05-28 PROCEDURE — 25010000002 SUCCINYLCHOLINE PER 20 MG: Performed by: ANESTHESIOLOGY

## 2025-05-28 PROCEDURE — 0T9B80Z DRAINAGE OF BLADDER WITH DRAINAGE DEVICE, VIA NATURAL OR ARTIFICIAL OPENING ENDOSCOPIC: ICD-10-PCS | Performed by: UROLOGY

## 2025-05-28 PROCEDURE — 0TJB8ZZ INSPECTION OF BLADDER, VIA NATURAL OR ARTIFICIAL OPENING ENDOSCOPIC: ICD-10-PCS | Performed by: UROLOGY

## 2025-05-28 PROCEDURE — 25810000003 SODIUM CHLORIDE PER 500 ML: Performed by: SURGERY

## 2025-05-28 PROCEDURE — 25810000003 LACTATED RINGERS PER 1000 ML: Performed by: ANESTHESIOLOGY

## 2025-05-28 PROCEDURE — 25010000002 PROPOFOL 10 MG/ML EMULSION: Performed by: ANESTHESIOLOGY

## 2025-05-28 PROCEDURE — 25010000002 DEXAMETHASONE SODIUM PHOSPHATE 10 MG/ML SOLUTION

## 2025-05-28 PROCEDURE — 0DTN4ZZ RESECTION OF SIGMOID COLON, PERCUTANEOUS ENDOSCOPIC APPROACH: ICD-10-PCS | Performed by: SURGERY

## 2025-05-28 PROCEDURE — 25010000002 CEFOXITIN PER 1 G: Performed by: SURGERY

## 2025-05-28 PROCEDURE — 25010000002 ONDANSETRON PER 1 MG: Performed by: ANESTHESIOLOGY

## 2025-05-28 PROCEDURE — 25010000002 HYDROMORPHONE 1 MG/ML SOLUTION: Performed by: SURGERY

## 2025-05-28 PROCEDURE — S0260 H&P FOR SURGERY: HCPCS | Performed by: PHYSICIAN ASSISTANT

## 2025-05-28 PROCEDURE — 0DBP4ZZ EXCISION OF RECTUM, PERCUTANEOUS ENDOSCOPIC APPROACH: ICD-10-PCS | Performed by: SURGERY

## 2025-05-28 PROCEDURE — 25010000002 BUPIVACAINE (PF) 0.25 % SOLUTION

## 2025-05-28 PROCEDURE — 25010000002 SUGAMMADEX 200 MG/2ML SOLUTION: Performed by: ANESTHESIOLOGY

## 2025-05-28 PROCEDURE — 25010000002 HYDROMORPHONE 1 MG/ML SOLUTION: Performed by: ANESTHESIOLOGY

## 2025-05-28 PROCEDURE — 82948 REAGENT STRIP/BLOOD GLUCOSE: CPT

## 2025-05-28 PROCEDURE — C1758 CATHETER, URETERAL: HCPCS | Performed by: SURGERY

## 2025-05-28 PROCEDURE — 88307 TISSUE EXAM BY PATHOLOGIST: CPT | Performed by: SURGERY

## 2025-05-28 PROCEDURE — 25010000002 PHENYLEPHRINE 10 MG/ML SOLUTION: Performed by: ANESTHESIOLOGY

## 2025-05-28 DEVICE — STAPLER 60 RELOAD BLUE
Type: IMPLANTABLE DEVICE | Site: ABDOMEN | Status: FUNCTIONAL
Brand: SUREFORM

## 2025-05-28 DEVICE — ECHELON CIRCULAR POWERED STAPLER
Type: IMPLANTABLE DEVICE | Site: ABDOMEN | Status: FUNCTIONAL
Brand: ECHELON CIRCULAR

## 2025-05-28 DEVICE — LIGAMAX 5 MM ENDOSCOPIC MULTIPLE CLIP APPLIER
Type: IMPLANTABLE DEVICE | Site: ABDOMEN | Status: FUNCTIONAL
Brand: LIGAMAX

## 2025-05-28 DEVICE — PROXIMATE RELOADABLE LINEAR CUTTER WITH SAFETY LOCK-OUT, 75MM
Type: IMPLANTABLE DEVICE | Site: ABDOMEN | Status: FUNCTIONAL
Brand: PROXIMATE

## 2025-05-28 DEVICE — STAPLER 60 RELOAD WHITE
Type: IMPLANTABLE DEVICE | Site: ABDOMEN | Status: FUNCTIONAL
Brand: SUREFORM

## 2025-05-28 RX ORDER — PHENYLEPHRINE HYDROCHLORIDE 10 MG/ML
INJECTION INTRAVENOUS AS NEEDED
Status: DISCONTINUED | OUTPATIENT
Start: 2025-05-28 | End: 2025-05-28 | Stop reason: SURG

## 2025-05-28 RX ORDER — MELOXICAM 15 MG/1
7.5 TABLET ORAL DAILY
Status: DISCONTINUED | OUTPATIENT
Start: 2025-05-28 | End: 2025-05-28

## 2025-05-28 RX ORDER — FENTANYL CITRATE 50 UG/ML
INJECTION, SOLUTION INTRAMUSCULAR; INTRAVENOUS AS NEEDED
Status: DISCONTINUED | OUTPATIENT
Start: 2025-05-28 | End: 2025-05-28 | Stop reason: SURG

## 2025-05-28 RX ORDER — LIDOCAINE HYDROCHLORIDE 10 MG/ML
INJECTION, SOLUTION EPIDURAL; INFILTRATION; INTRACAUDAL; PERINEURAL AS NEEDED
Status: DISCONTINUED | OUTPATIENT
Start: 2025-05-28 | End: 2025-05-28 | Stop reason: SURG

## 2025-05-28 RX ORDER — KETOROLAC TROMETHAMINE 30 MG/ML
30 INJECTION, SOLUTION INTRAMUSCULAR; INTRAVENOUS EVERY 6 HOURS PRN
Status: DISCONTINUED | OUTPATIENT
Start: 2025-05-28 | End: 2025-05-31 | Stop reason: HOSPADM

## 2025-05-28 RX ORDER — DOCUSATE SODIUM 100 MG/1
100 CAPSULE, LIQUID FILLED ORAL 2 TIMES DAILY PRN
Status: DISCONTINUED | OUTPATIENT
Start: 2025-05-28 | End: 2025-05-31 | Stop reason: HOSPADM

## 2025-05-28 RX ORDER — ULTRASOUND COUPLING MEDIUM
GEL (GRAM) TOPICAL AS NEEDED
Status: DISCONTINUED | OUTPATIENT
Start: 2025-05-28 | End: 2025-05-28 | Stop reason: HOSPADM

## 2025-05-28 RX ORDER — DIAZEPAM 10 MG/2ML
5 INJECTION, SOLUTION INTRAMUSCULAR; INTRAVENOUS EVERY 4 HOURS PRN
Status: DISCONTINUED | OUTPATIENT
Start: 2025-05-28 | End: 2025-05-31 | Stop reason: HOSPADM

## 2025-05-28 RX ORDER — SODIUM CHLORIDE 0.9 % (FLUSH) 0.9 %
10 SYRINGE (ML) INJECTION AS NEEDED
Status: DISCONTINUED | OUTPATIENT
Start: 2025-05-28 | End: 2025-05-28 | Stop reason: HOSPADM

## 2025-05-28 RX ORDER — ACETAMINOPHEN 500 MG
1000 TABLET ORAL ONCE
Status: COMPLETED | OUTPATIENT
Start: 2025-05-28 | End: 2025-05-28

## 2025-05-28 RX ORDER — SODIUM CHLORIDE, SODIUM LACTATE, POTASSIUM CHLORIDE, CALCIUM CHLORIDE 600; 310; 30; 20 MG/100ML; MG/100ML; MG/100ML; MG/100ML
9 INJECTION, SOLUTION INTRAVENOUS CONTINUOUS
Status: DISCONTINUED | OUTPATIENT
Start: 2025-05-29 | End: 2025-05-28

## 2025-05-28 RX ORDER — ONDANSETRON 2 MG/ML
4 INJECTION INTRAMUSCULAR; INTRAVENOUS ONCE AS NEEDED
Status: DISCONTINUED | OUTPATIENT
Start: 2025-05-28 | End: 2025-05-28 | Stop reason: HOSPADM

## 2025-05-28 RX ORDER — FAMOTIDINE 20 MG/1
20 TABLET, FILM COATED ORAL ONCE
Status: COMPLETED | OUTPATIENT
Start: 2025-05-28 | End: 2025-05-28

## 2025-05-28 RX ORDER — FENTANYL CITRATE 50 UG/ML
INJECTION, SOLUTION INTRAMUSCULAR; INTRAVENOUS
Status: COMPLETED
Start: 2025-05-28 | End: 2025-05-28

## 2025-05-28 RX ORDER — DEXMEDETOMIDINE HYDROCHLORIDE 4 UG/ML
INJECTION, SOLUTION INTRAVENOUS AS NEEDED
Status: DISCONTINUED | OUTPATIENT
Start: 2025-05-28 | End: 2025-05-28 | Stop reason: SURG

## 2025-05-28 RX ORDER — PROPOFOL 10 MG/ML
VIAL (ML) INTRAVENOUS AS NEEDED
Status: DISCONTINUED | OUTPATIENT
Start: 2025-05-28 | End: 2025-05-28 | Stop reason: SURG

## 2025-05-28 RX ORDER — MELOXICAM 15 MG/1
15 TABLET ORAL ONCE
Status: COMPLETED | OUTPATIENT
Start: 2025-05-28 | End: 2025-05-28

## 2025-05-28 RX ORDER — METOPROLOL SUCCINATE 25 MG/1
25 TABLET, EXTENDED RELEASE ORAL DAILY
Status: DISCONTINUED | OUTPATIENT
Start: 2025-05-29 | End: 2025-05-31 | Stop reason: HOSPADM

## 2025-05-28 RX ORDER — ONDANSETRON 2 MG/ML
4 INJECTION INTRAMUSCULAR; INTRAVENOUS EVERY 6 HOURS PRN
Status: DISCONTINUED | OUTPATIENT
Start: 2025-05-28 | End: 2025-05-31 | Stop reason: HOSPADM

## 2025-05-28 RX ORDER — SUCCINYLCHOLINE CHLORIDE 20 MG/ML
INJECTION INTRAMUSCULAR; INTRAVENOUS AS NEEDED
Status: DISCONTINUED | OUTPATIENT
Start: 2025-05-28 | End: 2025-05-28 | Stop reason: SURG

## 2025-05-28 RX ORDER — SCOPOLAMINE 1 MG/3D
1 PATCH, EXTENDED RELEASE TRANSDERMAL ONCE
Status: DISCONTINUED | OUTPATIENT
Start: 2025-05-28 | End: 2025-05-28

## 2025-05-28 RX ORDER — FAMOTIDINE 20 MG/1
20 TABLET, FILM COATED ORAL 2 TIMES DAILY
Status: DISCONTINUED | OUTPATIENT
Start: 2025-05-28 | End: 2025-05-31 | Stop reason: HOSPADM

## 2025-05-28 RX ORDER — BUPIVACAINE HYDROCHLORIDE 2.5 MG/ML
INJECTION, SOLUTION EPIDURAL; INFILTRATION; INTRACAUDAL; PERINEURAL
Status: COMPLETED | OUTPATIENT
Start: 2025-05-28 | End: 2025-05-28

## 2025-05-28 RX ORDER — ROCURONIUM BROMIDE 10 MG/ML
INJECTION, SOLUTION INTRAVENOUS AS NEEDED
Status: DISCONTINUED | OUTPATIENT
Start: 2025-05-28 | End: 2025-05-28 | Stop reason: SURG

## 2025-05-28 RX ORDER — SODIUM CHLORIDE 9 MG/ML
INJECTION, SOLUTION INTRAVENOUS AS NEEDED
Status: DISCONTINUED | OUTPATIENT
Start: 2025-05-28 | End: 2025-05-28 | Stop reason: HOSPADM

## 2025-05-28 RX ORDER — ACETAMINOPHEN 325 MG/1
650 TABLET ORAL EVERY 4 HOURS PRN
Status: DISCONTINUED | OUTPATIENT
Start: 2025-05-28 | End: 2025-05-31 | Stop reason: HOSPADM

## 2025-05-28 RX ORDER — LIDOCAINE HYDROCHLORIDE 10 MG/ML
0.5 INJECTION, SOLUTION EPIDURAL; INFILTRATION; INTRACAUDAL; PERINEURAL ONCE AS NEEDED
Status: COMPLETED | OUTPATIENT
Start: 2025-05-28 | End: 2025-05-28

## 2025-05-28 RX ORDER — DEXAMETHASONE SODIUM PHOSPHATE 10 MG/ML
INJECTION, SOLUTION INTRAMUSCULAR; INTRAVENOUS
Status: COMPLETED | OUTPATIENT
Start: 2025-05-28 | End: 2025-05-28

## 2025-05-28 RX ORDER — HEPARIN SODIUM 5000 [USP'U]/ML
5000 INJECTION, SOLUTION INTRAVENOUS; SUBCUTANEOUS EVERY 8 HOURS SCHEDULED
Status: DISCONTINUED | OUTPATIENT
Start: 2025-05-29 | End: 2025-05-31 | Stop reason: HOSPADM

## 2025-05-28 RX ORDER — DEXAMETHASONE SODIUM PHOSPHATE 4 MG/ML
INJECTION, SOLUTION INTRA-ARTICULAR; INTRALESIONAL; INTRAMUSCULAR; INTRAVENOUS; SOFT TISSUE AS NEEDED
Status: DISCONTINUED | OUTPATIENT
Start: 2025-05-28 | End: 2025-05-28 | Stop reason: SURG

## 2025-05-28 RX ORDER — ONDANSETRON 4 MG/1
4 TABLET, ORALLY DISINTEGRATING ORAL EVERY 6 HOURS PRN
Status: DISCONTINUED | OUTPATIENT
Start: 2025-05-28 | End: 2025-05-31 | Stop reason: HOSPADM

## 2025-05-28 RX ORDER — PREGABALIN 75 MG/1
75 CAPSULE ORAL ONCE
Status: COMPLETED | OUTPATIENT
Start: 2025-05-28 | End: 2025-05-28

## 2025-05-28 RX ORDER — ALVIMOPAN 12 MG/1
12 CAPSULE ORAL ONCE
Status: COMPLETED | OUTPATIENT
Start: 2025-05-28 | End: 2025-05-28

## 2025-05-28 RX ORDER — FAMOTIDINE 10 MG/ML
20 INJECTION, SOLUTION INTRAVENOUS ONCE
Status: DISCONTINUED | OUTPATIENT
Start: 2025-05-28 | End: 2025-05-28

## 2025-05-28 RX ORDER — MIDAZOLAM HYDROCHLORIDE 1 MG/ML
1 INJECTION, SOLUTION INTRAMUSCULAR; INTRAVENOUS
Status: DISCONTINUED | OUTPATIENT
Start: 2025-05-28 | End: 2025-05-28 | Stop reason: HOSPADM

## 2025-05-28 RX ORDER — NALOXONE HCL 0.4 MG/ML
0.1 VIAL (ML) INJECTION
Status: DISCONTINUED | OUTPATIENT
Start: 2025-05-28 | End: 2025-05-31 | Stop reason: HOSPADM

## 2025-05-28 RX ORDER — ERYTHROMYCIN 5 MG/G
OINTMENT OPHTHALMIC EVERY 6 HOURS SCHEDULED
Status: DISCONTINUED | OUTPATIENT
Start: 2025-05-28 | End: 2025-05-31 | Stop reason: HOSPADM

## 2025-05-28 RX ORDER — FENTANYL CITRATE 50 UG/ML
50 INJECTION, SOLUTION INTRAMUSCULAR; INTRAVENOUS
Status: DISCONTINUED | OUTPATIENT
Start: 2025-05-28 | End: 2025-05-28 | Stop reason: HOSPADM

## 2025-05-28 RX ORDER — INDOCYANINE GREEN AND WATER 25 MG
KIT INJECTION AS NEEDED
Status: DISCONTINUED | OUTPATIENT
Start: 2025-05-28 | End: 2025-05-28 | Stop reason: HOSPADM

## 2025-05-28 RX ORDER — ONDANSETRON 2 MG/ML
INJECTION INTRAMUSCULAR; INTRAVENOUS AS NEEDED
Status: DISCONTINUED | OUTPATIENT
Start: 2025-05-28 | End: 2025-05-28 | Stop reason: SURG

## 2025-05-28 RX ORDER — SODIUM CHLORIDE 0.9 % (FLUSH) 0.9 %
10 SYRINGE (ML) INJECTION EVERY 12 HOURS SCHEDULED
Status: DISCONTINUED | OUTPATIENT
Start: 2025-05-28 | End: 2025-05-28 | Stop reason: HOSPADM

## 2025-05-28 RX ORDER — OXYCODONE AND ACETAMINOPHEN 5; 325 MG/1; MG/1
1 TABLET ORAL EVERY 4 HOURS PRN
Refills: 0 | Status: DISCONTINUED | OUTPATIENT
Start: 2025-05-28 | End: 2025-05-31 | Stop reason: HOSPADM

## 2025-05-28 RX ORDER — HYDROMORPHONE HYDROCHLORIDE 1 MG/ML
0.5 INJECTION, SOLUTION INTRAMUSCULAR; INTRAVENOUS; SUBCUTANEOUS
Status: DISCONTINUED | OUTPATIENT
Start: 2025-05-28 | End: 2025-05-28 | Stop reason: HOSPADM

## 2025-05-28 RX ORDER — CYCLOBENZAPRINE HCL 5 MG
5 TABLET ORAL 3 TIMES DAILY PRN
Status: DISCONTINUED | OUTPATIENT
Start: 2025-05-28 | End: 2025-05-31 | Stop reason: HOSPADM

## 2025-05-28 RX ADMIN — HYDROMORPHONE HYDROCHLORIDE 0.5 MG: 1 INJECTION, SOLUTION INTRAMUSCULAR; INTRAVENOUS; SUBCUTANEOUS at 16:35

## 2025-05-28 RX ADMIN — SUCCINYLCHOLINE CHLORIDE 160 MG: 20 INJECTION, SOLUTION INTRAMUSCULAR; INTRAVENOUS at 07:42

## 2025-05-28 RX ADMIN — FAMOTIDINE 20 MG: 20 TABLET, FILM COATED ORAL at 07:10

## 2025-05-28 RX ADMIN — LIDOCAINE HYDROCHLORIDE 50 MG: 10 INJECTION, SOLUTION EPIDURAL; INFILTRATION; INTRACAUDAL; PERINEURAL at 07:42

## 2025-05-28 RX ADMIN — BUPIVACAINE HYDROCHLORIDE 60 ML: 2.5 INJECTION, SOLUTION EPIDURAL; INFILTRATION; INTRACAUDAL; PERINEURAL at 07:54

## 2025-05-28 RX ADMIN — DEXAMETHASONE SODIUM PHOSPHATE 4 MG: 10 INJECTION INTRAMUSCULAR; INTRAVENOUS at 07:54

## 2025-05-28 RX ADMIN — FENTANYL CITRATE 25 MCG: 50 INJECTION, SOLUTION INTRAMUSCULAR; INTRAVENOUS at 09:35

## 2025-05-28 RX ADMIN — CEFOXITIN SODIUM 2000 MG: 2 POWDER, FOR SOLUTION INTRAVENOUS at 13:24

## 2025-05-28 RX ADMIN — CEFOXITIN SODIUM 2000 MG: 2 POWDER, FOR SOLUTION INTRAVENOUS at 09:32

## 2025-05-28 RX ADMIN — FENTANYL CITRATE 50 MCG: 50 INJECTION, SOLUTION INTRAMUSCULAR; INTRAVENOUS at 07:40

## 2025-05-28 RX ADMIN — ROCURONIUM 25 MG: 50 INJECTION, SOLUTION INTRAVENOUS at 13:56

## 2025-05-28 RX ADMIN — FENTANYL CITRATE 50 MCG: 50 INJECTION, SOLUTION INTRAMUSCULAR; INTRAVENOUS at 16:49

## 2025-05-28 RX ADMIN — PHENYLEPHRINE HYDROCHLORIDE 50 MCG: 10 INJECTION INTRAVENOUS at 14:25

## 2025-05-28 RX ADMIN — FAMOTIDINE 20 MG: 20 TABLET, FILM COATED ORAL at 20:12

## 2025-05-28 RX ADMIN — ACETAMINOPHEN 1000 MG: 500 TABLET ORAL at 07:13

## 2025-05-28 RX ADMIN — ROCURONIUM 20 MG: 50 INJECTION, SOLUTION INTRAVENOUS at 11:22

## 2025-05-28 RX ADMIN — HYDROMORPHONE HYDROCHLORIDE 0.5 MG: 1 INJECTION, SOLUTION INTRAMUSCULAR; INTRAVENOUS; SUBCUTANEOUS at 13:46

## 2025-05-28 RX ADMIN — PHENYLEPHRINE HYDROCHLORIDE 100 MCG: 10 INJECTION INTRAVENOUS at 07:51

## 2025-05-28 RX ADMIN — CEFOXITIN SODIUM 2000 MG: 2 POWDER, FOR SOLUTION INTRAVENOUS at 07:49

## 2025-05-28 RX ADMIN — SCOPOLAMINE 1 PATCH: 1.5 PATCH, EXTENDED RELEASE TRANSDERMAL at 07:13

## 2025-05-28 RX ADMIN — PROPOFOL 25 MCG/KG/MIN: 10 INJECTION, EMULSION INTRAVENOUS at 07:45

## 2025-05-28 RX ADMIN — HYDROMORPHONE HYDROCHLORIDE 0.5 MG: 1 INJECTION, SOLUTION INTRAMUSCULAR; INTRAVENOUS; SUBCUTANEOUS at 20:12

## 2025-05-28 RX ADMIN — ROCURONIUM 10 MG: 50 INJECTION, SOLUTION INTRAVENOUS at 09:07

## 2025-05-28 RX ADMIN — HYDROMORPHONE HYDROCHLORIDE 0.5 MG: 1 INJECTION, SOLUTION INTRAMUSCULAR; INTRAVENOUS; SUBCUTANEOUS at 17:54

## 2025-05-28 RX ADMIN — PHENYLEPHRINE HYDROCHLORIDE 150 MCG: 10 INJECTION INTRAVENOUS at 08:34

## 2025-05-28 RX ADMIN — SACUBITRIL AND VALSARTAN 1 TABLET: 49; 51 TABLET, FILM COATED ORAL at 20:13

## 2025-05-28 RX ADMIN — FENTANYL CITRATE 50 MCG: 50 INJECTION, SOLUTION INTRAMUSCULAR; INTRAVENOUS at 16:00

## 2025-05-28 RX ADMIN — SODIUM CHLORIDE, POTASSIUM CHLORIDE, SODIUM LACTATE AND CALCIUM CHLORIDE 9 ML/HR: 600; 310; 30; 20 INJECTION, SOLUTION INTRAVENOUS at 07:14

## 2025-05-28 RX ADMIN — ROCURONIUM 5 MG: 50 INJECTION, SOLUTION INTRAVENOUS at 07:42

## 2025-05-28 RX ADMIN — PROPOFOL 200 MG: 10 INJECTION, EMULSION INTRAVENOUS at 07:42

## 2025-05-28 RX ADMIN — ROCURONIUM 10 MG: 50 INJECTION, SOLUTION INTRAVENOUS at 10:52

## 2025-05-28 RX ADMIN — ERYTHROMYCIN 1 APPLICATION: 5 OINTMENT OPHTHALMIC at 23:22

## 2025-05-28 RX ADMIN — PHENYLEPHRINE HYDROCHLORIDE 50 MCG: 10 INJECTION INTRAVENOUS at 09:11

## 2025-05-28 RX ADMIN — CEFOXITIN SODIUM 2000 MG: 2 POWDER, FOR SOLUTION INTRAVENOUS at 11:25

## 2025-05-28 RX ADMIN — ROCURONIUM 50 MG: 50 INJECTION, SOLUTION INTRAVENOUS at 07:53

## 2025-05-28 RX ADMIN — HYDROMORPHONE HYDROCHLORIDE 0.5 MG: 1 INJECTION, SOLUTION INTRAMUSCULAR; INTRAVENOUS; SUBCUTANEOUS at 16:22

## 2025-05-28 RX ADMIN — DEXMEDETOMIDINE HYDROCHLORIDE IN 0.9% SODIUM CHLORIDE 8 MCG: 4 INJECTION INTRAVENOUS at 14:50

## 2025-05-28 RX ADMIN — HYDROMORPHONE HYDROCHLORIDE 0.5 MG: 1 INJECTION, SOLUTION INTRAMUSCULAR; INTRAVENOUS; SUBCUTANEOUS at 23:22

## 2025-05-28 RX ADMIN — MELOXICAM 15 MG: 15 TABLET ORAL at 07:14

## 2025-05-28 RX ADMIN — PHENYLEPHRINE HYDROCHLORIDE 50 MCG: 10 INJECTION INTRAVENOUS at 10:16

## 2025-05-28 RX ADMIN — PHENYLEPHRINE HYDROCHLORIDE 50 MCG: 10 INJECTION INTRAVENOUS at 08:18

## 2025-05-28 RX ADMIN — OXYCODONE HYDROCHLORIDE AND ACETAMINOPHEN 1 TABLET: 5; 325 TABLET ORAL at 23:22

## 2025-05-28 RX ADMIN — ROCURONIUM 20 MG: 50 INJECTION, SOLUTION INTRAVENOUS at 13:18

## 2025-05-28 RX ADMIN — SODIUM CHLORIDE, POTASSIUM CHLORIDE, SODIUM LACTATE AND CALCIUM CHLORIDE: 600; 310; 30; 20 INJECTION, SOLUTION INTRAVENOUS at 13:48

## 2025-05-28 RX ADMIN — PREGABALIN 75 MG: 75 CAPSULE ORAL at 07:14

## 2025-05-28 RX ADMIN — Medication 25 MG: at 13:51

## 2025-05-28 RX ADMIN — ONDANSETRON 4 MG: 2 INJECTION INTRAMUSCULAR; INTRAVENOUS at 14:48

## 2025-05-28 RX ADMIN — ALVIMOPAN 12 MG: 12 CAPSULE ORAL at 07:11

## 2025-05-28 RX ADMIN — ROCURONIUM 10 MG: 50 INJECTION, SOLUTION INTRAVENOUS at 10:04

## 2025-05-28 RX ADMIN — FENTANYL CITRATE 25 MCG: 50 INJECTION, SOLUTION INTRAMUSCULAR; INTRAVENOUS at 09:48

## 2025-05-28 RX ADMIN — PHENYLEPHRINE HYDROCHLORIDE 50 MCG: 10 INJECTION INTRAVENOUS at 12:56

## 2025-05-28 RX ADMIN — ROCURONIUM 20 MG: 50 INJECTION, SOLUTION INTRAVENOUS at 12:20

## 2025-05-28 RX ADMIN — LIDOCAINE HYDROCHLORIDE 0.5 ML: 10 INJECTION, SOLUTION EPIDURAL; INFILTRATION; INTRACAUDAL; PERINEURAL at 07:14

## 2025-05-28 RX ADMIN — PHENYLEPHRINE HYDROCHLORIDE 100 MCG: 10 INJECTION INTRAVENOUS at 07:42

## 2025-05-28 RX ADMIN — SODIUM CHLORIDE, POTASSIUM CHLORIDE, SODIUM LACTATE AND CALCIUM CHLORIDE: 600; 310; 30; 20 INJECTION, SOLUTION INTRAVENOUS at 10:30

## 2025-05-28 RX ADMIN — ROCURONIUM 45 MG: 50 INJECTION, SOLUTION INTRAVENOUS at 07:48

## 2025-05-28 RX ADMIN — SUGAMMADEX 200 MG: 100 INJECTION, SOLUTION INTRAVENOUS at 15:08

## 2025-05-28 RX ADMIN — DEXAMETHASONE SODIUM PHOSPHATE 4 MG: 4 INJECTION, SOLUTION INTRAMUSCULAR; INTRAVENOUS at 08:17

## 2025-05-28 NOTE — ANESTHESIA PROCEDURE NOTES
Peripheral Block    Pre-sedation assessment completed: 5/28/2025 7:53 AM    Patient reassessed immediately prior to procedure    Patient location during procedure: OR  Start time: 5/28/2025 7:54 AM  Reason for block: at surgeon's request and post-op pain management  Performed by  CRNA/CAA: Marcellus Patel CRNASRNA: Mare Rainey SRNA  Preanesthetic Checklist  Completed: patient identified, IV checked, site marked, risks and benefits discussed, surgical consent, monitors and equipment checked, pre-op evaluation and timeout performed  Prep:  Pt Position: supine  Sterile barriers:cap, gloves, mask and washed/disinfected hands  Prep: ChloraPrep  Patient monitoring: blood pressure monitoring, continuous pulse oximetry and EKG  Procedure    Sedation: yes  Performed under: general  Guidance:ultrasound guided  Images:still images obtained, printed/placed on chart    Laterality:Bilateral  Block Type:TAP  Injection Technique:single-shot  Needle Type:short-bevel and echogenic  Needle Gauge:20 G  Resistance on Injection: none    Medications Used: bupivacaine PF (MARCAINE) 0.25 % injection - Injection   60 mL - 5/28/2025 7:54:00 AM  dexamethasone sodium phosphate injection - Injection   4 mg - 5/28/2025 7:54:00 AM      Medications  Preservative Free Saline:5ml  Comment:Block Injection:  LA dose divided between Right and Left block        Post Assessment  Injection Assessment: negative aspiration for heme, incremental injection and no paresthesia on injection  Patient Tolerance:comfortable throughout block  Complications:no  Additional Notes    Subcostal TAPs    A high-frequency linear transducer, with sterile cover, was placed sub-xiphoid to identify Linea Alba, right and left Rectus Abdominus Muscles (ABBIE). The transducer was moved either right or left subcostally to identify the ABBIE and the Transverse Abdominus Muscle (SIFUENTES). The insertion site was prepped in sterile fashion and then localized with 2-5 ml of 1% Lidocaine.  "Using ultrasound-guidance, a 20-gauge B-Zapata 4\" Ultraplex 360 non-stimulating echogenic needle was advanced in plane, from medial to lateral, until the tip of the needle was in the fascial plane between the ABBIE and SIFUENTES. 1-3ml of preservative free normal saline was used to hydro-dissect the fascial planes. After the fascial plane was verified, the local anesthetic (LA) was injected. The procedure was repeated on the opposite side for bilateral coverage. Aspiration every 5 ml to prevent intravascular injection. Injection was completed with negative aspiration of blood and negative intravascular injection. Injection pressures were normal with minimal resistance. The subcostal approach to the TAP nerve block ideally anesthetizes the intercostal nerves T6-T9.     Mid-Axillary/Lateral TAPs    A high-frequency linear transducer, with sterile cover, was placed in the midaxillary line between the ASIS and costal margin. The External Oblique Muscle (EOM), Internal Oblique Muscle (IOM), Transverse Abdominus Muscle (SIFUENTES), and Peritoneum were identified. The insertion site was prepped in sterile fashion and then localized with 2-5 ml of 1% Lidocaine. Using ultrasound-guidance, a 20-gauge B-Zapata 4\" Ultraplex 360 non-stimulating echogenic needle was advanced in plane, from medial to lateral, until the tip of the needle was in the fascial plane between the IOM and SIFUENTES. 1-3ml of preservative free normal saline was used to hydro-dissect the fascial planes. After the fascial plane was verified, the local anesthetic (LA) was injected. The procedure was repeated on the opposite side for bilateral coverage. Aspiration every 5 ml to prevent intravascular injection. Injection was completed with negative aspiration of blood and negative intravascular injection. Injection pressures were normal with minimal resistance. Midaxillary TAPs should reach intercostal nerves T10- T11 and the subcostal nerve T12.    Performed by: Mare Rainey, " SRNA

## 2025-05-28 NOTE — OP NOTE
General Surgery Operative Note    COLON RESECTION LAPAROSCOPIC SIGMOID WITH DAVINCI ROBOT  Procedure Report    Patient Name:  Palomo Pollack  YOB: 1980  [id number]     Date of Surgery:  5/28/2025       Pre-op Diagnosis: Diverticular stricture    Post-op Diagnosis: Diverticular stricture    Procedure(s):  LOW ANTERIOR RESECTION, SPLENIC FLEXURE MOBILIZATION, FLEXIBLE SIGMOIDOSCOPY, LOOP ILEOSTOMY      Surgeon: Rosas Plaza MD    Assistant: Gregorio Pan PA; Tana Thomas PA     Anesthesia: General with Block    This procedure was not performed to treat colon cancer through resection       Estimated Blood Loss: 350 mL    Complications: None     Implants:    Implant Name Type Inv. Item Serial No.  Lot No. LRB No. Used Action   RELOAD STPLR TISS SUREFORM/60 DAVINCI/X/XI 6ROW 2.5 WHT 1P/U - [id number] Implant RELOAD STPLR TISS SUREFORM/60 DAVINCI/X/XI 6ROW 2.5 WHT 1P/U  [employer] [id number] N/A 1 Implanted   RELOAD STPLR TISS SUREFORM/60 DAVINCI/X/XI 6ROW 3.5 VAMSI 1P/U - [id number] Implant RELOAD STPLR TISS SUREFORM/60 DAVINCI/X/XI 6ROW 3.5 VAMSI 1P/U  INTUITIVE SURGICAL [id number] N/A 2 Implanted   STPLR LNR CUT PROX 75MM VAMSI TLC75 - [id number] Implant STPLR LNR CUT PROX 75MM VAMSI TLC75  [employer]  DIV OF J AND J [id number] N/A 1 Implanted   STPLR ETHELON CIRCULAR PWR PRELD 3D 25MM - [id number] Implant STPLR ETHELON CIRCULAR PWR PRELD 3D 25MM  [employer]  DIV OF J AND J [id number] N/A 1 Implanted   CLIPAPPLR M/ ENDO LIGAMAX5 5MM 33CM MD/LG - [id number] Implant CLIPAPPLR M/ ENDO LIGAMAX5 5MM 33CM MD/LG  [employer]  DIV OF J AND J [id number] N/A 1 Implanted       Specimen:          Specimens       ID Source Type Tests Collected By Collected At Frozen?    A Large Intestine, Sigmoid Colon Tissue TISSUE PATHOLOGY EXAM   Rosas Plaza MD 5/28/25 1224 No    Description: SIGMOID COLON AND RECTUM                Findings: Large fibrotic mass of  the distal sigmoid colon and rectum with dense adhesions to the bladder, bilateral pelvic sidewalls, and left ureter taken down with each surrounding structure preserved; anterior defect of the colorectal anastomosis after firing of the EEA stapler requiring open midline incision for suture repair    Indications: The patient is a 45-year-old male with a history of recurrent bouts of diverticulitis over the past 8 years and recent episode of contained perforated diverticulitis.  The patient underwent antibiotic treatment after this, with resolution of pain but patient continued to have issues with constipation.  A colonoscopy was attempted preoperatively but was not able to be performed due to stricture.  We discussed the risks, benefits, and alternatives to robotic low anterior resection with possible ostomy and the patient was agreeable.  Informed consent was obtained.    Description of Procedure:     After obtaining informed consent, the patient was taken to the operating room and placed in supine position. After appropriate DVT and antibiotic prophylaxis, general anesthesia was induced.  Tap blocks were performed by anesthesia.  The patient was then placed in the lithotomy position and the patient underwent bilateral ureteral stent and Funez placement by Dr. Roosevelt Osuna.  Please see his operative note for details.  The rectum was then irrigated with sterile normal saline until clear.  The abdomen and perineum were prepped and draped in standard sterile fashion.    Using the Optiview technique a 5 mm port was placed in the left upper quadrant of the abdominal wall at Morrison's point.  The peritoneal cavity was insufflated using carbon oxide.  A laparoscope was placed through the port and no underlying visceral injury was noted.  A standard low anterior resection port placement schema was then performed.  The robot was docked at the patient's bedside.    On inspection of the pelvis the patient had a large fibrotic  mass involving the distal sigmoid colon and the rectum.  This was found to be densely adherent to both pelvic sidewalls as well as the overlying bladder.  These adhesions were taken down using a combination of robotic scissors, robotic vessel sealer, and blunt dissection.  After mobilization from the left pelvic sidewall it was noted that the mass was adherent to the left ureter.  The mass was able to be safely  from the ureter keeping the ureter intact.  After circumferential mobilization it was noted that the mass incorporated the majority of the rectum with only approximately 3 cm of rectum free from inflammation.  The sigmoid colon was then retracted anteriorly.  The peritoneum overlying the mesentery was then incised medial to the colon using robotic scissors.  Using blunt dissection the inferior mesenteric artery was identified.  This was skeletonized and was stapled using a firing of the robotic stapler with a white load, confirming the location of the left ureter throughout this process.  The mesentery of the distal sigmoid and rectum was then divided using the robotic vessel sealer to the area of the rectum which was free from inflammation.  The rectum was divided here with a firing of the robotic stapler with a blue load.  The lateral attachments of the sigmoid colon and descending colon were divided using the vessel sealer to the level of the splenic flexure.  It was apparent that the healthy sigmoid colon would not be able to reach the pelvis without further mobilization of the descending colon.  Therefore splenic flexure mobilization was performed.  The gastrocolic ligament was divided using the vessel sealer from the midportion of the transverse colon laterally.  The splenocolic ligaments were then divided using the vessel sealer.  Despite this the healthy sigmoid colon was still not able to reach the pelvis.  Therefore the IMV was then ligated with 2 clips placed proximally and 2 distally and  transected with scissors.  At this point the healthy sigmoid colon was able to reach the pelvis.  The robot was then undocked and I scrubbed at the patient's bedside.  A lower midline incision was made using a 10 blade through the skin.  The anterior abdominal fascia was incised using Bovie electrocautery and the peritoneal cavity was entered bluntly with safe entry.  The fascial defect was then expanded superiorly and inferiorly.  The rectum and sigmoid colon were then brought exterior.  An area of healthy sigmoid colon was identified which was divided with a LISETTE stapler with a blue load.  The remaining mesentery of the sigmoid colon to be removed was then ligated using 3-0 silk ties and divided with Bovie electrocautery.  The specimen was then sent to pathology as a permanent specimen.  Sterile blue towels were used to cover the wounds circumferentially.  The staple line from the sigmoid colon was then removed using electrocautery.  EEA dilators were then used on the open sigmoid colon and it was found to be able to accommodate a size 25 EEA stapler.  The stapler was then opened.  The anvil was placed in the sigmoid colon and was secured using a 2-0 Prolene stitch placed in a pursestring manner.  The sigmoid colon was then placed back into the peritoneal cavity and the abdomen was insufflated with carbon oxide.  The stapler was then brought into the rectum and the spike was brought out anterior to the rectal staple line.  The anvil was then meshed with the spike and the EEA stapler was fired.  On inspection there was only 1 intact doughnut after this firing.  Flexible sigmoidoscopy was performed showing air leak.  The decision was made to then open the lower midline incision further.  Inspection of the anastomosis showed an opening of the anterior third of the anastomosis.  This defect was closed in 2 layers with full-thickness interrupted 3-0 Vicryl sutures followed by 3-0 silk Lembert sutures.  Rigid  sigmoidoscopy was then performed and the pelvis was instilled with saline.  With insufflation of the rectum there was inflation of the sigmoid colon and no air bubbles in the saline.  The anastomosis appeared patent via sigmoidoscopy.    Due to the low anastomosis along with the repair after the EEA stapler firing the decision was made to create a loop ileostomy.  An area in the right lower quadrant was identified for the ostomy siding.  Skin and subcutaneous tissue was then divided in a circular manner using electrocautery.  The anterior abdominal fascia was opened in a cruciate manner using electrocautery.  The underlying muscle was split bluntly.  The posterior abdominal fascia was opened in a cruciate manner using electrocautery.  This defect allowed for greater than 2 fingerbreadths of space.  An area of distal ileum was identified and was brought out through this defect for later creation of the ostomy.  A 15 Italian round MILLA was placed through a separate stab incision in the left lower quadrant and was placed around the anastomosis and was secured to the skin level using a 2-0 nylon stitch.  The midline fascial defect was reapproximated using #1 looped PDS.  The fascial defect from the 12 mm right lower quadrant port site was reapproximated using an 0 Vicryl stitch.  The deep dermis of the midline incision was reapproximated using interrupted 3-0 Vicryl.  The skin was reapproximated at all sites using 4-0 Monocryl.  The wounds were covered with sterile blue towels.  The exteriorized ileum was then divided using electrocautery from two thirds proximal to distal.  The divided ileum was then secured to the dermis circumferentially in a Sunni manner.  An ostomy appliance was placed over this.  Dry dressings were placed over the remainder of the incisions.    All sponge and needle counts were correct times two at the completion of the procedure. The patient recovered from anesthesia, was extubated in the operating  room and was transported to the PACU in stable condition.        Assistant: Gregorio Pan PA; Tana Thomas PA  was responsible for performing the following activities: Retraction, Suction, Irrigation, Suturing, Closing, and Placing Dressing and their skilled assistance was necessary for the success of this case.    Rosas Plaza MD     Date: 5/28/2025  Time: 15:23 EDT

## 2025-05-28 NOTE — BRIEF OP NOTE
COLON RESECTION LAPAROSCOPIC SIGMOID WITH DAVINCI ROBOT  Progress Note    Palomo Pollack  5/28/2025    Pre-op Diagnosis:      * Diverticular stricture        Post-Op Diagnosis Codes:     * Diverticular stricture     Procedure(s):      Procedure(s):  LOW ANTERIOR RESECTION, SPLENIC FLEXURE MOBILIZATION, FLEXIBLE SIGMOIDOSCOPY, LOOP ILEOSTOMY  CYSTOSCOPY TEMPORARY PLACEMENT BILATERAL URETERAL CATHETER              Surgeon(s):  Roosevelt Osuna MD Shirley, Lawrence A, MD    Anesthesia: General with Block    Staff:   Circulator: Rebeca oJel RN; Drea Good RN; Miryam Bhakta RN  Scrub Person: Ro Lacey; Liane Tariq; Boom Herrera  Nursing Assistant: Adriana Caceres PCT  Assistant: Gregorio Pan PA; Tana Thomas PA  Assistant: Gregorio Pan PA; Tana Thomas PA    Estimated Blood Loss: 350 mL    Urine Voided: 365 mL    Specimens:                Specimens       ID Source Type Tests Collected By Collected At Frozen?    A Large Intestine, Sigmoid Colon Tissue TISSUE PATHOLOGY EXAM   Rosas Plaza MD 5/28/25 1224 No    Description: SIGMOID COLON AND RECTUM              Drains:   Closed/Suction Drain 1 Superior;Midline Abdomen Bulb 15 Fr. (Active)       Urethral Catheter Non-latex 16 Fr. (Active)       Ureteral Drain/Stent Left ureter 5 Fr. (Active)       Ureteral Drain/Stent Right ureter 5 Fr. (Active)       Findings: Large fibrotic mass of the distal sigmoid colon and rectum with dense adhesions to the bladder, bilateral pelvic sidewalls, and left ureter taken down with each surrounding structure preserved; anterior defect of the colorectal anastomosis after firing of the EEA stapler requiring open midline incision for suture repair      Complications: None    Assistant: Gregorio Pan PA; Tana Thomas PA  was responsible for performing the following activities: Retraction, Suction, Irrigation, Suturing, Closing, Placing Dressing, and Held/Positioned  Camera and their skilled assistance was necessary for the success of this case.    Rosas Plaza MD     Date: 5/28/2025  Time: 15:18 EDT

## 2025-05-28 NOTE — H&P
"Pre-Op H&P  Palomo Pollack  [id number]  1980    Chief complaint: \"I have diverticulitis\"    HPI:    Patient is a 45 y.o.male who presents with bouts of diverticulitis.  Has had complications with abscess formation in the past.  Treated conservatively.  Recently underwent a colonoscopy and was unable to pass beyond the area of inflammation.  Patient continues with intermittent abdominal pain especially during bowel movements.  Denies any fever or chills or systemic symptoms.  The patient is now admitted for resection of his sigmoid colon.    Review of Systems:  General ROS: negative for chills, fever or skin lesions;  No changes since last office visit.  Neg for recent sick exposure  Cardiovascular ROS: no chest pain or dyspnea on exertion  Respiratory ROS: no cough, shortness of breath, or wheezing    Allergies:   Allergies   Allergen Reactions    Bactrim [Sulfamethoxazole-Trimethoprim] Rash and Other (See Comments)     Gave increased heartrate-biaxin      Clarithromycin Rash    Milk-Related Compounds Other (See Comments)     Increased phlegm        Home Meds:    No current facility-administered medications on file prior to encounter.     Current Outpatient Medications on File Prior to Encounter   Medication Sig Dispense Refill    apixaban (Eliquis) 5 MG tablet tablet Take 1 tablet by mouth 2 (Two) Times a Day. 180 tablet 3    empagliflozin (JARDIANCE) 10 MG tablet tablet Take 1 tablet by mouth Daily. 90 tablet 3    EpiPen 2-Albert 0.3 MG/0.3ML solution auto-injector injection 0.3 mL As Needed (allergic reaction).      esomeprazole (nexIUM) 40 MG capsule Take 1 capsule by mouth Every Morning Before Breakfast. 90 capsule 3    fexofenadine (ALLEGRA) 180 MG tablet Take 1 tablet by mouth Daily. 90 tablet 0    sacubitril-valsartan (ENTRESTO) 49-51 MG tablet Take 1 tablet by mouth 2 (Two) Times a Day.      spironolactone (ALDACTONE) 25 MG tablet Take 1 tablet by mouth Daily.      Testosterone 1.62 % gel Apply 1 " bottle topically to the appropriate area as directed Daily. 1 pump for each shoulder         PMH:   Past Medical History:   Diagnosis Date    Allergic rhinitis     Anesthesia complication     irritable after    Atrial fibrillation     CHF (congestive heart failure)     Chronic right ear pain     h/o    Diabetes mellitus     h/o-  doesnt check sugar    Diverticulitis     GERD (gastroesophageal reflux disease)     Headache     Heart failure     History of kidney stones     passed them    History of shingles     1999- after transplant of stem cells    History of transfusion     1998-bhlex; 1999 [place], [place]- 1 unit- stem cell transplant -  recieved plts - no reaction recalled    Hodgkin lymphoma     Hypertension     Hypogonadism in male     LBBB (left bundle branch block)     PTSD (post-traumatic stress disorder)     Stem cells transplant status     Vision blurred     possibly need glasses     PSH:    Past Surgical History:   Procedure Laterality Date    BIVENTRICULLAR IMPLANTABLE CARDIOVERTER DEFIBRILLATOR PLACEMENT N/A 10/26/2023    Procedure: Implant ICD - bi ventricular; DNS meds; [place];  Surgeon: Wilmer Rojas DO;  Location:  [facility];  Service: Cardiovascular;  Laterality: N/A;    CARDIAC CATHETERIZATION N/A 05/30/2023    Procedure: Left Heart Cath;  Surgeon: Alec Mason III, MD;  Location:  [facility];  Service: Cardiovascular;  Laterality: N/A;    CARDIAC ELECTROPHYSIOLOGY PROCEDURE N/A 10/3/2024    Procedure: Ablation atrial fibrillation w PFA; CTA prior, GA, no meds to hold;  Surgeon: Wilmer Rojas DO;  Location: [facility];  Service: Cardiovascular;  Laterality: N/A;    CENTRAL VENOUS LINE INSERTION  1999    removed since    COLONOSCOPY      COLONOSCOPY N/A 4/22/2025    Procedure: COLONOSCOPY;  Surgeon: Randy White MD;  Location: [facility];  Service: Gastroenterology;  Laterality: N/A;    OTHER SURGICAL HISTORY      stem cell  transplant    PORTOCAVAL SHUNT PLACEMENT  1998    WISDOM TOOTH EXTRACTION       Patient denies allergy to contrast dye or latex  Immunization History:  Influenza: No  Pneumococcal: No  Tetanus: Probably not up-to-date    Social History:   Tobacco:   Social History     Tobacco Use   Smoking Status Some Days    Current packs/day: 0.25    Average packs/day: 0.3 packs/day for 22.5 years (5.6 ttl pk-yrs)    Types: Cigarettes    Start date: 11/2002    Last attempt to quit: 11/2022    Passive exposure: Past   Smokeless Tobacco Never   Tobacco Comments    smokes less than .25 ppd      Alcohol:     Social History     Substance and Sexual Activity   Alcohol Use Not Currently    Comment: stopped drinking 03/2025       Vitals:           There were no vitals taken for this visit.    Physical Exam:  General Appearance:    Alert, cooperative, no distress, appears stated age   Head:    Normocephalic, without obvious abnormality, atraumatic   Lungs:   Clear to auscultation bilaterally to the bases    Heart: S1-S2 without rubs murmurs or gallops    Abdomen:  Soft, nontender, bowel sounds present throughout.   Breast Exam:    deferred   Genitalia:    deferred   Extremities:   Extremities normal, atraumatic, no cyanosis or edema   Skin:   Skin color, texture, turgor normal, no rashes or lesions   Neurologic:   Grossly intact   Results Review  LABS:  Lab Results   Component Value Date    WBC 6.52 05/21/2025    HGB 15.3 05/21/2025    HCT 46.9 05/21/2025    MCV 89.3 05/21/2025     05/21/2025    NEUTROABS 6.01 03/07/2025    GLUCOSE 189 (H) 05/21/2025    BUN 16 05/21/2025    CREATININE 1.08 05/21/2025    EGFRIFNONA 75 03/02/2018     05/21/2025    K 4.1 05/21/2025     05/21/2025    CO2 25.0 05/21/2025    MG 2.2 10/23/2023    PHOS 2.5 05/29/2023    CALCIUM 9.7 05/21/2025    ALBUMIN 4.2 03/18/2025    AST 63 (H) 03/18/2025    ALT 75 (H) 03/18/2025    BILITOT 0.2 03/18/2025    INR 1.12 05/21/2025       RADIOLOGY:     I  reviewed the patient's new clinical results.    Cancer Staging (if applicable)  Cancer Patient: __ yes __no __unknown; If yes, clinical stage T:__ N:__M:__, stage group or __N/A    Impression: Diverticulitis  Left bundle branch block  Gastroesophageal reflux disease  Atrial fibrillation  Ejection fraction 41%  Presence of BiV defibrillator    Plan: Robotic sigmoid colectomy, possible open  Placement of bilateral ureteral catheters      TIM Horta   05/28/25   6:44 AM EDT

## 2025-05-28 NOTE — ANESTHESIA PREPROCEDURE EVALUATION
Anesthesia Evaluation     Patient summary reviewed and Nursing notes reviewed                Airway   Mallampati: II  TM distance: >3 FB  Neck ROM: full  No difficulty expected  Dental - normal exam     Pulmonary - normal exam   (+) a smoker Current,  Cardiovascular - normal exam    (+) pacemaker (ICD PLACED IN 2023, NEVER FIRED, CHECKED LAST MONTH 8.5 YEARS OF BATTERY LEFT) ICD, hypertension, dysrhythmias Atrial Fib, CHF     ROS comment: NONISCHEMIC CARDIOMYOPATHY    ECHO 4/15/25  ·  Left ventricular systolic function is mildly decreased. Calculated left ventricular EF = 42.5% Left ventricular ejection fraction appears to be 41 - 45%.  ·  Left ventricular wall thickness is consistent with borderline concentric hypertrophy.  ·  Left ventricular diastolic function was normal.  ·  Estimated right ventricular systolic pressure from tricuspid regurgitation is normal (<35 mmHg).         Neuro/Psych  (+) headaches, psychiatric history PTSD  GI/Hepatic/Renal/Endo    (+) GERD, diabetes mellitus    Musculoskeletal (-) negative ROS    Abdominal  - normal exam    Bowel sounds: normal.   Substance History - negative use     OB/GYN negative ob/gyn ROS         Other      history of cancer (HODGKIN'S LYMPHOMA)                  Anesthesia Plan    ASA 4     general     (TAPS FOR POSTOP PAIN)  intravenous induction     Anesthetic plan, risks, benefits, and alternatives have been provided, discussed and informed consent has been obtained with: patient.    Plan discussed with CRNA.    CODE STATUS:

## 2025-05-28 NOTE — ANESTHESIA PROCEDURE NOTES
Airway  Reason: elective    Date/Time: 5/28/2025 7:44 AM  Airway not difficult    General Information and Staff    Patient location during procedure: OR  CRNA/CAA: Josesito Prakash CRNA    Indications and Patient Condition  Indications for airway management: airway protection    Preoxygenated: yes  MILS not maintained throughout    Mask difficulty assessment: 0 - not attempted    Final Airway Details    Final airway type: endotracheal airway      Successful airway: ETT  Cuffed: yes   Successful intubation technique: video laryngoscopy and RSI  Adjuncts used in placement: intubating stylet  Endotracheal tube insertion site: oral  Blade: Faye  Blade size: 4  ETT size (mm): 8.0  Cormack-Lehane Classification: grade I - full view of glottis  Placement verified by: chest auscultation and capnometry   Cuff volume (mL): 8  Measured from: teeth  ETT/EBT  to teeth (cm): 22  Number of attempts at approach: 1  Assessment: lips, teeth, and gum same as pre-op and atraumatic intubation    Additional Comments  Negative epigastric sounds, Breath sound equal bilaterally with symmetric chest rise and fall

## 2025-05-28 NOTE — ANESTHESIA POSTPROCEDURE EVALUATION
Patient: Palomo Pollack    Procedure Summary       Date: 05/28/25 Room / Location:  [facility]  /  [facility]    Anesthesia Start: 0739 Anesthesia Stop: 1524    Procedures:       LOW ANTERIOR RESECTION, SPLENIC FLEXURE MOBILIZATION, FLEXIBLE SIGMOIDOSCOPY, LOOP ILEOSTOMY (Abdomen)      CYSTOSCOPY TEMPORARY PLACEMENT BILATERAL URETERAL CATHETER (Bladder) Diagnosis: Diverticular stricture    Surgeons: Rosas Plaza MD; Roosevelt Osuna MD Provider: Camilo Notrh MD    Anesthesia Type: general ASA Status: 4            Anesthesia Type: general    Vitals  No vitals data found for the desired time range.          Post Anesthesia Care and Evaluation    Patient location during evaluation: PACU  Patient participation: complete - patient participated  Level of consciousness: responsive to verbal stimuli  Pain score: 0  Pain management: adequate    Airway patency: patent  Anesthetic complications: No anesthetic complications  PONV Status: none  Cardiovascular status: acceptable, hemodynamically stable and stable  Respiratory status: acceptable, nasal cannula, spontaneous ventilation and nonlabored ventilation  Hydration status: acceptable    Comments: BP;119/73  RR;16  SPO2; 98%  HR;89  Temp;99.5  No anesthesia care post op

## 2025-05-28 NOTE — OP NOTE
Cystourethroscopy & Intraoperative Bilateral Catheter Placement Operative Report    Patient Name:  Palomo Pollack  YOB: 1980    Date of Surgery:  5/28/2025     Indications: 45-year-old male with history of diverticulitis presenting for sigmoid colectomy.  Urology consulted intraoperatively for ureteral identification catheter placement.    Pre-op Diagnosis:   Diverticulitis       Post-Op Diagnosis Codes:  Diverticulitis    Procedure/CPT® Codes: 15829, 94402    1. CYSTOSCOPY BILATERAL URETERAL CATHETER/STENT INSERTION  2. MODIFIER 50, BILATERAL STENT PLACEMENT  3. FUNEZ CATHETER INSERTION  CHANGE    Staff:  Surgeon(s):  Roosevelt Osuna MD        Anesthesia: General with Block    Estimated Blood Loss: none      Findings:   Normal urinary bladder  Insertion of bilateral 5 Irish ureteral identification catheter  Funez catheter insertion    Complications: None immediate    Description of Procedure:   The patient was identified in the preoperative holding area where informed consent was reviewed and signed. The patient was transported to the operating room per anesthesia and placed supine on the operating table. Smooth endotracheal intubation was performed without issue after administration of general anesthesia. The patient was then placed in the dorsal lithotomy position where genitals were prepped and draped in the usual sterile fashion. A brief timeout was performed identifying the correct patient procedure and laterality. Perioperative antibiotics were administered. All pressure points were padded.      Procedure began by inserting a 22 Irish cystoscope atraumatically per the patient's urethra. Upon entering the bladder formal cystoscopy was performed identifying bilateral orthotopic ureteral orifices and no evidence of tumor, stone, foreign body. Attention was then turned to the right ureteral orifice. A 5 Irish open ended ureteral catheter was inserted into the distal ureter and passed  up to the level right renal collecting system without difficulty.  10 mL ICG instilled through the ureteral catheter.  Attention was then turned to the left ureteral orifice.  A 5 Sinhala open ended ureteral catheter was inserted into the distal ureter and passed up to the level left renal collecting system without difficulty.   10 mL ICG instilled through the ureteral catheter.      A 16F catheter was placed.  10 mL placed in the balloon.  The ureteral catheters were affixed to the Funez catheter.     Patient remained under general anesthesia.  The patient will continue scheduled procedure with colorectal service, Dr. Mela MD. Urology service available to assist in any way during the procedure.    Roosevelt Osuna MD     Date: 5/28/2025  Time: 08:31 EDT

## 2025-05-29 LAB
ANION GAP SERPL CALCULATED.3IONS-SCNC: 12 MMOL/L (ref 5–15)
BASOPHILS # BLD AUTO: 0.02 10*3/MM3 (ref 0–0.2)
BASOPHILS NFR BLD AUTO: 0.2 % (ref 0–1.5)
BUN SERPL-MCNC: 18.1 MG/DL (ref 6–20)
BUN/CREAT SERPL: 19.9 (ref 7–25)
CALCIUM SPEC-SCNC: 8.4 MG/DL (ref 8.6–10.5)
CHLORIDE SERPL-SCNC: 99 MMOL/L (ref 98–107)
CO2 SERPL-SCNC: 24 MMOL/L (ref 22–29)
CREAT SERPL-MCNC: 0.91 MG/DL (ref 0.76–1.27)
DEPRECATED RDW RBC AUTO: 55.2 FL (ref 37–54)
EGFRCR SERPLBLD CKD-EPI 2021: 104.6 ML/MIN/1.73
EOSINOPHIL # BLD AUTO: 0 10*3/MM3 (ref 0–0.4)
EOSINOPHIL NFR BLD AUTO: 0 % (ref 0.3–6.2)
ERYTHROCYTE [DISTWIDTH] IN BLOOD BY AUTOMATED COUNT: 16.5 % (ref 12.3–15.4)
GLUCOSE SERPL-MCNC: 152 MG/DL (ref 65–99)
HCT VFR BLD AUTO: 39.8 % (ref 37.5–51)
HGB BLD-MCNC: 12.8 G/DL (ref 13–17.7)
IMM GRANULOCYTES # BLD AUTO: 0.05 10*3/MM3 (ref 0–0.05)
IMM GRANULOCYTES NFR BLD AUTO: 0.4 % (ref 0–0.5)
LYMPHOCYTES # BLD AUTO: 0.56 10*3/MM3 (ref 0.7–3.1)
LYMPHOCYTES NFR BLD AUTO: 4.6 % (ref 19.6–45.3)
MCH RBC QN AUTO: 29.5 PG (ref 26.6–33)
MCHC RBC AUTO-ENTMCNC: 32.2 G/DL (ref 31.5–35.7)
MCV RBC AUTO: 91.7 FL (ref 79–97)
MONOCYTES # BLD AUTO: 1.07 10*3/MM3 (ref 0.1–0.9)
MONOCYTES NFR BLD AUTO: 8.8 % (ref 5–12)
NEUTROPHILS NFR BLD AUTO: 10.46 10*3/MM3 (ref 1.7–7)
NEUTROPHILS NFR BLD AUTO: 86 % (ref 42.7–76)
NRBC BLD AUTO-RTO: 0 /100 WBC (ref 0–0.2)
PLATELET # BLD AUTO: 113 10*3/MM3 (ref 140–450)
PMV BLD AUTO: 10.2 FL (ref 6–12)
POTASSIUM SERPL-SCNC: 4 MMOL/L (ref 3.5–5.2)
RBC # BLD AUTO: 4.34 10*6/MM3 (ref 4.14–5.8)
SODIUM SERPL-SCNC: 135 MMOL/L (ref 136–145)
WBC NRBC COR # BLD AUTO: 12.16 10*3/MM3 (ref 3.4–10.8)

## 2025-05-29 PROCEDURE — 97530 THERAPEUTIC ACTIVITIES: CPT

## 2025-05-29 PROCEDURE — 80048 BASIC METABOLIC PNL TOTAL CA: CPT | Performed by: SURGERY

## 2025-05-29 PROCEDURE — 85025 COMPLETE CBC W/AUTO DIFF WBC: CPT | Performed by: SURGERY

## 2025-05-29 PROCEDURE — 25010000002 DIAZEPAM PER 5 MG: Performed by: SURGERY

## 2025-05-29 PROCEDURE — 25010000002 HYDROMORPHONE 1 MG/ML SOLUTION: Performed by: SURGERY

## 2025-05-29 PROCEDURE — 97161 PT EVAL LOW COMPLEX 20 MIN: CPT

## 2025-05-29 PROCEDURE — 25010000002 HEPARIN (PORCINE) PER 1000 UNITS: Performed by: SURGERY

## 2025-05-29 RX ADMIN — HYDROMORPHONE HYDROCHLORIDE 0.5 MG: 1 INJECTION, SOLUTION INTRAMUSCULAR; INTRAVENOUS; SUBCUTANEOUS at 14:01

## 2025-05-29 RX ADMIN — OXYCODONE HYDROCHLORIDE AND ACETAMINOPHEN 1 TABLET: 5; 325 TABLET ORAL at 05:29

## 2025-05-29 RX ADMIN — ERYTHROMYCIN 1 APPLICATION: 5 OINTMENT OPHTHALMIC at 23:54

## 2025-05-29 RX ADMIN — OXYCODONE HYDROCHLORIDE AND ACETAMINOPHEN 1 TABLET: 5; 325 TABLET ORAL at 20:11

## 2025-05-29 RX ADMIN — HEPARIN SODIUM 5000 UNITS: 5000 INJECTION INTRAVENOUS; SUBCUTANEOUS at 20:12

## 2025-05-29 RX ADMIN — ERYTHROMYCIN 1 APPLICATION: 5 OINTMENT OPHTHALMIC at 05:27

## 2025-05-29 RX ADMIN — FAMOTIDINE 20 MG: 20 TABLET, FILM COATED ORAL at 20:11

## 2025-05-29 RX ADMIN — DIAZEPAM 5 MG: 10 INJECTION, SOLUTION INTRAMUSCULAR; INTRAVENOUS at 23:53

## 2025-05-29 RX ADMIN — HYDROMORPHONE HYDROCHLORIDE 0.5 MG: 1 INJECTION, SOLUTION INTRAMUSCULAR; INTRAVENOUS; SUBCUTANEOUS at 08:50

## 2025-05-29 RX ADMIN — HYDROMORPHONE HYDROCHLORIDE 0.5 MG: 1 INJECTION, SOLUTION INTRAMUSCULAR; INTRAVENOUS; SUBCUTANEOUS at 16:43

## 2025-05-29 RX ADMIN — DIAZEPAM 5 MG: 10 INJECTION, SOLUTION INTRAMUSCULAR; INTRAVENOUS at 16:43

## 2025-05-29 RX ADMIN — ERYTHROMYCIN 1 APPLICATION: 5 OINTMENT OPHTHALMIC at 13:26

## 2025-05-29 RX ADMIN — EMPAGLIFLOZIN 10 MG: 10 TABLET, FILM COATED ORAL at 08:50

## 2025-05-29 RX ADMIN — HYDROMORPHONE HYDROCHLORIDE 0.5 MG: 1 INJECTION, SOLUTION INTRAMUSCULAR; INTRAVENOUS; SUBCUTANEOUS at 04:11

## 2025-05-29 RX ADMIN — METOPROLOL SUCCINATE 25 MG: 25 TABLET, FILM COATED, EXTENDED RELEASE ORAL at 08:50

## 2025-05-29 RX ADMIN — SACUBITRIL AND VALSARTAN 1 TABLET: 49; 51 TABLET, FILM COATED ORAL at 20:11

## 2025-05-29 RX ADMIN — HYDROMORPHONE HYDROCHLORIDE 0.5 MG: 1 INJECTION, SOLUTION INTRAMUSCULAR; INTRAVENOUS; SUBCUTANEOUS at 20:11

## 2025-05-29 RX ADMIN — FAMOTIDINE 20 MG: 20 TABLET, FILM COATED ORAL at 08:50

## 2025-05-29 RX ADMIN — HEPARIN SODIUM 5000 UNITS: 5000 INJECTION INTRAVENOUS; SUBCUTANEOUS at 05:27

## 2025-05-29 RX ADMIN — HYDROMORPHONE HYDROCHLORIDE 0.5 MG: 1 INJECTION, SOLUTION INTRAMUSCULAR; INTRAVENOUS; SUBCUTANEOUS at 23:53

## 2025-05-29 RX ADMIN — ERYTHROMYCIN: 5 OINTMENT OPHTHALMIC at 18:25

## 2025-05-29 RX ADMIN — SACUBITRIL AND VALSARTAN 1 TABLET: 49; 51 TABLET, FILM COATED ORAL at 08:50

## 2025-05-29 NOTE — NURSING NOTE
"[facility] visit for Stoma care and assessment    Surgery date: 5/28/25  Surgical Procedures Since Admission:  Procedure(s):  LOW ANTERIOR RESECTION, SPLENIC FLEXURE MOBILIZATION, FLEXIBLE SIGMOIDOSCOPY, LOOP ILEOSTOMY  CYSTOSCOPY TEMPORARY PLACEMENT BILATERAL URETERAL CATHETER  Surgeon:  Rosas Plaza MD  Status:  1 Day Post-Op  -------------------       [facility] Care Outcome: Patient seen for ostomy care, assessment and education. Patient awake and alert.. Patient seen in the Medical/Surgical Floor .  No family at beside.    Patient complaining of severe pain in his right eye.  He states that he did note the pain prior to admission but since surgery the discomfort has amplified.  He notes that the \"pain feels like it is behind his eye\".  I attempted to irrigate his eye with a 10 cc normal saline syringe without any improvement.  His primary nurse was notified.  May need evaluation by ophthalmology to resolve issue.    Stoma Type: Loop Ileostomy  Diameter/shape: Round, measurements not taken  Location: RLQ  Protrusion: Budded  Stoma Characteristics: Round, Moist, Red, and Bridge in place  Marquise: Yes, not sutured to peristomal skin  Tension: Moderate amount of tension  Peristomal skin: MARIBEL =unable to assess  Effluent Characteristics: Bilious   Effluent volume emptied:  ml.    Current pouching system: Letty, 1 piece, flat, drainable  Accessory products used:   Goal wear time: 3-4 days  Emptying frequency of appliance per day: Recommend emptying when 1/3-1/2 full.   Last appliance change: 5/28/2025      Follow up visit summary:   Stoma viable.  Return of bowel function noted.  Bilious output noted in tail of appliance.  Flatus noted as well.  Ostomy education folder provided.  Unfortunately unable to begin education due to patient's pain in his right eye.  Ambulation encouraged.  Discussed importance of regular hydration due to risk for dehydration having an ileostomy.  Discussed dietary recommendations in the " postoperative period.  Will reach out to registered dietitian for additional input.  Daily education to resume tomorrow.      WOC Nurse will continue to follow daily for ostomy education. Please contact with questions or if new needs arise.

## 2025-05-29 NOTE — CASE MANAGEMENT/SOCIAL WORK
Discharge Planning Assessment  [facility]     Patient Name: Palomo Pollack  MRN: [id number]  Today's Date: 5/29/2025    Admit Date: 5/28/2025    Plan: Home   Discharge Needs Assessment       Row Name 05/29/25 1446       Living Environment    People in Home significant other    Current Living Arrangements home    Potentially Unsafe Housing Conditions none    In the past 12 months has the electric, gas, oil, or water company threatened to shut off services in your home? 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 no    In the past 12 months, has lack of transportation kept you from meetings, work, or from getting things needed for daily living? No       Food Insecurity    Within the past 12 months, you worried that your food would run out before you got the money to buy more. Never true    Within the past 12 months, the food you bought just didn't last and you didn't have money to get more. Never true       Transition Planning    Patient/Family Anticipates Transition to home with family    Patient/Family Anticipated Services at Transition none    Transportation Anticipated family or friend will provide       Discharge Needs Assessment    Equipment Currently Used at Home none    Do you want help finding or keeping work or a job? Patient declined    Do you want help with school or training? For example, starting or completing job training or getting a high school diploma, GED or equivalent No                   Discharge Plan       Row Name 05/29/25 1448       Plan    Plan Home    Patient/Family in Agreement with Plan yes    Plan Comments CM  spoke with patient and significant other, Slime, at the bedside. Patient lives with Slime in [facility] [place]. Patient states he is independent with all ADLs. Denies any current or previous DME, 02, or HH use. PCP: Dr. Heladio Valentine. Pharmacy: [facility] in [place]. Slime can transport when patient is medically ready for discharge. Verified primary insurance as [facility]. 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 He now has financial concerns related to recovery time. CM has reached out to  to provide patient with potential information and resources. No further needs identified at this time. CM following    Final Discharge Disposition Code 01 - home or self-care                  Selected Continued Care - Prior Encounters Includes continued care and service providers with selected services from prior encounters from 2/27/2025 to 5/29/2025      Discharged on 3/7/2025 Admission date: 3/4/2025 - Discharge disposition: Home or Self Care      Dialysis/Infusion       Service Provider Services Address Phone Fax Patient Preferred    [place] [facility] [address] [facility] [address] [id number] [phone] --       Internal Comment last updated by Danna North, RN 3/7/2025 1315    Follow-up with Dr. Seth on 3/14/25 at 10:30 AM.               [facility] [address] [facility] [address] [phone] --       Internal Comment last updated by Danna North, RN 3/7/2025 1236    Teaching, antibiotic & supplies                                        Demographic Summary       Row Name 05/29/25 1447       General Information    Admission Type inpatient    Arrived From home    Preferred Language English                   Functional Status       Row Name 05/29/25 1441        Functional Status    Usual Activity Tolerance good    Current Activity Tolerance good       Physical Activity    On average, how many days per week do you engage in moderate to strenuous exercise (like a brisk walk)? 7 days    On average, how many minutes do you engage in exercise at this level? 30 min    Number of minutes of exercise per week 210       Functional Status, IADL    Medications independent    Meal Preparation independent    Housekeeping independent    Laundry independent    Shopping independent    If for any reason you need help with day-to-day activities such as bathing, preparing meals, shopping, managing finances, etc., do you get the help you need? I don't need any help       Mental Status    General Appearance WDL WDL                   Psychosocial    No documentation.                  Abuse/Neglect    No documentation.                  Legal    No documentation.                  Substance Abuse    No documentation.                  Patient Forms    No documentation.                     Litzy Do RN

## 2025-05-29 NOTE — PROGRESS NOTES
"Patient Name:  Palomo Pollack  YOB: 1980  [id number]    Surgery Progress Note    Date of visit: 5/29/2025    Subjective     Pain well-controlled.  No nausea or vomiting.  No fevers or chills.  Having ostomy output.         Objective       /94 (BP Location: Left arm, Patient Position: Lying)   Pulse 107   Temp 98.2 °F (36.8 °C) (Oral)   Resp 18   Ht 180.3 cm (70.98\")   Wt 90.2 kg (198 lb 13.7 oz)   SpO2 95%   BMI 27.75 kg/m²     Intake/Output Summary (Last 24 hours) at 5/29/2025 0931  Last data filed at 5/29/2025 0418  Gross per 24 hour   Intake 2500 ml   Output 1925 ml   Net 575 ml   MILLA 100 cc    CV:  Rhythm regular and rate regular  L:  Clear to auscultation bilaterally  Abd:  Bowel sounds positive, soft, appropriately tender, dressings clean dry and intact, MILLA serosanguineous, ostomy viable and functioning  Ext:  No cyanosis, clubbing, edema    Recent labs and imaging that are back at this time have been reviewed.   Creatinine 0.91  WBC 12.1  Hemoglobin 12.8       Assessment & Plan     Patient postop day 1 from low anterior resection and loop ileostomy creation for diverticular stricture.  Advance to full liquid diet.  Keep Funez in for now given dissection close to the bladder.  Wound care consult for new ostomy.  Out of bed, incentive spirometer, DVT prophylaxis.        Rosas Plaza MD  5/29/2025  09:31 EDT     "

## 2025-05-29 NOTE — THERAPY EVALUATION
Patient Name: Palomo Pollack  : 1980    MRN: [id number]                              Today's Date: 2025       Admit Date: 2025    Visit Dx:     ICD-10-CM ICD-9-CM   1. Diverticulitis  K57.92 562.11     Patient Active Problem List   Diagnosis    Hypogonadism, male    History of Lymphoma    Tobacco use    GERD (gastroesophageal reflux disease)    PTSD (post-traumatic stress disorder)    LBBB (left bundle branch block)    Chronic HFrEF (heart failure with reduced ejection fraction)    Presence of biventricular implantable cardioverter-defibrillator (ICD)    Paroxysmal atrial fibrillation    NICM (nonischemic cardiomyopathy)    Persistent atrial fibrillation    Diverticular disease of intestine with perforation and abscess    Screen for colon cancer    Diarrhea    Diverticular stricture     Past Medical History:   Diagnosis Date    Allergic rhinitis     Anesthesia complication     irritable after    Atrial fibrillation     CHF (congestive heart failure)     Chronic right ear pain     h/o    Diabetes mellitus     h/o-  doesnt check sugar    Diverticulitis     GERD (gastroesophageal reflux disease)     Headache     Heart failure     History of kidney stones     passed them    History of shingles     - after transplant of stem cells    History of transfusion     -bhlex;  [place]- 1 unit- stem cell transplant -  recieved plts - no reaction recalled    Hodgkin lymphoma     Hypertension     Hypogonadism in male     LBBB (left bundle branch block)     PTSD (post-traumatic stress disorder)     Stem cells transplant status     Vision blurred     possibly need glasses     Past Surgical History:   Procedure Laterality Date    BIVENTRICULLAR IMPLANTABLE CARDIOVERTER DEFIBRILLATOR PLACEMENT N/A 10/26/2023    Procedure: Implant ICD - bi ventricular; DNS meds; [place];  Surgeon: Wilmer Rojas DO;  Location: [facility] INVASIVE LOCATION;  Service: Cardiovascular;  Laterality: N/A;     CARDIAC CATHETERIZATION N/A 05/30/2023    Procedure: Left Heart Cath;  Surgeon: Alec Mason III, MD;  Location:  [facility] CATH INVASIVE LOCATION;  Service: Cardiovascular;  Laterality: N/A;    CARDIAC ELECTROPHYSIOLOGY PROCEDURE N/A 10/3/2024    Procedure: Ablation atrial fibrillation w PFA; CTA prior, GA, no meds to hold;  Surgeon: Wilmer Rojas DO;  Location:  [facility] EP INVASIVE LOCATION;  Service: Cardiovascular;  Laterality: N/A;    CENTRAL VENOUS LINE INSERTION  1999    removed since    COLON RESECTION N/A 5/28/2025    Procedure: LOW ANTERIOR RESECTION, SPLENIC FLEXURE MOBILIZATION, FLEXIBLE SIGMOIDOSCOPY, LOOP ILEOSTOMY;  Surgeon: Rosas Plaza MD;  Location:  [facility] OR;  Service: Robotics - [place];  Laterality: N/A;    COLONOSCOPY      COLONOSCOPY N/A 4/22/2025    Procedure: COLONOSCOPY;  Surgeon: Randy White MD;  Location:  [facility]OSCOPY;  Service: Gastroenterology;  Laterality: N/A;    CYSTOSCOPY N/A 5/28/2025    Procedure: CYSTOSCOPY TEMPORARY PLACEMENT BILATERAL URETERAL CATHETER;  Surgeon: Roosevelt Osuna MD;  Location:  [facility] OR;  Service: Urology;  Laterality: N/A;    OTHER SURGICAL HISTORY      stem cell transplant    PORTOCAVAL SHUNT PLACEMENT  1998    WISDOM TOOTH EXTRACTION        General Information       Row Name 05/29/25 1016          Physical Therapy Time and Intention    Document Type evaluation  -AC     Mode of Treatment physical therapy  -AC       Row Name 05/29/25 1016          General Information    Patient Profile Reviewed yes  -AC     Prior Level of Function independent:;all household mobility;community mobility;gait;transfer;bed mobility;ADL's  -AC     Existing Precautions/Restrictions fall;other (see comments)  MILLA drain, michael catheter, abdominal sparing  -AC     Barriers to Rehab none identified;visual deficit  -AC       Row Name 05/29/25 1016          Living Environment    Current Living Arrangements home  -AC     People in Home significant other  -AC       Row Name  05/29/25 1016          Home Main Entrance    Number of Stairs, Main Entrance none  -       Row Name 05/29/25 1016          Stairs Within Home, Primary    Number of Stairs, Within Home, Primary none  -       Row Name 05/29/25 1016          Cognition    Orientation Status (Cognition) oriented x 4  -       Row Name 05/29/25 1016          Safety Issues/Impairments Affecting Functional Mobility    Safety Issues Affecting Function (Mobility) awareness of need for assistance;insight into deficits/self-awareness;safety precautions follow-through/compliance;safety precaution awareness;sequencing abilities  -     Impairments Affecting Function (Mobility) balance;endurance/activity tolerance;pain;strength  -               User Key  (r) = Recorded By, (t) = Taken By, (c) = Cosigned By      Initials Name Provider Type    AC Rachelle Lorenz, PT Physical Therapist                   Mobility       Row Name 05/29/25 1020          Bed Mobility    Bed Mobility rolling left;sidelying-sit  -     Rolling Left [place] (Bed Mobility) minimum assist (75% patient effort);1 person assist;verbal cues;nonverbal cues (demo/gesture)  -     Sidelying-Sit [place] (Bed Mobility) contact guard;1 person assist;verbal cues;nonverbal cues (demo/gesture)  -     Assistive Device (Bed Mobility) bed rails;head of bed elevated  -     Comment, (Bed Mobility) Pt required increased time and effort to perform log roll to transition to sitting EOB. No dizziness reported  -[facility] Name 05/29/25 1020          Sit-Stand Transfer    Sit-Stand [place] (Transfers) standby assist;1 person assist  -     Assistive Device (Sit-Stand Transfers) other (see comments)  no AD  -AC     Comment, (Sit-Stand Transfer) Pt impulsivly stood from EOB due to significant pain while sitting EOB. Pt required CGA to steady himself initially  -[facility] Name 05/29/25 1020          Gait/Stairs (Locomotion)    [place] Level (Gait) contact guard;1  person assist  -AC     Assistive Device (Gait) other (see comments)  no AD  -AC     Patient was able to Ambulate yes  -AC     Distance in Feet (Gait) 15  -AC     Deviations/Abnormal Patterns (Gait) jacob decreased;gait speed decreased;base of support, narrow  -AC     Bilateral Gait Deviations forward flexed posture;heel strike decreased  -     Comment, (Gait/Stairs) Pt ambulated within room w/ CGAx1, no AD, and a step through gait pattern. Pt demonstrated slowed gait speed, decreased foot clearance bilaterally, and slightly flexed trunk posture due to significant eye pain. No overt LOB or buckling noted however d/t eye pain/impaired vision pt demonstrated decreased safety awareness and ambulation was limited.  -               User Key  (r) = Recorded By, (t) = Taken By, (c) = Cosigned By      Initials Name Provider Type    AC Rachelle Lorenz, PT Physical Therapist                   Obj/Interventions       Row Name 05/29/25 1026          Range of Motion Comprehensive    General Range of Motion bilateral lower extremity ROM WNL  -       Row Name 05/29/25 1026          Strength Comprehensive (MMT)    General Manual Muscle Testing (MMT) Assessment lower extremity strength deficits identified  -AC     Comment, General Manual Muscle Testing (MMT) Assessment BLE's grossly WFL  -       Row Name 05/29/25 1026          Balance    Balance Assessment sitting static balance;sitting dynamic balance;standing static balance;standing dynamic balance  -AC     Static Sitting Balance independent  -AC     Dynamic Sitting Balance independent  -AC     Position, Sitting Balance unsupported;sitting edge of bed  -AC     Static Standing Balance standby assist  -AC     Dynamic Standing Balance contact guard  -     Position/Device Used, Standing Balance unsupported  -AC     Balance Interventions sitting;standing;sit to stand;supported;static;dynamic  -       Row Name 05/29/25 1026          Sensory Assessment (Somatosensory)    Sensory  Assessment (Somatosensory) LE sensation intact  -AC               User Key  (r) = Recorded By, (t) = Taken By, (c) = Cosigned By      Initials Name Provider Type    AC Rachelle Lorenz PT Physical Therapist                   Goals/Plan       Row Name 05/29/25 1032          Bed Mobility Goal 1 (PT)    Activity/Assistive Device (Bed Mobility Goal 1, PT) supine to sit  -AC     [place] Level/Cues Needed (Bed Mobility Goal 1, PT) standby assist  -AC     Time Frame (Bed Mobility Goal 1, PT) short term goal (STG);5 days  -AC       Row Name 05/29/25 1032          Transfer Goal 1 (PT)    Activity/Assistive Device (Transfer Goal 1, PT) bed-to-chair/chair-to-bed  -AC     [place] Level/Cues Needed (Transfer Goal 1, PT) standby assist  -AC     Time Frame (Transfer Goal 1, PT) short term goal (STG);5 days  -       Row Name 05/29/25 1032          Gait Training Goal 1 (PT)    Activity/Assistive Device (Gait Training Goal 1, PT) gait (walking locomotion);decrease fall risk;improve balance and speed;increase endurance/gait distance  -AC     [place] Level (Gait Training Goal 1, PT) standby assist  -AC     Distance (Gait Training Goal 1, PT) 200  -AC     Time Frame (Gait Training Goal 1, PT) long term goal (LTG);10 days  -AC       Row Name 05/29/25 1032          Therapy Assessment/Plan (PT)    Planned Therapy Interventions (PT) balance training;bed mobility training;gait training;home exercise program;patient/family education;strengthening;transfer training;ROM (range of motion);postural re-education;stretching  -AC               User Key  (r) = Recorded By, (t) = Taken By, (c) = Cosigned By      Initials Name Provider Type    AC Rachelle Lorenz PT Physical Therapist                   Clinical Impression       Row Name 05/29/25 1026          Pain    Pretreatment Pain Rating 9/10  -AC     Posttreatment Pain Rating 9/10  -AC     Pain Location other (see comments)  eye  -AC     Pain Side/Orientation right  -AC     Pain  Management Interventions activity modification encouraged;exercise or physical activity utilized;positioning techniques utilized  -AC     Response to Pain Interventions no change per patient report  -AC       Row Name 05/29/25 1026          Plan of Care Review    Plan of Care Reviewed With patient  -AC     Progress no change  -AC     Outcome Evaluation PT initial evaluation complete. Pt presents w/ generalized weakness, decreased activity tolerance, and significant pain this date. Pt ambulated within room w/ CGAx1 and no AD however gait distance limited due to patients significant R eye pain. No overt LOB or buckling noted. IPPT services warrantef to address deficits listed above. D/c rec is home w/ assist when medically ready for discharge.  -AC       Row Name 05/29/25 1026          Therapy Assessment/Plan (PT)    Rehab Potential (PT) good  -AC     Criteria for Skilled Interventions Met (PT) yes  -AC     Therapy Frequency (PT) daily  -AC     Predicted Duration of Therapy Intervention (PT) 10 days  -       Row Name 05/29/25 1026          Vital Signs    O2 Delivery Pre Treatment room air  -AC     O2 Delivery Intra Treatment room air  -AC     O2 Delivery Post Treatment room air  -AC     Pre Patient Position Supine  -AC     Intra Patient Position Standing  -AC     Post Patient Position Sitting  -AC       Row Name 05/29/25 1026          Positioning and Restraints    Pre-Treatment Position in bed  -AC     Post Treatment Position chair  -AC     In Chair notified nsg;reclined;sitting;call light within reach;encouraged to call for assist;waffle cushion;legs elevated  alarm unchanged. RN approved  -               User Key  (r) = Recorded By, (t) = Taken By, (c) = Cosigned By      Initials Name Provider Type    AC Rachelle Lorenz, PT Physical Therapist                   Outcome Measures       Row Name 05/29/25 1033          How much help from another person do you currently need...    Turning from your back to your side while  in flat bed without using bedrails? 3  -AC     Moving from lying on back to sitting on the side of a flat bed without bedrails? 3  -AC     Moving to and from a bed to a chair (including a wheelchair)? 3  -AC     Standing up from a chair using your arms (e.g., wheelchair, bedside chair)? 3  -AC     Climbing 3-5 steps with a railing? 3  -AC     To walk in hospital room? 3  -AC     AM-PAC 6 Clicks Score (PT) 18  -AC     Highest Level of Mobility Goal Walk 10 Steps or More-6  -AC       Row Name 05/29/25 1033          Functional Assessment    Outcome Measure Options AM-PAC 6 Clicks Basic Mobility (PT)  -               User Key  (r) = Recorded By, (t) = Taken By, (c) = Cosigned By      Initials Name Provider Type    AC Rachelle Lorenz, PT Physical Therapist                                 Physical Therapy Education       Title: PT OT SLP Therapies (In Progress)       Topic: Physical Therapy (In Progress)       Point: Mobility training (Done)       Learning Progress Summary            Patient Acceptance, E, VU by  at 5/29/2025 1033                      Point: Home exercise program (Not Started)       Learner Progress:  Not documented in this visit.              Point: Body mechanics (Done)       Learning Progress Summary            Patient Acceptance, E, VU by  at 5/29/2025 1033                      Point: Precautions (Done)       Learning Progress Summary            Patient Acceptance, E, VU by  at 5/29/2025 1033                                      User Key       Initials Effective Dates Name Provider Type Discipline     07/11/24 -  Rachelle Lorenz, PT Physical Therapist PT                  PT Recommendation and Plan  Planned Therapy Interventions (PT): balance training, bed mobility training, gait training, home exercise program, patient/family education, strengthening, transfer training, ROM (range of motion), postural re-education, stretching  Progress: no change  Outcome Evaluation: PT initial evaluation complete.  Pt presents w/ generalized weakness, decreased activity tolerance, and significant pain this date. Pt ambulated within room w/ CGAx1 and no AD however gait distance limited due to patients significant R eye pain. No overt LOB or buckling noted. IPPT services warrantef to address deficits listed above. D/c rec is home w/ assist when medically ready for discharge.     Time Calculation:   PT Evaluation Complexity  History, PT Evaluation Complexity: 1-2 personal factors and/or comorbidities  Examination of Body Systems (PT Eval Complexity): total of 3 or more elements  Clinical Presentation (PT Evaluation Complexity): stable  Clinical Decision Making (PT Evaluation Complexity): low complexity  Overall Complexity (PT Evaluation Complexity): low complexity     PT Charges       Row Name 05/29/25 1034             Time Calculation    Start Time 0929  -AC      PT Received On 05/29/25  -AC      PT Goal Re-Cert Due Date 06/08/25  -AC         Time Calculation- PT    Total Timed Code Minutes- PT 13 minute(s)  -AC         Timed Charges    05850 - PT Therapeutic Activity Minutes 13  -AC         Untimed Charges    PT Eval/Re-eval Minutes 50  -AC         Total Minutes    Timed Charges Total Minutes 13  -AC      Untimed Charges Total Minutes 50  -AC       Total Minutes 63  -AC                User Key  (r) = Recorded By, (t) = Taken By, (c) = Cosigned By      Initials Name Provider Type    AC Rachelle Lorenz, PT Physical Therapist                  Therapy Charges for Today       Code Description Service Date Service Provider Modifiers Qty    28518209102 HC PT THERAPEUTIC ACT EA 15 MIN 5/29/2025 Rachelle Lorenz, PT GP 1    51065196975 HC PT EVAL LOW COMPLEXITY 4 5/29/2025 Rachelle Lorenz, PT GP 1            PT G-Codes  Outcome Measure Options: AM-PAC 6 Clicks Basic Mobility (PT)  AM-PAC 6 Clicks Score (PT): 18  PT Discharge Summary  Anticipated Discharge Disposition (PT): home with assist    Rachelle Lorenz PT  5/29/2025

## 2025-05-29 NOTE — PLAN OF CARE
Goal Outcome Evaluation:  Plan of Care Reviewed With: patient        Progress: no change  Outcome Evaluation: PT initial evaluation complete. Pt presents w/ generalized weakness, decreased activity tolerance, and significant pain this date. Pt ambulated within room w/ CGAx1 and no AD however gait distance limited due to patients significant R eye pain. No overt LOB or buckling noted. IPPT services warrantef to address deficits listed above. D/c rec is home w/ assist when medically ready for discharge.    Anticipated Discharge Disposition (PT): home with assist

## 2025-05-30 LAB
ANION GAP SERPL CALCULATED.3IONS-SCNC: 12 MMOL/L (ref 5–15)
BASOPHILS # BLD AUTO: 0.03 10*3/MM3 (ref 0–0.2)
BASOPHILS NFR BLD AUTO: 0.4 % (ref 0–1.5)
BUN SERPL-MCNC: 14.4 MG/DL (ref 6–20)
BUN/CREAT SERPL: 19.5 (ref 7–25)
CALCIUM SPEC-SCNC: 9.1 MG/DL (ref 8.6–10.5)
CHLORIDE SERPL-SCNC: 96 MMOL/L (ref 98–107)
CO2 SERPL-SCNC: 27 MMOL/L (ref 22–29)
CREAT SERPL-MCNC: 0.74 MG/DL (ref 0.76–1.27)
CYTO UR: NORMAL
DEPRECATED RDW RBC AUTO: 53.7 FL (ref 37–54)
EGFRCR SERPLBLD CKD-EPI 2021: 112.5 ML/MIN/1.73
EOSINOPHIL # BLD AUTO: 0.07 10*3/MM3 (ref 0–0.4)
EOSINOPHIL NFR BLD AUTO: 0.8 % (ref 0.3–6.2)
ERYTHROCYTE [DISTWIDTH] IN BLOOD BY AUTOMATED COUNT: 15.9 % (ref 12.3–15.4)
GLUCOSE SERPL-MCNC: 87 MG/DL (ref 65–99)
HCT VFR BLD AUTO: 39 % (ref 37.5–51)
HGB BLD-MCNC: 12.9 G/DL (ref 13–17.7)
IMM GRANULOCYTES # BLD AUTO: 0.04 10*3/MM3 (ref 0–0.05)
IMM GRANULOCYTES NFR BLD AUTO: 0.5 % (ref 0–0.5)
LAB AP CASE REPORT: NORMAL
LAB AP CLINICAL INFORMATION: NORMAL
LYMPHOCYTES # BLD AUTO: 0.91 10*3/MM3 (ref 0.7–3.1)
LYMPHOCYTES NFR BLD AUTO: 10.8 % (ref 19.6–45.3)
MCH RBC QN AUTO: 30.4 PG (ref 26.6–33)
MCHC RBC AUTO-ENTMCNC: 33.1 G/DL (ref 31.5–35.7)
MCV RBC AUTO: 92 FL (ref 79–97)
MONOCYTES # BLD AUTO: 0.96 10*3/MM3 (ref 0.1–0.9)
MONOCYTES NFR BLD AUTO: 11.4 % (ref 5–12)
NEUTROPHILS NFR BLD AUTO: 6.41 10*3/MM3 (ref 1.7–7)
NEUTROPHILS NFR BLD AUTO: 76.1 % (ref 42.7–76)
NRBC BLD AUTO-RTO: 0 /100 WBC (ref 0–0.2)
PATH REPORT.FINAL DX SPEC: NORMAL
PATH REPORT.GROSS SPEC: NORMAL
PLATELET # BLD AUTO: 113 10*3/MM3 (ref 140–450)
PMV BLD AUTO: 10 FL (ref 6–12)
POTASSIUM SERPL-SCNC: 3.4 MMOL/L (ref 3.5–5.2)
RBC # BLD AUTO: 4.24 10*6/MM3 (ref 4.14–5.8)
SODIUM SERPL-SCNC: 135 MMOL/L (ref 136–145)
WBC NRBC COR # BLD AUTO: 8.42 10*3/MM3 (ref 3.4–10.8)

## 2025-05-30 PROCEDURE — 85025 COMPLETE CBC W/AUTO DIFF WBC: CPT | Performed by: SURGERY

## 2025-05-30 PROCEDURE — 25010000002 HYDROMORPHONE 1 MG/ML SOLUTION: Performed by: SURGERY

## 2025-05-30 PROCEDURE — 25010000002 HEPARIN (PORCINE) PER 1000 UNITS: Performed by: SURGERY

## 2025-05-30 PROCEDURE — 25010000002 ONDANSETRON PER 1 MG: Performed by: SURGERY

## 2025-05-30 PROCEDURE — 80048 BASIC METABOLIC PNL TOTAL CA: CPT | Performed by: SURGERY

## 2025-05-30 RX ORDER — AMOXICILLIN AND CLAVULANATE POTASSIUM 500; 125 MG/1; MG/1
1 TABLET, FILM COATED ORAL 2 TIMES DAILY
Status: DISCONTINUED | OUTPATIENT
Start: 2025-05-30 | End: 2025-05-31 | Stop reason: HOSPADM

## 2025-05-30 RX ADMIN — AMOXICILLIN AND CLAVULANATE POTASSIUM 500 MG: 500; 125 TABLET, FILM COATED ORAL at 09:17

## 2025-05-30 RX ADMIN — ERYTHROMYCIN: 5 OINTMENT OPHTHALMIC at 12:44

## 2025-05-30 RX ADMIN — HYDROMORPHONE HYDROCHLORIDE 0.5 MG: 1 INJECTION, SOLUTION INTRAMUSCULAR; INTRAVENOUS; SUBCUTANEOUS at 10:37

## 2025-05-30 RX ADMIN — HYDROMORPHONE HYDROCHLORIDE 0.5 MG: 1 INJECTION, SOLUTION INTRAMUSCULAR; INTRAVENOUS; SUBCUTANEOUS at 14:23

## 2025-05-30 RX ADMIN — ERYTHROMYCIN: 5 OINTMENT OPHTHALMIC at 23:23

## 2025-05-30 RX ADMIN — EMPAGLIFLOZIN 10 MG: 10 TABLET, FILM COATED ORAL at 08:21

## 2025-05-30 RX ADMIN — AMOXICILLIN AND CLAVULANATE POTASSIUM 500 MG: 500; 125 TABLET, FILM COATED ORAL at 21:02

## 2025-05-30 RX ADMIN — HEPARIN SODIUM 5000 UNITS: 5000 INJECTION INTRAVENOUS; SUBCUTANEOUS at 06:19

## 2025-05-30 RX ADMIN — OXYCODONE HYDROCHLORIDE AND ACETAMINOPHEN 1 TABLET: 5; 325 TABLET ORAL at 14:23

## 2025-05-30 RX ADMIN — ERYTHROMYCIN: 5 OINTMENT OPHTHALMIC at 06:46

## 2025-05-30 RX ADMIN — FAMOTIDINE 20 MG: 20 TABLET, FILM COATED ORAL at 08:21

## 2025-05-30 RX ADMIN — SACUBITRIL AND VALSARTAN 1 TABLET: 49; 51 TABLET, FILM COATED ORAL at 21:02

## 2025-05-30 RX ADMIN — ERYTHROMYCIN: 5 OINTMENT OPHTHALMIC at 17:44

## 2025-05-30 RX ADMIN — OXYCODONE HYDROCHLORIDE AND ACETAMINOPHEN 1 TABLET: 5; 325 TABLET ORAL at 06:20

## 2025-05-30 RX ADMIN — SACUBITRIL AND VALSARTAN 1 TABLET: 49; 51 TABLET, FILM COATED ORAL at 08:21

## 2025-05-30 RX ADMIN — ONDANSETRON 4 MG: 2 INJECTION INTRAMUSCULAR; INTRAVENOUS at 06:20

## 2025-05-30 RX ADMIN — FAMOTIDINE 20 MG: 20 TABLET, FILM COATED ORAL at 21:02

## 2025-05-30 RX ADMIN — METOPROLOL SUCCINATE 25 MG: 25 TABLET, FILM COATED, EXTENDED RELEASE ORAL at 08:21

## 2025-05-30 RX ADMIN — HYDROMORPHONE HYDROCHLORIDE 0.5 MG: 1 INJECTION, SOLUTION INTRAMUSCULAR; INTRAVENOUS; SUBCUTANEOUS at 20:59

## 2025-05-30 RX ADMIN — HYDROMORPHONE HYDROCHLORIDE 0.5 MG: 1 INJECTION, SOLUTION INTRAMUSCULAR; INTRAVENOUS; SUBCUTANEOUS at 03:33

## 2025-05-30 RX ADMIN — OXYCODONE HYDROCHLORIDE AND ACETAMINOPHEN 1 TABLET: 5; 325 TABLET ORAL at 21:02

## 2025-05-30 RX ADMIN — HEPARIN SODIUM 5000 UNITS: 5000 INJECTION INTRAVENOUS; SUBCUTANEOUS at 21:02

## 2025-05-30 RX ADMIN — HYDROMORPHONE HYDROCHLORIDE 0.5 MG: 1 INJECTION, SOLUTION INTRAMUSCULAR; INTRAVENOUS; SUBCUTANEOUS at 06:19

## 2025-05-30 RX ADMIN — HYDROMORPHONE HYDROCHLORIDE 0.5 MG: 1 INJECTION, SOLUTION INTRAMUSCULAR; INTRAVENOUS; SUBCUTANEOUS at 18:31

## 2025-05-30 NOTE — NURSING NOTE
[facility] visit for Stoma care and assessment     Surgery date: 5/28/25  Surgical Procedures Since Admission:  Procedure(s):  LOW ANTERIOR RESECTION, SPLENIC FLEXURE MOBILIZATION, FLEXIBLE SIGMOIDOSCOPY, LOOP ILEOSTOMY  CYSTOSCOPY TEMPORARY PLACEMENT BILATERAL URETERAL CATHETER  Surgeon:  Roass Plaza MD  Status:  2 Day Post-Op  -------------------        WO Care Outcome: Patient seen for ostomy care, assessment and education. Patient awake and alert.. Patient seen in the Medical/Surgical Floor .  Significant other at beside.     Pain is improving today at right eye.     Stoma Type: Loop Ileostomy  Diameter/shape: Round, ~35 mm  Location: RLQ  Protrusion: Budded  Stoma Characteristics: Round, Moist, Red  Marquise: Removed without complication.    Peristomal skin: Clean dry intact  Effluent Characteristics: Bilious   Effluent volume emptied:  ml.     Current pouching system: Moran, soft convex, drainable, 8958  Accessory products used: Stoma paste  Goal wear time: 3-4 days  Emptying frequency of appliance per day: Recommend emptying when 1/3-1/2 full.   Last appliance change: 5/30/2025        Follow up visit summary:   Stoma viable.  Return of bowel function noted.  Bilious output noted in tail of appliance.  Flatus noted as well.  Ostomy education folder removed entirety with patient and significant other.  We discussed importance of regular hydration including electrolyte replacement, changing the appliance twice a week to assess peristomal skin.  When to seek medical attention, bathing and diet recommendations.  Ambulation encouraged.  Discharge supplies provided.  Reviewed how to order supplies from [facility].  Outpatient ostomy clinic discussed.  Patient to contact [facility] nurse to schedule appointment on same day as appointment with Dr. Plaza.  Outpatient clinic order placed.       [facility] Nurse will continue to follow daily for ostomy education. Please contact with questions or if new needs arise.

## 2025-05-30 NOTE — PROGRESS NOTES
Continued Stay Note   [facility]     Patient Name: Palomo Pollack  MRN: [id number]  Today's Date: 5/30/2025    Admit Date: 5/28/2025    Plan: Home   Discharge Plan       Row Name 05/30/25 1712       Plan    Plan Comments Social work visited Mr. Pollack and gave him a packet of community and social work resources for fiancial assistance.                   Discharge Codes    No documentation.                       RAD Garner

## 2025-05-30 NOTE — CASE MANAGEMENT/SOCIAL WORK
Continued Stay Note  [facility]     Patient Name: Palomo Pollack  MRN: [id number]  Today's Date: 5/30/2025    Admit Date: 5/28/2025    Plan: Home   Discharge Plan       Row Name 05/30/25 1243       Plan    Plan Home    Patient/Family in Agreement with Plan yes    Plan Comments CM spoke with patient and significant other, Slime, at bedside. Patient states that surgery projects that he could possibly be medically ready for discharge tomorrow. Patient and SO deny any discharge needs at this time. They do verbalize financial concerns again, CM informed them that SW has been made aware of the patients concerns and SW will see them today. No further needs identified.    Final Discharge Disposition Code 01 - home or self-care                   Discharge Codes    No documentation.                       Lityz Do RN

## 2025-05-31 ENCOUNTER — READMISSION MANAGEMENT (OUTPATIENT)
Dept: CALL CENTER | Facility: HOSPITAL | Age: 45
End: 2025-05-31
Payer: MEDICAID

## 2025-05-31 VITALS
OXYGEN SATURATION: 91 % | RESPIRATION RATE: 16 BRPM | TEMPERATURE: 98.2 F | BODY MASS INDEX: 27.84 KG/M2 | SYSTOLIC BLOOD PRESSURE: 130 MMHG | WEIGHT: 198.85 LBS | HEART RATE: 95 BPM | DIASTOLIC BLOOD PRESSURE: 84 MMHG | HEIGHT: 71 IN

## 2025-05-31 LAB
ANION GAP SERPL CALCULATED.3IONS-SCNC: 12 MMOL/L (ref 5–15)
BASOPHILS # BLD AUTO: 0.04 10*3/MM3 (ref 0–0.2)
BASOPHILS NFR BLD AUTO: 0.5 % (ref 0–1.5)
BUN SERPL-MCNC: 11.7 MG/DL (ref 6–20)
BUN/CREAT SERPL: 16.5 (ref 7–25)
CALCIUM SPEC-SCNC: 8.8 MG/DL (ref 8.6–10.5)
CHLORIDE SERPL-SCNC: 96 MMOL/L (ref 98–107)
CO2 SERPL-SCNC: 27 MMOL/L (ref 22–29)
CREAT SERPL-MCNC: 0.71 MG/DL (ref 0.76–1.27)
DEPRECATED RDW RBC AUTO: 52.6 FL (ref 37–54)
EGFRCR SERPLBLD CKD-EPI 2021: 113.9 ML/MIN/1.73
EOSINOPHIL # BLD AUTO: 0.08 10*3/MM3 (ref 0–0.4)
EOSINOPHIL NFR BLD AUTO: 0.9 % (ref 0.3–6.2)
ERYTHROCYTE [DISTWIDTH] IN BLOOD BY AUTOMATED COUNT: 15.8 % (ref 12.3–15.4)
GLUCOSE SERPL-MCNC: 91 MG/DL (ref 65–99)
HCT VFR BLD AUTO: 38.3 % (ref 37.5–51)
HGB BLD-MCNC: 12.7 G/DL (ref 13–17.7)
IMM GRANULOCYTES # BLD AUTO: 0.07 10*3/MM3 (ref 0–0.05)
IMM GRANULOCYTES NFR BLD AUTO: 0.8 % (ref 0–0.5)
LYMPHOCYTES # BLD AUTO: 0.81 10*3/MM3 (ref 0.7–3.1)
LYMPHOCYTES NFR BLD AUTO: 9.3 % (ref 19.6–45.3)
MCH RBC QN AUTO: 29.9 PG (ref 26.6–33)
MCHC RBC AUTO-ENTMCNC: 33.2 G/DL (ref 31.5–35.7)
MCV RBC AUTO: 90.1 FL (ref 79–97)
MONOCYTES # BLD AUTO: 0.94 10*3/MM3 (ref 0.1–0.9)
MONOCYTES NFR BLD AUTO: 10.8 % (ref 5–12)
NEUTROPHILS NFR BLD AUTO: 6.76 10*3/MM3 (ref 1.7–7)
NEUTROPHILS NFR BLD AUTO: 77.7 % (ref 42.7–76)
NRBC BLD AUTO-RTO: 0 /100 WBC (ref 0–0.2)
PLATELET # BLD AUTO: 116 10*3/MM3 (ref 140–450)
PMV BLD AUTO: 10 FL (ref 6–12)
POTASSIUM SERPL-SCNC: 3.3 MMOL/L (ref 3.5–5.2)
RBC # BLD AUTO: 4.25 10*6/MM3 (ref 4.14–5.8)
SODIUM SERPL-SCNC: 135 MMOL/L (ref 136–145)
WBC NRBC COR # BLD AUTO: 8.7 10*3/MM3 (ref 3.4–10.8)

## 2025-05-31 PROCEDURE — 80048 BASIC METABOLIC PNL TOTAL CA: CPT | Performed by: SURGERY

## 2025-05-31 PROCEDURE — 85025 COMPLETE CBC W/AUTO DIFF WBC: CPT | Performed by: SURGERY

## 2025-05-31 PROCEDURE — 25010000002 HYDROMORPHONE 1 MG/ML SOLUTION: Performed by: SURGERY

## 2025-05-31 PROCEDURE — 25010000002 DIAZEPAM PER 5 MG: Performed by: SURGERY

## 2025-05-31 PROCEDURE — 25010000002 HEPARIN (PORCINE) PER 1000 UNITS: Performed by: SURGERY

## 2025-05-31 RX ORDER — ERYTHROMYCIN 5 MG/G
1 OINTMENT OPHTHALMIC 2 TIMES DAILY
Qty: 3.5 G | Refills: 0 | Status: ON HOLD | OUTPATIENT
Start: 2025-05-31

## 2025-05-31 RX ORDER — PSEUDOEPHEDRINE HCL 30 MG
100 TABLET ORAL 2 TIMES DAILY PRN
Qty: 10 CAPSULE | Refills: 0 | Status: ON HOLD | OUTPATIENT
Start: 2025-05-31

## 2025-05-31 RX ORDER — AMOXICILLIN AND CLAVULANATE POTASSIUM 500; 125 MG/1; MG/1
1 TABLET, FILM COATED ORAL 2 TIMES DAILY
Qty: 11 TABLET | Refills: 0 | Status: ON HOLD | OUTPATIENT
Start: 2025-05-31 | End: 2025-06-06

## 2025-05-31 RX ORDER — LOPERAMIDE HYDROCHLORIDE 2 MG/1
2 CAPSULE ORAL 4 TIMES DAILY PRN
Status: SHIPPED | OUTPATIENT
Start: 2025-05-31

## 2025-05-31 RX ORDER — OXYCODONE AND ACETAMINOPHEN 5; 325 MG/1; MG/1
1 TABLET ORAL EVERY 4 HOURS PRN
Qty: 10 TABLET | Refills: 0 | Status: ON HOLD | OUTPATIENT
Start: 2025-05-31 | End: 2025-06-07

## 2025-05-31 RX ADMIN — FAMOTIDINE 20 MG: 20 TABLET, FILM COATED ORAL at 07:44

## 2025-05-31 RX ADMIN — OXYCODONE HYDROCHLORIDE AND ACETAMINOPHEN 1 TABLET: 5; 325 TABLET ORAL at 13:29

## 2025-05-31 RX ADMIN — HYDROMORPHONE HYDROCHLORIDE 0.5 MG: 1 INJECTION, SOLUTION INTRAMUSCULAR; INTRAVENOUS; SUBCUTANEOUS at 04:59

## 2025-05-31 RX ADMIN — METOPROLOL SUCCINATE 25 MG: 25 TABLET, FILM COATED, EXTENDED RELEASE ORAL at 07:44

## 2025-05-31 RX ADMIN — ERYTHROMYCIN: 5 OINTMENT OPHTHALMIC at 05:00

## 2025-05-31 RX ADMIN — HEPARIN SODIUM 5000 UNITS: 5000 INJECTION INTRAVENOUS; SUBCUTANEOUS at 05:00

## 2025-05-31 RX ADMIN — HYDROMORPHONE HYDROCHLORIDE 0.5 MG: 1 INJECTION, SOLUTION INTRAMUSCULAR; INTRAVENOUS; SUBCUTANEOUS at 02:46

## 2025-05-31 RX ADMIN — SACUBITRIL AND VALSARTAN 1 TABLET: 49; 51 TABLET, FILM COATED ORAL at 07:44

## 2025-05-31 RX ADMIN — ERYTHROMYCIN: 5 OINTMENT OPHTHALMIC at 12:21

## 2025-05-31 RX ADMIN — HYDROMORPHONE HYDROCHLORIDE 0.5 MG: 1 INJECTION, SOLUTION INTRAMUSCULAR; INTRAVENOUS; SUBCUTANEOUS at 00:34

## 2025-05-31 RX ADMIN — OXYCODONE HYDROCHLORIDE AND ACETAMINOPHEN 1 TABLET: 5; 325 TABLET ORAL at 08:55

## 2025-05-31 RX ADMIN — EMPAGLIFLOZIN 10 MG: 10 TABLET, FILM COATED ORAL at 07:44

## 2025-05-31 RX ADMIN — DIAZEPAM 5 MG: 10 INJECTION, SOLUTION INTRAMUSCULAR; INTRAVENOUS at 02:46

## 2025-05-31 RX ADMIN — HYDROMORPHONE HYDROCHLORIDE 0.5 MG: 1 INJECTION, SOLUTION INTRAMUSCULAR; INTRAVENOUS; SUBCUTANEOUS at 10:06

## 2025-05-31 RX ADMIN — HYDROMORPHONE HYDROCHLORIDE 0.5 MG: 1 INJECTION, SOLUTION INTRAMUSCULAR; INTRAVENOUS; SUBCUTANEOUS at 07:45

## 2025-05-31 RX ADMIN — AMOXICILLIN AND CLAVULANATE POTASSIUM 500 MG: 500; 125 TABLET, FILM COATED ORAL at 07:45

## 2025-05-31 RX ADMIN — HYDROMORPHONE HYDROCHLORIDE 0.5 MG: 1 INJECTION, SOLUTION INTRAMUSCULAR; INTRAVENOUS; SUBCUTANEOUS at 12:24

## 2025-05-31 NOTE — CASE MANAGEMENT/SOCIAL WORK
Case Management Discharge Note      Final Note: [facility] has order for discharge. Spoke with patient at bedside regarding discharge plan who denies home needs. Plan is to discharge home today via car with family to transport.         Selected Continued Care - Admitted Since 5/28/2025       Destination    No services have been selected for the patient.                Durable Medical Equipment    No services have been selected for the patient.                Dialysis/Infusion    No services have been selected for the patient.                Home Medical Care    No services have been selected for the patient.                Therapy    No services have been selected for the patient.                Community Resources    No services have been selected for the patient.                Community & DME    No services have been selected for the patient.                    Selected Continued Care - Prior Encounters Includes continued care and service providers with selected services from prior encounters from 2/27/2025 to 5/31/2025      Discharged on 3/7/2025 Admission date: 3/4/2025 - Discharge disposition: Home or Self Care      Dialysis/Infusion       Service Provider Services Address Phone Fax Patient Preferred    [place] [facility] Infusion and IV Therapy [address] [address] [phone] --       Internal Comment last updated by Danna North, RN 3/7/2025 1318    Follow-up with Dr. Seth on 3/14/25 at 10:30 AM.               [facility] [address] [facility] [address] [phone] --       Internal Comment last updated by Danna North, RN 3/7/2025 1236    Teaching, antibiotic & supplies                                          Final Discharge Disposition Code: 01 - home or self-care

## 2025-05-31 NOTE — PROGRESS NOTES
"Patient Name:  Palomo Pollack  YOB: 1980  [id number]    Surgery Progress Note    Date of visit: 5/31/2025    Subjective   Pain well-controlled.  No nausea or vomiting.  No fevers or chills.  Ostomy viable and functioning.         Objective       /82   Pulse 102   Temp 98.6 °F (37 °C) (Oral)   Resp 18   Ht 180.3 cm (70.98\")   Wt 90.2 kg (198 lb 13.7 oz)   SpO2 96%   BMI 27.75 kg/m²     Intake/Output Summary (Last 24 hours) at 5/31/2025 1039  Last data filed at 5/31/2025 0346  Gross per 24 hour   Intake --   Output 3135 ml   Net -3135 ml   Ileostomy 1300 cc    CV:  Rhythm regular and rate regular  L:  Clear to auscultation bilaterally  Abd:  Bowel sounds positive, soft, appropriately tender, incisions clean dry and intact without erythema, MILLA serosanguineous, ostomy viable and functioning  Ext:  No cyanosis, clubbing, edema    Recent labs and imaging that are back at this time have been reviewed.   Creatinine 0.71  WBC 8.7  Hemoglobin 12.7       Assessment & Plan     Patient postop day 3 from low anterior resection and loop ileostomy creation for diverticular stricture.  Continue current diet.  DC Funez.  DC drain.  Continue antibiotic ointment to right eye.  Continue oral Augmentin.  Out of bed, incentive spirometer, DVT prophylaxis.  Will begin DC planning, pending spontaneous urine output after Funez removal.        Rosas Plaza MD  5/31/2025  10:39 EDT      "

## 2025-05-31 NOTE — OUTREACH NOTE
Prep Survey      Flowsheet Row Responses   [facility] facility patient discharged from? [facility]   Is LACE score < 7 ? No   Eligibility [facility]   Date of Admission 05/28/25   Date of Discharge 05/31/25   Discharge Disposition Home or Self Care   Discharge diagnosis Diverticular stricture   Does the patient have one of the following disease processes/diagnoses(primary or secondary)? Other   Does the patient have Home health ordered? No   Is there a DME ordered? No   Prep survey completed? Yes            DANIA RATLIFF - Registered Nurse

## 2025-06-01 ENCOUNTER — TRANSITIONAL CARE MANAGEMENT TELEPHONE ENCOUNTER (OUTPATIENT)
Dept: CALL CENTER | Facility: HOSPITAL | Age: 45
End: 2025-06-01
Payer: MEDICAID

## 2025-06-01 ENCOUNTER — APPOINTMENT (OUTPATIENT)
Dept: GENERAL RADIOLOGY | Facility: HOSPITAL | Age: 45
End: 2025-06-01
Payer: MEDICAID

## 2025-06-01 ENCOUNTER — APPOINTMENT (OUTPATIENT)
Dept: CT IMAGING | Facility: HOSPITAL | Age: 45
End: 2025-06-01
Payer: MEDICAID

## 2025-06-01 ENCOUNTER — HOSPITAL ENCOUNTER (OUTPATIENT)
Facility: HOSPITAL | Age: 45
Setting detail: OBSERVATION
Discharge: HOME | End: 2025-06-03
Attending: EMERGENCY MEDICINE | Admitting: INTERNAL MEDICINE
Payer: MEDICAID

## 2025-06-01 DIAGNOSIS — N13.30 HYDRONEPHROSIS, UNSPECIFIED HYDRONEPHROSIS TYPE: ICD-10-CM

## 2025-06-01 DIAGNOSIS — R10.84 GENERALIZED ABDOMINAL PAIN: Primary | ICD-10-CM

## 2025-06-01 DIAGNOSIS — Z90.49 HISTORY OF COLECTOMY: ICD-10-CM

## 2025-06-01 DIAGNOSIS — N32.89 BLADDER WALL THICKENING: ICD-10-CM

## 2025-06-01 PROBLEM — R10.9 ABDOMINAL PAIN: Status: ACTIVE | Noted: 2025-06-01

## 2025-06-01 LAB
ALBUMIN SERPL-MCNC: 3.8 G/DL (ref 3.5–5.2)
ALBUMIN/GLOB SERPL: 1.1 G/DL
ALP SERPL-CCNC: 76 U/L (ref 39–117)
ALT SERPL W P-5'-P-CCNC: 26 U/L (ref 1–41)
ANION GAP SERPL CALCULATED.3IONS-SCNC: 15 MMOL/L (ref 5–15)
AST SERPL-CCNC: 33 U/L (ref 1–40)
BACTERIA UR QL AUTO: ABNORMAL /HPF
BASOPHILS # BLD AUTO: 0.04 10*3/MM3 (ref 0–0.2)
BASOPHILS NFR BLD AUTO: 0.5 % (ref 0–1.5)
BILIRUB SERPL-MCNC: 0.8 MG/DL (ref 0–1.2)
BILIRUB UR QL STRIP: NEGATIVE
BUN SERPL-MCNC: 10.3 MG/DL (ref 6–20)
BUN/CREAT SERPL: 11.3 (ref 7–25)
CALCIUM SPEC-SCNC: 9.3 MG/DL (ref 8.6–10.5)
CHLORIDE SERPL-SCNC: 92 MMOL/L (ref 98–107)
CLARITY UR: CLEAR
CO2 SERPL-SCNC: 27 MMOL/L (ref 22–29)
COLOR UR: YELLOW
CREAT SERPL-MCNC: 0.91 MG/DL (ref 0.76–1.27)
D-LACTATE SERPL-SCNC: 1.5 MMOL/L (ref 0.5–2)
DEPRECATED RDW RBC AUTO: 51 FL (ref 37–54)
EGFRCR SERPLBLD CKD-EPI 2021: 104.6 ML/MIN/1.73
EOSINOPHIL # BLD AUTO: 0.05 10*3/MM3 (ref 0–0.4)
EOSINOPHIL NFR BLD AUTO: 0.6 % (ref 0.3–6.2)
ERYTHROCYTE [DISTWIDTH] IN BLOOD BY AUTOMATED COUNT: 15.6 % (ref 12.3–15.4)
GLOBULIN UR ELPH-MCNC: 3.6 GM/DL
GLUCOSE SERPL-MCNC: 127 MG/DL (ref 65–99)
GLUCOSE UR STRIP-MCNC: ABNORMAL MG/DL
HCT VFR BLD AUTO: 41.1 % (ref 37.5–51)
HGB BLD-MCNC: 13.8 G/DL (ref 13–17.7)
HGB UR QL STRIP.AUTO: ABNORMAL
HOLD SPECIMEN: NORMAL
HYALINE CASTS UR QL AUTO: ABNORMAL /LPF
IMM GRANULOCYTES # BLD AUTO: 0.03 10*3/MM3 (ref 0–0.05)
IMM GRANULOCYTES NFR BLD AUTO: 0.4 % (ref 0–0.5)
KETONES UR QL STRIP: ABNORMAL
LEUKOCYTE ESTERASE UR QL STRIP.AUTO: ABNORMAL
LIPASE SERPL-CCNC: 21 U/L (ref 13–60)
LYMPHOCYTES # BLD AUTO: 0.59 10*3/MM3 (ref 0.7–3.1)
LYMPHOCYTES NFR BLD AUTO: 7.2 % (ref 19.6–45.3)
MAGNESIUM SERPL-MCNC: 2 MG/DL (ref 1.6–2.6)
MCH RBC QN AUTO: 29.7 PG (ref 26.6–33)
MCHC RBC AUTO-ENTMCNC: 33.6 G/DL (ref 31.5–35.7)
MCV RBC AUTO: 88.4 FL (ref 79–97)
MONOCYTES # BLD AUTO: 0.79 10*3/MM3 (ref 0.1–0.9)
MONOCYTES NFR BLD AUTO: 9.7 % (ref 5–12)
NEUTROPHILS NFR BLD AUTO: 6.66 10*3/MM3 (ref 1.7–7)
NEUTROPHILS NFR BLD AUTO: 81.6 % (ref 42.7–76)
NITRITE UR QL STRIP: NEGATIVE
NRBC BLD AUTO-RTO: 0 /100 WBC (ref 0–0.2)
NT-PROBNP SERPL-MCNC: 314.1 PG/ML (ref 0–450)
PH UR STRIP.AUTO: 6.5 [PH] (ref 5–8)
PLATELET # BLD AUTO: 165 10*3/MM3 (ref 140–450)
PMV BLD AUTO: 9.3 FL (ref 6–12)
POTASSIUM SERPL-SCNC: 3.1 MMOL/L (ref 3.5–5.2)
PROCALCITONIN SERPL-MCNC: 0.21 NG/ML (ref 0–0.25)
PROT SERPL-MCNC: 7.4 G/DL (ref 6–8.5)
PROT UR QL STRIP: ABNORMAL
QT INTERVAL: 410 MS
QTC INTERVAL: 515 MS
RBC # BLD AUTO: 4.65 10*6/MM3 (ref 4.14–5.8)
RBC # UR STRIP: ABNORMAL /HPF
REF LAB TEST METHOD: ABNORMAL
SODIUM SERPL-SCNC: 134 MMOL/L (ref 136–145)
SP GR UR STRIP: 1.05 (ref 1–1.03)
SQUAMOUS #/AREA URNS HPF: ABNORMAL /HPF
TROPONIN T SERPL HS-MCNC: 10 NG/L
UROBILINOGEN UR QL STRIP: ABNORMAL
WBC # UR STRIP: ABNORMAL /HPF
WBC NRBC COR # BLD AUTO: 8.16 10*3/MM3 (ref 3.4–10.8)
WHOLE BLOOD HOLD COAG: NORMAL
WHOLE BLOOD HOLD SPECIMEN: NORMAL

## 2025-06-01 PROCEDURE — 25010000002 ONDANSETRON PER 1 MG: Performed by: EMERGENCY MEDICINE

## 2025-06-01 PROCEDURE — 83605 ASSAY OF LACTIC ACID: CPT | Performed by: NURSE PRACTITIONER

## 2025-06-01 PROCEDURE — 80053 COMPREHEN METABOLIC PANEL: CPT | Performed by: EMERGENCY MEDICINE

## 2025-06-01 PROCEDURE — 25810000003 LACTATED RINGERS PER 1000 ML: Performed by: SURGERY

## 2025-06-01 PROCEDURE — 81001 URINALYSIS AUTO W/SCOPE: CPT | Performed by: EMERGENCY MEDICINE

## 2025-06-01 PROCEDURE — 84484 ASSAY OF TROPONIN QUANT: CPT | Performed by: NURSE PRACTITIONER

## 2025-06-01 PROCEDURE — 25010000002 HYDROMORPHONE 1 MG/ML SOLUTION: Performed by: EMERGENCY MEDICINE

## 2025-06-01 PROCEDURE — 25510000001 IOPAMIDOL 61 % SOLUTION: Performed by: EMERGENCY MEDICINE

## 2025-06-01 PROCEDURE — 87086 URINE CULTURE/COLONY COUNT: CPT | Performed by: STUDENT IN AN ORGANIZED HEALTH CARE EDUCATION/TRAINING PROGRAM

## 2025-06-01 PROCEDURE — 83880 ASSAY OF NATRIURETIC PEPTIDE: CPT | Performed by: STUDENT IN AN ORGANIZED HEALTH CARE EDUCATION/TRAINING PROGRAM

## 2025-06-01 PROCEDURE — 94799 UNLISTED PULMONARY SVC/PX: CPT

## 2025-06-01 PROCEDURE — 96376 TX/PRO/DX INJ SAME DRUG ADON: CPT

## 2025-06-01 PROCEDURE — 99285 EMERGENCY DEPT VISIT HI MDM: CPT

## 2025-06-01 PROCEDURE — G0378 HOSPITAL OBSERVATION PER HR: HCPCS

## 2025-06-01 PROCEDURE — 93010 ELECTROCARDIOGRAM REPORT: CPT | Performed by: INTERNAL MEDICINE

## 2025-06-01 PROCEDURE — 99221 1ST HOSP IP/OBS SF/LOW 40: CPT | Performed by: STUDENT IN AN ORGANIZED HEALTH CARE EDUCATION/TRAINING PROGRAM

## 2025-06-01 PROCEDURE — 96374 THER/PROPH/DIAG INJ IV PUSH: CPT

## 2025-06-01 PROCEDURE — 96375 TX/PRO/DX INJ NEW DRUG ADDON: CPT

## 2025-06-01 PROCEDURE — 25010000002 POTASSIUM CHLORIDE 10 MEQ/100ML SOLUTION

## 2025-06-01 PROCEDURE — 74177 CT ABD & PELVIS W/CONTRAST: CPT

## 2025-06-01 PROCEDURE — 25510000001 IOPAMIDOL PER 1 ML: Performed by: EMERGENCY MEDICINE

## 2025-06-01 PROCEDURE — 25010000002 PIPERACILLIN SOD-TAZOBACTAM PER 1 G: Performed by: NURSE PRACTITIONER

## 2025-06-01 PROCEDURE — 25010000002 HYDROMORPHONE PER 4 MG: Performed by: STUDENT IN AN ORGANIZED HEALTH CARE EDUCATION/TRAINING PROGRAM

## 2025-06-01 PROCEDURE — 25010000002 POTASSIUM CHLORIDE 10 MEQ/100ML SOLUTION: Performed by: EMERGENCY MEDICINE

## 2025-06-01 PROCEDURE — 25010000002 CEFTRIAXONE PER 250 MG: Performed by: STUDENT IN AN ORGANIZED HEALTH CARE EDUCATION/TRAINING PROGRAM

## 2025-06-01 PROCEDURE — 93005 ELECTROCARDIOGRAM TRACING: CPT | Performed by: NURSE PRACTITIONER

## 2025-06-01 PROCEDURE — 85025 COMPLETE CBC W/AUTO DIFF WBC: CPT | Performed by: EMERGENCY MEDICINE

## 2025-06-01 PROCEDURE — 25810000003 SODIUM CHLORIDE 0.9 % SOLUTION: Performed by: EMERGENCY MEDICINE

## 2025-06-01 PROCEDURE — 71275 CT ANGIOGRAPHY CHEST: CPT

## 2025-06-01 PROCEDURE — 84145 PROCALCITONIN (PCT): CPT | Performed by: NURSE PRACTITIONER

## 2025-06-01 PROCEDURE — 83690 ASSAY OF LIPASE: CPT | Performed by: EMERGENCY MEDICINE

## 2025-06-01 PROCEDURE — 83735 ASSAY OF MAGNESIUM: CPT | Performed by: EMERGENCY MEDICINE

## 2025-06-01 RX ORDER — POTASSIUM CHLORIDE 7.45 MG/ML
10 INJECTION INTRAVENOUS
Status: DISPENSED | OUTPATIENT
Start: 2025-06-01 | End: 2025-06-01

## 2025-06-01 RX ORDER — SODIUM CHLORIDE 0.9 % (FLUSH) 0.9 %
10 SYRINGE (ML) INJECTION EVERY 12 HOURS SCHEDULED
Status: DISCONTINUED | OUTPATIENT
Start: 2025-06-01 | End: 2025-06-03 | Stop reason: HOSPADM

## 2025-06-01 RX ORDER — ERYTHROMYCIN 5 MG/G
1 OINTMENT OPHTHALMIC 2 TIMES DAILY
Status: DISCONTINUED | OUTPATIENT
Start: 2025-06-01 | End: 2025-06-03 | Stop reason: HOSPADM

## 2025-06-01 RX ORDER — ERYTHROMYCIN 5 MG/G
1 OINTMENT OPHTHALMIC ONCE
Status: COMPLETED | OUTPATIENT
Start: 2025-06-01 | End: 2025-06-01

## 2025-06-01 RX ORDER — POTASSIUM CHLORIDE 7.45 MG/ML
10 INJECTION INTRAVENOUS
Status: COMPLETED | OUTPATIENT
Start: 2025-06-01 | End: 2025-06-01

## 2025-06-01 RX ORDER — SODIUM CHLORIDE 0.9 % (FLUSH) 0.9 %
10 SYRINGE (ML) INJECTION AS NEEDED
Status: DISCONTINUED | OUTPATIENT
Start: 2025-06-01 | End: 2025-06-02 | Stop reason: SDUPTHER

## 2025-06-01 RX ORDER — SODIUM CHLORIDE 9 MG/ML
40 INJECTION, SOLUTION INTRAVENOUS AS NEEDED
Status: DISCONTINUED | OUTPATIENT
Start: 2025-06-01 | End: 2025-06-03 | Stop reason: HOSPADM

## 2025-06-01 RX ORDER — SODIUM CHLORIDE, SODIUM LACTATE, POTASSIUM CHLORIDE, CALCIUM CHLORIDE 600; 310; 30; 20 MG/100ML; MG/100ML; MG/100ML; MG/100ML
75 INJECTION, SOLUTION INTRAVENOUS CONTINUOUS
Status: DISCONTINUED | OUTPATIENT
Start: 2025-06-01 | End: 2025-06-02

## 2025-06-01 RX ORDER — SPIRONOLACTONE 25 MG/1
25 TABLET ORAL DAILY
Status: DISCONTINUED | OUTPATIENT
Start: 2025-06-01 | End: 2025-06-03 | Stop reason: HOSPADM

## 2025-06-01 RX ORDER — DOCUSATE SODIUM 100 MG/1
100 CAPSULE, LIQUID FILLED ORAL 2 TIMES DAILY
Status: DISCONTINUED | OUTPATIENT
Start: 2025-06-01 | End: 2025-06-03 | Stop reason: HOSPADM

## 2025-06-01 RX ORDER — MORPHINE SULFATE 4 MG/ML
4 INJECTION, SOLUTION INTRAMUSCULAR; INTRAVENOUS ONCE
Status: DISCONTINUED | OUTPATIENT
Start: 2025-06-01 | End: 2025-06-01

## 2025-06-01 RX ORDER — SODIUM CHLORIDE 0.9 % (FLUSH) 0.9 %
10 SYRINGE (ML) INJECTION AS NEEDED
Status: DISCONTINUED | OUTPATIENT
Start: 2025-06-01 | End: 2025-06-03 | Stop reason: HOSPADM

## 2025-06-01 RX ORDER — POTASSIUM CHLORIDE 1500 MG/1
40 TABLET, EXTENDED RELEASE ORAL
Status: DISPENSED | OUTPATIENT
Start: 2025-06-01 | End: 2025-06-01

## 2025-06-01 RX ORDER — PROCHLORPERAZINE EDISYLATE 5 MG/ML
5 INJECTION INTRAMUSCULAR; INTRAVENOUS EVERY 6 HOURS PRN
Status: DISCONTINUED | OUTPATIENT
Start: 2025-06-01 | End: 2025-06-03 | Stop reason: HOSPADM

## 2025-06-01 RX ORDER — MULTIPLE VITAMINS W/ MINERALS TAB 9MG-400MCG
1 TAB ORAL DAILY
Status: DISCONTINUED | OUTPATIENT
Start: 2025-06-01 | End: 2025-06-03 | Stop reason: HOSPADM

## 2025-06-01 RX ORDER — AMOXICILLIN AND CLAVULANATE POTASSIUM 500; 125 MG/1; MG/1
1 TABLET, FILM COATED ORAL 2 TIMES DAILY
Status: DISCONTINUED | OUTPATIENT
Start: 2025-06-01 | End: 2025-06-03 | Stop reason: HOSPADM

## 2025-06-01 RX ORDER — HYDROCODONE BITARTRATE AND ACETAMINOPHEN 5; 325 MG/1; MG/1
1 TABLET ORAL EVERY 6 HOURS PRN
Refills: 0 | Status: DISCONTINUED | OUTPATIENT
Start: 2025-06-01 | End: 2025-06-02

## 2025-06-01 RX ORDER — FAMOTIDINE 20 MG/1
20 TABLET, FILM COATED ORAL
Status: DISCONTINUED | OUTPATIENT
Start: 2025-06-01 | End: 2025-06-03 | Stop reason: HOSPADM

## 2025-06-01 RX ORDER — MELOXICAM 7.5 MG/1
7.5 TABLET ORAL DAILY
Status: DISCONTINUED | OUTPATIENT
Start: 2025-06-01 | End: 2025-06-03 | Stop reason: HOSPADM

## 2025-06-01 RX ORDER — POTASSIUM CHLORIDE 1500 MG/1
40 TABLET, EXTENDED RELEASE ORAL
Status: COMPLETED | OUTPATIENT
Start: 2025-06-01 | End: 2025-06-01

## 2025-06-01 RX ORDER — SODIUM CHLORIDE 9 MG/ML
10 INJECTION, SOLUTION INTRAMUSCULAR; INTRAVENOUS; SUBCUTANEOUS AS NEEDED
Status: DISCONTINUED | OUTPATIENT
Start: 2025-06-01 | End: 2025-06-03 | Stop reason: HOSPADM

## 2025-06-01 RX ORDER — HYDROMORPHONE HYDROCHLORIDE 1 MG/ML
0.5 INJECTION, SOLUTION INTRAMUSCULAR; INTRAVENOUS; SUBCUTANEOUS
Refills: 0 | Status: DISCONTINUED | OUTPATIENT
Start: 2025-06-01 | End: 2025-06-03 | Stop reason: HOSPADM

## 2025-06-01 RX ORDER — ONDANSETRON 2 MG/ML
4 INJECTION INTRAMUSCULAR; INTRAVENOUS ONCE
Status: COMPLETED | OUTPATIENT
Start: 2025-06-01 | End: 2025-06-01

## 2025-06-01 RX ORDER — IOPAMIDOL 755 MG/ML
100 INJECTION, SOLUTION INTRAVASCULAR
Status: COMPLETED | OUTPATIENT
Start: 2025-06-01 | End: 2025-06-01

## 2025-06-01 RX ORDER — NALOXONE HCL 0.4 MG/ML
0.4 VIAL (ML) INJECTION
Status: DISCONTINUED | OUTPATIENT
Start: 2025-06-01 | End: 2025-06-03 | Stop reason: HOSPADM

## 2025-06-01 RX ORDER — METOPROLOL SUCCINATE 25 MG/1
25 TABLET, EXTENDED RELEASE ORAL DAILY
Status: DISCONTINUED | OUTPATIENT
Start: 2025-06-01 | End: 2025-06-03 | Stop reason: HOSPADM

## 2025-06-01 RX ORDER — ACETAMINOPHEN 500 MG
500 TABLET ORAL 4 TIMES DAILY
Status: DISCONTINUED | OUTPATIENT
Start: 2025-06-01 | End: 2025-06-03 | Stop reason: HOSPADM

## 2025-06-01 RX ORDER — CARISOPRODOL 350 MG/1
350 TABLET ORAL EVERY 12 HOURS PRN
Status: DISCONTINUED | OUTPATIENT
Start: 2025-06-01 | End: 2025-06-03 | Stop reason: HOSPADM

## 2025-06-01 RX ORDER — IOPAMIDOL 612 MG/ML
100 INJECTION, SOLUTION INTRAVASCULAR
Status: COMPLETED | OUTPATIENT
Start: 2025-06-01 | End: 2025-06-01

## 2025-06-01 RX ADMIN — HYDROMORPHONE HYDROCHLORIDE 0.5 MG: 1 INJECTION, SOLUTION INTRAMUSCULAR; INTRAVENOUS; SUBCUTANEOUS at 22:00

## 2025-06-01 RX ADMIN — Medication 1 TABLET: at 16:26

## 2025-06-01 RX ADMIN — POTASSIUM CHLORIDE 40 MEQ: 1500 TABLET, EXTENDED RELEASE ORAL at 17:17

## 2025-06-01 RX ADMIN — ERYTHROMYCIN 1 APPLICATION: 5 OINTMENT OPHTHALMIC at 11:31

## 2025-06-01 RX ADMIN — ACETAMINOPHEN 500 MG: 500 TABLET, FILM COATED ORAL at 20:50

## 2025-06-01 RX ADMIN — POTASSIUM CHLORIDE 10 MEQ: 7.46 INJECTION, SOLUTION INTRAVENOUS at 13:00

## 2025-06-01 RX ADMIN — ONDANSETRON 4 MG: 2 INJECTION INTRAMUSCULAR; INTRAVENOUS at 11:10

## 2025-06-01 RX ADMIN — MELOXICAM 7.5 MG: 7.5 TABLET ORAL at 16:26

## 2025-06-01 RX ADMIN — HYDROMORPHONE HYDROCHLORIDE 1 MG: 1 INJECTION, SOLUTION INTRAMUSCULAR; INTRAVENOUS; SUBCUTANEOUS at 11:17

## 2025-06-01 RX ADMIN — SODIUM CHLORIDE, POTASSIUM CHLORIDE, SODIUM LACTATE AND CALCIUM CHLORIDE 75 ML/HR: 600; 310; 30; 20 INJECTION, SOLUTION INTRAVENOUS at 16:33

## 2025-06-01 RX ADMIN — PIPERACILLIN AND TAZOBACTAM 3.38 G: 3; .375 INJECTION, POWDER, LYOPHILIZED, FOR SOLUTION INTRAVENOUS at 14:55

## 2025-06-01 RX ADMIN — ACETAMINOPHEN 500 MG: 500 TABLET, FILM COATED ORAL at 22:48

## 2025-06-01 RX ADMIN — POTASSIUM CHLORIDE 10 MEQ: 7.46 INJECTION, SOLUTION INTRAVENOUS at 17:17

## 2025-06-01 RX ADMIN — HYDROMORPHONE HYDROCHLORIDE 1 MG: 1 INJECTION, SOLUTION INTRAMUSCULAR; INTRAVENOUS; SUBCUTANEOUS at 14:54

## 2025-06-01 RX ADMIN — DOCUSATE SODIUM 100 MG: 100 CAPSULE, LIQUID FILLED ORAL at 20:50

## 2025-06-01 RX ADMIN — HYDROCODONE BITARTRATE AND ACETAMINOPHEN 1 TABLET: 5; 325 TABLET ORAL at 19:40

## 2025-06-01 RX ADMIN — SODIUM CHLORIDE 1000 ML: 9 INJECTION, SOLUTION INTRAVENOUS at 11:13

## 2025-06-01 RX ADMIN — HYDROMORPHONE HYDROCHLORIDE 0.5 MG: 1 INJECTION, SOLUTION INTRAMUSCULAR; INTRAVENOUS; SUBCUTANEOUS at 17:18

## 2025-06-01 RX ADMIN — ERYTHROMYCIN 1 APPLICATION: 5 OINTMENT OPHTHALMIC at 22:00

## 2025-06-01 RX ADMIN — IOPAMIDOL 60 ML: 755 INJECTION, SOLUTION INTRAVENOUS at 13:52

## 2025-06-01 RX ADMIN — IOPAMIDOL 90 ML: 612 INJECTION, SOLUTION INTRAVENOUS at 11:49

## 2025-06-01 RX ADMIN — SODIUM CHLORIDE 1000 MG: 900 INJECTION INTRAVENOUS at 20:49

## 2025-06-01 RX ADMIN — ACETAMINOPHEN 500 MG: 500 TABLET, FILM COATED ORAL at 16:26

## 2025-06-01 RX ADMIN — FAMOTIDINE 20 MG: 20 TABLET, FILM COATED ORAL at 16:51

## 2025-06-01 RX ADMIN — POTASSIUM CHLORIDE 40 MEQ: 1500 TABLET, EXTENDED RELEASE ORAL at 13:00

## 2025-06-01 NOTE — ED PROVIDER NOTES
EMERGENCY DEPARTMENT ENCOUNTER    Pt Name: Palomo Pollack  MRN: [id number]  Pt :   1980  Room Number:  S208/1  Date of encounter:  2025  PCP: Heladio Valentine DO  ED Provider: YARA Armendariz    Historian: Patient, spouse      HPI:  Chief Complaint: Abdominal pain        Context: Palomo Pollack is a 45 y.o. male who presents to the ED c/o abdominal pain.  Patient underwent bowel resection and loop ileostomy on May 28 with Dr. Plaza.  History of diverticular disease with perforation and abscess.  He told me that he was quite uncomfortable upon discharge with pain 9 out of 10, when he came to the house he could not control his pain.  Pain across the abdomen radiating to the back and right upper shoulder.  He felt cold and chilled.  He was able to eat small amount of soft food yesterday night, was not able to eat or drink anything today, did not take his maintenance medications.  States pain is 10 out of 10.  He took narcotic pain control around 7 AM without significant relief.      PAST MEDICAL HISTORY  Past Medical History:   Diagnosis Date    Allergic rhinitis     Anesthesia complication     irritable after    Atrial fibrillation     CHF (congestive heart failure)     Chronic right ear pain     h/o    Diabetes mellitus     h/o-  doesnt check sugar    Diverticulitis     GERD (gastroesophageal reflux disease)     Headache     Heart failure     History of kidney stones     passed them    History of shingles     - after transplant of stem cells    History of transfusion     -bhlex;  [place], [place]- 1 unit- stem cell transplant -  recieved plts - no reaction recalled    Hodgkin lymphoma     Hypertension     Hypogonadism in male     LBBB (left bundle branch block)     PTSD (post-traumatic stress disorder)     Stem cells transplant status     Vision blurred     possibly need glasses         PAST SURGICAL HISTORY  Past Surgical History:   Procedure Laterality Date     BIVENTRICULLAR IMPLANTABLE CARDIOVERTER DEFIBRILLATOR PLACEMENT N/A 10/26/2023    Procedure: Implant ICD - bi ventricular; DNS meds; [place];  Surgeon: Wilmer Rojas DO;  Location:  [facility];  Service: Cardiovascular;  Laterality: N/A;    CARDIAC CATHETERIZATION N/A 05/30/2023    Procedure: Left Heart Cath;  Surgeon: Alec Mason III, MD;  Location:  [facility];  Service: Cardiovascular;  Laterality: N/A;    CARDIAC ELECTROPHYSIOLOGY PROCEDURE N/A 10/3/2024    Procedure: Ablation atrial fibrillation w PFA; CTA prior, [place], no meds to hold;  Surgeon: Wilmer Rojas DO;  Location:  [facility];  Service: Cardiovascular;  Laterality: N/A;    CENTRAL VENOUS LINE INSERTION  1999    removed since    COLON RESECTION N/A 5/28/2025    Procedure: LOW ANTERIOR RESECTION, SPLENIC FLEXURE MOBILIZATION, FLEXIBLE SIGMOIDOSCOPY, LOOP ILEOSTOMY;  Surgeon: Rosas Plaza MD;  Location:  [facility];  Service: Robotics - DaVinci;  Laterality: N/A;    COLONOSCOPY      COLONOSCOPY N/A 4/22/2025    Procedure: COLONOSCOPY;  Surgeon: Randy White MD;  Location:  [facility];  Service: Gastroenterology;  Laterality: N/A;    CYSTOSCOPY N/A 5/28/2025    Procedure: CYSTOSCOPY TEMPORARY PLACEMENT BILATERAL URETERAL CATHETER;  Surgeon: Roosevelt Osuna MD;  Location:  [facility];  Service: Urology;  Laterality: N/A;    OTHER SURGICAL HISTORY      stem cell transplant    PORTOCAVAL SHUNT PLACEMENT  1998    WISDOM TOOTH EXTRACTION           FAMILY HISTORY  Family History   Problem Relation Age of Onset    Stroke Father     Lung cancer Father     Cancer Father         Positive for cured lung cancer- he was smoker         SOCIAL HISTORY  Social History     Socioeconomic History    Marital status: Significant Other   Tobacco Use    Smoking status: Some Days     Current packs/day: 0.25     Average packs/day: 0.3 packs/day for 22.5 years (5.6 ttl pk-yrs)     Types: Cigarettes     Start  date: 11/2002     Last attempt to quit: 11/2022     Passive exposure: Past    Smokeless tobacco: Never    Tobacco comments:     smokes less than .25 ppd   Vaping Use    Vaping status: Never Used   Substance and Sexual Activity    Alcohol use: Not Currently     Comment: stopped drinking 03/2025    Drug use: Not Currently     Types: Marijuana     Comment: stopped October 2024    Sexual activity: Yes         ALLERGIES  Morphine, Bactrim [sulfamethoxazole-trimethoprim], Clarithromycin, and Milk-related compounds        REVIEW OF SYSTEMS  Review of Systems       All systems reviewed and negative except for those discussed in HPI.       PHYSICAL EXAM    I have reviewed the triage vital signs and nursing notes.    ED Triage Vitals [06/01/25 1041]   Temp Heart Rate Resp BP SpO2   98.2 °F (36.8 °C) 110 18 131/96 100 %      Temp src Heart Rate Source Patient Position BP Location FiO2 (%)   Oral Monitor Sitting Left arm --       Physical Exam  GENERAL:   Appears in no acute distress.   HENT: Nares patent.  EYES: No scleral icterus.  CV: Regular rhythm, tachycardia  RESPIRATORY: Normal effort.  No audible wheezes, rales or rhonchi.  ABDOMEN: Soft, decreased bowel sounds to the left mid abdomen, ileostomy bag present, postsurgical scar without significant erythema or drainage.  MUSCULOSKELETAL: No deformities.   NEURO: Alert, moves all extremities, follows commands.  SKIN: Warm, dry, no rash visualized.      LAB RESULTS  Recent Results (from the past 24 hours)   Comprehensive Metabolic Panel    Collection Time: 06/01/25 10:57 AM    Specimen: Blood   Result Value Ref Range    Glucose 127 (H) 65 - 99 mg/dL    BUN 10.3 6.0 - 20.0 mg/dL    Creatinine 0.91 0.76 - 1.27 mg/dL    Sodium 134 (L) 136 - 145 mmol/L    Potassium 3.1 (L) 3.5 - 5.2 mmol/L    Chloride 92 (L) 98 - 107 mmol/L    CO2 27.0 22.0 - 29.0 mmol/L    Calcium 9.3 8.6 - 10.5 mg/dL    Total Protein 7.4 6.0 - 8.5 g/dL    Albumin 3.8 3.5 - 5.2 g/dL    ALT (SGPT) 26 1 - 41 U/L     AST (SGOT) 33 1 - 40 U/L    Alkaline Phosphatase 76 39 - 117 U/L    Total Bilirubin 0.8 0.0 - 1.2 mg/dL    Globulin 3.6 gm/dL    A/G Ratio 1.1 g/dL    BUN/Creatinine Ratio 11.3 7.0 - 25.0    Anion Gap 15.0 5.0 - 15.0 mmol/L    eGFR 104.6 >60.0 mL/min/1.73   Lipase    Collection Time: 06/01/25 10:57 AM    Specimen: Blood   Result Value Ref Range    Lipase 21 13 - 60 U/L   Green Top (Gel)    Collection Time: 06/01/25 10:57 AM   Result Value Ref Range    Extra Tube Hold for add-ons.    Lavender Top    Collection Time: 06/01/25 10:57 AM   Result Value Ref Range    Extra Tube hold for add-on    Gold Top - SST    Collection Time: 06/01/25 10:57 AM   Result Value Ref Range    Extra Tube Hold for add-ons.    Gray Top    Collection Time: 06/01/25 10:57 AM   Result Value Ref Range    Extra Tube Hold for add-ons.    Light Blue Top    Collection Time: 06/01/25 10:57 AM   Result Value Ref Range    Extra Tube Hold for add-ons.    CBC Auto Differential    Collection Time: 06/01/25 10:57 AM    Specimen: Blood   Result Value Ref Range    WBC 8.16 3.40 - 10.80 10*3/mm3    RBC 4.65 4.14 - 5.80 10*6/mm3    Hemoglobin 13.8 13.0 - 17.7 g/dL    Hematocrit 41.1 37.5 - 51.0 %    MCV 88.4 79.0 - 97.0 fL    MCH 29.7 26.6 - 33.0 pg    MCHC 33.6 31.5 - 35.7 g/dL    RDW 15.6 (H) 12.3 - 15.4 %    RDW-SD 51.0 37.0 - 54.0 fl    MPV 9.3 6.0 - 12.0 fL    Platelets 165 140 - 450 10*3/mm3    Neutrophil % 81.6 (H) 42.7 - 76.0 %    Lymphocyte % 7.2 (L) 19.6 - 45.3 %    Monocyte % 9.7 5.0 - 12.0 %    Eosinophil % 0.6 0.3 - 6.2 %    Basophil % 0.5 0.0 - 1.5 %    Immature Grans % 0.4 0.0 - 0.5 %    Neutrophils, Absolute 6.66 1.70 - 7.00 10*3/mm3    Lymphocytes, Absolute 0.59 (L) 0.70 - 3.10 10*3/mm3    Monocytes, Absolute 0.79 0.10 - 0.90 10*3/mm3    Eosinophils, Absolute 0.05 0.00 - 0.40 10*3/mm3    Basophils, Absolute 0.04 0.00 - 0.20 10*3/mm3    Immature Grans, Absolute 0.03 0.00 - 0.05 10*3/mm3    nRBC 0.0 0.0 - 0.2 /100 WBC   High Sensitivity  Troponin T    Collection Time: 06/01/25 10:57 AM    Specimen: Blood   Result Value Ref Range    HS Troponin T 10 <22 ng/L   Lactic Acid, Plasma    Collection Time: 06/01/25 10:57 AM    Specimen: Blood   Result Value Ref Range    Lactate 1.5 0.5 - 2.0 mmol/L   Procalcitonin    Collection Time: 06/01/25 10:57 AM    Specimen: Blood   Result Value Ref Range    Procalcitonin 0.21 0.00 - 0.25 ng/mL   Magnesium    Collection Time: 06/01/25 10:57 AM    Specimen: Blood   Result Value Ref Range    Magnesium 2.0 1.6 - 2.6 mg/dL   proBNP    Collection Time: 06/01/25 10:57 AM    Specimen: Blood   Result Value Ref Range    proBNP 314.1 0.0 - 450.0 pg/mL   ECG 12 Lead Chest Pain    Collection Time: 06/01/25 11:37 AM   Result Value Ref Range    QT Interval 410 ms    QTC Interval 515 ms   Urinalysis With Microscopic If Indicated (No Culture) - Urine, Clean Catch    Collection Time: 06/01/25  1:17 PM    Specimen: Urine, Clean Catch   Result Value Ref Range    Color, UA Yellow Yellow, Straw    Appearance, UA Clear Clear    pH, UA 6.5 5.0 - 8.0    Specific Gravity, UA 1.052 (H) 1.001 - 1.030    Glucose, UA >=1000 mg/dL (3+) (A) Negative    Ketones, UA 15 mg/dL (1+) (A) Negative    Bilirubin, UA Negative Negative    Blood, UA Large (3+) (A) Negative    Protein, UA 30 mg/dL (1+) (A) Negative    Leuk Esterase, UA Trace (A) Negative    Nitrite, UA Negative Negative    Urobilinogen, UA 0.2 E.U./dL 0.2 - 1.0 E.U./dL   Urinalysis, Microscopic Only - Urine, Clean Catch    Collection Time: 06/01/25  1:17 PM    Specimen: Urine, Clean Catch   Result Value Ref Range    RBC, UA Too Numerous to Count (A) None Seen, 0-2 /HPF    WBC, UA 11-20 (A) None Seen, 0-2 /HPF    Bacteria, UA None Seen None Seen, Trace /HPF    Squamous Epithelial Cells, UA 0-2 None Seen, 0-2 /HPF    Hyaline Casts, UA 0-6 0 - 6 /LPF    Methodology Automated Microscopy        If labs were ordered, I independently reviewed the results and considered them in treating the  patient.        RADIOLOGY  CT Angiogram Chest Pulmonary Embolism  Result Date: 6/1/2025  CT ANGIOGRAM CHEST PULMONARY EMBOLISM Date of Exam: 6/1/2025 1:48 PM EDT Indication: sob. Comparison: CTA chest 9/25/2024 Technique: Axial CT images were obtained of the chest after the uneventful intravenous administration of 60 mL Isovue-370 utilizing pulmonary embolism protocol.  In addition, a 3-D volume rendered image was created for interpretation.  Reconstructed coronal and sagittal images were also obtained. Automated exposure control and iterative construction methods were used. Findings: Normal appearance of the pulmonary arteries without evidence of pulmonary embolism. Unremarkable appearance of the thoracic aorta. There are coronary artery calcifications. Dual-lead cardiac pacer is noted with left chest pulse generator. Redemonstration  of a rim calcified right epicardial cyst. No pericardial effusion. No thoracic adenopathy. There are calcified mediastinal lymph nodes compatible with sequelae of healed granulomatous disease. The chest wall soft tissues are unremarkable. Unremarkable appearance of the airways. The lungs are clear. No lung mass or suspicious pulmonary nodule. No pleural effusion or pneumothorax. There is scattered free air in the upper abdomen compatible with pneumoperitoneum. Otherwise unremarkable appearance of the upper abdomen.     Impression: No evidence of pulmonary embolism. No acute intrathoracic findings. Scattered free air in the upper abdomen compatible with pneumoperitoneum presumably relating to recent abdominal surgery. Electronically Signed: Severino Peter MD  6/1/2025 2:06 PM EDT  Workstation ID: [id number]    CT Abdomen Pelvis With Contrast  Result Date: 6/1/2025  CT ABDOMEN PELVIS W CONTRAST Date of Exam: 6/1/2025 11:42 AM EDT Indication: Recent bowel resection, abdominal pain, back pain. Low anterior resection and loop ileostomy 5/28/2025. History of diverticular disease with  perforation and abscess. Comparison: 4/3/2025, 3/17/2025, and prior. Technique: Axial CT images were obtained of the abdomen and pelvis following the uneventful intravenous administration of 90 mL Isovue-300. Reconstructed coronal and sagittal images were also obtained. Automated exposure control and iterative construction methods were used. Findings: Pacemaker/ICD is present, as before. Heart size appears unchanged. Calcified pericardial cyst, incompletely visualized, as before. There is suspected mild dependent atelectasis. No pleural effusion. Findings consistent with history of low anterior resection and distal colectomy with loop ileostomy. There is an anastomosis in the rectosigmoid colon. There is a small amount of pneumoperitoneum and gas in the anterior abdominal wall. This is presumed to be related to the recent surgery. There is edema/fat stranding in the pelvis in the area of the anastomosis. To the left of the anastomosis on axial series 2 images 135-140 and coronal image 54, there is a small amount of fluid attenuation measuring approximately 2 cm diameter. This does not appear to have a significant rim of enhancement and could represent a small amount of postoperative fluid, but  an early abscess cannot be excluded. No other sizable collection is visualized at this time. New mild left hydronephrosis with diminished excretion of contrast from the left kidney on delayed phase images. The ureter does not opacify with contrast on the delayed phase. The ureter does appear to be mildly dilated to the pelvis in the area of prior surgery where there is surrounding fat stranding/edema. A small calculus is present in the left renal lower pole, as before. No obstructing left ureteral calculus is identified. No right hydronephrosis or hydroureter. There is expected excretion of  contrast from the right kidney. There is contrast from the right ureter and the bladder on delayed phase. Bladder is minimally distended  with diffuse bladder wall thickening and mild surrounding fat stranding, primarily along the posterior and superior aspect of the bladder. Findings suggest obstruction of the distal left ureter in the pelvis, likely related to postoperative edema/inflammation. Small right lower pole renal calculus, as before. Suspected focal fatty infiltration adjacent to the falciform ligament. Diffuse hypoattenuation of the liver may indicate hepatic steatosis, as before. No definite focal splenic lesion or splenomegaly. No acute biliary or pancreatic abnormality. No suspicious adrenal lesion. No acute gastric abnormality. Mild fluid distention of small bowel loops without significant small bowel dilatation. Loop ileostomy in the right lower quadrant. Proximal and distal small bowel loops in the right lower quadrant appear nondilated. Appendix is not well visualized. No definite right lower quadrant inflammation. The colon is mostly nondistended. There is mild diffuse wall thickening of the distal colon and rectum. Aorta appears normal in caliber. No definite new abdominal or pelvic lymphadenopathy. Midline abdominal incision with small amount of gas. No localized collection is identified in the abdominal wall on this exam. No definite acute osseous abnormality.     Impression: 1.New distal colectomy with loop ileostomy. Small amount of pneumoperitoneum and gas in the anterior abdominal wall, presumed to be related to the recent surgery. 2.Edema/fat stranding in the pelvis in the area of the anastomosis. There is a 2 cm focus of fluid to the left of the anastomosis measuring which could represent a small amount of postoperative fluid, but an early abscess cannot be excluded. 3.New mild left hydronephrosis. The left ureter also appears mildly dilated to the area of surgery where there is surrounding fat stranding/edema. Findings suggest obstruction of the distal left ureter, likely related to postoperative edema/inflammation.   There are small bilateral renal calculi, but no ureteral calculus is visualized on this exam. 4.Mild diffuse wall thickening of the distal colon and rectum which could be related to postoperative changes, nondistention, or colitis. 5.Mild fluid distention of small bowel loops without significant small bowel dilatation. 6.Diffuse bladder wall thickening and mild surrounding fat stranding which could be related to cystitis or postoperative changes. Electronically Signed: Nathen Gallegojoyce  6/1/2025 12:39 PM EDT  Workstation ID: [id number]      I ordered and independently reviewed the above noted radiographic studies.      I viewed images of CT of the abdomen which showed postoperative changes, inflammation at the area of anastomosis per my independent interpretation.    See radiologist's dictation for official interpretation.        PROCEDURES    Procedures    ECG 12 Lead Chest Pain   Final Result   Test Reason : CP   Blood Pressure :   */*   mmHG   Vent. Rate :  95 BPM     Atrial Rate :  95 BPM      P-R Int : 152 ms          QRS Dur : 130 ms       QT Int : 410 ms       P-R-T Axes :  71 150   6 degrees     QTcB Int : 515 ms      Atrial-sensed ventricular-paced rhythm   Abnormal ECG   When compared with ECG of 04-Mar-2025 20:20,   Vent. rate has decreased by  10 bpm   Confirmed by JESUS LAWSON ([id number]) on 6/1/2025 4:42:20 PM      Referred By: EDMD           Confirmed By: JESUS LAWSON          MEDICATIONS GIVEN IN ER    Medications   Sodium Chloride (PF) 0.9 % 10 mL (has no administration in time range)   sodium chloride 0.9 % flush 10 mL (has no administration in time range)   potassium chloride (KLOR-CON M20) CR tablet 40 mEq (40 mEq Oral Given 6/1/25 1300)   potassium chloride 10 mEq in 100 mL IVPB (10 mEq Intravenous New Bag 6/1/25 1300)   famotidine (PEPCID) tablet 20 mg (has no administration in time range)   apixaban (ELIQUIS) tablet 5 mg (has no administration in time range)   Psyllium (METAMUCIL MULTIHEALTH FIBER) 51.7  % packet 1 packet (has no administration in time range)   docusate sodium (COLACE) capsule 100 mg (has no administration in time range)   meloxicam (MOBIC) tablet 7.5 mg (7.5 mg Oral Given 6/1/25 1626)   carisoprodol (SOMA) tablet 350 mg (has no administration in time range)   acetaminophen (TYLENOL) tablet 500 mg (500 mg Oral Given 6/1/25 1626)   multivitamin with minerals 1 tablet (1 tablet Oral Given 6/1/25 1626)   lactated ringers infusion (75 mL/hr Intravenous New Bag 6/1/25 1633)   HYDROcodone-acetaminophen (NORCO) 5-325 MG per tablet 1 tablet (has no administration in time range)   HYDROmorphone (DILAUDID) injection 0.5 mg (has no administration in time range)   naloxone (NARCAN) injection 0.4 mg (has no administration in time range)   prochlorperazine (COMPAZINE) injection 5 mg (has no administration in time range)   cefTRIAXone (ROCEPHIN) 1,000 mg in sodium chloride 0.9 % 100 mL MBP (has no administration in time range)   amoxicillin-clavulanate (AUGMENTIN) 500-125 MG per tablet 500 mg ( Oral Dose Auto Held 6/6/25 0900)   erythromycin (ROMYCIN) ophthalmic ointment 1 Application (has no administration in time range)   metoprolol succinate XL (TOPROL-XL) 24 hr tablet 25 mg ( Oral Dose Auto Held 6/9/25 0900)   sacubitril-valsartan (ENTRESTO) 49-51 MG tablet 1 tablet ( Oral Dose Auto Held 6/9/25 2100)   spironolactone (ALDACTONE) tablet 25 mg ( Oral Dose Auto Held 6/9/25 0900)   sodium chloride 0.9 % flush 10 mL (has no administration in time range)   sodium chloride 0.9 % flush 10 mL (has no administration in time range)   sodium chloride 0.9 % infusion 40 mL (has no administration in time range)   Potassium Replacement - Follow Nurse / BPA Driven Protocol (has no administration in time range)   Magnesium Standard Dose Replacement - Follow Nurse / BPA Driven Protocol (has no administration in time range)   Phosphorus Replacement - Follow Nurse / BPA Driven Protocol (has no administration in time range)    Calcium Replacement - Follow Nurse / BPA Driven Protocol (has no administration in time range)   potassium chloride 10 mEq in 100 mL IVPB (has no administration in time range)   potassium chloride (KLOR-CON M20) CR tablet 40 mEq (has no administration in time range)   sodium chloride 0.9 % bolus 1,000 mL (0 mL Intravenous Stopped 6/1/25 1252)   ondansetron (ZOFRAN) injection 4 mg (4 mg Intravenous Given 6/1/25 1110)   HYDROmorphone (DILAUDID) injection 1 mg (1 mg Intravenous Given 6/1/25 1117)   erythromycin (ROMYCIN) ophthalmic ointment 1 Application (1 Application Both Eyes Given 6/1/25 1131)   iopamidol (ISOVUE-300) 61 % injection 100 mL (90 mL Intravenous Given 6/1/25 1149)   iopamidol (ISOVUE-370) 76 % injection 100 mL (60 mL Intravenous Given 6/1/25 1352)   HYDROmorphone (DILAUDID) injection 1 mg (1 mg Intravenous Given 6/1/25 1454)   piperacillin-tazobactam (ZOSYN) 3.375 g IVPB in 100 mL NS MBP (CD) (3.375 g Intravenous New Bag 6/1/25 1455)         MEDICAL DECISION MAKING, PROGRESS, and CONSULTS    All labs, if obtained, have been independently reviewed by me.  All radiology studies, if obtained, have been reviewed by me and the radiologist dictating the report.  All EKG's, if obtained, have been independently viewed and interpreted by me/my attending physician.      Discussion below represents my analysis of pertinent findings related to patient's condition, differential diagnosis, treatment plan and final disposition.  Patient is pleasant 45-year-old male with history of A-fib, CHF, GERD, PTSD, diabetes, Hodgkin's lymphoma, recent low anterior resection and loop ileostomy presented  with concern of unrelieved abdominal pain radiating to the back and right shoulder.  His vitals were stable, he was afebrile, abdomen was tender on palpation, postoperative changes and ileostomy bag noted, lab work significant for normal white count, stable hemoglobin hematocrit, normal lactate, hypokalemia 3.1 replaced per  protocol, urinalysis showing TNTC RBCs, WBCs 11-20, no bacteria, he had Funez catheter during procedure.  CT of the abdomen showed a new distal colectomy with loop ileostomy, edema and fat stranding in the pelvis in the area of anastomosis, fluid collections versus early abscess, mild left hydronephrosis, mild diffuse wall thickening of distal colon and rectum, mild fluid distention of small bowel loops, diffuse bladder wall thickening.  CT of the chest no evidence of PE.  Patient received pain control with good result, furthermore started on antibiotics for pyuria, cystitis, suspected intra-abdominal infection.  Consulted general surgery Dr. Vickers, advised hospital admission for further evaluation.                       Differential diagnosis:    Postsurgical complication, intra-abdominal abscess, cystitis, sepsis, SIRS, perforation      Additional sources:    - Discussed/ obtained information from independent historians: Spouse    - External (non-ED) record review: 5/29/2025, progress note with Dr. Flores, pain was controlled, no nausea or vomiting no fevers, having ostomy output    - Chronic or social conditions impacting care: Recent surgery    - Shared decision making: Patient      Orders placed during this visit:  Orders Placed This Encounter   Procedures    Urine Culture - Urine,    CT Abdomen Pelvis With Contrast    CT Angiogram Chest Pulmonary Embolism    [place] Draw    Comprehensive Metabolic Panel    Lipase    Urinalysis With Microscopic If Indicated (No Culture) - Urine, Clean Catch    CBC Auto Differential    High Sensitivity Troponin T    Lactic Acid, Plasma    Procalcitonin    Magnesium    Urinalysis, Microscopic Only - Urine, Clean Catch    proBNP    Basic Metabolic Panel    Diet: Liquid; Clear Liquid; Fluid Consistency: Thin (IDDSI 0)    Undress & Gown    Turn Cough Deep Breathe    Ambulate Patient    Vital Signs    Intake & Output    Weigh Patient    Oral Care    Saline Lock & Maintain IV Access     Continuous Pulse Oximetry    Activity - Ad Fany    Strict Intake & Output    Assess Stoma    Empty Pouch When 1/3 Full    Change Pouch Immediately if Leaking    Stoma Care - Change Appliance    Notify Provider (With Default Parameters)    Code Status and Medical Interventions: CPR (Attempt to Resuscitate); Full Support    Inpatient Urology Consult    Dietary Nutrition Supplements Boost Breeze (Clear Liquid)    Oscillating Positive Expiratory Pressure (OPEP)    Incentive Spirometry    ECG 12 Lead Chest Pain    Insert Peripheral IV    Insert Peripheral IV    Insert Peripheral IV    Initiate Observation Status    CBC & Differential    Green Top (Gel)    Lavender Top    Gold Top - SST    Gray Top    Light Blue Top    CBC & Differential         Additional orders considered but not ordered:      ED Course:    Consultants:      ED Course as of 06/01/25 1646   Sun Jun 01, 2025   1113 Patient reported intolerance to morphine with anxiety and agitation.  Dilaudid ordered [IR]   1158 Potassium(!): 3.1  Mild hypokalemia, potassium replacement protocol ordered [IR]   1200 Lactate: 1.5 [IR]   1200 WBC: 8.16  Normal white count and lactate [IR]   1333 FERNANDO Bender notified me of low O2 sats.  She told me that she noted O2 sats being 89% on room air before she gave him narcotics.  She put him on 2 L nasal cannula.  Will order chest images [IR]   1349 Spoke with Dr. Vickers, discussed the patient, advised admit to hospital services [IR]      ED Course User Index  [IR] Liz Benavidez, APRN              Shared Decision Making:  After my consideration of clinical presentation and any laboratory/radiology studies obtained, I discussed the findings with the patient/patient representative who is in agreement with the treatment plan and the final disposition.   Risks and benefits of discharge and/or observation/admission were discussed.       AS OF 16:46 EDT VITALS:    BP - 111/71  HR - 95  TEMP - 98.3 °F (36.8 °C) (Oral)  O2 SATS -  99%                  DIAGNOSIS  Final diagnoses:   Generalized abdominal pain   History of colectomy   Hydronephrosis, unspecified hydronephrosis type   Bladder wall thickening         DISPOSITION  admit      Please note that portions of this document were completed with voice recognition software.     YARA Armendariz   06/01/25   16:46 EDT        Liz Benavidez, APRRALPH  06/01/25 [id number]

## 2025-06-01 NOTE — ED NOTES
[facility]    Nursing Report ED to Floor:  Mental status: alert and oriented x 4  Ambulatory status: assist of 1  Oxygen Therapy:  2L  Cardiac Rhythm: NSR  Admitted from: home  Safety Concerns:  none  Precautions: none  Social Issues: none  ED Room #:  21    ED Nurse Phone Extension - [phone] or may call [phone].      HPI:   Chief Complaint   Patient presents with    Abdominal Pain       Past Medical History:  Past Medical History:   Diagnosis Date    Allergic rhinitis     Anesthesia complication     irritable after    Atrial fibrillation     CHF (congestive heart failure)     Chronic right ear pain     h/o    Diabetes mellitus     h/o-  doesnt check sugar    Diverticulitis     GERD (gastroesophageal reflux disease)     Headache     Heart failure     History of kidney stones     passed them    History of shingles     1999- after transplant of stem cells    History of transfusion     1998-bhlex; 1999 [place], [place]- 1 unit- stem cell transplant -  recieved plts - no reaction recalled    Hodgkin lymphoma     Hypertension     Hypogonadism in male     LBBB (left bundle branch block)     PTSD (post-traumatic stress disorder)     Stem cells transplant status     Vision blurred     possibly need glasses        Past Surgical History:  Past Surgical History:   Procedure Laterality Date    BIVENTRICULLAR IMPLANTABLE CARDIOVERTER DEFIBRILLATOR PLACEMENT N/A 10/26/2023    Procedure: Implant ICD - bi ventricular; DNS meds; [place];  Surgeon: Wilmer Rojas DO;  Location:  [facility] INVASIVE LOCATION;  Service: Cardiovascular;  Laterality: N/A;    CARDIAC CATHETERIZATION N/A 05/30/2023    Procedure: Left Heart Cath;  Surgeon: Alec Mason III, MD;  Location:  [facility] CATH INVASIVE LOCATION;  Service: Cardiovascular;  Laterality: N/A;    CARDIAC ELECTROPHYSIOLOGY PROCEDURE N/A 10/3/2024    Procedure: Ablation atrial fibrillation w PFA; CTA prior, [place], no meds to hold;  Surgeon: Wilmer Rojas DO;  Location:  [facility] EP  INVASIVE LOCATION;  Service: Cardiovascular;  Laterality: N/A;    CENTRAL VENOUS LINE INSERTION  1999    removed since    COLON RESECTION N/A 5/28/2025    Procedure: LOW ANTERIOR RESECTION, SPLENIC FLEXURE MOBILIZATION, FLEXIBLE SIGMOIDOSCOPY, LOOP ILEOSTOMY;  Surgeon: Rosas Plaza MD;  Location:  [facility] OR;  Service: Robotics - [place];  Laterality: N/A;    COLONOSCOPY      COLONOSCOPY N/A 4/22/2025    Procedure: COLONOSCOPY;  Surgeon: Randy White MD;  Location:  [facility] END[facility];  Service: Gastroenterology;  Laterality: N/A;    CYSTOSCOPY N/A 5/28/2025    Procedure: CYSTOSCOPY TEMPORARY PLACEMENT BILATERAL URETERAL CATHETER;  Surgeon: Rooseevlt Osuna MD;  Location:  [facility] OR;  Service: Urology;  Laterality: N/A;    OTHER SURGICAL HISTORY      stem cell transplant    PORTOCAVAL SHUNT PLACEMENT  1998    WISDOM TOOTH EXTRACTION          Admitting Doctor:   Mady Blanca MD    Consulting Provider(s):  Consults       No orders found from 5/3/2025 to 6/2/2025.             Admitting Diagnosis:   There were no encounter diagnoses.    Most Recent Vitals:   Vitals:    06/01/25 1300 06/01/25 1330 06/01/25 1400 06/01/25 1430   BP: 117/80 116/73 111/74 112/77   BP Location:       Patient Position:       Pulse: 89 91 95 97   Resp:       Temp:       TempSrc:       SpO2: 97% 97% 99% 100%   Weight:       Height:           Active LDAs/IV Access:   Lines, Drains & Airways       Active LDAs       Name Placement date Placement time Site Days    Peripheral IV 06/01/25 1059 20 G Right Antecubital 06/01/25  1059  Antecubital  less than 1    Ileostomy RLQ 05/28/25  --  RLQ  4    Ureteral Drain/Stent Left ureter 5 Fr. 05/28/25  --  Left ureter  4    Ureteral Drain/Stent Right ureter 5 Fr. 05/28/25  --  Right ureter  4                    Labs (abnormal labs have a star):   Labs Reviewed   COMPREHENSIVE METABOLIC PANEL - Abnormal; Notable for the following components:       Result Value    Glucose 127 (*)     Sodium 134 (*)      Potassium 3.1 (*)     Chloride 92 (*)     All other components within normal limits    Narrative:     GFR Categories in Chronic Kidney Disease (CKD)              GFR Category          GFR (mL/min/1.73)    Interpretation  G1                    90 or greater        Normal or high (1)  G2                    60-89                Mild decrease (1)  G3a                   45-59                Mild to moderate decrease  G3b                   30-44                Moderate to severe decrease  G4                    15-29                Severe decrease  G5                    14 or less           Kidney failure    (1)In the absence of evidence of kidney disease, neither GFR category G1 or G2 fulfill the criteria for CKD.    eGFR calculation 2021 CKD-EPI creatinine equation, which does not include race as a factor   URINALYSIS W/ MICROSCOPIC IF INDICATED (NO CULTURE) - Abnormal; Notable for the following components:    Specific Gravity, UA 1.052 (*)     Glucose, UA >=1000 mg/dL (3+) (*)     Ketones, UA 15 mg/dL (1+) (*)     Blood, UA Large (3+) (*)     Protein, UA 30 mg/dL (1+) (*)     Leuk Esterase, UA Trace (*)     All other components within normal limits   CBC WITH AUTO DIFFERENTIAL - Abnormal; Notable for the following components:    RDW 15.6 (*)     Neutrophil % 81.6 (*)     Lymphocyte % 7.2 (*)     Lymphocytes, Absolute 0.59 (*)     All other components within normal limits   URINALYSIS, MICROSCOPIC ONLY - Abnormal; Notable for the following components:    RBC, UA Too Numerous to Count (*)     WBC, UA 11-20 (*)     All other components within normal limits   LIPASE - Normal   TROPONIN - Normal    Narrative:     High Sensitive Troponin T Reference Range:  <14.0 ng/L- Negative Female for AMI  <22.0 ng/L- Negative Male for AMI  >=14 - Abnormal Female indicating possible myocardial injury.  >=22 - Abnormal Male indicating possible myocardial injury.   Clinicians would have to utilize clinical acumen, EKG, Troponin, and serial  "changes to determine if it is an Acute Myocardial Infarction or myocardial injury due to an underlying chronic condition.        LACTIC ACID, PLASMA - Normal   PROCALCITONIN - Normal    Narrative:     As a Marker for Sepsis (Non-Neonates):    1. <0.5 ng/mL represents a low risk of severe sepsis and/or septic shock.  2. >2 ng/mL represents a high risk of severe sepsis and/or septic shock.    As a Marker for Lower Respiratory Tract Infections that require antibiotic therapy:    PCT on Admission    Antibiotic Therapy       6-12 Hrs later    >0.5                Strongly Recommended  >0.25 - <0.5        Recommended   0.1 - 0.25          Discouraged              Remeasure/reassess PCT  <0.1                Strongly Discouraged     Remeasure/reassess PCT    As 28 day mortality risk marker: \"Change in Procalcitonin Result\" (>80% or <=80%) if Day 0 (or Day 1) and Day 4 values are available. Refer to http://www.[employer]-pct-calculator.com    Change in PCT <=80%  A decrease of PCT levels below or equal to 80% defines a positive change in PCT test result representing a higher risk for 28-day all-cause mortality of patients diagnosed with severe sepsis for septic shock.    Change in PCT >80%  A decrease of PCT levels of more than 80% defines a negative change in PCT result representing a lower risk for 28-day all-cause mortality of patients diagnosed with severe sepsis or septic shock.      MAGNESIUM - Normal   RAINBOW DRAW    Narrative:     The following orders were created for panel order [place] Draw.  Procedure                               Abnormality         Status                     ---------                               -----------         ------                     Green Top (Gel)[[id number]]                                  Final result               Lavender Top[[id number]]                                     Final result               Gold Top - SST[[id number]]                                   Final result             "   Reddy Top[[id number]]                                         Final result               Light Blue Top[[id number]]                                   Final result                 Please view results for these tests on the individual orders.   CBC AND DIFFERENTIAL    Narrative:     The following orders were created for panel order CBC & Differential.  Procedure                               Abnormality         Status                     ---------                               -----------         ------                     CBC Auto Differential[[id number]]        Abnormal            Final result                 Please view results for these tests on the individual orders.   GREEN TOP   LAVENDER TOP   GOLD TOP - SST   GRAY TOP   LIGHT BLUE TOP       Meds Given in ED:   Medications   Sodium Chloride (PF) 0.9 % 10 mL (has no administration in time range)   sodium chloride 0.9 % flush 10 mL (has no administration in time range)   Potassium Replacement - Follow Nurse / BPA Driven Protocol (has no administration in time range)   Magnesium Standard Dose Replacement - Follow Nurse / BPA Driven Protocol (has no administration in time range)   potassium chloride (KLOR-CON M20) CR tablet 40 mEq (40 mEq Oral Given 6/1/25 1300)   potassium chloride 10 mEq in 100 mL IVPB (10 mEq Intravenous New Bag 6/1/25 1300)   HYDROmorphone (DILAUDID) injection 1 mg (has no administration in time range)   piperacillin-tazobactam (ZOSYN) 3.375 g IVPB in 100 mL NS MBP (CD) (has no administration in time range)   famotidine (PEPCID) tablet 20 mg (has no administration in time range)   apixaban (ELIQUIS) tablet 5 mg (has no administration in time range)   Psyllium (METAMUCIL MULTIHEALTH FIBER) 51.7 % packet 1 packet (has no administration in time range)   docusate sodium (COLACE) capsule 100 mg (has no administration in time range)   meloxicam (MOBIC) tablet 7.5 mg (has no administration in time range)   carisoprodol (SOMA) tablet 350 mg (has no  administration in time range)   acetaminophen (TYLENOL) tablet 500 mg (has no administration in time range)   multivitamin with minerals 1 tablet (has no administration in time range)   lactated ringers infusion (has no administration in time range)   HYDROcodone-acetaminophen (NORCO) 5-325 MG per tablet 1 tablet (has no administration in time range)   sodium chloride 0.9 % bolus 1,000 mL (0 mL Intravenous Stopped 6/1/25 1252)   ondansetron (ZOFRAN) injection 4 mg (4 mg Intravenous Given 6/1/25 1110)   HYDROmorphone (DILAUDID) injection 1 mg (1 mg Intravenous Given 6/1/25 1117)   erythromycin (ROMYCIN) ophthalmic ointment 1 Application (1 Application Both Eyes Given 6/1/25 1131)   iopamidol (ISOVUE-300) 61 % injection 100 mL (90 mL Intravenous Given 6/1/25 1149)   iopamidol (ISOVUE-370) 76 % injection 100 mL (60 mL Intravenous Given 6/1/25 1352)     lactated ringers, 75 mL/hr         Last NIH score:                                                          Dysphagia screening results:  Patient Factors Component (Dysphagia:Stroke or Rule-out)  Best Eye Response: 4-->(E4) spontaneous (06/01/25 1127)  Best Motor Response: 6-->(M6) obeys commands (06/01/25 1127)  Best Verbal Response: 5-->(V5) oriented (06/01/25 1127)  Cindy Coma Scale Score: 15 (06/01/25 1127)     Cindy Coma Scale:  No data recorded     CIWA:        Restraint Type:            Isolation Status:  No active isolations

## 2025-06-01 NOTE — H&P
[facility] Medicine Services  HISTORY AND PHYSICAL    Patient Name: Palomo Pollack  : 1980  MRN: [id number]  Primary Care Physician: Heladio Valentine DO  Date of admission: 2025    Subjective   Subjective     Chief Complaint:  Abdominal pain    HPI:  Palomo Pollack is a 45 y.o. male With history of A-fib, HFrEF, recent sigmoid diverticulitis with perforation/abscess status post IV antibiotics (2025),GERD, PTSD, DM2, Hodgkin lymphoma s/p stem cell transplant, who was just discharged by the surgical service on 2025 after low anterior resection and loop ileostomy creation for diverticular stricture. He was discharged on Augmentin with a 6-day supply, erythromycin eye ointment. Patient has had normal ostomy output with no blood. No fever, headache, chest pain, emesis. Has had nausea. Has had uncontrolled abdominal pain since discharge. No rectal output. Has had dysuria and hematuria.     In the ER, patient was afebrile, heart rate 110 down to 95, blood pressure 131/96, satting 100% on room air.  Labs notable for UA with blood, protein, leuk esterase, too numerous to count RBCs, 11-20 WBCs, creatinine 0.91, lipase 21, WBC 8.16 with left shift, lactic 1.5, procalcitonin 0.21.  CTA chest with no evidence of PE.  CT abdomen/pelvis with contrast showed new distal colectomy with loop ileostomy, edema/fat stranding in the pelvis in the area of the anastomosis with a 2 cm focus of fluid to the left of the anastomosis that could be postoperative fluid versus early abscess.  New mild left hydronephrosis also seen concerning for obstruction of the distal left ureter likely related postoperative edema/inflammation.  He was given Dilaudid, Zofran, potassium, 1 L normal saline, Zosyn.  ER provider discussed with Dr. Vickers, who recommended admission.  He was admitted for further management.    Review of Systems   As above          Personal History     Past Medical  History:   Diagnosis Date    Allergic rhinitis     Anesthesia complication     irritable after    Atrial fibrillation     CHF (congestive heart failure)     Chronic right ear pain     h/o    Diabetes mellitus     h/o-  doesnt check sugar    Diverticulitis     GERD (gastroesophageal reflux disease)     Headache     Heart failure     History of kidney stones     passed them    History of shingles     1999- after transplant of stem cells    History of transfusion     1998-bhlex; 1999 [place], [place]- 1 unit- stem cell transplant -  recieved plts - no reaction recalled    Hodgkin lymphoma     Hypertension     Hypogonadism in male     LBBB (left bundle branch block)     PTSD (post-traumatic stress disorder)     Stem cells transplant status     Vision blurred     possibly need glasses         Oncology Problem List:  History of Lymphoma (10/05/2017; Status: Active)    Oncology/Hematology History    No history exists.       Past Surgical History:   Procedure Laterality Date    BIVENTRICULLAR IMPLANTABLE CARDIOVERTER DEFIBRILLATOR PLACEMENT N/A 10/26/2023    Procedure: Implant ICD - bi ventricular; DNS meds; [place];  Surgeon: Wilmer Rojas DO;  Location:  [facility] INVASIVE LOCATION;  Service: Cardiovascular;  Laterality: N/A;    CARDIAC CATHETERIZATION N/A 05/30/2023    Procedure: Left Heart Cath;  Surgeon: Alec Mason III, MD;  Location:  [facility] CATH INVASIVE LOCATION;  Service: Cardiovascular;  Laterality: N/A;    CARDIAC ELECTROPHYSIOLOGY PROCEDURE N/A 10/3/2024    Procedure: Ablation atrial fibrillation w PFA; CTA prior, [place], no meds to hold;  Surgeon: Wilmer Rojas DO;  Location: [facility] INVASIVE LOCATION;  Service: Cardiovascular;  Laterality: N/A;    CENTRAL VENOUS LINE INSERTION  1999    removed since    COLON RESECTION N/A 5/28/2025    Procedure: LOW ANTERIOR RESECTION, SPLENIC FLEXURE MOBILIZATION, FLEXIBLE SIGMOIDOSCOPY, LOOP ILEOSTOMY;  Surgeon: Rosas Plaza MD;  Location:  [facility] OR;  Service:  Robotics - [employer];  Laterality: N/A;    COLONOSCOPY      COLONOSCOPY N/A 4/22/2025    Procedure: COLONOSCOPY;  Surgeon: Randy White MD;  Location:  [facility];  Service: Gastroenterology;  Laterality: N/A;    CYSTOSCOPY N/A 5/28/2025    Procedure: CYSTOSCOPY TEMPORARY PLACEMENT BILATERAL URETERAL CATHETER;  Surgeon: Roosevelt Osuna MD;  Location:  [facility] OR;  Service: Urology;  Laterality: N/A;    OTHER SURGICAL HISTORY      stem cell transplant    PORTOCAVAL SHUNT PLACEMENT  1998    WISDOM TOOTH EXTRACTION         Family History: family history includes Cancer in his father; Lung cancer in his father; Stroke in his father.     Social History:  reports that he has been smoking cigarettes. He started smoking about 22 years ago. He has a 5.6 pack-year smoking history. He has been exposed to tobacco smoke. He has never used smokeless tobacco. He reports that he does not currently use alcohol. He reports that he does not currently use drugs after having used the following drugs: Marijuana.  Social History     Social History Narrative    Not on file       Medications:  EPINEPHrine, Testosterone, amoxicillin-clavulanate, apixaban, docusate sodium, empagliflozin, erythromycin, esomeprazole, fexofenadine, furosemide, metoprolol succinate XL, multivitamin, oxyCODONE-acetaminophen, sacubitril-valsartan, and spironolactone    Allergies   Allergen Reactions    Morphine Other (See Comments)     ANXIETY; AGITATION    Bactrim [Sulfamethoxazole-Trimethoprim] Rash and Other (See Comments)     Gave increased heartrate-biaxin      Clarithromycin Rash     Tolerates erythromycin    Milk-Related Compounds Other (See Comments)     Increased phlegm        Objective   Objective     Vital Signs:   Temp:  [98.2 °F (36.8 °C)] 98.2 °F (36.8 °C)  Heart Rate:  [] 94  Resp:  [18] 18  BP: (103-131)/(69-96) 108/76  Flow (L/min) (Oxygen Therapy):  [2] 2    Physical Exam   Constitutional: No acute distress, awake, alert  HENT:  NCAT, mucous membranes moist  Respiratory: Clear to auscultation bilaterally, respiratory effort normal   Cardiovascular: RRR  Gastrointestinal: Positive bowel sounds, soft, tender to palpation, no rebound, no guarding, ostomy in place with stool output, surgical incisions c/d/i, nondistended  Musculoskeletal: No bilateral ankle edema  Psychiatric: Appropriate affect, cooperative  Neurologic: Alert, oriented, symmetric facies, moves all extremities, speech clear  Skin: No rashes    Result Review:  I have personally reviewed the results from the time of this admission to 6/1/2025 15:09 EDT and agree with these findings:  []  Laboratory list / accordion  []  Microbiology  []  Radiology  []  EKG/Telemetry   []  Cardiology/Vascular   []  Pathology  []  Old records  []  Other:  Most notable findings include:     LAB RESULTS:      Lab 06/01/25  1057 05/31/25 0402 05/30/25 2051 05/29/25  0449   WBC 8.16 8.70 8.42 12.16*   HEMOGLOBIN 13.8 12.7* 12.9* 12.8*   HEMATOCRIT 41.1 38.3 39.0 39.8   PLATELETS 165 116* 113* 113*   NEUTROS ABS 6.66 6.76 6.41 10.46*   IMMATURE GRANS (ABS) 0.03 0.07* 0.04 0.05   LYMPHS ABS 0.59* 0.81 0.91 0.56*   MONOS ABS 0.79 0.94* 0.96* 1.07*   EOS ABS 0.05 0.08 0.07 0.00   MCV 88.4 90.1 92.0 91.7   PROCALCITONIN 0.21  --   --   --    LACTATE 1.5  --   --   --          Lab 06/01/25  1057 05/31/25 0403 05/30/25 2051 05/29/25  0449   SODIUM 134* 135* 135* 135*   POTASSIUM 3.1* 3.3* 3.4* 4.0   CHLORIDE 92* 96* 96* 99   CO2 27.0 27.0 27.0 24.0   ANION GAP 15.0 12.0 12.0 12.0   BUN 10.3 11.7 14.4 18.1   CREATININE 0.91 0.71* 0.74* 0.91   EGFR 104.6 113.9 112.5 104.6   GLUCOSE 127* 91 87 152*   CALCIUM 9.3 8.8 9.1 8.4*   MAGNESIUM 2.0  --   --   --          Lab 06/01/25  1057   TOTAL PROTEIN 7.4   ALBUMIN 3.8   GLOBULIN 3.6   ALT (SGPT) 26   AST (SGOT) 33   BILIRUBIN 0.8   ALK PHOS 76   LIPASE 21         Lab 06/01/25  1057   HSTROP T 10                 Brief Urine Lab Results  (Last result in the past  365 days)        Color   Clarity   Blood   Leuk Est   Nitrite   Protein   CREAT   Urine HCG        06/01/25 1317 Yellow   Clear   Large (3+)   Trace   Negative   30 mg/dL (1+)                 Microbiology Results (last 10 days)       ** No results found for the last 240 hours. **            CT Angiogram Chest Pulmonary Embolism  Result Date: 6/1/2025  CT ANGIOGRAM CHEST PULMONARY EMBOLISM Date of Exam: 6/1/2025 1:48 PM EDT Indication: sob. Comparison: CTA chest 9/25/2024 Technique: Axial CT images were obtained of the chest after the uneventful intravenous administration of 60 mL Isovue-370 utilizing pulmonary embolism protocol.  In addition, a 3-D volume rendered image was created for interpretation.  Reconstructed coronal and sagittal images were also obtained. Automated exposure control and iterative construction methods were used. Findings: Normal appearance of the pulmonary arteries without evidence of pulmonary embolism. Unremarkable appearance of the thoracic aorta. There are coronary artery calcifications. Dual-lead cardiac pacer is noted with left chest pulse generator. Redemonstration  of a rim calcified right epicardial cyst. No pericardial effusion. No thoracic adenopathy. There are calcified mediastinal lymph nodes compatible with sequelae of healed granulomatous disease. The chest wall soft tissues are unremarkable. Unremarkable appearance of the airways. The lungs are clear. No lung mass or suspicious pulmonary nodule. No pleural effusion or pneumothorax. There is scattered free air in the upper abdomen compatible with pneumoperitoneum. Otherwise unremarkable appearance of the upper abdomen.     Impression: Impression: No evidence of pulmonary embolism. No acute intrathoracic findings. Scattered free air in the upper abdomen compatible with pneumoperitoneum presumably relating to recent abdominal surgery. Electronically Signed: Severino Peter MD  6/1/2025 2:06 PM EDT  Workstation ID: [id number]    CT  Abdomen Pelvis With Contrast  Result Date: 6/1/2025  CT ABDOMEN PELVIS W CONTRAST Date of Exam: 6/1/2025 11:42 AM EDT Indication: Recent bowel resection, abdominal pain, back pain. Low anterior resection and loop ileostomy 5/28/2025. History of diverticular disease with perforation and abscess. Comparison: 4/3/2025, 3/17/2025, and prior. Technique: Axial CT images were obtained of the abdomen and pelvis following the uneventful intravenous administration of 90 mL Isovue-300. Reconstructed coronal and sagittal images were also obtained. Automated exposure control and iterative construction methods were used. Findings: Pacemaker/ICD is present, as before. Heart size appears unchanged. Calcified pericardial cyst, incompletely visualized, as before. There is suspected mild dependent atelectasis. No pleural effusion. Findings consistent with history of low anterior resection and distal colectomy with loop ileostomy. There is an anastomosis in the rectosigmoid colon. There is a small amount of pneumoperitoneum and gas in the anterior abdominal wall. This is presumed to be related to the recent surgery. There is edema/fat stranding in the pelvis in the area of the anastomosis. To the left of the anastomosis on axial series 2 images 135-140 and coronal image 54, there is a small amount of fluid attenuation measuring approximately 2 cm diameter. This does not appear to have a significant rim of enhancement and could represent a small amount of postoperative fluid, but  an early abscess cannot be excluded. No other sizable collection is visualized at this time. New mild left hydronephrosis with diminished excretion of contrast from the left kidney on delayed phase images. The ureter does not opacify with contrast on the delayed phase. The ureter does appear to be mildly dilated to the pelvis in the area of prior surgery where there is surrounding fat stranding/edema. A small calculus is present in the left renal lower pole,  as before. No obstructing left ureteral calculus is identified. No right hydronephrosis or hydroureter. There is expected excretion of  contrast from the right kidney. There is contrast from the right ureter and the bladder on delayed phase. Bladder is minimally distended with diffuse bladder wall thickening and mild surrounding fat stranding, primarily along the posterior and superior aspect of the bladder. Findings suggest obstruction of the distal left ureter in the pelvis, likely related to postoperative edema/inflammation. Small right lower pole renal calculus, as before. Suspected focal fatty infiltration adjacent to the falciform ligament. Diffuse hypoattenuation of the liver may indicate hepatic steatosis, as before. No definite focal splenic lesion or splenomegaly. No acute biliary or pancreatic abnormality. No suspicious adrenal lesion. No acute gastric abnormality. Mild fluid distention of small bowel loops without significant small bowel dilatation. Loop ileostomy in the right lower quadrant. Proximal and distal small bowel loops in the right lower quadrant appear nondilated. Appendix is not well visualized. No definite right lower quadrant inflammation. The colon is mostly nondistended. There is mild diffuse wall thickening of the distal colon and rectum. Aorta appears normal in caliber. No definite new abdominal or pelvic lymphadenopathy. Midline abdominal incision with small amount of gas. No localized collection is identified in the abdominal wall on this exam. No definite acute osseous abnormality.     Impression: Impression: 1.New distal colectomy with loop ileostomy. Small amount of pneumoperitoneum and gas in the anterior abdominal wall, presumed to be related to the recent surgery. 2.Edema/fat stranding in the pelvis in the area of the anastomosis. There is a 2 cm focus of fluid to the left of the anastomosis measuring which could represent a small amount of postoperative fluid, but an early  abscess cannot be excluded. 3.New mild left hydronephrosis. The left ureter also appears mildly dilated to the area of surgery where there is surrounding fat stranding/edema. Findings suggest obstruction of the distal left ureter, likely related to postoperative edema/inflammation.  There are small bilateral renal calculi, but no ureteral calculus is visualized on this exam. 4.Mild diffuse wall thickening of the distal colon and rectum which could be related to postoperative changes, nondistention, or colitis. 5.Mild fluid distention of small bowel loops without significant small bowel dilatation. 6.Diffuse bladder wall thickening and mild surrounding fat stranding which could be related to cystitis or postoperative changes. Electronically Signed: Nathen Gilliland  6/1/2025 12:39 PM EDT  Workstation ID: [id number]      Results for orders placed during the hospital encounter of 04/15/25    Adult Transthoracic Echo Complete w/ Color, Spectral and Contrast if necessary per protocol    Interpretation Summary    Left ventricular systolic function is mildly decreased. Calculated left ventricular EF = 42.5% Left ventricular ejection fraction appears to be 41 - 45%.    Left ventricular wall thickness is consistent with borderline concentric hypertrophy.    Left ventricular diastolic function was normal.    Estimated right ventricular systolic pressure from tricuspid regurgitation is normal (<35 mmHg).      Assessment & Plan   Assessment & Plan       Abdominal pain    45 y.o. male With history of A-fib, HFrEF, recent sigmoid diverticulitis with perforation/abscess status post IV antibiotics (March 2025),GERD, PTSD, DM2, Hodgkin lymphoma s/p stem cell transplant, who was just discharged by the surgical service on 5/31/2025 after low anterior resection and loop ileostomy creation for diverticular stricture; urethral stents were placed at time of surgery by urology. He was discharged on Augmentin with a 6-day supply, erythromycin eye  ointment. Patient has had normal ostomy output with no blood. No rectal output. Has had dysuria and hematuria.     Labs notable for UA with blood, protein, leuk esterase, too numerous to count RBCs, 11-20 WBCs, creatinine 0.91, lipase 21, WBC 8.16 with left shift, lactic 1.5, procalcitonin 0.21.  CTA chest with no evidence of PE.  CT abdomen/pelvis with contrast showed new distal colectomy with loop ileostomy, edema/fat stranding in the pelvis in the area of the anastomosis with a 2 cm focus of fluid to the left of the anastomosis that could be postoperative fluid versus early abscess.  New mild left hydronephrosis also seen concerning for obstruction of the distal left ureter likely related postoperative edema/inflammation.  He was given Dilaudid, Zofran, potassium, 1 L normal saline, Zosyn.      Intractable post-operative abdominal pain   S/p lower anterior resection and loop ileostomy creation for diverticular stricture  Fluid collection near anastomosis, likely postoperative fluid collection  Recent sigmoid perforation/abscess s/p IV antibiotics  -Gentle IVF in setting of known heart failure  -Pain, nausea control  -CLD for now, advancement per general surgery  -Was discharged on Augmentin; will hold for now per discussion w Dr. Vickers; normal white count, negative procalcitonin, low suspicion for abscess at this time  -General surgery consulted    Abnormal UA with UTI symptoms  New hydronephrosis  -Follow-up urine culture  -Urology consulted per general surgery; suspect hydro is due to post-op inflammation  -Empiric CTX for now    A fib  -Continue eliquis; discussed with Dr. Vickers and he is okay with continuing this   -Continue metoprolol    Corneal abrasion, possible infection  -Was discharged from here w erythromycin eye ointment, continue    HFrEF, LBBB s/p BiV ICD-hold entresto, spironolactone  Lymphoma s/p chemo, chest radiation, and stem cell transplant  DM2-SSI   GERD  PTSD    Updated patient's  girlfriend bedside with patient's permission while in the ER.  Patient seen and evaluated in the ER.    Med list reviewed by pharmacy tech    DVT prophylaxis: Eliquis    CODE STATUS: Full code  Code Status (Patient has no pulse and is not breathing): CPR (Attempt to Resuscitate)  Medical Interventions (Patient has pulse or is breathing): Full Support  Level Of Support Discussed With: Patient      Expected Discharge  Expected discharge date/ time has not been documented.      This note has been completed as part of a split-shared workflow.     Signature: Electronically signed by Mady Blanca MD, 06/01/25, 2:36 PM EDT

## 2025-06-01 NOTE — PROGRESS NOTES
"General Surgery Daily Progress Note    5/28/2025 - robotic low anterior resection with loop ileostomy creation for a diverticular stricture    History : 45-year-old male discharged yesterday presents to the emergency room with uncontrolled abdominal pain.  His pain is diffuse in the abdomen and radiates through to his back.  This has been associated with subjective fever, unrelenting nausea, though no vomiting.  He has been moving his loop ileostomy with stool and gas.  He has had nothing from his rectum.     ER workup with blood work and CT imaging.    Objective:  /74   Pulse 95   Temp 98.2 °F (36.8 °C) (Oral)   Resp 18   Ht 180.3 cm (71\")   Wt 88.5 kg (195 lb)   SpO2 99%   BMI 27.20 kg/m²     General Appearance: Mild distress  Eyes: Anicteric  Neck: Trachea midline   Cardiovascular:  RRR without murmur nor rub  Lungs:  Bilateral respirations unlabored   Abdomen:  Soft, diffusely tender without generalized peritonitis, midline incision appears well with minimal erythema inferiorly.  The other laparoscopic sites in his drain exit site appear okay also.  Extremities:  No cyanosis or edema   Skin:  No obvious rashes   Neurologic: awake and conversant       Imaging Results (Last 24 Hours)       Procedure Component Value Units Date/Time    CT Angiogram Chest Pulmonary Embolism [[id number]] Collected: 06/01/25 1400     Updated: 06/01/25 1410    Narrative:      CT ANGIOGRAM CHEST PULMONARY EMBOLISM    Date of Exam: 6/1/2025 1:48 PM EDT    Indication: sob.    Comparison: CTA chest 9/25/2024    Technique: Axial CT images were obtained of the chest after the uneventful intravenous administration of 60 mL Isovue-370 utilizing pulmonary embolism protocol.  In addition, a 3-D volume rendered image was created for interpretation.  Reconstructed   coronal and sagittal images were also obtained. Automated exposure control and iterative construction methods were used.      Findings:  Normal appearance of the pulmonary " arteries without evidence of pulmonary embolism. Unremarkable appearance of the thoracic aorta. There are coronary artery calcifications. Dual-lead cardiac pacer is noted with left chest pulse generator. Redemonstration   of a rim calcified right epicardial cyst. No pericardial effusion. No thoracic adenopathy. There are calcified mediastinal lymph nodes compatible with sequelae of healed granulomatous disease. The chest wall soft tissues are unremarkable. Unremarkable   appearance of the airways. The lungs are clear. No lung mass or suspicious pulmonary nodule. No pleural effusion or pneumothorax.    There is scattered free air in the upper abdomen compatible with pneumoperitoneum. Otherwise unremarkable appearance of the upper abdomen.      Impression:      Impression:  No evidence of pulmonary embolism. No acute intrathoracic findings.      Scattered free air in the upper abdomen compatible with pneumoperitoneum presumably relating to recent abdominal surgery.        Electronically Signed: Severino Peter MD    6/1/2025 2:06 PM EDT    Workstation ID: [id number]    CT Abdomen Pelvis With Contrast [[id number]] Collected: 06/01/25 1219     Updated: 06/01/25 1242    Narrative:      CT ABDOMEN PELVIS W CONTRAST    Date of Exam: 6/1/2025 11:42 AM EDT    Indication: Recent bowel resection, abdominal pain, back pain. Low anterior resection and loop ileostomy 5/28/2025. History of diverticular disease with perforation and abscess.    Comparison: 4/3/2025, 3/17/2025, and prior.    Technique: Axial CT images were obtained of the abdomen and pelvis following the uneventful intravenous administration of 90 mL Isovue-300. Reconstructed coronal and sagittal images were also obtained. Automated exposure control and iterative   construction methods were used.      Findings:  Pacemaker/ICD is present, as before. Heart size appears unchanged. Calcified pericardial cyst, incompletely visualized, as before. There is suspected mild  dependent atelectasis. No pleural effusion.    Findings consistent with history of low anterior resection and distal colectomy with loop ileostomy. There is an anastomosis in the rectosigmoid colon.    There is a small amount of pneumoperitoneum and gas in the anterior abdominal wall. This is presumed to be related to the recent surgery. There is edema/fat stranding in the pelvis in the area of the anastomosis. To the left of the anastomosis on axial   series 2 images 135-140 and coronal image 54, there is a small amount of fluid attenuation measuring approximately 2 cm diameter. This does not appear to have a significant rim of enhancement and could represent a small amount of postoperative fluid, but   an early abscess cannot be excluded. No other sizable collection is visualized at this time.    New mild left hydronephrosis with diminished excretion of contrast from the left kidney on delayed phase images. The ureter does not opacify with contrast on the delayed phase. The ureter does appear to be mildly dilated to the pelvis in the area of   prior surgery where there is surrounding fat stranding/edema. A small calculus is present in the left renal lower pole, as before. No obstructing left ureteral calculus is identified. No right hydronephrosis or hydroureter. There is expected excretion of   contrast from the right kidney. There is contrast from the right ureter and the bladder on delayed phase. Bladder is minimally distended with diffuse bladder wall thickening and mild surrounding fat stranding, primarily along the posterior and superior   aspect of the bladder. Findings suggest obstruction of the distal left ureter in the pelvis, likely related to postoperative edema/inflammation.    Small right lower pole renal calculus, as before. Suspected focal fatty infiltration adjacent to the falciform ligament. Diffuse hypoattenuation of the liver may indicate hepatic steatosis, as before. No definite focal splenic  lesion or splenomegaly. No   acute biliary or pancreatic abnormality. No suspicious adrenal lesion.    No acute gastric abnormality. Mild fluid distention of small bowel loops without significant small bowel dilatation. Loop ileostomy in the right lower quadrant. Proximal and distal small bowel loops in the right lower quadrant appear nondilated.    Appendix is not well visualized. No definite right lower quadrant inflammation. The colon is mostly nondistended. There is mild diffuse wall thickening of the distal colon and rectum.    Aorta appears normal in caliber. No definite new abdominal or pelvic lymphadenopathy.    Midline abdominal incision with small amount of gas. No localized collection is identified in the abdominal wall on this exam.    No definite acute osseous abnormality.      Impression:      Impression:  1.New distal colectomy with loop ileostomy. Small amount of pneumoperitoneum and gas in the anterior abdominal wall, presumed to be related to the recent surgery.  2.Edema/fat stranding in the pelvis in the area of the anastomosis. There is a 2 cm focus of fluid to the left of the anastomosis measuring which could represent a small amount of postoperative fluid, but an early abscess cannot be excluded.  3.New mild left hydronephrosis. The left ureter also appears mildly dilated to the area of surgery where there is surrounding fat stranding/edema. Findings suggest obstruction of the distal left ureter, likely related to postoperative edema/inflammation.   There are small bilateral renal calculi, but no ureteral calculus is visualized on this exam.  4.Mild diffuse wall thickening of the distal colon and rectum which could be related to postoperative changes, nondistention, or colitis.  5.Mild fluid distention of small bowel loops without significant small bowel dilatation.  6.Diffuse bladder wall thickening and mild surrounding fat stranding which could be related to cystitis or postoperative  changes.            Electronically Signed: Nathen Gilliland    6/1/2025 12:39 PM EDT    Workstation ID: [id number]            CBC:  Results from last 7 days   Lab Units 06/01/25  1057   WBC 10*3/mm3 8.16   HEMOGLOBIN g/dL 13.8   HEMATOCRIT % 41.1   PLATELETS 10*3/mm3 165       CMP:  Results from last 7 days   Lab Units 06/01/25  1057   SODIUM mmol/L 134*   POTASSIUM mmol/L 3.1*   CHLORIDE mmol/L 92*   CO2 mmol/L 27.0   BUN mg/dL 10.3   CREATININE mg/dL 0.91   CALCIUM mg/dL 9.3   BILIRUBIN mg/dL 0.8   ALK PHOS U/L 76   ALT (SGPT) U/L 26   AST (SGOT) U/L 33   GLUCOSE mg/dL 127*     A/P:  I reviewed his labs, operative note, and current CT imaging.  With uncontrolled unremitting worsening abdominal pain and nausea I think admitting him to the hospital is appropriate.  He did have bilateral ureteral ureteral stents at the time of his operation, Dr. Osuna placed them with urology.  I think this is most likely due to inflammation but he has new left-sided hydronephrosis, I will ask him to evaluate this.  Otherwise he may have clear liquids.  Pain control.  He is on 2 L nasal cannula oxygen, his CT of the chest did not demonstrate any pulmonary embolism.  Mobilize and pulmonary toilet.  Supportive care.    Evan Vickers MD - 6/1/2025, 14:26 EDT

## 2025-06-01 NOTE — PLAN OF CARE
Problem: Adult Inpatient Plan of Care  Goal: Plan of Care Review  Outcome: Progressing  Flowsheets (Taken 6/1/2025 1834)  Progress: no change  Outcome Evaluation: Recived care of pt from ED, pt is AOx4, on 2L NC ( has been baseline since loop ileostomy on 5/28/2025), NSR/sinus tachycardia on the monitor. Patient skin is clean, dry, intact. Surgical incisions present on abdomen as well as a RLQ ileostomy. Patient is a fall risk, pt is up ad laisha, ambulates to bathroom w/o difficulty. All appropriate skin and fall precaitions in place. Pt has abd pain and request PRN pain medicaion, medication relieves pain. Pt is currently recieving PO and IV potassium replacement per MD order, clarified order with pharmacy.  Plan of Care Reviewed With: patient  Goal: Patient-Specific Goal (Individualized)  Outcome: Progressing  Goal: Absence of Hospital-Acquired Illness or Injury  Outcome: Progressing  Intervention: Identify and Manage Fall Risk  Recent Flowsheet Documentation  Taken 6/1/2025 1600 by Ana White RN  Safety Promotion/Fall Prevention:   activity supervised   clutter free environment maintained   safety round/check completed   room organization consistent  Taken 6/1/2025 1535 by Ana White RN  Safety Promotion/Fall Prevention:   activity supervised   safety round/check completed   room organization consistent   nonskid shoes/slippers when out of bed   assistive device/personal items within reach   fall prevention program maintained  Intervention: Prevent Skin Injury  Recent Flowsheet Documentation  Taken 6/1/2025 1800 by Ana White RN  Body Position:   position maintained   position changed independently  Skin Protection: incontinence pads utilized  Taken 6/1/2025 1600 by Ana White RN  Body Position: position changed independently  Skin Protection: incontinence pads utilized  Taken 6/1/2025 1535 by Ana White RN  Body Position:   position changed independently   neutral head position  Skin Protection:  incontinence pads utilized  Intervention: Prevent Infection  Recent Flowsheet Documentation  Taken 6/1/2025 1800 by Ana White RN  Infection Prevention:   rest/sleep promoted   environmental surveillance performed  Taken 6/1/2025 1600 by Ana White RN  Infection Prevention:   environmental surveillance performed   rest/sleep promoted  Taken 6/1/2025 1535 by Ana White RN  Infection Prevention:   rest/sleep promoted   environmental surveillance performed  Goal: Optimal Comfort and Wellbeing  Outcome: Progressing  Intervention: Monitor Pain and Promote Comfort  Recent Flowsheet Documentation  Taken 6/1/2025 1800 by Ana White RN  Pain Management Interventions: pain medication given  Taken 6/1/2025 1600 by Ana White RN  Pain Management Interventions:   pain management plan reviewed with patient/caregiver   quiet environment facilitated  Taken 6/1/2025 1535 by Ana White RN  Pain Management Interventions: (In ED, pt reports pain relief from medication) pain medication given  Intervention: Provide Person-Centered Care  Recent Flowsheet Documentation  Taken 6/1/2025 1800 by Ana White RN  Trust Relationship/Rapport:   care explained   thoughts/feelings acknowledged  Taken 6/1/2025 1600 by Ana White RN  Trust Relationship/Rapport:   care explained   thoughts/feelings acknowledged   questions answered   emotional support provided  Taken 6/1/2025 1535 by Ana White RN  Trust Relationship/Rapport:   care explained   questions answered  Goal: Readiness for Transition of Care  Outcome: Progressing  Intervention: Mutually Develop Transition Plan  Recent Flowsheet Documentation  Taken 6/1/2025 1649 by Ana White RN  Transportation Anticipated: family or friend will provide  Patient/Family Anticipated Services at Transition: none  Patient/Family Anticipates Transition to:   home   home with family     Problem: Sepsis/Septic Shock  Goal: Optimal Coping  Outcome: Progressing  Intervention: Support Patient and Family  Response  Recent Flowsheet Documentation  Taken 6/1/2025 1800 by Ana White RN  Supportive Measures:   self-care encouraged   active listening utilized  Family/Support System Care:   presence promoted   self-care encouraged  Taken 6/1/2025 1600 by Ana White RN  Supportive Measures:   active listening utilized   relaxation techniques promoted  Family/Support System Care:   self-care encouraged   support provided  Taken 6/1/2025 1535 by Ana White RN  Family/Support System Care: self-care encouraged  Goal: Absence of Bleeding  Outcome: Progressing  Goal: Blood Glucose Level Within Target Range  Outcome: Progressing  Goal: Absence of Infection Signs and Symptoms  Outcome: Progressing  Intervention: Initiate Sepsis Management  Recent Flowsheet Documentation  Taken 6/1/2025 1800 by Ana White RN  Infection Management: aseptic technique maintained  Infection Prevention:   rest/sleep promoted   environmental surveillance performed  Taken 6/1/2025 1600 by Ana White RN  Infection Management: aseptic technique maintained  Infection Prevention:   environmental surveillance performed   rest/sleep promoted  Taken 6/1/2025 1535 by Ana White RN  Infection Management: aseptic technique maintained  Infection Prevention:   rest/sleep promoted   environmental surveillance performed  Intervention: Promote Stabilization  Recent Flowsheet Documentation  Taken 6/1/2025 1600 by Ana White RN  Fluid/Electrolyte Management: fluids provided  Taken 6/1/2025 1535 by Ana White RN  Fluid/Electrolyte Management: fluids provided  Intervention: Promote Recovery  Recent Flowsheet Documentation  Taken 6/1/2025 1800 by Ana White RN  Activity Management:   up ad laisha   activity encouraged  Airway/Ventilation Management: oxygen therapy provided  Sleep/Rest Enhancement:   awakenings minimized   noise level reduced   regular sleep/rest pattern promoted  Taken 6/1/2025 1700 by Ana White RN  Activity Management:   up ad laisha   ambulated in  room  Taken 6/1/2025 1600 by Ana White, RN  Activity Management:   activity encouraged   up ad laisha  Airway/Ventilation Management: oxygen therapy provided  Sleep/Rest Enhancement:   family presence promoted   awakenings minimized   consistent schedule promoted   noise level reduced   relaxation techniques promoted  Taken 6/1/2025 1441 by Ana White, RN  Activity Management:   ambulated to bathroom   ambulated in room   up ad laisha  Goal: Optimal Nutrition Delivery  Outcome: Progressing   Goal Outcome Evaluation:  Plan of Care Reviewed With: patient        Progress: no change  Outcome Evaluation: Recived care of pt from ED, pt is AOx4, on 2L NC ( has been baseline since loop ileostomy on 5/28/2025), NSR/sinus tachycardia on the monitor. Patient skin is clean, dry, intact. Surgical incisions present on abdomen as well as a RLQ ileostomy. Patient is a fall risk, pt is up ad laisha, ambulates to bathroom w/o difficulty. All appropriate skin and fall precaitions in place. Pt has abd pain and request PRN pain medicaion, medication relieves pain. Pt is currently recieving PO and IV potassium replacement per MD order, clarified order with pharmacy.

## 2025-06-02 LAB
ANION GAP SERPL CALCULATED.3IONS-SCNC: 9 MMOL/L (ref 5–15)
BACTERIA SPEC AEROBE CULT: NO GROWTH
BASOPHILS # BLD AUTO: 0.04 10*3/MM3 (ref 0–0.2)
BASOPHILS NFR BLD AUTO: 0.6 % (ref 0–1.5)
BUN SERPL-MCNC: 9.2 MG/DL (ref 6–20)
BUN/CREAT SERPL: 13 (ref 7–25)
CALCIUM SPEC-SCNC: 8.9 MG/DL (ref 8.6–10.5)
CHLORIDE SERPL-SCNC: 98 MMOL/L (ref 98–107)
CO2 SERPL-SCNC: 28 MMOL/L (ref 22–29)
CREAT SERPL-MCNC: 0.71 MG/DL (ref 0.76–1.27)
DEPRECATED RDW RBC AUTO: 51.5 FL (ref 37–54)
EGFRCR SERPLBLD CKD-EPI 2021: 113.9 ML/MIN/1.73
EOSINOPHIL # BLD AUTO: 0.08 10*3/MM3 (ref 0–0.4)
EOSINOPHIL NFR BLD AUTO: 1.2 % (ref 0.3–6.2)
ERYTHROCYTE [DISTWIDTH] IN BLOOD BY AUTOMATED COUNT: 15.4 % (ref 12.3–15.4)
GLUCOSE SERPL-MCNC: 94 MG/DL (ref 65–99)
HCT VFR BLD AUTO: 35.3 % (ref 37.5–51)
HGB BLD-MCNC: 11.6 G/DL (ref 13–17.7)
IMM GRANULOCYTES # BLD AUTO: 0.03 10*3/MM3 (ref 0–0.05)
IMM GRANULOCYTES NFR BLD AUTO: 0.4 % (ref 0–0.5)
LYMPHOCYTES # BLD AUTO: 0.55 10*3/MM3 (ref 0.7–3.1)
LYMPHOCYTES NFR BLD AUTO: 8.1 % (ref 19.6–45.3)
MCH RBC QN AUTO: 30.2 PG (ref 26.6–33)
MCHC RBC AUTO-ENTMCNC: 32.9 G/DL (ref 31.5–35.7)
MCV RBC AUTO: 91.9 FL (ref 79–97)
MONOCYTES # BLD AUTO: 0.7 10*3/MM3 (ref 0.1–0.9)
MONOCYTES NFR BLD AUTO: 10.3 % (ref 5–12)
NEUTROPHILS NFR BLD AUTO: 5.38 10*3/MM3 (ref 1.7–7)
NEUTROPHILS NFR BLD AUTO: 79.4 % (ref 42.7–76)
NRBC BLD AUTO-RTO: 0 /100 WBC (ref 0–0.2)
PLATELET # BLD AUTO: 138 10*3/MM3 (ref 140–450)
PMV BLD AUTO: 9.8 FL (ref 6–12)
POTASSIUM SERPL-SCNC: 4.6 MMOL/L (ref 3.5–5.2)
RBC # BLD AUTO: 3.84 10*6/MM3 (ref 4.14–5.8)
SODIUM SERPL-SCNC: 135 MMOL/L (ref 136–145)
WBC NRBC COR # BLD AUTO: 6.78 10*3/MM3 (ref 3.4–10.8)

## 2025-06-02 PROCEDURE — 25010000002 CEFTRIAXONE PER 250 MG: Performed by: STUDENT IN AN ORGANIZED HEALTH CARE EDUCATION/TRAINING PROGRAM

## 2025-06-02 PROCEDURE — 25010000002 HYDROMORPHONE PER 4 MG: Performed by: STUDENT IN AN ORGANIZED HEALTH CARE EDUCATION/TRAINING PROGRAM

## 2025-06-02 PROCEDURE — 96376 TX/PRO/DX INJ SAME DRUG ADON: CPT

## 2025-06-02 PROCEDURE — 80048 BASIC METABOLIC PNL TOTAL CA: CPT | Performed by: STUDENT IN AN ORGANIZED HEALTH CARE EDUCATION/TRAINING PROGRAM

## 2025-06-02 PROCEDURE — 85025 COMPLETE CBC W/AUTO DIFF WBC: CPT | Performed by: STUDENT IN AN ORGANIZED HEALTH CARE EDUCATION/TRAINING PROGRAM

## 2025-06-02 PROCEDURE — 99232 SBSQ HOSP IP/OBS MODERATE 35: CPT | Performed by: INTERNAL MEDICINE

## 2025-06-02 PROCEDURE — G0378 HOSPITAL OBSERVATION PER HR: HCPCS

## 2025-06-02 RX ORDER — OXYCODONE AND ACETAMINOPHEN 5; 325 MG/1; MG/1
2 TABLET ORAL EVERY 6 HOURS PRN
Status: DISCONTINUED | OUTPATIENT
Start: 2025-06-02 | End: 2025-06-03 | Stop reason: HOSPADM

## 2025-06-02 RX ORDER — OXYCODONE AND ACETAMINOPHEN 5; 325 MG/1; MG/1
1 TABLET ORAL EVERY 6 HOURS PRN
Refills: 0 | Status: DISCONTINUED | OUTPATIENT
Start: 2025-06-02 | End: 2025-06-02

## 2025-06-02 RX ADMIN — Medication 10 ML: at 08:16

## 2025-06-02 RX ADMIN — ERYTHROMYCIN 1 APPLICATION: 5 OINTMENT OPHTHALMIC at 08:21

## 2025-06-02 RX ADMIN — Medication 1 TABLET: at 08:14

## 2025-06-02 RX ADMIN — DOCUSATE SODIUM 100 MG: 100 CAPSULE, LIQUID FILLED ORAL at 21:58

## 2025-06-02 RX ADMIN — ACETAMINOPHEN 500 MG: 500 TABLET, FILM COATED ORAL at 21:58

## 2025-06-02 RX ADMIN — HYDROMORPHONE HYDROCHLORIDE 0.5 MG: 1 INJECTION, SOLUTION INTRAMUSCULAR; INTRAVENOUS; SUBCUTANEOUS at 05:33

## 2025-06-02 RX ADMIN — ACETAMINOPHEN 500 MG: 500 TABLET, FILM COATED ORAL at 12:16

## 2025-06-02 RX ADMIN — HYDROCODONE BITARTRATE AND ACETAMINOPHEN 1 TABLET: 5; 325 TABLET ORAL at 08:13

## 2025-06-02 RX ADMIN — FAMOTIDINE 20 MG: 20 TABLET, FILM COATED ORAL at 16:45

## 2025-06-02 RX ADMIN — HYDROCODONE BITARTRATE AND ACETAMINOPHEN 1 TABLET: 5; 325 TABLET ORAL at 01:16

## 2025-06-02 RX ADMIN — HYDROMORPHONE HYDROCHLORIDE 0.5 MG: 1 INJECTION, SOLUTION INTRAMUSCULAR; INTRAVENOUS; SUBCUTANEOUS at 12:16

## 2025-06-02 RX ADMIN — MELOXICAM 7.5 MG: 7.5 TABLET ORAL at 08:14

## 2025-06-02 RX ADMIN — OXYCODONE HYDROCHLORIDE AND ACETAMINOPHEN 2 TABLET: 5; 325 TABLET ORAL at 22:56

## 2025-06-02 RX ADMIN — SODIUM CHLORIDE 1000 MG: 900 INJECTION INTRAVENOUS at 21:58

## 2025-06-02 RX ADMIN — ERYTHROMYCIN 1 APPLICATION: 5 OINTMENT OPHTHALMIC at 20:35

## 2025-06-02 RX ADMIN — FAMOTIDINE 20 MG: 20 TABLET, FILM COATED ORAL at 08:14

## 2025-06-02 RX ADMIN — OXYCODONE HYDROCHLORIDE AND ACETAMINOPHEN 1 TABLET: 5; 325 TABLET ORAL at 16:45

## 2025-06-02 RX ADMIN — APIXABAN 5 MG: 5 TABLET, FILM COATED ORAL at 08:17

## 2025-06-02 NOTE — CASE MANAGEMENT/SOCIAL WORK
Discharge Planning Assessment  [facility]     Patient Name: Palomo Pollack  MRN: [id number]  Today's Date: 6/2/2025    Admit Date: 6/1/2025    Plan: HOME   Discharge Needs Assessment       Row Name 06/02/25 1444       Living Environment    People in Home significant other    Potentially Unsafe Housing Conditions none    Primary Care Provided by self    Provides Primary Care For no one    Family Caregiver if Needed significant other    Quality of Family Relationships supportive    Able to Return to Prior Arrangements yes       Resource/Environmental Concerns    Transportation Concerns none       Transition Planning    Patient/Family Anticipates Transition to home with family    Patient/Family Anticipated Services at Transition     Transportation Anticipated family or friend will provide       Discharge Needs Assessment    Equipment Currently Used at Home none    Concerns to be Addressed discharge planning    Anticipated Changes Related to Illness none    Equipment Needed After Discharge none                   Discharge Plan       Row Name 06/02/25 1445       Plan    Plan HOME    Patient/Family in Agreement with Plan yes    Plan Comments Met with pt at bedside for DCP.  He resides alone in [facility].  He is independent with ADL/IADLs.  No current DME or HH services.  Confirmed he has [facility] Medicaid with Rx coverage.  Goal is home upon DC.  No immediate needs identified/voiced.  CM will cont to follow.    Final Discharge Disposition Code 01 - home or self-care                  Selected Continued Care - Prior Encounters Includes continued care and service providers with selected services from prior encounters from 3/3/2025 to 6/2/2025      Discharged on 3/7/2025 Admission date: 3/4/2025 - Discharge disposition: Home or Self Care      Dialysis/Infusion       Service Provider Services Address Phone Fax Patient Preferred    [facility] [address] [facility] [address] [address] [phone] --       Internal Comment last updated by Danna North, RN 3/7/2025 1315    Follow-up with Dr. Seth on 3/14/25 at 10:30 AM.               [facility] [address] [facility] [address] [phone] --       Internal Comment last updated by Danna North, RN 3/7/2025 1236    Teaching, antibiotic & supplies                                     Expected Discharge Date and Time       Expected Discharge Date Expected Discharge Time    Kody 3, 2025            Demographic Summary       Row Name 06/02/25 1444       General Information    Admission Type observation    Referral Source admission list    Reason for Consult discharge planning    Preferred Language English       Contact Information    Permission Granted to Share Info With     Contact Information Obtained for                    Functional Status       Row Name 06/02/25 1444       Functional Status    Usual Activity Tolerance good    Current Activity Tolerance good       Functional Status, IADL    Medications independent    Meal Preparation independent    Housekeeping independent    Laundry independent    Shopping independent                   Psychosocial    No documentation.                  Abuse/Neglect    No documentation.                  Legal    No documentation.                  Substance Abuse    No documentation.                  Patient Forms    No documentation.                     Carissa Bowman, RN

## 2025-06-02 NOTE — PLAN OF CARE
Problem: Adult Inpatient Plan of Care  Goal: Plan of Care Review  6/2/2025 1851 by Ana White RN  Outcome: Progressing  Flowsheets  Taken 6/2/2025 1851  Progress: improving  Outcome Evaluation: Pt is AOx4, on RA, SR on monitor. Pt is now on a GI fiber restricted diet and is refusing hospital food, family bringing in food. Pt is up ad laisha, fall precautions in place. Skin is clean, dry and intact. Pt has a ileostomy RLQ with a midline incision and 2 other insicions from 5/28. Skin precautions in place. Pt is in pain but requested PO meds to d/c IV pain meds to help with pain mgmt here so he can go home.  Taken 6/1/2025 1834  Plan of Care Reviewed With: patient  6/2/2025 1850 by Ana White RN  Outcome: Progressing  Flowsheets  Taken 6/2/2025 1850  Outcome Evaluation: Pt is AOx4  Taken 6/1/2025 1834  Plan of Care Reviewed With: patient  Goal: Patient-Specific Goal (Individualized)  6/2/2025 1851 by Ana White RN  Outcome: Progressing  6/2/2025 1850 by Ana White RN  Outcome: Progressing  Goal: Absence of Hospital-Acquired Illness or Injury  6/2/2025 1851 by Ana White RN  Outcome: Progressing  6/2/2025 1850 by Ana White RN  Outcome: Progressing  Intervention: Identify and Manage Fall Risk  Recent Flowsheet Documentation  Taken 6/2/2025 1800 by Ana White RN  Safety Promotion/Fall Prevention:   activity supervised   assistive device/personal items within reach   clutter free environment maintained   safety round/check completed  Taken 6/2/2025 1600 by Ana White RN  Safety Promotion/Fall Prevention:   safety round/check completed   room organization consistent   nonskid shoes/slippers when out of bed   fall prevention program maintained   clutter free environment maintained   assistive device/personal items within reach   activity supervised  Taken 6/2/2025 1400 by Ana White RN  Safety Promotion/Fall Prevention:   fall prevention program maintained   clutter free environment maintained   activity  supervised   nonskid shoes/slippers when out of bed   safety round/check completed   room organization consistent  Taken 6/2/2025 1200 by Ana White RN  Safety Promotion/Fall Prevention:   activity supervised   safety round/check completed  Taken 6/2/2025 1000 by Ana White RN  Safety Promotion/Fall Prevention:   activity supervised   clutter free environment maintained   fall prevention program maintained   safety round/check completed   room organization consistent   nonskid shoes/slippers when out of bed  Taken 6/2/2025 0800 by Ana White RN  Safety Promotion/Fall Prevention:   safety round/check completed   room organization consistent   nonskid shoes/slippers when out of bed   fall prevention program maintained   clutter free environment maintained   assistive device/personal items within reach   activity supervised  Intervention: Prevent Skin Injury  Recent Flowsheet Documentation  Taken 6/2/2025 1800 by Ana White RN  Body Position: position changed independently  Skin Protection:   silicone foam dressing in place   incontinence pads utilized  Taken 6/2/2025 1600 by Ana White RN  Body Position:   position maintained   position changed independently   neutral head position  Skin Protection: incontinence pads utilized  Taken 6/2/2025 1400 by Ana White RN  Body Position: position changed independently  Skin Protection:   pulse oximeter probe site changed   incontinence pads utilized  Taken 6/2/2025 1200 by Ana White RN  Body Position:   position changed independently   position maintained  Skin Protection:   incontinence pads utilized   silicone foam dressing in place  Taken 6/2/2025 1000 by Ana White RN  Body Position:   position changed independently   position maintained  Skin Protection:   incontinence pads utilized   transparent dressing maintained  Taken 6/2/2025 0800 by Ana White RN  Body Position: position changed independently  Skin Protection:   incontinence pads utilized   transparent  dressing maintained  Intervention: Prevent Infection  Recent Flowsheet Documentation  Taken 6/2/2025 1800 by Ana White RN  Infection Prevention:   environmental surveillance performed   rest/sleep promoted  Taken 6/2/2025 1600 by Ana White RN  Infection Prevention:   rest/sleep promoted   environmental surveillance performed  Taken 6/2/2025 1400 by Ana White RN  Infection Prevention: environmental surveillance performed  Taken 6/2/2025 1200 by Ana White RN  Infection Prevention:   rest/sleep promoted   environmental surveillance performed  Taken 6/2/2025 1000 by Ana White RN  Infection Prevention:   rest/sleep promoted   environmental surveillance performed  Taken 6/2/2025 0800 by Ana White RN  Infection Prevention:   environmental surveillance performed   rest/sleep promoted  Goal: Optimal Comfort and Wellbeing  6/2/2025 1851 by Ana White RN  Outcome: Progressing  6/2/2025 1850 by Ana White RN  Outcome: Progressing  Intervention: Monitor Pain and Promote Comfort  Recent Flowsheet Documentation  Taken 6/2/2025 1800 by Ana White RN  Pain Management Interventions: quiet environment facilitated  Taken 6/2/2025 1700 by Ana White RN  Pain Management Interventions: position adjusted  Taken 6/2/2025 1400 by Ana White RN  Pain Management Interventions: pain management plan reviewed with patient/caregiver  Taken 6/2/2025 1246 by Ana White RN  Pain Management Interventions:   care clustered   quiet environment facilitated  Taken 6/2/2025 1200 by Ana White RN  Pain Management Interventions: awakened for pain meds per patient request  Taken 6/2/2025 1000 by Ana White RN  Pain Management Interventions: awakened for pain meds per patient request  Taken 6/2/2025 0843 by Ana White RN  Pain Management Interventions: quiet environment facilitated  Taken 6/2/2025 0800 by Ana White RN  Pain Management Interventions:   pain medication given   pillow support provided   quiet environment facilitated    relaxation techniques promoted  Intervention: Provide Person-Centered Care  Recent Flowsheet Documentation  Taken 6/2/2025 1800 by Ana White RN  Trust Relationship/Rapport: thoughts/feelings acknowledged  Taken 6/2/2025 1600 by Ana White RN  Trust Relationship/Rapport:   questions answered   thoughts/feelings acknowledged  Taken 6/2/2025 1400 by Ana White RN  Trust Relationship/Rapport:   care explained   thoughts/feelings acknowledged  Taken 6/2/2025 1200 by Ana White RN  Trust Relationship/Rapport: questions answered  Taken 6/2/2025 1000 by Ana White RN  Trust Relationship/Rapport:   thoughts/feelings acknowledged   care explained  Taken 6/2/2025 0800 by Ana White RN  Trust Relationship/Rapport:   questions answered   care explained   thoughts/feelings acknowledged  Goal: Readiness for Transition of Care  6/2/2025 1851 by Ana White RN  Outcome: Progressing  6/2/2025 1850 by Ana White RN  Outcome: Progressing     Problem: Sepsis/Septic Shock  Goal: Optimal Coping  6/2/2025 1851 by Ana White RN  Outcome: Progressing  6/2/2025 1850 by Ana White RN  Outcome: Progressing  Intervention: Support Patient and Family Response  Recent Flowsheet Documentation  Taken 6/2/2025 1800 by Ana White RN  Supportive Measures:   self-care encouraged   decision-making supported   active listening utilized  Family/Support System Care: self-care encouraged  Taken 6/2/2025 1600 by Ana White RN  Supportive Measures:   relaxation techniques promoted   active listening utilized  Family/Support System Care: self-care encouraged  Taken 6/2/2025 1400 by Ana White RN  Supportive Measures:   active listening utilized   self-care encouraged  Family/Support System Care:   caregiver stress acknowledged   self-care encouraged  Taken 6/2/2025 1200 by Ana White RN  Supportive Measures: self-care encouraged  Family/Support System Care: self-care encouraged  Taken 6/2/2025 1000 by Ana White RN  Supportive Measures:    goal-setting facilitated   self-care encouraged  Family/Support System Care: self-care encouraged  Taken 6/2/2025 0800 by Ana White RN  Supportive Measures:   self-care encouraged   decision-making supported  Family/Support System Care: self-care encouraged  Goal: Absence of Bleeding  6/2/2025 1851 by Ana White RN  Outcome: Progressing  6/2/2025 1850 by Ana White RN  Outcome: Progressing  Intervention: Monitor and Manage Bleeding  Recent Flowsheet Documentation  Taken 6/2/2025 1800 by Ana White RN  Bleeding Precautions: monitored for signs of bleeding  Bleeding Management: dressing monitored  Taken 6/2/2025 1600 by Ana White RN  Bleeding Management: dressing monitored  Taken 6/2/2025 1400 by Ana White RN  Bleeding Management: dressing monitored  Taken 6/2/2025 1200 by Ana White RN  Bleeding Precautions: monitored for signs of bleeding  Bleeding Management: dressing monitored  Taken 6/2/2025 1000 by Ana White RN  Bleeding Precautions: monitored for signs of bleeding  Bleeding Management: dressing monitored  Taken 6/2/2025 0800 by Ana White RN  Bleeding Precautions: monitored for signs of bleeding  Bleeding Management: dressing monitored  Goal: Blood Glucose Level Within Target Range  6/2/2025 1851 by Ana White RN  Outcome: Progressing  6/2/2025 1850 by Ana White RN  Outcome: Progressing  Goal: Absence of Infection Signs and Symptoms  6/2/2025 1851 by Ana White RN  Outcome: Progressing  6/2/2025 1850 by Ana White RN  Outcome: Progressing  Intervention: Initiate Sepsis Management  Recent Flowsheet Documentation  Taken 6/2/2025 1800 by Ana White RN  Infection Management: aseptic technique maintained  Infection Prevention:   environmental surveillance performed   rest/sleep promoted  Taken 6/2/2025 1600 by Ana White RN  Infection Management: aseptic technique maintained  Infection Prevention:   rest/sleep promoted   environmental surveillance performed  Taken 6/2/2025 1400 by Ana White  RN  Infection Prevention: environmental surveillance performed  Taken 6/2/2025 1200 by Ana White RN  Infection Management: aseptic technique maintained  Infection Prevention:   rest/sleep promoted   environmental surveillance performed  Taken 6/2/2025 1000 by Ana White RN  Infection Management: aseptic technique maintained  Infection Prevention:   rest/sleep promoted   environmental surveillance performed  Taken 6/2/2025 0800 by Ana White RN  Infection Prevention:   environmental surveillance performed   rest/sleep promoted  Intervention: Promote Stabilization  Recent Flowsheet Documentation  Taken 6/2/2025 1200 by Ana White RN  Fluid/Electrolyte Management: fluids provided  Taken 6/2/2025 0800 by Ana White RN  Fluid/Electrolyte Management: fluids provided  Fever Reduction/Comfort Measures:   lightweight clothing   lightweight bedding  Intervention: Promote Recovery  Recent Flowsheet Documentation  Taken 6/2/2025 1800 by Ana White RN  Activity Management:   up ad laisha   activity encouraged  Airway/Ventilation Management: airway patency maintained  Sleep/Rest Enhancement:   awakenings minimized   relaxation techniques promoted   consistent schedule promoted   regular sleep/rest pattern promoted  Taken 6/2/2025 1700 by Ana White RN  Activity Management:   up ad laisha   activity encouraged  Taken 6/2/2025 1600 by Ana White RN  Activity Management:   up ad laisha   up to bedside commode  Airway/Ventilation Management: airway patency maintained  Sleep/Rest Enhancement:   relaxation techniques promoted   awakenings minimized  Taken 6/2/2025 1441 by Ana White RN  Activity Management:   activity encouraged   up ad laisha  Taken 6/2/2025 1400 by Ana White RN  Activity Management:   activity encouraged   up ad laisha  Airway/Ventilation Management: airway patency maintained  Sleep/Rest Enhancement:   awakenings minimized   consistent schedule promoted   relaxation techniques promoted   regular sleep/rest pattern  promoted  Taken 6/2/2025 1200 by Ana White RN  Activity Management: up ad laisha  Airway/Ventilation Management: airway patency maintained  Sleep/Rest Enhancement:   noise level reduced   consistent schedule promoted   family presence promoted  Taken 6/2/2025 1000 by Ana White RN  Activity Management: up ad laisha  Airway/Ventilation Management: airway patency maintained  Sleep/Rest Enhancement:   awakenings minimized   consistent schedule promoted   relaxation techniques promoted   regular sleep/rest pattern promoted   room darkened  Taken 6/2/2025 0900 by Ana White RN  Activity Management: up ad laisha  Taken 6/2/2025 0800 by Ana White RN  Activity Management:   up ad laisha   activity encouraged  Airway/Ventilation Management:   airway patency maintained   position adjusted  Sleep/Rest Enhancement:   relaxation techniques promoted   awakenings minimized   consistent schedule promoted   regular sleep/rest pattern promoted   noise level reduced  Goal: Optimal Nutrition Delivery  6/2/2025 1851 by Ana White RN  Outcome: Progressing  6/2/2025 1850 by Ana White RN  Outcome: Progressing     Problem: Suicide Risk  Goal: Absence of Self-Harm  6/2/2025 1851 by Ana White RN  Outcome: Progressing  6/2/2025 1850 by Ana White RN  Outcome: Progressing  Intervention: Assess Risk to Self and Maintain Safety  Recent Flowsheet Documentation  Taken 6/2/2025 1800 by Ana White RN  Enhanced Safety Measures: room near unit station  Taken 6/2/2025 1600 by Ana White RN  Enhanced Safety Measures: room near unit station  Taken 6/2/2025 1400 by Ana White RN  Enhanced Safety Measures: bed alarm set  Taken 6/2/2025 1200 by Ana White RN  Enhanced Safety Measures: bed alarm set  Taken 6/2/2025 1000 by Ana White RN  Enhanced Safety Measures: bed alarm set  Taken 6/2/2025 0800 by Ana White RN  Enhanced Safety Measures:   room near unit station   bed alarm set  Intervention: Promote Psychosocial Wellbeing  Recent Flowsheet  Documentation  Taken 6/2/2025 1800 by Ana White RN  Supportive Measures:   self-care encouraged   decision-making supported   active listening utilized  Family/Support System Care: self-care encouraged  Sleep/Rest Enhancement:   awakenings minimized   relaxation techniques promoted   consistent schedule promoted   regular sleep/rest pattern promoted  Taken 6/2/2025 1600 by Ana White RN  Supportive Measures:   relaxation techniques promoted   active listening utilized  Family/Support System Care: self-care encouraged  Sleep/Rest Enhancement:   relaxation techniques promoted   awakenings minimized  Taken 6/2/2025 1400 by Ana White RN  Supportive Measures:   active listening utilized   self-care encouraged  Family/Support System Care:   caregiver stress acknowledged   self-care encouraged  Sleep/Rest Enhancement:   awakenings minimized   consistent schedule promoted   relaxation techniques promoted   regular sleep/rest pattern promoted  Taken 6/2/2025 1200 by Ana White RN  Supportive Measures: self-care encouraged  Family/Support System Care: self-care encouraged  Sleep/Rest Enhancement:   noise level reduced   consistent schedule promoted   family presence promoted  Taken 6/2/2025 1000 by Ana White RN  Supportive Measures:   goal-setting facilitated   self-care encouraged  Family/Support System Care: self-care encouraged  Sleep/Rest Enhancement:   awakenings minimized   consistent schedule promoted   relaxation techniques promoted   regular sleep/rest pattern promoted   room darkened  Taken 6/2/2025 0800 by Ana White RN  Supportive Measures:   self-care encouraged   decision-making supported  Family/Support System Care: self-care encouraged  Sleep/Rest Enhancement:   relaxation techniques promoted   awakenings minimized   consistent schedule promoted   regular sleep/rest pattern promoted   noise level reduced   Goal Outcome Evaluation:  Plan of Care Reviewed With: patient        Progress:  improving  Outcome Evaluation: Pt is AOx4, on RA, SR on monitor. Pt is now on a GI fiber restricted diet and is refusing hospital food, family bringing in food. Pt is up ad laisha, fall precautions in place. Skin is clean, dry and intact. Pt has a ileostomy RLQ with a midline incision and 2 other insicions from 5/28. Skin precautions in place. Pt is in pain but requested PO meds to d/c IV pain meds to help with pain mgmt here so he can go home.

## 2025-06-02 NOTE — PROGRESS NOTES
"Patient Name:  Palomo Pollack  YOB: 1980  [id number]    Surgery Progress Note    Date of visit: 6/2/2025    Subjective   Patient significantly improved from yesterday. No nausea/vomiting. No fevers/chills.          Objective       /78 (BP Location: Left arm, Patient Position: Lying)   Pulse 93   Temp 98.2 °F (36.8 °C) (Oral)   Resp 16   Ht 180.3 cm (71\")   Wt 88.5 kg (195 lb)   SpO2 93%   BMI 27.20 kg/m²     Intake/Output Summary (Last 24 hours) at 6/2/2025 0857  Last data filed at 6/2/2025 0800  Gross per 24 hour   Intake 1080 ml   Output 1000 ml   Net 80 ml       CV:  Rhythm regular and rate regular  L:  Clear to auscultation bilaterally  Abd:  Bowel sounds positive, soft  Ext:  No cyanosis, clubbing, edema    Recent labs and imaging that are back at this time have been reviewed.   AM labs pending       Assessment & Plan     Patient with recent LAR with loop ileostomy, admitted with pain and nausea, now both improved. Continue antibiotics for pelvic fluid collection. Await urology input for mild left hydronephrosis. GI soft diet. Hep lock IV.         Rosas Plaza MD  6/2/2025  08:57 EDT      "

## 2025-06-02 NOTE — OUTREACH NOTE
Call Center TCM Note      Flowsheet Row Responses   [facility] patient discharged from? [facility]   Does the patient have one of the following disease processes/diagnoses(primary or secondary)? Other   TCM attempt successful? No   Unsuccessful attempts Attempt 1   Change in Health Status Readmitted            Parisa BAUTISTA - Registered Nurse    6/1/2025, 22:02 EDT

## 2025-06-02 NOTE — PROGRESS NOTES
[facility] Medicine Services  PROGRESS NOTE    Patient Name: Palomo Pollack  : 1980  MRN: [id number]    Date of Admission: 2025  Primary Care Physician: Heladio Valentine DO    Subjective   Subjective     CC:  Abdominal pain    HPI:  C/o pain 7/10 at surgical sites and radiates to back      Objective   Objective     Vital Signs:   Temp:  [98.2 °F (36.8 °C)-98.4 °F (36.9 °C)] 98.2 °F (36.8 °C)  Heart Rate:  [91-98] 93  Resp:  [16-18] 16  BP: (108-123)/(70-81) 123/78  Flow (L/min) (Oxygen Therapy):  [0-2] 0     Physical Exam:  Constitutional: No acute distress, awake, alert  HENT: NCAT, mucous membranes moist  Respiratory: Clear to auscultation bilaterally, respiratory effort normal   Cardiovascular: RRR, no murmurs, rubs, or gallops  Gastrointestinal: Positive bowel sounds, soft, , nondistended. TTP diffuse lower abdomen, midline vertical incision c/d/I and ostomy with with brown liquid stool  Musculoskeletal: No bilateral ankle edema  Psychiatric: Appropriate affect, cooperative  Neurologic: Oriented x 3, PALOMARES, speech clear  Skin: No rashes      Results Reviewed:  LAB RESULTS:      Lab 25  0955 25  1057 25  0402 251 25  0449   WBC 6.78 8.16 8.70 8.42 12.16*   HEMOGLOBIN 11.6* 13.8 12.7* 12.9* 12.8*   HEMATOCRIT 35.3* 41.1 38.3 39.0 39.8   PLATELETS 138* 165 116* 113* 113*   NEUTROS ABS 5.38 6.66 6.76 6.41 10.46*   IMMATURE GRANS (ABS) 0.03 0.03 0.07* 0.04 0.05   LYMPHS ABS 0.55* 0.59* 0.81 0.91 0.56*   MONOS ABS 0.70 0.79 0.94* 0.96* 1.07*   EOS ABS 0.08 0.05 0.08 0.07 0.00   MCV 91.9 88.4 90.1 92.0 91.7   PROCALCITONIN  --  0.21  --   --   --    LACTATE  --  1.5  --   --   --    HSTROP T  --  10  --   --   --          Lab 25  0955 25  1057 25  0403 25  0449   SODIUM 135* 134* 135* 135* 135*   POTASSIUM 4.6 3.1* 3.3* 3.4* 4.0   CHLORIDE 98 92* 96* 96* 99   CO2 28.0 27.0 27.0 27.0 24.0   ANION GAP 9.0  15.0 12.0 12.0 12.0   BUN 9.2 10.3 11.7 14.4 18.1   CREATININE 0.71* 0.91 0.71* 0.74* 0.91   EGFR 113.9 104.6 113.9 112.5 104.6   GLUCOSE 94 127* 91 87 152*   CALCIUM 8.9 9.3 8.8 9.1 8.4*   MAGNESIUM  --  2.0  --   --   --          Lab 06/01/25  1057   TOTAL PROTEIN 7.4   ALBUMIN 3.8   GLOBULIN 3.6   ALT (SGPT) 26   AST (SGOT) 33   BILIRUBIN 0.8   ALK PHOS 76   LIPASE 21         Lab 06/01/25  1057   PROBNP 314.1   HSTROP T 10                 Brief Urine Lab Results  (Last result in the past 365 days)        Color   Clarity   Blood   Leuk Est   Nitrite   Protein   CREAT   Urine HCG        06/01/25 1317 Yellow   Clear   Large (3+)   Trace   Negative   30 mg/dL (1+)                   Microbiology Results Abnormal       None            CT Angiogram Chest Pulmonary Embolism  Result Date: 6/1/2025  CT ANGIOGRAM CHEST PULMONARY EMBOLISM Date of Exam: 6/1/2025 1:48 PM EDT Indication: sob. Comparison: CTA chest 9/25/2024 Technique: Axial CT images were obtained of the chest after the uneventful intravenous administration of 60 mL Isovue-370 utilizing pulmonary embolism protocol.  In addition, a 3-D volume rendered image was created for interpretation.  Reconstructed coronal and sagittal images were also obtained. Automated exposure control and iterative construction methods were used. Findings: Normal appearance of the pulmonary arteries without evidence of pulmonary embolism. Unremarkable appearance of the thoracic aorta. There are coronary artery calcifications. Dual-lead cardiac pacer is noted with left chest pulse generator. Redemonstration  of a rim calcified right epicardial cyst. No pericardial effusion. No thoracic adenopathy. There are calcified mediastinal lymph nodes compatible with sequelae of healed granulomatous disease. The chest wall soft tissues are unremarkable. Unremarkable appearance of the airways. The lungs are clear. No lung mass or suspicious pulmonary nodule. No pleural effusion or pneumothorax.  There is scattered free air in the upper abdomen compatible with pneumoperitoneum. Otherwise unremarkable appearance of the upper abdomen.     Impression: Impression: No evidence of pulmonary embolism. No acute intrathoracic findings. Scattered free air in the upper abdomen compatible with pneumoperitoneum presumably relating to recent abdominal surgery. Electronically Signed: Severino Peter MD  6/1/2025 2:06 PM EDT  Workstation ID: [id number]    CT Abdomen Pelvis With Contrast  Result Date: 6/1/2025  CT ABDOMEN PELVIS W CONTRAST Date of Exam: 6/1/2025 11:42 AM EDT Indication: Recent bowel resection, abdominal pain, back pain. Low anterior resection and loop ileostomy 5/28/2025. History of diverticular disease with perforation and abscess. Comparison: 4/3/2025, 3/17/2025, and prior. Technique: Axial CT images were obtained of the abdomen and pelvis following the uneventful intravenous administration of 90 mL Isovue-300. Reconstructed coronal and sagittal images were also obtained. Automated exposure control and iterative construction methods were used. Findings: Pacemaker/ICD is present, as before. Heart size appears unchanged. Calcified pericardial cyst, incompletely visualized, as before. There is suspected mild dependent atelectasis. No pleural effusion. Findings consistent with history of low anterior resection and distal colectomy with loop ileostomy. There is an anastomosis in the rectosigmoid colon. There is a small amount of pneumoperitoneum and gas in the anterior abdominal wall. This is presumed to be related to the recent surgery. There is edema/fat stranding in the pelvis in the area of the anastomosis. To the left of the anastomosis on axial series 2 images 135-140 and coronal image 54, there is a small amount of fluid attenuation measuring approximately 2 cm diameter. This does not appear to have a significant rim of enhancement and could represent a small amount of postoperative fluid, but  an early  abscess cannot be excluded. No other sizable collection is visualized at this time. New mild left hydronephrosis with diminished excretion of contrast from the left kidney on delayed phase images. The ureter does not opacify with contrast on the delayed phase. The ureter does appear to be mildly dilated to the pelvis in the area of prior surgery where there is surrounding fat stranding/edema. A small calculus is present in the left renal lower pole, as before. No obstructing left ureteral calculus is identified. No right hydronephrosis or hydroureter. There is expected excretion of  contrast from the right kidney. There is contrast from the right ureter and the bladder on delayed phase. Bladder is minimally distended with diffuse bladder wall thickening and mild surrounding fat stranding, primarily along the posterior and superior aspect of the bladder. Findings suggest obstruction of the distal left ureter in the pelvis, likely related to postoperative edema/inflammation. Small right lower pole renal calculus, as before. Suspected focal fatty infiltration adjacent to the falciform ligament. Diffuse hypoattenuation of the liver may indicate hepatic steatosis, as before. No definite focal splenic lesion or splenomegaly. No acute biliary or pancreatic abnormality. No suspicious adrenal lesion. No acute gastric abnormality. Mild fluid distention of small bowel loops without significant small bowel dilatation. Loop ileostomy in the right lower quadrant. Proximal and distal small bowel loops in the right lower quadrant appear nondilated. Appendix is not well visualized. No definite right lower quadrant inflammation. The colon is mostly nondistended. There is mild diffuse wall thickening of the distal colon and rectum. Aorta appears normal in caliber. No definite new abdominal or pelvic lymphadenopathy. Midline abdominal incision with small amount of gas. No localized collection is identified in the abdominal wall on this  exam. No definite acute osseous abnormality.     Impression: Impression: 1.New distal colectomy with loop ileostomy. Small amount of pneumoperitoneum and gas in the anterior abdominal wall, presumed to be related to the recent surgery. 2.Edema/fat stranding in the pelvis in the area of the anastomosis. There is a 2 cm focus of fluid to the left of the anastomosis measuring which could represent a small amount of postoperative fluid, but an early abscess cannot be excluded. 3.New mild left hydronephrosis. The left ureter also appears mildly dilated to the area of surgery where there is surrounding fat stranding/edema. Findings suggest obstruction of the distal left ureter, likely related to postoperative edema/inflammation.  There are small bilateral renal calculi, but no ureteral calculus is visualized on this exam. 4.Mild diffuse wall thickening of the distal colon and rectum which could be related to postoperative changes, nondistention, or colitis. 5.Mild fluid distention of small bowel loops without significant small bowel dilatation. 6.Diffuse bladder wall thickening and mild surrounding fat stranding which could be related to cystitis or postoperative changes. Electronically Signed: Nathen Gilliland  6/1/2025 12:39 PM EDT  Workstation ID: [id number]      Results for orders placed during the hospital encounter of 04/15/25    Adult Transthoracic Echo Complete w/ Color, Spectral and Contrast if necessary per protocol    Interpretation Summary    Left ventricular systolic function is mildly decreased. Calculated left ventricular EF = 42.5% Left ventricular ejection fraction appears to be 41 - 45%.    Left ventricular wall thickness is consistent with borderline concentric hypertrophy.    Left ventricular diastolic function was normal.    Estimated right ventricular systolic pressure from tricuspid regurgitation is normal (<35 mmHg).      Current medications:  Scheduled Meds:acetaminophen, 500 mg, Oral, 4x Daily  [Held  by provider] amoxicillin-clavulanate, 1 tablet, Oral, BID  apixaban, 5 mg, Oral, Q12H  cefTRIAXone, 1,000 mg, Intravenous, Q24H  docusate sodium, 100 mg, Oral, BID  erythromycin, 1 Application, Right Eye, BID  famotidine, 20 mg, Oral, BID AC  meloxicam, 7.5 mg, Oral, Daily  [Held by provider] metoprolol succinate XL, 25 mg, Oral, Daily  multivitamin with minerals, 1 tablet, Oral, Daily  Psyllium, 1 packet, Oral, Daily  [Held by provider] sacubitril-valsartan, 1 tablet, Oral, BID  sodium chloride, 10 mL, Intravenous, Q12H  [Held by provider] spironolactone, 25 mg, Oral, Daily      Continuous Infusions:   PRN Meds:.  Calcium Replacement - Follow Nurse / BPA Driven Protocol    carisoprodol    HYDROmorphone    Magnesium Standard Dose Replacement - Follow Nurse / BPA Driven Protocol    naloxone    oxyCODONE-acetaminophen    Phosphorus Replacement - Follow Nurse / BPA Driven Protocol    Potassium Replacement - Follow Nurse / BPA Driven Protocol    prochlorperazine    Sodium Chloride (PF)    sodium chloride    sodium chloride    Assessment & Plan   Assessment & Plan     Active Hospital Problems    Diagnosis  POA    **Abdominal pain [R10.9]  Yes      Resolved Hospital Problems   No resolved problems to display.        Brief Hospital Course to date:  Palomo Pollack is a 45 y.o. male  With history of A-fib, HFrEF, recent sigmoid diverticulitis with perforation/abscess status post IV antibiotics (March 2025),GERD, PTSD, DM2, Hodgkin lymphoma s/p stem cell transplant, who was just discharged by the surgical service on 5/31/2025 after low anterior resection and loop ileostomy creation for diverticular stricture; urethral stents were placed at time of surgery by urology.  Returned with abdominal pain and nausea    Intractable post-operative abdominal pain   S/p lower anterior resection and loop ileostomy creation for diverticular stricture  Fluid collection near anastomosis, likely postoperative fluid collection  Recent  sigmoid perforation/abscess s/p IV antibiotics  -Gentle IVF in setting of known heart failure  -Pain, nausea control.  Change PO lortab to percocet for better pain control  -Was discharged on Augmentin; will hold for now per discussion w Dr. Vickers; normal white count, negative procalcitonin, low suspicion for abscess at this time  -General surgery following     Abnormal UA with UTI symptoms  New hydronephrosis  -urine culture no growth  -Urology consulted per general surgery; suspect hydro is due to post-op inflammation  -Empiric CTX for now     A fib  -Continue eliquis; discussed with Dr. Vickers and he is okay with continuing this   -Continue metoprolol     Corneal abrasion, possible infection  -Was discharged from here w erythromycin eye ointment, continue     HFrEF, LBBB s/p BiV ICD-hold entresto, spironolactone  Lymphoma s/p chemo, chest radiation, and stem cell transplant  DM2-SSI   GERD  PTSD        Expected Discharge Location and Transportation:   Expected Discharge   Expected Discharge Date: 6/3/2025; Expected Discharge Time:      VTE Prophylaxis:  Pharmacologic VTE prophylaxis orders are present.         AM-PAC 6 Clicks Score (PT): 24 (06/02/25 0800)    CODE STATUS:   Code Status and Medical Interventions: CPR (Attempt to Resuscitate); Full Support   Ordered at: 06/01/25 1450     Code Status (Patient has no pulse and is not breathing):    CPR (Attempt to Resuscitate)     Medical Interventions (Patient has pulse or is breathing):    Full Support     Level Of Support Discussed With:    Patient       Anton Cordon MD  06/02/25

## 2025-06-02 NOTE — PROGRESS NOTES
Clinical Nutrition     Nutrition Education   Reason for Visit:   Patient request/pt family request     Patient Name: Palomo Pollack  YOB: 1980  MRN: [id number]  Date of Encounter: 06/02/25 11:44 EDT  Admission date: 6/1/2025         Applicable diagnosis:    Abdominal pain      Reported/Observed/Food/Nutrition Related History:     RD notified by RN of diet related concerns s/p loop ileostomy (5/28/25) with recent d/c from this facility. Did not receive nutrition education from clinical nutrition prior to d/c.     RD provided patient with ileostomy nutrition education and written materials. RD reviewed low-fiber diet x 4-6 weeks post-op, slow re-introduction of high-fiber foods after 4-6 weeks, daily chewable/liquid MVI, high ileostomy output/dehydration/oral rehydration solutions, dietary strategies for managing high ileostomy output, food lists pertaining to gas/odor/blockage/diarrhea and stool thickening, appropriate oral nutrition supplements, and recommended daily protein intake for post-op wound healing.    Pt reports distaste for hospital food - family present during education with plan to provide meals from outside. Encourage to follow meal progression closely if to bring outside meals. Educated on importance of protein adequacy to prevent catabolism, verbalized understanding.     All questions asked were answered within scope of practice.    Labs reviewed   Yes     Nutrition Diagnosis   Date: 6/2  Updated:   Problem Food and nutrition knowledge deficit   Etiology S/p loop ileostomy (5/28/25)   Signs/Symptoms Need for education       Goal:     Increase knowledge on diet/nutrition effects on condition/status     Nutrition Intervention     Nutrition Education provided regarding: Diet rationale, Key food habit change, Ileostomy  , and High Output Ostomy    RD provided printed material via [employer]-Nutrition Care Manual  and [facility] RDN created  education material    Monitoring/Evaluation:     Pt acknowledged understanding of material covered  RD to follow up LE Laura, MS,RD,LD  Time Spent: 30min

## 2025-06-02 NOTE — PLAN OF CARE
Problem: Adult Inpatient Plan of Care  Goal: Absence of Hospital-Acquired Illness or Injury  Intervention: Identify and Manage Fall Risk  Recent Flowsheet Documentation  Taken 6/2/2025 0400 by Anton Dominguez, RN  Safety Promotion/Fall Prevention:   nonskid shoes/slippers when out of bed   safety round/check completed  Taken 6/2/2025 0200 by Anton Dominguez, RN  Safety Promotion/Fall Prevention:   nonskid shoes/slippers when out of bed   safety round/check completed  Taken 6/2/2025 0000 by Anton Dominguez, RN  Safety Promotion/Fall Prevention:   nonskid shoes/slippers when out of bed   safety round/check completed  Taken 6/1/2025 2000 by Anton Dominguez RN  Safety Promotion/Fall Prevention: activity supervised   Goal Outcome Evaluation:

## 2025-06-03 ENCOUNTER — READMISSION MANAGEMENT (OUTPATIENT)
Dept: CALL CENTER | Facility: HOSPITAL | Age: 45
End: 2025-06-03
Payer: MEDICAID

## 2025-06-03 VITALS
WEIGHT: 195 LBS | HEART RATE: 91 BPM | HEIGHT: 71 IN | DIASTOLIC BLOOD PRESSURE: 87 MMHG | OXYGEN SATURATION: 91 % | RESPIRATION RATE: 16 BRPM | SYSTOLIC BLOOD PRESSURE: 138 MMHG | BODY MASS INDEX: 27.3 KG/M2 | TEMPERATURE: 97.9 F

## 2025-06-03 LAB
ANION GAP SERPL CALCULATED.3IONS-SCNC: 11 MMOL/L (ref 5–15)
BUN SERPL-MCNC: 9.9 MG/DL (ref 6–20)
BUN/CREAT SERPL: 11.9 (ref 7–25)
CA-I SERPL ISE-MCNC: 1.17 MMOL/L (ref 1.15–1.3)
CALCIUM SPEC-SCNC: 9.2 MG/DL (ref 8.6–10.5)
CHLORIDE SERPL-SCNC: 99 MMOL/L (ref 98–107)
CO2 SERPL-SCNC: 28 MMOL/L (ref 22–29)
CREAT SERPL-MCNC: 0.83 MG/DL (ref 0.76–1.27)
EGFRCR SERPLBLD CKD-EPI 2021: 108.6 ML/MIN/1.73
GLUCOSE SERPL-MCNC: 98 MG/DL (ref 65–99)
MAGNESIUM SERPL-MCNC: 1.9 MG/DL (ref 1.6–2.6)
PHOSPHATE SERPL-MCNC: 3.2 MG/DL (ref 2.5–4.5)
POTASSIUM SERPL-SCNC: 3.9 MMOL/L (ref 3.5–5.2)
SODIUM SERPL-SCNC: 138 MMOL/L (ref 136–145)

## 2025-06-03 PROCEDURE — 84100 ASSAY OF PHOSPHORUS: CPT | Performed by: INTERNAL MEDICINE

## 2025-06-03 PROCEDURE — 82330 ASSAY OF CALCIUM: CPT | Performed by: INTERNAL MEDICINE

## 2025-06-03 PROCEDURE — 99239 HOSP IP/OBS DSCHRG MGMT >30: CPT | Performed by: INTERNAL MEDICINE

## 2025-06-03 PROCEDURE — G0378 HOSPITAL OBSERVATION PER HR: HCPCS

## 2025-06-03 PROCEDURE — 80048 BASIC METABOLIC PNL TOTAL CA: CPT | Performed by: INTERNAL MEDICINE

## 2025-06-03 PROCEDURE — 83735 ASSAY OF MAGNESIUM: CPT | Performed by: INTERNAL MEDICINE

## 2025-06-03 PROCEDURE — 99222 1ST HOSP IP/OBS MODERATE 55: CPT | Performed by: UROLOGY

## 2025-06-03 RX ORDER — OXYCODONE AND ACETAMINOPHEN 10; 325 MG/1; MG/1
1 TABLET ORAL EVERY 6 HOURS PRN
Qty: 15 TABLET | Refills: 0 | Status: SHIPPED | OUTPATIENT
Start: 2025-06-03 | End: 2026-06-03

## 2025-06-03 RX ADMIN — ERYTHROMYCIN 1 APPLICATION: 5 OINTMENT OPHTHALMIC at 08:13

## 2025-06-03 RX ADMIN — OXYCODONE HYDROCHLORIDE AND ACETAMINOPHEN 2 TABLET: 5; 325 TABLET ORAL at 10:52

## 2025-06-03 RX ADMIN — Medication 10 ML: at 08:13

## 2025-06-03 RX ADMIN — MELOXICAM 7.5 MG: 7.5 TABLET ORAL at 08:12

## 2025-06-03 RX ADMIN — Medication 1 TABLET: at 08:12

## 2025-06-03 RX ADMIN — FAMOTIDINE 20 MG: 20 TABLET, FILM COATED ORAL at 07:36

## 2025-06-03 RX ADMIN — APIXABAN 5 MG: 5 TABLET, FILM COATED ORAL at 08:12

## 2025-06-03 RX ADMIN — OXYCODONE HYDROCHLORIDE AND ACETAMINOPHEN 2 TABLET: 5; 325 TABLET ORAL at 04:52

## 2025-06-03 RX ADMIN — ACETAMINOPHEN 500 MG: 500 TABLET, FILM COATED ORAL at 04:53

## 2025-06-03 NOTE — CASE MANAGEMENT/SOCIAL WORK
Continued Stay Note   Bryan     Patient Name: Palomo Pollack  MRN: [id number]  Today's Date: 6/3/2025    Admit Date: 6/1/2025    Plan: home   Discharge Plan       Row Name 06/03/25 1054       Plan    Plan home    Patient/Family in Agreement with Plan yes    Plan Comments I met with this patient regarding his discharge home today. He is in agreement, and his wife can transport. He states he has all his supplies for the ileostomy and able to maintain care for it himself. He denies having any further discharge needs.    Final Discharge Disposition Code 01 - home or self-care                   Discharge Codes    No documentation.                 Expected Discharge Date and Time       Expected Discharge Date Expected Discharge Time    Kody 3, 2025               Jess Grossman RN

## 2025-06-03 NOTE — CONSULTS
[facility]   HISTORY AND PHYSICAL    Patient Name: Palomo Pollack  : 1980  MRN: [id number]  Primary Care Physician:  Heladio Valentine DO  Date of admission: 2025    Subjective   Subjective     Chief Complaint: Left hydronephrosis    HPI:    Palomo Pollack is a 45 y.o. male who is currently admitted secondary to increasing abdominal pain status post low anterior resection with loop ileostomy creation for diverticular disease.  Patient surgical procedure 25.  Intraoperatively ureteral identification catheters were placed.    At the time of presentation to ED and returned to hospital patient was reporting abdominal pain.  CT imaging study was pleated which identified from urologic standpoint mild left-sided hydronephrosis.  His overall renal function was stable.    There is not evidence of significant obstruction no evidence of ureteral stone.  Small nonobstructing renal stone.    Patient is voiding without difficulty.  Denies dysuria, hematuria.  At time of evaluation today patient is denying flank pain.  He is reporting improved overall abdominal pain and symptoms.    Review of Systems   The following systems were reviewed and negative;  constitution, eyes, ENT, respiratory, cardiovascular, gastrointestinal, genitourinary, hematologic / lymphatic, musculoskeletal, neurological, and behavioral/psych.    Personal History     Past Medical History:   Diagnosis Date    Allergic rhinitis     Anesthesia complication     irritable after    Atrial fibrillation     CHF (congestive heart failure)     Chronic right ear pain     h/o    Diabetes mellitus     h/o-  doesnt check sugar    Diverticulitis     GERD (gastroesophageal reflux disease)     Headache     Heart failure     History of kidney stones     passed them    History of shingles     - after transplant of stem cells    History of transfusion     -bhlex;  [place], [place]- 1 unit- stem cell transplant -  recieved plts - no  reaction recalled    Hodgkin lymphoma     Hypertension     Hypogonadism in male     LBBB (left bundle branch block)     PTSD (post-traumatic stress disorder)     Stem cells transplant status     Vision blurred     possibly need glasses       Past Surgical History:   Procedure Laterality Date    BIVENTRICULLAR IMPLANTABLE CARDIOVERTER DEFIBRILLATOR PLACEMENT N/A 10/26/2023    Procedure: Implant ICD - bi ventricular; DNS meds; [place];  Surgeon: Wilmer Rojas DO;  Location:  [facility];  Service: Cardiovascular;  Laterality: N/A;    CARDIAC CATHETERIZATION N/A 05/30/2023    Procedure: Left Heart Cath;  Surgeon: Alec Mason III, MD;  Location:  [facility];  Service: Cardiovascular;  Laterality: N/A;    CARDIAC ELECTROPHYSIOLOGY PROCEDURE N/A 10/3/2024    Procedure: Ablation atrial fibrillation w PFA; CTA prior, [place], no meds to hold;  Surgeon: Wilmer Rojas DO;  Location:  [facility];  Service: Cardiovascular;  Laterality: N/A;    CENTRAL VENOUS LINE INSERTION  1999    removed since    COLON RESECTION N/A 5/28/2025    Procedure: LOW ANTERIOR RESECTION, SPLENIC FLEXURE MOBILIZATION, FLEXIBLE SIGMOIDOSCOPY, LOOP ILEOSTOMY;  Surgeon: Rosas Plaza MD;  Location:  [facility];  Service: Robotics - [employer];  Laterality: N/A;    COLONOSCOPY      COLONOSCOPY N/A 4/22/2025    Procedure: COLONOSCOPY;  Surgeon: Randy White MD;  Location:  [facility];  Service: Gastroenterology;  Laterality: N/A;    CYSTOSCOPY N/A 5/28/2025    Procedure: CYSTOSCOPY TEMPORARY PLACEMENT BILATERAL URETERAL CATHETER;  Surgeon: Roosevelt Osuna MD;  Location:  [facility];  Service: Urology;  Laterality: N/A;    OTHER SURGICAL HISTORY      stem cell transplant    PORTOCAVAL SHUNT PLACEMENT  1998    WISDOM TOOTH EXTRACTION         Family History: family history includes Cancer in his father; Lung cancer in his father; Stroke in his father. Otherwise pertinent FHx was reviewed and not  pertinent to current issue.    Social History:  reports that he has been smoking cigarettes. He started smoking about 22 years ago. He has a 5.6 pack-year smoking history. He has been exposed to tobacco smoke. He has never used smokeless tobacco. He reports that he does not currently use alcohol. He reports that he does not currently use drugs after having used the following drugs: Marijuana.    Home Medications:  EPINEPHrine, Testosterone, amoxicillin-clavulanate, apixaban, docusate sodium, empagliflozin, erythromycin, esomeprazole, fexofenadine, furosemide, metoprolol succinate XL, multivitamin, naloxone, oxyCODONE-acetaminophen, sacubitril-valsartan, and spironolactone      Allergies:  Allergies   Allergen Reactions    Morphine Other (See Comments)     ANXIETY; AGITATION    Bactrim [Sulfamethoxazole-Trimethoprim] Rash and Other (See Comments)     Gave increased heartrate-biaxin      Clarithromycin Rash     Tolerates erythromycin    Milk-Related Compounds Other (See Comments)     Increased phlegm        Objective   Objective     Vitals:   Temp:  [97.9 °F (36.6 °C)-98.5 °F (36.9 °C)] 97.9 °F (36.6 °C)  Heart Rate:  [] 91  Resp:  [16] 16  BP: (129-138)/(75-87) 138/87  Flow (L/min) (Oxygen Therapy):  [0] 0  Physical Exam    Constitutional: Awake &  alert    Eyes: PERRLA, sclerae anicteric, no conjunctival injection   HENT: Normocephalic, atraumatic, mucous membranes moist   Neck: Supple, trachea midline   Respiratory:Equal chest rise, non-labored respirations    Cardiovascular: Perfused, no edema   Gastrointestinal: Soft, nontender, non-distended, ostomy appliance in position.   Psychiatric: Appropriate affect, cooperative   Neurologic: Oriented x 3, Cranial Nerves grossly intact, speech clear   Skin: No rashes     Result Review    Result Review:  I have personally reviewed the results from the time of this admission to 6/3/2025 10:55 EDT and agree with these findings:  [x]  Laboratory  [x]  Microbiology  [x]   Radiology  []  EKG/Telemetry   []  Cardiology/Vascular   []  Pathology  [x]  Old records  []  Other:    Most notable findings include: Creatinine 0.83, WBC 6.7.    CT imaging reviewed with very mild left-sided hydronephrosis.  This dilation is down to the level of inflammatory changes within the abdomen status post surgical intervention.    Assessment & Plan   Assessment / Plan     Brief Patient Summary:  Palomo Pollack is a 45 y.o. male who currently admitted for abdominal pain status post low anterior resection 5/31/2025 with general surgery.  Patient has been identified to have mild left-sided hydronephrosis which is evident down to the level of inflammatory changes within the abdomen postsurgery.  Overall patient has stable renal function.  He is denying any current flank pain.  We have discussed that there is no indication for urologic intervention at this time these findings on imaging would be expected in the postsurgical setting.  He may continue management with hospitalist and general surgery.    Active Hospital Problems:  Active Hospital Problems    Diagnosis     **Abdominal pain          VTE Prophylaxis:  Pharmacologic VTE prophylaxis orders are present.        CODE STATUS:    Code Status (Patient has no pulse and is not breathing): CPR (Attempt to Resuscitate)  Medical Interventions (Patient has pulse or is breathing): Full Support  Level Of Support Discussed With: Patient    Admission Status: Per primary team    Electronically signed by Roosevelt Osuna MD, 06/03/25, 10:55 AM EDT.

## 2025-06-03 NOTE — DISCHARGE SUMMARY
[facility] Medicine Services  DISCHARGE SUMMARY    Patient Name: Palomo Pollack  : 1980  MRN: [id number]    Date of Admission: 2025 10:43 AM  Date of Discharge:  6/3/2025  Primary Care Physician: Heladio Valentine DO    Consults       Date and Time Order Name Status Description    2025  3:01 PM Inpatient Urology Consult              Hospital Course     Presenting Problem:     Active Hospital Problems    Diagnosis  POA    **Abdominal pain [R10.9]  Yes      Resolved Hospital Problems   No resolved problems to display.          Hospital Course:  Palomo Pollack is a 45 y.o. male With history of A-fib, HFrEF, recent sigmoid diverticulitis with perforation/abscess status post IV antibiotics (2025),GERD, PTSD, DM2, Hodgkin lymphoma s/p stem cell transplant, who was just discharged by the surgical service on 2025 after low anterior resection and loop ileostomy creation for diverticular stricture; urethral stents were placed at time of surgery by urology.  Returned with abdominal pain and nausea     Intractable post-operative abdominal pain   S/p lower anterior resection and loop ileostomy creation for diverticular stricture  Fluid collection near anastomosis, likely postoperative fluid collection  Recent sigmoid perforation/abscess s/p IV antibiotics  -Changed PO lortab to percocet for better pain control  -Was discharged on Augmentin, restart  -General surgery followed.  Much better today.  D/w Dr. Plaza ok home today/he wrote prescription for percocet     Abnormal UA with UTI symptoms  New hydronephrosis  -urine culture no growth  -Urology consulted per general surgery; suspect hydro is due to post-op inflammation.  D/w Dr. Plaza who d/w /Dr Osuna and says nothing to do from urology standpoint.  Ok to d/c home.        A fib  -Continue eliquis   -Continue metoprolol     Corneal abrasion, possible infection  -Was discharged from here w erythromycin  eye ointment, continue     HFrEF, LBBB s/p BiV ICD-hold entresto, spironolactone  Lymphoma s/p chemo, chest radiation, and stem cell transplant  DM2-SSI   GERD  PTSD      Discharge Follow Up Recommendations for outpatient labs/diagnostics:  F/u with PCP in 1 week  F/u with Dr. Plaza/general surgery per his rec    Day of Discharge     HPI:   Feels better.  Pain better controlled.  Wants home today    Review of Systems  Gen- No fevers, chills  CV- No chest pain, palpitations  Resp- No cough, dyspnea  GI- No N/V/D, abd pain      Vital Signs:   Temp:  [97.9 °F (36.6 °C)-98.5 °F (36.9 °C)] 97.9 °F (36.6 °C)  Heart Rate:  [] 91  Resp:  [16] 16  BP: (129-138)/(75-87) 138/87  Flow (L/min) (Oxygen Therapy):  [0] 0      Physical Exam:  Constitutional: No acute distress, awake, alert  HENT: NCAT, mucous membranes moist  Respiratory: Clear to auscultation bilaterally, respiratory effort normal   Cardiovascular: RRR, no murmurs, rubs, or gallops  Gastrointestinal: Positive bowel sounds, soft, nontender, nondistended  Musculoskeletal: No bilateral ankle edema  Psychiatric: Appropriate affect, cooperative  Neurologic: Oriented x 3, PALOMARES, speech clear  Skin: No rashes      Pertinent  and/or Most Recent Results     LAB RESULTS:      Lab 06/02/25  0955 06/01/25  1057 05/31/25  0402 05/30/25  2051 05/29/25  0449   WBC 6.78 8.16 8.70 8.42 12.16*   HEMOGLOBIN 11.6* 13.8 12.7* 12.9* 12.8*   HEMATOCRIT 35.3* 41.1 38.3 39.0 39.8   PLATELETS 138* 165 116* 113* 113*   NEUTROS ABS 5.38 6.66 6.76 6.41 10.46*   IMMATURE GRANS (ABS) 0.03 0.03 0.07* 0.04 0.05   LYMPHS ABS 0.55* 0.59* 0.81 0.91 0.56*   MONOS ABS 0.70 0.79 0.94* 0.96* 1.07*   EOS ABS 0.08 0.05 0.08 0.07 0.00   MCV 91.9 88.4 90.1 92.0 91.7   PROCALCITONIN  --  0.21  --   --   --    LACTATE  --  1.5  --   --   --          Lab 06/03/25  0844 06/02/25  0955 06/01/25  1057 05/31/25  0403 05/30/25  2051   SODIUM 138 135* 134* 135* 135*   POTASSIUM 3.9 4.6 3.1* 3.3* 3.4*   CHLORIDE  99 98 92* 96* 96*   CO2 28.0 28.0 27.0 27.0 27.0   ANION GAP 11.0 9.0 15.0 12.0 12.0   BUN 9.9 9.2 10.3 11.7 14.4   CREATININE 0.83 0.71* 0.91 0.71* 0.74*   EGFR 108.6 113.9 104.6 113.9 112.5   GLUCOSE 98 94 127* 91 87   CALCIUM 9.2 8.9 9.3 8.8 9.1   IONIZED CALCIUM 1.17  --   --   --   --    MAGNESIUM 1.9  --  2.0  --   --    PHOSPHORUS 3.2  --   --   --   --          Lab 06/01/25  1057   TOTAL PROTEIN 7.4   ALBUMIN 3.8   GLOBULIN 3.6   ALT (SGPT) 26   AST (SGOT) 33   BILIRUBIN 0.8   ALK PHOS 76   LIPASE 21         Lab 06/01/25  1057   PROBNP 314.1   HSTROP T 10                 Brief Urine Lab Results  (Last result in the past 365 days)        Color   Clarity   Blood   Leuk Est   Nitrite   Protein   CREAT   Urine HCG        06/01/25 1317 Yellow   Clear   Large (3+)   Trace   Negative   30 mg/dL (1+)                 Microbiology Results (last 10 days)       Procedure Component Value - Date/Time    Urine Culture - Urine, Urine, Clean Catch [[id number]]  (Normal) Collected: 06/01/25 1317    Lab Status: Final result Specimen: Urine, Clean Catch Updated: 06/02/25 2113     Urine Culture No growth            CT Angiogram Chest Pulmonary Embolism  Result Date: 6/1/2025  CT ANGIOGRAM CHEST PULMONARY EMBOLISM Date of Exam: 6/1/2025 1:48 PM EDT Indication: sob. Comparison: CTA chest 9/25/2024 Technique: Axial CT images were obtained of the chest after the uneventful intravenous administration of 60 mL Isovue-370 utilizing pulmonary embolism protocol.  In addition, a 3-D volume rendered image was created for interpretation.  Reconstructed coronal and sagittal images were also obtained. Automated exposure control and iterative construction methods were used. Findings: Normal appearance of the pulmonary arteries without evidence of pulmonary embolism. Unremarkable appearance of the thoracic aorta. There are coronary artery calcifications. Dual-lead cardiac pacer is noted with left chest pulse generator. Redemonstration  of a rim  calcified right epicardial cyst. No pericardial effusion. No thoracic adenopathy. There are calcified mediastinal lymph nodes compatible with sequelae of healed granulomatous disease. The chest wall soft tissues are unremarkable. Unremarkable appearance of the airways. The lungs are clear. No lung mass or suspicious pulmonary nodule. No pleural effusion or pneumothorax. There is scattered free air in the upper abdomen compatible with pneumoperitoneum. Otherwise unremarkable appearance of the upper abdomen.     Impression: No evidence of pulmonary embolism. No acute intrathoracic findings. Scattered free air in the upper abdomen compatible with pneumoperitoneum presumably relating to recent abdominal surgery. Electronically Signed: Severino Peter MD  6/1/2025 2:06 PM EDT  Workstation ID: [id number]    CT Abdomen Pelvis With Contrast  Result Date: 6/1/2025  CT ABDOMEN PELVIS W CONTRAST Date of Exam: 6/1/2025 11:42 AM EDT Indication: Recent bowel resection, abdominal pain, back pain. Low anterior resection and loop ileostomy 5/28/2025. History of diverticular disease with perforation and abscess. Comparison: 4/3/2025, 3/17/2025, and prior. Technique: Axial CT images were obtained of the abdomen and pelvis following the uneventful intravenous administration of 90 mL Isovue-300. Reconstructed coronal and sagittal images were also obtained. Automated exposure control and iterative construction methods were used. Findings: Pacemaker/ICD is present, as before. Heart size appears unchanged. Calcified pericardial cyst, incompletely visualized, as before. There is suspected mild dependent atelectasis. No pleural effusion. Findings consistent with history of low anterior resection and distal colectomy with loop ileostomy. There is an anastomosis in the rectosigmoid colon. There is a small amount of pneumoperitoneum and gas in the anterior abdominal wall. This is presumed to be related to the recent surgery. There is edema/fat  stranding in the pelvis in the area of the anastomosis. To the left of the anastomosis on axial series 2 images 135-140 and coronal image 54, there is a small amount of fluid attenuation measuring approximately 2 cm diameter. This does not appear to have a significant rim of enhancement and could represent a small amount of postoperative fluid, but  an early abscess cannot be excluded. No other sizable collection is visualized at this time. New mild left hydronephrosis with diminished excretion of contrast from the left kidney on delayed phase images. The ureter does not opacify with contrast on the delayed phase. The ureter does appear to be mildly dilated to the pelvis in the area of prior surgery where there is surrounding fat stranding/edema. A small calculus is present in the left renal lower pole, as before. No obstructing left ureteral calculus is identified. No right hydronephrosis or hydroureter. There is expected excretion of  contrast from the right kidney. There is contrast from the right ureter and the bladder on delayed phase. Bladder is minimally distended with diffuse bladder wall thickening and mild surrounding fat stranding, primarily along the posterior and superior aspect of the bladder. Findings suggest obstruction of the distal left ureter in the pelvis, likely related to postoperative edema/inflammation. Small right lower pole renal calculus, as before. Suspected focal fatty infiltration adjacent to the falciform ligament. Diffuse hypoattenuation of the liver may indicate hepatic steatosis, as before. No definite focal splenic lesion or splenomegaly. No acute biliary or pancreatic abnormality. No suspicious adrenal lesion. No acute gastric abnormality. Mild fluid distention of small bowel loops without significant small bowel dilatation. Loop ileostomy in the right lower quadrant. Proximal and distal small bowel loops in the right lower quadrant appear nondilated. Appendix is not well  visualized. No definite right lower quadrant inflammation. The colon is mostly nondistended. There is mild diffuse wall thickening of the distal colon and rectum. Aorta appears normal in caliber. No definite new abdominal or pelvic lymphadenopathy. Midline abdominal incision with small amount of gas. No localized collection is identified in the abdominal wall on this exam. No definite acute osseous abnormality.     Impression: 1.New distal colectomy with loop ileostomy. Small amount of pneumoperitoneum and gas in the anterior abdominal wall, presumed to be related to the recent surgery. 2.Edema/fat stranding in the pelvis in the area of the anastomosis. There is a 2 cm focus of fluid to the left of the anastomosis measuring which could represent a small amount of postoperative fluid, but an early abscess cannot be excluded. 3.New mild left hydronephrosis. The left ureter also appears mildly dilated to the area of surgery where there is surrounding fat stranding/edema. Findings suggest obstruction of the distal left ureter, likely related to postoperative edema/inflammation.  There are small bilateral renal calculi, but no ureteral calculus is visualized on this exam. 4.Mild diffuse wall thickening of the distal colon and rectum which could be related to postoperative changes, nondistention, or colitis. 5.Mild fluid distention of small bowel loops without significant small bowel dilatation. 6.Diffuse bladder wall thickening and mild surrounding fat stranding which could be related to cystitis or postoperative changes. Electronically Signed: Nathen Gilliland  6/1/2025 12:39 PM EDT  Workstation ID: [id number]    Peripheral Block  Result Date: 5/28/2025  Josesito Prakash CRNA     5/28/2025  8:36 AM Peripheral Block Pre-sedation assessment completed: 5/28/2025 7:53 AM Patient reassessed immediately prior to procedure Patient location during procedure: OR Start time: 5/28/2025 7:54 AM Reason for block: at surgeon's request and  "post-op pain management Performed by CRNA/CAA: Marcellus Patel, CRNASRNA: Mare Rainey SRNA Preanesthetic Checklist Completed: patient identified, IV checked, site marked, risks and benefits discussed, surgical consent, monitors and equipment checked, pre-op evaluation and timeout performed Prep: Pt Position: supine Sterile barriers:cap, gloves, mask and washed/disinfected hands Prep: ChloraPrep Patient monitoring: blood pressure monitoring, continuous pulse oximetry and EKG Procedure Sedation: yes Performed under: general Guidance:ultrasound guided Images:still images obtained, printed/placed on chart Laterality:Bilateral Block Type:TAP Injection Technique:single-shot Needle Type:short-bevel and echogenic Needle Gauge:20 G Resistance on Injection: none Medications Used: bupivacaine PF (MARCAINE) 0.25 % injection - Injection  60 mL - 5/28/2025 7:54:00 AM dexamethasone sodium phosphate injection - Injection  4 mg - 5/28/2025 7:54:00 AM Medications Preservative Free Saline:5ml Comment:Block Injection:  LA dose divided between Right and Left block Post Assessment Injection Assessment: negative aspiration for heme, incremental injection and no paresthesia on injection Patient Tolerance:comfortable throughout block Complications:no Additional Notes Subcostal TAPs A high-frequency linear transducer, with sterile cover, was placed sub-xiphoid to identify [place], right and left Rectus Abdominus Muscles (ABBIE). The transducer was moved either right or left subcostally to identify the ABBIE and the Transverse Abdominus Muscle (SIFUENTES). The insertion site was prepped in sterile fashion and then localized with 2-5 ml of 1% Lidocaine. Using ultrasound-guidance, a 20-gauge B-Zapata 4\" Ultraplex 360 non-stimulating echogenic needle was advanced in plane, from medial to lateral, until the tip of the needle was in the fascial plane between the ABBIE and SIFUENTES. 1-3ml of preservative free normal saline was used to hydro-dissect the " "fascial planes. After the fascial plane was verified, the local anesthetic (LA) was injected. The procedure was repeated on the opposite side for bilateral coverage. Aspiration every 5 ml to prevent intravascular injection. Injection was completed with negative aspiration of blood and negative intravascular injection. Injection pressures were normal with minimal resistance. The subcostal approach to the TAP nerve block ideally anesthetizes the intercostal nerves T6-T9. Mid-Axillary/Lateral TAPs A high-frequency linear transducer, with sterile cover, was placed in the midaxillary line between the ASIS and costal margin. The External Oblique Muscle (EOM), Internal Oblique Muscle (IOM), Transverse Abdominus Muscle (SIFUENTES), and Peritoneum were identified. The insertion site was prepped in sterile fashion and then localized with 2-5 ml of 1% Lidocaine. Using ultrasound-guidance, a 20-gauge B-Zapata 4\" Ultraplex 360 non-stimulating echogenic needle was advanced in plane, from medial to lateral, until the tip of the needle was in the fascial plane between the IOM and SIFUENTES. 1-3ml of preservative free normal saline was used to hydro-dissect the fascial planes. After the fascial plane was verified, the local anesthetic (LA) was injected. The procedure was repeated on the opposite side for bilateral coverage. Aspiration every 5 ml to prevent intravascular injection. Injection was completed with negative aspiration of blood and negative intravascular injection. Injection pressures were normal with minimal resistance. Midaxillary TAPs should reach intercostal nerves T10- T11 and the subcostal nerve T12.  Performed by: Mare Rainey SRNA              Results for orders placed during the hospital encounter of 04/15/25    Adult Transthoracic Echo Complete w/ Color, Spectral and Contrast if necessary per protocol    Interpretation Summary    Left ventricular systolic function is mildly decreased. Calculated left ventricular EF = 42.5% " Left ventricular ejection fraction appears to be 41 - 45%.    Left ventricular wall thickness is consistent with borderline concentric hypertrophy.    Left ventricular diastolic function was normal.    Estimated right ventricular systolic pressure from tricuspid regurgitation is normal (<35 mmHg).      Plan for Follow-up of Pending Labs/Results:     Discharge Details        Discharge Medications        New Medications        Instructions Start Date   naloxone 4 MG/0.1ML nasal spray  Commonly known as: NARCAN   Call 911. Don't prime. [place] in 1 nostril for overdose. Repeat in 2-3 minutes in other nostril if no or minimal breathing/responsiveness.      oxyCODONE-acetaminophen  MG per tablet  Commonly known as: Percocet  Replaces: oxyCODONE-acetaminophen 5-325 MG per tablet   1 tablet, Oral, Every 6 Hours PRN             Changes to Medications        Instructions Start Date   furosemide 20 MG tablet  Commonly known as: LASIX  What changed: See the new instructions.   TAKE 1 TABLET BY MOUTH ONCE DAILY AS NEEDED FOR SHORTNESS OF BREATH, SWELLING, WEIGHT GAIN > 3 POUNDS             Continue These Medications        Instructions Start Date   amoxicillin-clavulanate 500-125 MG per tablet  Commonly known as: AUGMENTIN   500 mg, Oral, 2 Times Daily      apixaban 5 MG tablet tablet  Commonly known as: Eliquis   5 mg, Oral, 2 Times Daily      empagliflozin 10 MG tablet tablet  Commonly known as: JARDIANCE   10 mg, Oral, Daily      EpiPen 2-Albert 0.3 MG/0.3ML solution auto-injector injection  Generic drug: EPINEPHrine   0.3 mg, As Needed      erythromycin 5 MG/GM ophthalmic ointment  Commonly known as: ROMYCIN   Administer to the right eye 2 (Two) Times a Day.      esomeprazole 40 MG capsule  Commonly known as: nexIUM   40 mg, Oral, Every Morning Before Breakfast      fexofenadine 180 MG tablet  Commonly known as: ALLEGRA   180 mg, Oral, Daily      metoprolol succinate XL 25 MG 24 hr tablet  Commonly known as: TOPROL-XL   25  mg, Oral, Daily      multivitamin tablet tablet   1 tablet, Daily      sacubitril-valsartan 49-51 MG tablet  Commonly known as: ENTRESTO   1 tablet, 2 Times Daily      spironolactone 25 MG tablet  Commonly known as: ALDACTONE   25 mg, Daily      Stool Softener 100 MG capsule  Generic drug: docusate sodium   100 mg, Oral, 2 Times Daily PRN      Testosterone 1.62 % gel   Apply 1 bottle topically to the appropriate area as directed Daily. 1 pump for each shoulder             Stop These Medications      oxyCODONE-acetaminophen 5-325 MG per tablet  Commonly known as: PERCOCET  Replaced by: oxyCODONE-acetaminophen  MG per tablet              Allergies   Allergen Reactions    Morphine Other (See Comments)     ANXIETY; AGITATION    Bactrim [Sulfamethoxazole-Trimethoprim] Rash and Other (See Comments)     Gave increased heartrate-biaxin      Clarithromycin Rash     Tolerates erythromycin    Milk-Related Compounds Other (See Comments)     Increased phlegm          Discharge Disposition:  Home or Self Care    Diet:  Hospital:  Diet Order   Procedures    Diet: Gastrointestinal; Fiber-Restricted; Texture: Soft to Chew (NDD 3); Soft to Chew: Whole Meat; Fluid Consistency: Thin (IDDSI 0)       Diet Instructions       Diet: Regular/House Diet; Thin (IDDSI 0)      Discharge Diet: Regular/House Diet    Fluid Consistency: Thin (IDDSI 0)             Activity:      Restrictions or Other Recommendations:         CODE STATUS:    Code Status and Medical Interventions: CPR (Attempt to Resuscitate); Full Support   Ordered at: 06/01/25 1450     Code Status (Patient has no pulse and is not breathing):    CPR (Attempt to Resuscitate)     Medical Interventions (Patient has pulse or is breathing):    Full Support     Level Of Support Discussed With:    Patient       Future Appointments   Date Time Provider Department Center   8/22/2025  9:30 AM Heladio Valentine DO [facility]   10/13/2025 10:30 AM Wilmer Rojas DO [facility]        Additional Instructions for the Follow-ups that You Need to Schedule       Discharge Follow-up with PCP   As directed       Currently Documented PCP:    Heladio Valentine DO    PCP Phone Number:    [phone]     Follow Up Details: with PCP in 1 week        Discharge Follow-up with Specified Provider: with Dr. Plaza/surgery   As directed      To: with Dr. Plaza/surgery   Follow Up Details: per his rec                      Anton Cordon MD  06/03/25      Time Spent on Discharge:  I spent  37  minutes on this discharge activity which included: face-to-face encounter with the patient, reviewing the data in the system, coordination of the care with the nursing staff as well as consultants, documentation, and entering orders.

## 2025-06-03 NOTE — PROGRESS NOTES
"Patient Name:  Palomo Pollack  YOB: 1980  [id number]    Surgery Progress Note    Date of visit: 6/3/2025    Subjective   Pain control improved with new oral pain medication.  No nausea or vomiting.  No fevers or chills.  Ostomy continues to function well.         Objective       /84 (BP Location: Left arm, Patient Position: Lying)   Pulse 89   Temp 97.9 °F (36.6 °C) (Oral)   Resp 16   Ht 180.3 cm (71\")   Wt 88.5 kg (195 lb)   SpO2 93%   BMI 27.20 kg/m²     Intake/Output Summary (Last 24 hours) at 6/3/2025 0728  Last data filed at 6/3/2025 0326  Gross per 24 hour   Intake 80 ml   Output 2200 ml   Net -2120 ml       CV:  Rhythm regular and rate regular  L:  Clear to auscultation bilaterally  Abd:  Bowel sounds positive, soft nontender, nondistended, incision clean dry and intact, ostomy viable and functioning  Ext:  No cyanosis, clubbing, edema    Recent labs and imaging that are back at this time have been reviewed.   A.m. labs pending       Assessment & Plan     Patient with recent LAR and loop ileostomy, admitted with pain and nausea now improved.  Continue to await for urology assessment of mild left hydronephrosis.  Await a.m. lab results to assess for creatinine level.  If creatinine stable or improving and urology has no interventions planned okay for DC planning from surgical perspective.  Recommend restarting Augmentin on discharge.        Rosas Plaza MD  6/3/2025  07:28 EDT      "

## 2025-06-03 NOTE — PLAN OF CARE
Problem: Adult Inpatient Plan of Care  Goal: Absence of Hospital-Acquired Illness or Injury  Intervention: Prevent Skin Injury  Recent Flowsheet Documentation  Taken 6/2/2025 2000 by Anton Dominguez RN  Body Position: position changed independently     Problem: Adult Inpatient Plan of Care  Goal: Absence of Hospital-Acquired Illness or Injury  Intervention: Prevent and Manage VTE (Venous Thromboembolism) Risk  Recent Flowsheet Documentation  Taken 6/2/2025 2000 by Anton Dominguez, RN  VTE Prevention/Management: other (see comments)   Goal Outcome Evaluation:

## 2025-06-03 NOTE — OUTREACH NOTE
Prep Survey      Flowsheet Row Responses   [facility], [place], operated by [facility] patient discharged from? [facility]   Is LACE score < 7 ? No   Eligibility [facility]   Date of Admission 06/01/25   Date of Discharge 06/03/25   Discharge diagnosis Abdominal pain   Does the patient have one of the following disease processes/diagnoses(primary or secondary)? Other   Prep survey completed? Yes            Gracie WHEATLEY - Registered Nurse

## 2025-06-03 NOTE — NURSING NOTE
WOC follow-up for ostomy education.    Ostomy appliance applied last Friday, May 30 remains intact.  Patient's pain improved.  States that he is having good stool output.    Appliance changed with patient today.  He was able to change the entire apparatus independently while standing.    Provided ostomy belt for better securement.  Patient feels that he has a good grasp with how to manage his ostomy.    I asked him to reach out moving forward if he has any questions or issues that need to be addressed in the outpatient clinic.    He is familiar with how to order supplies from [facility] and has plenty of discharge supplies at home.  Reminded patient about importance of regular hydration.    WOC will sign off at this time.    Pratik Slaughter RN, BSN, CWOCN  Wound, Ostomy and Continence (WOC) Department  [facility]

## 2025-06-04 ENCOUNTER — TRANSITIONAL CARE MANAGEMENT TELEPHONE ENCOUNTER (OUTPATIENT)
Dept: CALL CENTER | Facility: HOSPITAL | Age: 45
End: 2025-06-04
Payer: MEDICAID

## 2025-06-04 NOTE — OUTREACH NOTE
Call Center TCM Note      Flowsheet Row Responses   [facility] facility patient discharged from? [place]   Does the patient have one of the following disease processes/diagnoses(primary or secondary)? Other   TCM attempt successful? No  [Mother listed on VR but no number given.]   Unsuccessful attempts Attempt 1            Lorena Felix Registered Nurse    6/4/2025, 08:20 EDT

## 2025-06-04 NOTE — OUTREACH NOTE
Call Center TCM Note      Flowsheet Row Responses   [facility] patient discharged from? [place]   Does the patient have one of the following disease processes/diagnoses(primary or secondary)? Other   TCM attempt successful? No   Unsuccessful attempts Attempt 2            Lorena AVENDANO - Registered Nurse    6/4/2025, 12:13 EDT

## 2025-06-05 ENCOUNTER — TRANSITIONAL CARE MANAGEMENT TELEPHONE ENCOUNTER (OUTPATIENT)
Dept: CALL CENTER | Facility: HOSPITAL | Age: 45
End: 2025-06-05
Payer: MEDICAID

## 2025-06-05 NOTE — DISCHARGE SUMMARY
Patient Name:  Palomo Pollack  YOB: 1980  [id number]    Date of Discharge: 5/31/2025    Discharge Diagnosis: Diverticular stricture    Presenting Problem/History of Present Illness  Active Hospital Problems    Diagnosis  POA    **Diverticular stricture [K56.699]  Yes      Resolved Hospital Problems   No resolved problems to display.        Hospital Course  Patient is a 45 y.o. male presented with a chronic diverticular stricture.  On 5/28/2025 I performed a robotic sigmoid colectomy and loop ileostomy creation. On postoperative day 3, pain was well-controlled with oral pain medications. No nausea/vomiting.  No fevers/chills. Voiding urine spontaneously. Having ostomy function. Ambulating appropriately. The patient was thus found to be safe for discharge to home.        Procedures Performed    Procedure(s):  LOW ANTERIOR RESECTION, SPLENIC FLEXURE MOBILIZATION, FLEXIBLE SIGMOIDOSCOPY, LOOP ILEOSTOMY  CYSTOSCOPY TEMPORARY PLACEMENT BILATERAL URETERAL CATHETER  -------------------       Consults:   Consults       No orders found from 4/29/2025 to 5/29/2025.            Pertinent Test Results: N/A    Condition on Discharge:  Pain controlled with oral pain medication, tolerating diet, ambulating appropriately      Vital Signs       Physical Exam:     General Appearance:  Alert, cooperative, in no acute distress   Head:  Normocephalic, without obvious abnormality, atraumatic   Eyes:  Lids and lashes normal, conjunctivae and sclerae normal, no icterus, no pallor, corneas clear, PERRLA   Ears:  Ears appear intact with no abnormalities noted   Throat:  No oral lesions, no thrush, oral mucosa moist   Neck:  No adenopathy, supple, trachea midline, no thyromegaly, no carotid bruit, no JVD   Back:  No kyphosis present, no scoliosis present, no skin lesions, erythema or scars, no tenderness to percussion or palpation, range of motion normal   Lungs:  Clear to auscultation, respirations regular, even and  unlabored    Heart:  Regular rhythm and normal rate, normal S1 and S2, no murmur, no gallop, no rub, no click   Chest Wall:  No abnormalities observed   Abdomen:  Normal bowel sounds, no masses, no organomegaly, soft non-tender, non-distended, no guarding, no rebound tenderness, incisions clean dry and intact, ostomy viable and functioning   Rectal:  Deferred   Extremities:  Moves all extremities well, no edema, no cyanosis, no redness   Pulses:  Pulses palpable and equal bilaterally   Skin:  No bleeding, bruising or rash   Lymph nodes:  No palpable adenopathy   Neurologic:  Cranial nerves 2 - 12 grossly intact, sensation intact, DTR present and equal bilaterally                                                                               Discharge Disposition  Home or Self Care    Discharge Medications     Discharge Medications        New Medications        Instructions Start Date   amoxicillin-clavulanate 500-125 MG per tablet  Commonly known as: AUGMENTIN   500 mg, Oral, 2 Times Daily      erythromycin 5 MG/GM ophthalmic ointment  Commonly known as: ROMYCIN   Administer to the right eye 2 (Two) Times a Day.      Stool Softener 100 MG capsule  Generic drug: docusate sodium   100 mg, Oral, 2 Times Daily PRN             Changes to Medications        Instructions Start Date   furosemide 20 MG tablet  Commonly known as: LASIX  What changed: See the new instructions.   TAKE 1 TABLET BY MOUTH ONCE DAILY AS NEEDED FOR SHORTNESS OF BREATH, SWELLING, WEIGHT GAIN > 3 POUNDS             Continue These Medications        Instructions Start Date   apixaban 5 MG tablet tablet  Commonly known as: Eliquis   5 mg, Oral, 2 Times Daily      empagliflozin 10 MG tablet tablet  Commonly known as: JARDIANCE   10 mg, Oral, Daily      EpiPen 2-Albert 0.3 MG/0.3ML solution auto-injector injection  Generic drug: EPINEPHrine   0.3 mg, As Needed      esomeprazole 40 MG capsule  Commonly known as: nexIUM   40 mg, Oral, Every Morning Before  Breakfast      fexofenadine 180 MG tablet  Commonly known as: ALLEGRA   180 mg, Oral, Daily      metoprolol succinate XL 25 MG 24 hr tablet  Commonly known as: TOPROL-XL   25 mg, Oral, Daily      multivitamin tablet tablet   1 tablet, Daily      sacubitril-valsartan 49-51 MG tablet  Commonly known as: ENTRESTO   1 tablet, 2 Times Daily      spironolactone 25 MG tablet  Commonly known as: ALDACTONE   25 mg, Daily      Testosterone 1.62 % gel   Apply 1 bottle topically to the appropriate area as directed Daily. 1 pump for each shoulder               Discharge Diet:     Activity at Discharge:   Activity Instructions       Discharge Activity      1) No driving until no longer taking narcotics.   2) Return to school / work in 2 weeks.  3) May shower.  No tub baths.  No swimming.  4) Do not lift / push / pull more than 15 lbs.  5) Record ostomy output daily.  Bring records to follow-up appointment with Dr. Plaza.            Follow-up Appointments  Future Appointments   Date Time Provider Department Center   6/17/2025  9:45 AM Heladio Valentine DO [facility]   8/22/2025  9:30 AM Heladio Valentine DO [facility]   10/13/2025 10:30 AM Wilmer Rojas DO [facility]     Additional Instructions for the Follow-ups that You Need to Schedule       Ambulatory Referral to Wound Ostomy   As directed      Discharge Follow-up with Specified Provider: Dr. Plaza; 1 Week   As directed      To: Dr. Plaza   Follow Up: 1 Week   Follow Up Details: Call [phone] to make an appointment        Notify Physician or Go To The ED For the Following Conditions   As directed      Fever >100.4, increased abdominal pain, persistent nausea/vomiting, new redness/drainage from incisions, ostomy output over 1000 mL a day    Order Comments: Fever >100.4, increased abdominal pain, persistent nausea/vomiting, new redness/drainage from incisions, ostomy output over 1000 mL a day                 Test Results Pending at Discharge       Rosas  GAUDENCIO Plaza MD   06/05/25  19:39 EDT    Time: Discharge 15 min

## 2025-06-05 NOTE — OUTREACH NOTE
Call Center TCM Note      Flowsheet Row Responses   [facility] patient discharged from? [place]   Does the patient have one of the following disease processes/diagnoses(primary or secondary)? Other   TCM attempt successful? No   Unsuccessful attempts Attempt 3            Ro SANCHEZ - Licensed Nurse    6/5/2025, 09:07 EDT

## 2025-06-16 ENCOUNTER — TELEPHONE (OUTPATIENT)
Dept: CARDIOLOGY | Facility: CLINIC | Age: 45
End: 2025-06-16
Payer: MEDICAID

## 2025-06-17 ENCOUNTER — OFFICE VISIT (OUTPATIENT)
Dept: CARDIOLOGY | Facility: CLINIC | Age: 45
End: 2025-06-17
Payer: MEDICAID

## 2025-06-17 VITALS
OXYGEN SATURATION: 96 % | WEIGHT: 192 LBS | HEIGHT: 71 IN | SYSTOLIC BLOOD PRESSURE: 126 MMHG | BODY MASS INDEX: 26.88 KG/M2 | HEART RATE: 90 BPM | DIASTOLIC BLOOD PRESSURE: 82 MMHG

## 2025-06-17 DIAGNOSIS — I44.7 LBBB (LEFT BUNDLE BRANCH BLOCK): ICD-10-CM

## 2025-06-17 DIAGNOSIS — I48.0 PAROXYSMAL ATRIAL FIBRILLATION: ICD-10-CM

## 2025-06-17 DIAGNOSIS — I50.22 CHRONIC HFREF (HEART FAILURE WITH REDUCED EJECTION FRACTION): Primary | ICD-10-CM

## 2025-06-17 PROCEDURE — 99214 OFFICE O/P EST MOD 30 MIN: CPT | Performed by: INTERNAL MEDICINE

## 2025-06-17 RX ORDER — VALSARTAN 40 MG/1
40 TABLET ORAL
Qty: 90 TABLET | Refills: 3 | Status: SHIPPED | OUTPATIENT
Start: 2025-06-17

## 2025-06-17 NOTE — PROGRESS NOTES
[facility] Cardiology  Office visit  Palomo Pollack  1980  [phone]  There is no work phone number on file.    VISIT DATE:  6/17/2025    PCP: Heladio Valentine DO  [address] [address]    CC:  Chief Complaint   Patient presents with    Paroxysmal atrial fibrillation    Chronic HFrEF     3 month follow up       Previous cardiac studies and procedures:  May 2023  Myocardial perfusion imaging    Calcifications visualized in the proximal and mid RCA.    Left ventricular ejection fraction is moderately reduced (Calculated EF = 37%).    Abnormal LV wall motion consistent with moderate hypokinesis of the septal wall.    Large region of moderately decreased perfusion which is predominantly fixed involving the septal wall, apex, and mid portions of the anterior and inferior walls.    Impressions are consistent with a high risk study.  TTE    Left ventricular systolic function is moderately decreased. Calculated left ventricular EF = 28% Left ventricular ejection fraction appears to be 26 - 30%.    The left ventricular cavity is mildly dilated.    The left atrial cavity is mildly dilated.    Moderate tricuspid valve regurgitation is present.    Estimated right ventricular systolic pressure from tricuspid regurgitation is moderately elevated (45-55 mmHg). Calculated right ventricular systolic pressure from tricuspid regurgitation is 50 mmHg.    Cardiac catheterization  Luminal irregularities noted in the proximal mid major epicardial vessels.  LVEDP 21 mmHg  No aortic stenosis    September 2023 TTE    Left ventricular systolic function is moderately decreased. Calculated left ventricular EF = 30% Left ventricular ejection fraction appears to be 31 - 35%.    Left ventricular wall thickness is consistent with mild concentric hypertrophy.    Left ventricular diastolic function was indeterminate.    October 2023 CRT-D [employer]    October 2024:  PVI    April 2025 TTE    Left ventricular systolic function is mildly decreased. Calculated left ventricular EF = 42.5% Left ventricular ejection fraction appears to be 41 - 45%.    Left ventricular wall thickness is consistent with borderline concentric hypertrophy.    Left ventricular diastolic function was normal.    Estimated right ventricular systolic pressure from tricuspid regurgitation is normal (<35 mmHg)    ASSESSMENT:   Diagnosis Plan   1. Chronic HFrEF (heart failure with reduced ejection fraction)        2. LBBB (left bundle branch block)        3. Paroxysmal atrial fibrillation              PLAN:  Heart failure with reduced ejection fraction, chronic: Suspect secondary to late onset chemotherapeutic effects.  Currently euvolemic and compensated.  Clinical improvement status post CRT-D.  Starting valsartan 40 mg p.o. nightly, will gradually titrate to 40 mg p.o. twice daily before transitioning back to low-dose Entresto.  Otherwise continue current dosing of metoprolol succinate, spironolactone, and Jardiance.    Paroxysmal atrial fibrillation: Status post PVI.  No recent arrhythmia on remote device interrogation.  Will hold Eliquis for the time being, reassess after preanastomosis surgery.    Subjective  Interval assessment: Recovering from recent sigmoid colectomy with ostomy placement.  Developed intermittent symptomatic hypotension, has been weaned off Entresto.  Otherwise denies PND orthopnea, worsening dyspnea or lower extremity edema.  Has been off Entresto since his surgery.    Initial evaluation: 43-year-old gentleman with persistent limiting dyspnea on exertion over the previous 4 months following COVID-19 infection in December 2022.  Also with intermittent palpitations.  Describes intermittent isolated flip-flop sensations, also with intermittent sustained palpitations in which she feels his heart is beating hard and fast.  Has intermittent left upper chest discomfort rating down his left  "arm, no obvious triggers.  No alleviating or exacerbating features.  Does have a history of Hodgkin's lymphoma with chemotherapy and chest radiation and stem cell transplant.  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  Diagnosed with diabetes last year, was able to normalize his hemoglobin A1c with dietary changes and approximately 50 pound weight loss.    PHYSICAL EXAMINATION:  Vitals:    06/17/25 1410   BP: 126/82   BP Location: Right arm   Patient Position: Sitting   Cuff Size: Adult   Pulse: 90   SpO2: 96%   Weight: 87.1 kg (192 lb)   Height: 180.3 cm (71\")         General Appearance:    Alert, cooperative, no distress, appears stated age   Head:    Normocephalic, without obvious abnormality, atraumatic   Eyes:    conjunctiva/corneas clear   Nose:   Nares normal, septum midline, mucosa normal, no drainage   Throat:   Lips, teeth and gums normal   Neck:   Supple, symmetrical, trachea midline, no carotid    bruit or JVD   Lungs:     Clear to auscultation bilaterally, respirations unlabored   Chest Wall:    No tenderness or deformity    Heart:    Regular rate and rhythm, S1 and S2 normal, no murmur, rub   or gallop, normal carotid impulse bilaterally without bruit.   Abdomen:     Soft, non-tender   Extremities:   Extremities normal, atraumatic, no cyanosis or edema   Pulses:   2+ and symmetric all extremities   Skin:   Skin color, texture, turgor normal, no rashes or lesions       Diagnostic Data:  Procedures  Lab Results   Component Value Date    TRIG 93 05/31/2023    HDL 59 05/31/2023     Lab Results   Component Value Date    GLUCOSE 98 06/03/2025    BUN 9.9 06/03/2025    CREATININE 0.83 06/03/2025     06/03/2025    K 3.9 06/03/2025    CL 99 06/03/2025    CO2 28.0 06/03/2025     Lab Results   Component Value Date    HGBA1C 6.50 (H) 05/21/2025     Lab Results   Component Value Date    WBC 6.78 06/02/2025    HGB 11.6 " (L) 06/02/2025    HCT 35.3 (L) 06/02/2025     (L) 06/02/2025       Allergies  Allergies   Allergen Reactions    Morphine Other (See Comments)     ANXIETY; AGITATION    Bactrim [Sulfamethoxazole-Trimethoprim] Rash and Other (See Comments)     Gave increased heartrate-biaxin      Clarithromycin Rash     Tolerates erythromycin    Milk-Related Compounds Other (See Comments)     Increased phlegm        Current Medications    Current Outpatient Medications:     docusate sodium 100 MG capsule, Take 1 capsule by mouth 2 (Two) Times a Day As Needed for Constipation., Disp: 10 capsule, Rfl: 0    empagliflozin (JARDIANCE) 10 MG tablet tablet, Take 1 tablet by mouth Daily., Disp: 90 tablet, Rfl: 3    EpiPen 2-Albert 0.3 MG/0.3ML solution auto-injector injection, 0.3 mL As Needed (allergic reaction)., Disp: , Rfl:     esomeprazole (nexIUM) 40 MG capsule, Take 1 capsule by mouth Every Morning Before Breakfast., Disp: 90 capsule, Rfl: 3    fexofenadine (ALLEGRA) 180 MG tablet, Take 1 tablet by mouth Daily., Disp: 90 tablet, Rfl: 0    metoprolol succinate XL (TOPROL-XL) 25 MG 24 hr tablet, Take 1 tablet by mouth once daily (Patient taking differently: Take 1 tablet by mouth Every Night.), Disp: 90 tablet, Rfl: 0    spironolactone (ALDACTONE) 25 MG tablet, Take 1 tablet by mouth Daily., Disp: , Rfl:     Testosterone 1.62 % gel, Apply 1 bottle topically to the appropriate area as directed Daily. 1 pump for each shoulder, Disp: , Rfl:     apixaban (Eliquis) 5 MG tablet tablet, Take 1 tablet by mouth 2 (Two) Times a Day. (Patient not taking: Reported on 6/17/2025), Disp: 180 tablet, Rfl: 3    valsartan (DIOVAN) 40 MG tablet, Take 1 tablet by mouth every night at bedtime., Disp: 90 tablet, Rfl: 3    Current Facility-Administered Medications:     loperamide (IMODIUM) capsule 2 mg, 2 mg, Oral, 4x Daily PRN, Rosas Plaza MD          ROS  ROS      SOCIAL HX  Social History     Socioeconomic History    Marital status: Significant  Other   Tobacco Use    Smoking status: Some Days     Current packs/day: 0.25     Average packs/day: 0.3 packs/day for 22.5 years (5.6 ttl pk-yrs)     Types: Cigarettes     Start date: 11/2002     Last attempt to quit: 11/2022     Passive exposure: Past    Smokeless tobacco: Never    Tobacco comments:     smokes less than .25 ppd   Vaping Use    Vaping status: Never Used   Substance and Sexual Activity    Alcohol use: Not Currently     Comment: stopped drinking 03/2025    Drug use: Not Currently     Types: Marijuana     Comment: stopped October 2024    Sexual activity: Yes       FAMILY HX  Family History   Problem Relation Age of Onset    Stroke Father     Lung cancer Father     Cancer Father         Positive for cured lung cancer- he was smoker             Alec Mason III, MD, FACC

## 2025-07-08 DIAGNOSIS — J30.2 SEASONAL ALLERGIES: ICD-10-CM

## 2025-07-08 RX ORDER — FEXOFENADINE HCL 180 MG/1
180 TABLET ORAL DAILY
Qty: 90 TABLET | Refills: 0 | Status: SHIPPED | OUTPATIENT
Start: 2025-07-08

## 2025-07-14 ENCOUNTER — TELEPHONE (OUTPATIENT)
Dept: CARDIOLOGY | Facility: CLINIC | Age: 45
End: 2025-07-14
Payer: MEDICAID

## 2025-07-14 NOTE — TELEPHONE ENCOUNTER
HLI is 17 with a threshold of 16. Thoracic impedance is stable at 57.5 ohms. RR is 17.8 rpm. Night Heart Rate is elevated at 102 bpm. Baseline for Night Heart Rate is 95 bpm.      I attempted to call patient for a symptom check.  No answer, I left message on  requesting a call back.

## 2025-07-15 LAB
MC_CV_MDC_IDC_RATE_1: 170
MC_CV_MDC_IDC_RATE_1: 200
MC_CV_MDC_IDC_RATE_1: 250
MC_CV_MDC_IDC_SHOCK_MEASURED_IMPEDANCE: 93
MC_CV_MDC_IDC_THERAPIES: NORMAL
MC_CV_MDC_IDC_THERAPIES: NORMAL
MC_CV_MDC_IDC_ZONE_ID: 1
MC_CV_MDC_IDC_ZONE_ID: 2
MC_CV_MDC_IDC_ZONE_ID: 3
MDC_IDC_MSMT_BATTERY_REMAINING_LONGEVITY: 96 MO
MDC_IDC_MSMT_BATTERY_REMAINING_PERCENTAGE: 100 %
MDC_IDC_MSMT_BATTERY_STATUS: NORMAL
MDC_IDC_MSMT_CAP_CHARGE_TIME: 10.2
MDC_IDC_MSMT_LEADCHNL_LV_DTM: NORMAL
MDC_IDC_MSMT_LEADCHNL_LV_IMPEDANCE_VALUE: 819
MDC_IDC_MSMT_LEADCHNL_LV_PACING_THRESHOLD_AMPLITUDE: 0.7
MDC_IDC_MSMT_LEADCHNL_LV_PACING_THRESHOLD_POLARITY: NORMAL
MDC_IDC_MSMT_LEADCHNL_LV_PACING_THRESHOLD_PULSEWIDTH: 1
MDC_IDC_MSMT_LEADCHNL_RA_DTM: NORMAL
MDC_IDC_MSMT_LEADCHNL_RA_IMPEDANCE_VALUE: 536
MDC_IDC_MSMT_LEADCHNL_RA_PACING_THRESHOLD_POLARITY: NORMAL
MDC_IDC_MSMT_LEADCHNL_RA_SENSING_INTR_AMPL: 3.6
MDC_IDC_MSMT_LEADCHNL_RV_DTM: NORMAL
MDC_IDC_MSMT_LEADCHNL_RV_IMPEDANCE_VALUE: 513
MDC_IDC_MSMT_LEADCHNL_RV_PACING_THRESHOLD_AMPLITUDE: 0.6
MDC_IDC_MSMT_LEADCHNL_RV_PACING_THRESHOLD_POLARITY: NORMAL
MDC_IDC_MSMT_LEADCHNL_RV_PACING_THRESHOLD_PULSEWIDTH: 0.4
MDC_IDC_PG_IMPLANT_DTM: NORMAL
MDC_IDC_PG_MFG: NORMAL
MDC_IDC_PG_MODEL: NORMAL
MDC_IDC_PG_SERIAL: NORMAL
MDC_IDC_PG_TYPE: NORMAL
MDC_IDC_SESS_DTM: NORMAL
MDC_IDC_SESS_TYPE: NORMAL
MDC_IDC_SET_BRADY_AT_MODE_SWITCH_RATE: 170
MDC_IDC_SET_BRADY_LOWRATE: 60
MDC_IDC_SET_BRADY_MAX_TRACKING_RATE: 130
MDC_IDC_SET_BRADY_MODE: NORMAL
MDC_IDC_SET_BRADY_PAV_DELAY: 180
MDC_IDC_SET_BRADY_SAV_DELAY: 120
MDC_IDC_SET_CRT_LVRV_DELAY: 0
MDC_IDC_SET_CRT_PACED_CHAMBERS: NORMAL
MDC_IDC_SET_LEADCHNL_LV_PACING_AMPLITUDE: 2
MDC_IDC_SET_LEADCHNL_LV_PACING_PULSEWIDTH: 1
MDC_IDC_SET_LEADCHNL_RA_PACING_AMPLITUDE: 2
MDC_IDC_SET_LEADCHNL_RA_PACING_POLARITY: NORMAL
MDC_IDC_SET_LEADCHNL_RA_PACING_PULSEWIDTH: 0.4
MDC_IDC_SET_LEADCHNL_RA_SENSING_POLARITY: NORMAL
MDC_IDC_SET_LEADCHNL_RA_SENSING_SENSITIVITY: 0.25
MDC_IDC_SET_LEADCHNL_RV_PACING_AMPLITUDE: 2
MDC_IDC_SET_LEADCHNL_RV_PACING_POLARITY: NORMAL
MDC_IDC_SET_LEADCHNL_RV_PACING_PULSEWIDTH: 0.4
MDC_IDC_SET_LEADCHNL_RV_SENSING_POLARITY: NORMAL
MDC_IDC_SET_LEADCHNL_RV_SENSING_SENSITIVITY: 0.6
MDC_IDC_SET_ZONE_STATUS: NORMAL
MDC_IDC_SET_ZONE_TYPE: NORMAL
MDC_IDC_STAT_AT_BURDEN_PERCENT: 0
MDC_IDC_STAT_BRADY_RA_PERCENT_PACED: 0
MDC_IDC_STAT_BRADY_RV_PERCENT_PACED: 91
MDC_IDC_STAT_CRT_LV_PERCENT_PACED: 90
MDC_IDC_STAT_TACHYTHERAPY_ATP_DELIVERED_RECENT: 0
MDC_IDC_STAT_TACHYTHERAPY_SHOCKS_ABORTED_RECENT: 0
MDC_IDC_STAT_TACHYTHERAPY_SHOCKS_DELIVERED_RECENT: 0

## 2025-07-21 NOTE — TELEPHONE ENCOUNTER
* [employer] HF Index crossed alert threshold with index of **16**    HLI is 23.   Thoracic impedance is stable at 56.1 ohms.   RR is 18 rpm.   Night Heart is 98 bpm.      I attempted to call patient for a symptom check.  No answer, Message left on  requesting a call back.

## 2025-08-05 ENCOUNTER — TRANSCRIBE ORDERS (OUTPATIENT)
Dept: ADMINISTRATIVE | Facility: HOSPITAL | Age: 45
End: 2025-08-05
Payer: MEDICAID

## 2025-08-05 DIAGNOSIS — K57.20 DIVERTICULITIS OF LARGE INTESTINE WITH PERFORATION AND ABSCESS WITHOUT BLEEDING: Primary | ICD-10-CM

## 2025-08-06 LAB
MC_CV_MDC_IDC_RATE_1: 170
MC_CV_MDC_IDC_RATE_1: 200
MC_CV_MDC_IDC_RATE_1: 250
MC_CV_MDC_IDC_SHOCK_MEASURED_IMPEDANCE: 91
MC_CV_MDC_IDC_THERAPIES: NORMAL
MC_CV_MDC_IDC_THERAPIES: NORMAL
MC_CV_MDC_IDC_ZONE_ID: 1
MC_CV_MDC_IDC_ZONE_ID: 2
MC_CV_MDC_IDC_ZONE_ID: 3
MDC_IDC_MSMT_BATTERY_REMAINING_LONGEVITY: 96 MO
MDC_IDC_MSMT_BATTERY_REMAINING_PERCENTAGE: 100 %
MDC_IDC_MSMT_BATTERY_STATUS: NORMAL
MDC_IDC_MSMT_CAP_CHARGE_TIME: 10.2
MDC_IDC_MSMT_LEADCHNL_LV_DTM: NORMAL
MDC_IDC_MSMT_LEADCHNL_LV_IMPEDANCE_VALUE: 865
MDC_IDC_MSMT_LEADCHNL_LV_PACING_THRESHOLD_AMPLITUDE: 0.7
MDC_IDC_MSMT_LEADCHNL_LV_PACING_THRESHOLD_POLARITY: NORMAL
MDC_IDC_MSMT_LEADCHNL_LV_PACING_THRESHOLD_PULSEWIDTH: 1
MDC_IDC_MSMT_LEADCHNL_LV_SENSING_INTR_AMPL: 25
MDC_IDC_MSMT_LEADCHNL_RA_DTM: NORMAL
MDC_IDC_MSMT_LEADCHNL_RA_IMPEDANCE_VALUE: 592
MDC_IDC_MSMT_LEADCHNL_RA_PACING_THRESHOLD_POLARITY: NORMAL
MDC_IDC_MSMT_LEADCHNL_RA_SENSING_INTR_AMPL: 6.5
MDC_IDC_MSMT_LEADCHNL_RV_DTM: NORMAL
MDC_IDC_MSMT_LEADCHNL_RV_IMPEDANCE_VALUE: 545
MDC_IDC_MSMT_LEADCHNL_RV_PACING_THRESHOLD_AMPLITUDE: 0.6
MDC_IDC_MSMT_LEADCHNL_RV_PACING_THRESHOLD_POLARITY: NORMAL
MDC_IDC_MSMT_LEADCHNL_RV_PACING_THRESHOLD_PULSEWIDTH: 0.4
MDC_IDC_MSMT_LEADCHNL_RV_SENSING_INTR_AMPL: 25
MDC_IDC_PG_IMPLANT_DTM: NORMAL
MDC_IDC_PG_MFG: NORMAL
MDC_IDC_PG_MODEL: NORMAL
MDC_IDC_PG_SERIAL: NORMAL
MDC_IDC_PG_TYPE: NORMAL
MDC_IDC_SESS_DTM: NORMAL
MDC_IDC_SESS_TYPE: NORMAL
MDC_IDC_SET_BRADY_AT_MODE_SWITCH_RATE: 170
MDC_IDC_SET_BRADY_LOWRATE: 60
MDC_IDC_SET_BRADY_MAX_TRACKING_RATE: 130
MDC_IDC_SET_BRADY_MODE: NORMAL
MDC_IDC_SET_BRADY_PAV_DELAY: 180
MDC_IDC_SET_BRADY_SAV_DELAY: 120
MDC_IDC_SET_CRT_LVRV_DELAY: 0
MDC_IDC_SET_CRT_PACED_CHAMBERS: NORMAL
MDC_IDC_SET_LEADCHNL_LV_PACING_AMPLITUDE: 2
MDC_IDC_SET_LEADCHNL_LV_PACING_PULSEWIDTH: 1
MDC_IDC_SET_LEADCHNL_RA_PACING_AMPLITUDE: 2
MDC_IDC_SET_LEADCHNL_RA_PACING_POLARITY: NORMAL
MDC_IDC_SET_LEADCHNL_RA_PACING_PULSEWIDTH: 0.4
MDC_IDC_SET_LEADCHNL_RA_SENSING_POLARITY: NORMAL
MDC_IDC_SET_LEADCHNL_RA_SENSING_SENSITIVITY: 0.25
MDC_IDC_SET_LEADCHNL_RV_PACING_AMPLITUDE: 2
MDC_IDC_SET_LEADCHNL_RV_PACING_POLARITY: NORMAL
MDC_IDC_SET_LEADCHNL_RV_PACING_PULSEWIDTH: 0.4
MDC_IDC_SET_LEADCHNL_RV_SENSING_POLARITY: NORMAL
MDC_IDC_SET_LEADCHNL_RV_SENSING_SENSITIVITY: 0.6
MDC_IDC_SET_ZONE_STATUS: NORMAL
MDC_IDC_SET_ZONE_TYPE: NORMAL
MDC_IDC_STAT_AT_BURDEN_PERCENT: 0
MDC_IDC_STAT_BRADY_RA_PERCENT_PACED: 0
MDC_IDC_STAT_BRADY_RV_PERCENT_PACED: 92
MDC_IDC_STAT_CRT_LV_PERCENT_PACED: 91
MDC_IDC_STAT_TACHYTHERAPY_ATP_DELIVERED_RECENT: 0
MDC_IDC_STAT_TACHYTHERAPY_SHOCKS_ABORTED_RECENT: 0
MDC_IDC_STAT_TACHYTHERAPY_SHOCKS_DELIVERED_RECENT: 0

## 2025-08-11 ENCOUNTER — HOSPITAL ENCOUNTER (OUTPATIENT)
Dept: GENERAL RADIOLOGY | Facility: HOSPITAL | Age: 45
Discharge: HOME | End: 2025-08-11
Admitting: SURGERY
Payer: MEDICAID

## 2025-08-11 DIAGNOSIS — K57.20 DIVERTICULITIS OF LARGE INTESTINE WITH PERFORATION AND ABSCESS WITHOUT BLEEDING: ICD-10-CM

## 2025-08-11 PROCEDURE — 74270 X-RAY XM COLON 1CNTRST STD: CPT

## 2025-08-11 PROCEDURE — 25510000002 DIATRIZOATE MEGLUMINE & SODIUM PER 1 ML: Performed by: SURGERY

## 2025-08-11 RX ORDER — DIATRIZOATE MEGLUMINE AND DIATRIZOATE SODIUM 660; 100 MG/ML; MG/ML
480 SOLUTION ORAL; RECTAL
Status: COMPLETED | OUTPATIENT
Start: 2025-08-11 | End: 2025-08-11

## 2025-08-11 RX ADMIN — DIATRIZOATE MEGLUMINE AND DIATRIZOATE SODIUM 480 ML: 660; 100 LIQUID ORAL; RECTAL at 09:47

## 2025-08-18 RX ORDER — METOPROLOL SUCCINATE 25 MG/1
25 TABLET, EXTENDED RELEASE ORAL DAILY
Qty: 90 TABLET | Refills: 0 | Status: SHIPPED | OUTPATIENT
Start: 2025-08-18

## 2025-08-22 ENCOUNTER — OFFICE VISIT (OUTPATIENT)
Dept: FAMILY MEDICINE CLINIC | Facility: CLINIC | Age: 45
End: 2025-08-22
Payer: MEDICAID

## 2025-08-22 VITALS
BODY MASS INDEX: 27.94 KG/M2 | WEIGHT: 199.6 LBS | OXYGEN SATURATION: 95 % | TEMPERATURE: 97.5 F | HEART RATE: 92 BPM | SYSTOLIC BLOOD PRESSURE: 140 MMHG | DIASTOLIC BLOOD PRESSURE: 94 MMHG | HEIGHT: 71 IN

## 2025-08-22 DIAGNOSIS — R23.0 CYANOSIS: ICD-10-CM

## 2025-08-22 DIAGNOSIS — R20.2 NUMBNESS AND TINGLING IN LEFT ARM: Primary | ICD-10-CM

## 2025-08-22 DIAGNOSIS — R20.0 NUMBNESS AND TINGLING IN LEFT ARM: Primary | ICD-10-CM

## 2025-08-22 PROCEDURE — 3044F HG A1C LEVEL LT 7.0%: CPT | Performed by: FAMILY MEDICINE

## 2025-08-22 PROCEDURE — 1126F AMNT PAIN NOTED NONE PRSNT: CPT | Performed by: FAMILY MEDICINE

## 2025-08-22 PROCEDURE — 99214 OFFICE O/P EST MOD 30 MIN: CPT | Performed by: FAMILY MEDICINE

## 2025-08-25 ENCOUNTER — HOSPITAL ENCOUNTER (OUTPATIENT)
Facility: HOSPITAL | Age: 45
Discharge: HOME | End: 2025-08-25
Admitting: FAMILY MEDICINE
Payer: MEDICAID

## 2025-08-25 DIAGNOSIS — R20.2 NUMBNESS AND TINGLING IN LEFT ARM: ICD-10-CM

## 2025-08-25 DIAGNOSIS — R23.0 CYANOSIS: ICD-10-CM

## 2025-08-25 DIAGNOSIS — R20.0 NUMBNESS AND TINGLING IN LEFT ARM: ICD-10-CM

## 2025-08-25 LAB
BH CV UPPER DUPLEX LEFT AXILLARY ART PSV: 48.3 CM/S
BH CV UPPER DUPLEX LEFT BRACHIAL ART PSV: 59.3 CM/S
BH CV UPPER DUPLEX LEFT RADIAL ART PSV: 60.1 CM/S
BH CV UPPER DUPLEX LEFT SUBCLAVIAN  ART PSV: 76.4 CM/S
BH CV UPPER DUPLEX LEFT ULNAR ARTERY PSV: 50.5 CM/S
BH CV UPPER DUPLEX RIGHT AXILLARY ARTERY PSV: 58.5 CM/S
BH CV UPPER DUPLEX RIGHT BRACHIAL ART PSV: 61.4 CM/S
BH CV UPPER DUPLEX RIGHT RADIAL ART PSV: 67.3 CM/S
BH CV UPPER DUPLEX RIGHT SUBCLAVIAN ART PSV: 74.3 CM/S
BH CV UPPER DUPLEX RIGHT ULNAR ART PSV: 54.1 CM/S
BH CV XLRA MEAS LEFT PROX SCLA PSV: 76.4 CM/SEC
BH CV XLRA MEAS RIGHT PROX SCLA PSV: 74.3 CM/SEC

## 2025-08-25 PROCEDURE — 93930 UPPER EXTREMITY STUDY: CPT

## 2025-08-25 PROCEDURE — 93930 UPPER EXTREMITY STUDY: CPT | Performed by: INTERNAL MEDICINE

## 2025-08-26 ENCOUNTER — RESULTS FOLLOW-UP (OUTPATIENT)
Dept: FAMILY MEDICINE CLINIC | Facility: CLINIC | Age: 45
End: 2025-08-26
Payer: MEDICAID

## 2025-08-26 ENCOUNTER — TELEPHONE (OUTPATIENT)
Dept: CARDIOLOGY | Facility: CLINIC | Age: 45
End: 2025-08-26
Payer: MEDICAID

## 2025-08-26 DIAGNOSIS — G54.0 THORACIC OUTLET SYNDROME: ICD-10-CM

## 2025-08-26 DIAGNOSIS — R20.2 NUMBNESS AND TINGLING IN LEFT ARM: ICD-10-CM

## 2025-08-26 DIAGNOSIS — R23.0 CYANOSIS: Primary | ICD-10-CM

## 2025-08-26 DIAGNOSIS — R20.0 NUMBNESS AND TINGLING IN LEFT ARM: ICD-10-CM

## 2025-08-27 ENCOUNTER — PRE-ADMISSION TESTING (OUTPATIENT)
Dept: PREADMISSION TESTING | Facility: HOSPITAL | Age: 45
End: 2025-08-27
Payer: MEDICAID

## 2025-08-27 VITALS — WEIGHT: 198.3 LBS | BODY MASS INDEX: 27.76 KG/M2 | HEIGHT: 71 IN

## 2025-08-27 LAB
DEPRECATED RDW RBC AUTO: 50.3 FL (ref 37–54)
ERYTHROCYTE [DISTWIDTH] IN BLOOD BY AUTOMATED COUNT: 15.6 % (ref 12.3–15.4)
HBA1C MFR BLD: 6.1 % (ref 4.8–5.6)
HCT VFR BLD AUTO: 46.4 % (ref 37.5–51)
HGB BLD-MCNC: 16 G/DL (ref 13–17.7)
INR PPP: 0.93 (ref 0.89–1.12)
MCH RBC QN AUTO: 30.1 PG (ref 26.6–33)
MCHC RBC AUTO-ENTMCNC: 34.5 G/DL (ref 31.5–35.7)
MCV RBC AUTO: 87.4 FL (ref 79–97)
PLATELET # BLD AUTO: 160 10*3/MM3 (ref 140–450)
PMV BLD AUTO: 9.2 FL (ref 6–12)
POTASSIUM SERPL-SCNC: 3.9 MMOL/L (ref 3.5–5.2)
PROTHROMBIN TIME: 13 SECONDS (ref 12.2–15.3)
RBC # BLD AUTO: 5.31 10*6/MM3 (ref 4.14–5.8)
WBC NRBC COR # BLD AUTO: 7.64 10*3/MM3 (ref 3.4–10.8)

## 2025-08-27 PROCEDURE — 85610 PROTHROMBIN TIME: CPT

## 2025-08-27 PROCEDURE — 84132 ASSAY OF SERUM POTASSIUM: CPT

## 2025-08-27 PROCEDURE — 83036 HEMOGLOBIN GLYCOSYLATED A1C: CPT

## 2025-08-27 PROCEDURE — 36415 COLL VENOUS BLD VENIPUNCTURE: CPT

## 2025-08-27 PROCEDURE — 85027 COMPLETE CBC AUTOMATED: CPT

## 2025-08-27 RX ORDER — FUROSEMIDE 20 MG/1
TABLET ORAL
COMMUNITY
Start: 2025-07-30

## 2025-08-28 ENCOUNTER — RESULTS FOLLOW-UP (OUTPATIENT)
Dept: FAMILY MEDICINE CLINIC | Facility: CLINIC | Age: 45
End: 2025-08-28
Payer: MEDICAID

## 2025-08-28 ENCOUNTER — HOSPITAL ENCOUNTER (OUTPATIENT)
Dept: CT IMAGING | Facility: HOSPITAL | Age: 45
Discharge: HOME | End: 2025-08-28
Admitting: FAMILY MEDICINE
Payer: MEDICAID

## 2025-08-28 DIAGNOSIS — G54.0 THORACIC OUTLET SYNDROME: ICD-10-CM

## 2025-08-28 DIAGNOSIS — R20.2 NUMBNESS AND TINGLING IN LEFT ARM: ICD-10-CM

## 2025-08-28 DIAGNOSIS — R20.0 NUMBNESS AND TINGLING IN LEFT ARM: ICD-10-CM

## 2025-08-28 DIAGNOSIS — R23.0 CYANOSIS: ICD-10-CM

## 2025-08-28 PROCEDURE — 71275 CT ANGIOGRAPHY CHEST: CPT

## 2025-08-28 PROCEDURE — 25510000001 IOPAMIDOL PER 1 ML: Performed by: FAMILY MEDICINE

## 2025-08-28 RX ORDER — IOPAMIDOL 755 MG/ML
100 INJECTION, SOLUTION INTRAVASCULAR
Status: COMPLETED | OUTPATIENT
Start: 2025-08-28 | End: 2025-08-28

## 2025-08-28 RX ADMIN — IOPAMIDOL 95 ML: 755 INJECTION, SOLUTION INTRAVENOUS at 10:16

## (undated) DEVICE — GW PERIPH GUIDERIGHT STD/EXCHNG/J/TIP SS 0.035IN 5X260CM

## (undated) DEVICE — PK CYSTO-TUR BASIC 10

## (undated) DEVICE — ADULT NASAL CO2 SAMPLING WITH O2 DELIVERY CANNULA FOR CAPNOFLEX MODULE: Brand: VITAL SIGNS™

## (undated) DEVICE — CATH URETRL FLXITP POLLACK STD 5F 70CM

## (undated) DEVICE — INTENDED FOR TISSUE SEPARATION, AND OTHER PROCEDURES THAT REQUIRE A SHARP SURGICAL BLADE TO PUNCTURE OR CUT.: Brand: BARD-PARKER ® STAINLESS STEEL BLADES

## (undated) DEVICE — LEX ELECTRO PHYSIOLOGY: Brand: MEDLINE INDUSTRIES, INC.

## (undated) DEVICE — GLV SURG BIOGEL LTX PF 7

## (undated) DEVICE — CVR PROB ULTRASND/TRANSD W/GEL 7X11IN STRL

## (undated) DEVICE — REDUCER: Brand: ENDOWRIST

## (undated) DEVICE — KT ORCA ORCAPOD DISP STRL

## (undated) DEVICE — ARM DRAPE

## (undated) DEVICE — 1 X VERSACROSS CONNECT TRANSSEPTAL DILATOR;  1 X VERSACROSS RF WIRE (INCLUDING 1 X CONNECTOR CABLE (SINGLE USE)): Brand: VERSACROSS CONNECT ACCESS SOLUTION FOR FARADRIVE

## (undated) DEVICE — DECANT BG O JET

## (undated) DEVICE — DRSNG TELFA PAD NONADH STR 1S 3X8IN

## (undated) DEVICE — JELLY,LUBE,STERILE,FLIP TOP,TUBE,2-OZ: Brand: MEDLINE

## (undated) DEVICE — SUCTION IRRIGATOR: Brand: ENDOWRIST

## (undated) DEVICE — MODEL BT2000 P/N 700287-012KIT CONTENTS: MANIFOLD WITH SALINE AND CONTRAST PORTS, SALINE TUBING WITH SPIKE AND HAND SYRINGE, TRANSDUCER: Brand: BT2000 AUTOMATED MANIFOLD KIT

## (undated) DEVICE — SOLIDIFIER LIQ PREMISORB 1500CC

## (undated) DEVICE — UNDRGLV SURG BIOGEL PUNCTUREINDICATION SZ7 PF STRL

## (undated) DEVICE — UNDERGLV SURG BIOGEL INDICATOR LF PF 7.5

## (undated) DEVICE — BLADELESS OBTURATOR: Brand: WECK VISTA

## (undated) DEVICE — ST INF PRI SMRTSTE 20DRP 2VLV 24ML 117

## (undated) DEVICE — SYR LUERLOK 30CC

## (undated) DEVICE — Device

## (undated) DEVICE — ELECTRD RETRN/GRND MEGADYNE SGL/PLT W/CORD 9FT DISP

## (undated) DEVICE — RADIFOCUS GLIDEWIRE ADVANTAGE GUIDEWIRE: Brand: GLIDEWIRE ADVANTAGE

## (undated) DEVICE — PRESSURE MONITORING SET: Brand: TRUWAVE

## (undated) DEVICE — RESERVOIR,SUCTION,100CC,SILICONE: Brand: MEDLINE

## (undated) DEVICE — SOL NACL 0.9PCT 1000ML

## (undated) DEVICE — DRAIN JACKSON PRATT ROUND 15FR: Brand: CARDINAL HEALTH

## (undated) DEVICE — ENDOPATH XCEL BLADELESS TROCARS WITH STABILITY SLEEVES: Brand: ENDOPATH XCEL

## (undated) DEVICE — YANKAUER,BULB TIP,W/O VENT,RIGID,STERILE: Brand: MEDLINE

## (undated) DEVICE — DEV COMPR RADL PRELUDESYNCEZ 30ML 32CM

## (undated) DEVICE — SYR LL TP 10ML STRL

## (undated) DEVICE — GLV SURG PREMIERPRO GAMMEX NEOPRN PF SZ7.5 GRN

## (undated) DEVICE — Device: Brand: WEBSTER CS

## (undated) DEVICE — Device: Brand: MEDEX

## (undated) DEVICE — DRSNG SURESITE123 4X4.8IN

## (undated) DEVICE — CATHETER CONNECTION CABLE: Brand: FARASTAR™

## (undated) DEVICE — TUBING, SUCTION, 1/4" X 10', STRAIGHT: Brand: MEDLINE

## (undated) DEVICE — ST EXT IV SMRTSTE 2VLV FIX M LL 6ML 41

## (undated) DEVICE — 450 ML BOTTLE OF 0.05% CHLORHEXIDINE GLUCONATE IN 99.95% STERILE WATER FOR IRRIGATION, USP AND APPLICATOR.: Brand: IRRISEPT ANTIMICROBIAL WOUND LAVAGE

## (undated) DEVICE — INTRO ACCSR BLNT TP

## (undated) DEVICE — BLANKT WARM UPPR/BDY ARM/OUT 57X196CM

## (undated) DEVICE — SUT VIC 0 UR6 27IN VCP603H

## (undated) DEVICE — INTRO SHEATH ENGAGE W/50 GW .038 8F12

## (undated) DEVICE — CAUTERY TIP POLISHER: Brand: DEVON

## (undated) DEVICE — PENCL SMOKE/EVAC MEGADYNE TELESCP 10FT

## (undated) DEVICE — CONTN GRAD MEAS TRIANG 32OZ BLK

## (undated) DEVICE — TRENDELENBURG WINGPAD POSITIONING KIT DELUXE - WITHOUT BODY STRAP: Brand: SOULE MEDICAL

## (undated) DEVICE — INTRO TEAR AWAY/LVD W/SD PRT 8F 13CM

## (undated) DEVICE — COLUMN DRAPE

## (undated) DEVICE — MONOPOLAR METZENBAUM SCISSOR TIP, DISPOSABLE: Brand: MONOPOLAR METZENBAUM SCISSOR TIP, DISPOSABLE

## (undated) DEVICE — TIP COVER ACCESSORY

## (undated) DEVICE — CATHETER,URETHRAL,REDRUBBER,STRL,18FR: Brand: MEDLINE

## (undated) DEVICE — ADULT, W/LG. BACK PAD, RADIOTRANSPARENT ELEMENT AND LEAD WIRE: Brand: DEFIBRILLATION ELECTRODES

## (undated) DEVICE — INTRO SHEATH FAST/CATH LG/LUM 11F .038IN 12CM

## (undated) DEVICE — TRAP FLD MINIVAC MEGADYNE 100ML

## (undated) DEVICE — DRAPE,TOP,102X53,STERILE: Brand: MEDLINE

## (undated) DEVICE — LEGGINGS, PAIR, 29X43, STERILE: Brand: MEDLINE

## (undated) DEVICE — HI-TORQUE WHISPER MS GUIDE WIRE .014 STRAIGHT TIP 3.0 CM X 190 CM: Brand: HI-TORQUE WHISPER

## (undated) DEVICE — INTRO SHEATH PRELUDE EASE HC .025 6F 11CM

## (undated) DEVICE — STERILE (15.2 TAPERED TO 7.6 X 183CM) POLYETHYLENE ACCORDION-FOLDED COVER FOR USE WITH SIEMENS ACUNAV ULTRASOUND CATHETER FAMILY CONNECTOR: Brand: SWIFTLINK TRANSDUCER COVER

## (undated) DEVICE — SET PRIMARY GRVTY 10DP MALE LL 104IN

## (undated) DEVICE — ST EXT IV SMARTSITE PINCH/CLMP 5ML 46CM

## (undated) DEVICE — FIRST STEP BEDSIDE ADD WATER KIT - RESEALABLE STAND-UP POUCH, ENDOSCOPIC CLEANING PAD - 1 POUCH: Brand: FIRST STEP BEDSIDE ADD WATER KIT - RESEALABLE STAND-UP POUCH, ENDOSCOPIC CLEANIN

## (undated) DEVICE — KT MANIFLD EP

## (undated) DEVICE — MODEL AT P65, P/N 701554-001KIT CONTENTS: HAND CONTROLLER, 3-WAY HIGH-PRESSURE STOPCOCK WITH ROTATING END AND PREMIUM HIGH-PRESSURE TUBING: Brand: ANGIOTOUCH® KIT

## (undated) DEVICE — LUBE GEL ENDOGLIDE 1.1OZ

## (undated) DEVICE — LIMB HOLDER, WRIST/ANKLE: Brand: DEROYAL

## (undated) DEVICE — DOME MONITORING W BONDED STPCK BIOTRANS2

## (undated) DEVICE — LUBE JELLY FOIL PACK 1.4 OZ: Brand: MEDLINE INDUSTRIES, INC.

## (undated) DEVICE — PATIENT RETURN ELECTRODE, SINGLE-USE, CONTACT QUALITY MONITORING, ADULT, WITH 9FT CORD, FOR PATIENTS WEIGING OVER 33LBS. (15KG): Brand: MEGADYNE

## (undated) DEVICE — LAPAROSCOPIC ACCESS SYSTEM: Brand: ALEXIS LAPAROSCOPIC SYSTEM

## (undated) DEVICE — SYS CLS VASC/VENI VASCADE MVP 6TO12F

## (undated) DEVICE — PULSED FIELD ABLATION CATHETER: Brand: FARAWAVE™

## (undated) DEVICE — STPCK 4WY ON/OFF VLV M/COLAR FIT 45PSI STRL

## (undated) DEVICE — SPNG LAP PREWSH SFTPK 18X18IN STRL PK/5

## (undated) DEVICE — LOCATION REFERENCE PATCH KIT: Brand: RHYTHMIA™ MAPPING SYSTEM

## (undated) DEVICE — IRRIGATOR BULB ASEPTO 60CC STRL

## (undated) DEVICE — HYBRID CO2 TUBING/CAP SET FOR OLYMPUS® SCOPES & CO2 SOURCE: Brand: ERBE

## (undated) DEVICE — SYR LUERLOK 50ML

## (undated) DEVICE — CATH ULTRASND ECHOCARDIOGRAPHY ACUNAV

## (undated) DEVICE — KT 2 CONTRST ADMIN

## (undated) DEVICE — STEERABLE SHEATH CLEAR: Brand: FARADRIVE™

## (undated) DEVICE — MINI ENDOCUT SCISSOR TIP, DISPOSABLE: Brand: RENEW

## (undated) DEVICE — MEDI-VAC YANKAUER SUCTION HANDLE W/BULBOUS TIP: Brand: CARDINAL HEALTH

## (undated) DEVICE — SEAL

## (undated) DEVICE — LAPAROVUE VISIBILITY SYSTEM LAPAROSCOPIC SOLUTIONS: Brand: LAPAROVUE

## (undated) DEVICE — CATH DIAG EXPO M/ PK 5F FL4/FR4 PIG

## (undated) DEVICE — SAFELINER SUCTION CANISTER 1000CC: Brand: DEROYAL

## (undated) DEVICE — ADULT, W/LG. BACK PAD, RADIOTRANSPARENT ELEMENT AND LEAD WIRE COMPATIBLE W/: Brand: DEFIBRILLATION ELECTRODES

## (undated) DEVICE — STAPLER 60: Brand: SUREFORM

## (undated) DEVICE — PK LAP LASR CHOLE 10

## (undated) DEVICE — INTRO TEAR AWAY/LVD W/SD PRT 7F 13CM

## (undated) DEVICE — CATH DIAG EXPO .045 FL3.5 5F 100CM

## (undated) DEVICE — GUIDE CORNRY SIN JUMBO STD 9F 40CM

## (undated) DEVICE — TBG PENCL TELESCP MEGADYNE SMOKE EVAC 10FT

## (undated) DEVICE — PK CATH CARD 10

## (undated) DEVICE — SOL ANTISTICK CAUTRY ELECTROLUBE LF

## (undated) DEVICE — MARYLAND JAW LAPAROSCOPIC SEALER/DIVIDER COATED: Brand: LIGASURE

## (undated) DEVICE — INTRO LAT VEIN WORLEY ADV RENAL 5.5F 62CM

## (undated) DEVICE — SOL IRR H2O BO 1000ML STRL

## (undated) DEVICE — AIR/WATER CLEANING VALVES: Brand: AIR/WATER CLEANING VALVES

## (undated) DEVICE — SUT PDS O CT1 CR/8 18IN Z740D

## (undated) DEVICE — SUT MNCRYL PLS ANTIB UD 4/0 PS2 18IN

## (undated) DEVICE — SYS CLS VASC/VENI VASCADE/MVP 10TO12F XL

## (undated) DEVICE — SUT PROLN 2/0 PC3 8833H